# Patient Record
Sex: FEMALE | HISPANIC OR LATINO | Employment: FULL TIME | URBAN - METROPOLITAN AREA
[De-identification: names, ages, dates, MRNs, and addresses within clinical notes are randomized per-mention and may not be internally consistent; named-entity substitution may affect disease eponyms.]

---

## 2017-09-29 ENCOUNTER — OFFICE VISIT (OUTPATIENT)
Dept: FAMILY MEDICINE | Facility: CLINIC | Age: 47
End: 2017-09-29
Payer: MEDICAID

## 2017-09-29 VITALS
TEMPERATURE: 98.1 F | OXYGEN SATURATION: 98 % | BODY MASS INDEX: 31.57 KG/M2 | HEIGHT: 61 IN | WEIGHT: 167.2 LBS | SYSTOLIC BLOOD PRESSURE: 119 MMHG | HEART RATE: 52 BPM | DIASTOLIC BLOOD PRESSURE: 78 MMHG

## 2017-09-29 DIAGNOSIS — Z00.00 ROUTINE HISTORY AND PHYSICAL EXAMINATION OF ADULT: Primary | ICD-10-CM

## 2017-09-29 DIAGNOSIS — Z12.31 ENCOUNTER FOR SCREENING MAMMOGRAM FOR BREAST CANCER: ICD-10-CM

## 2017-09-29 DIAGNOSIS — Z23 NEED FOR PROPHYLACTIC VACCINATION AND INOCULATION AGAINST INFLUENZA: ICD-10-CM

## 2017-09-29 DIAGNOSIS — E03.9 HYPOTHYROIDISM, UNSPECIFIED TYPE: ICD-10-CM

## 2017-09-29 PROCEDURE — G0476 HPV COMBO ASSAY CA SCREEN: HCPCS | Performed by: FAMILY MEDICINE

## 2017-09-29 PROCEDURE — G0123 SCREEN CERV/VAG THIN LAYER: HCPCS | Performed by: FAMILY MEDICINE

## 2017-09-29 PROCEDURE — 90686 IIV4 VACC NO PRSV 0.5 ML IM: CPT | Performed by: FAMILY MEDICINE

## 2017-09-29 PROCEDURE — G0145 SCR C/V CYTO,THINLAYER,RESCR: HCPCS | Performed by: FAMILY MEDICINE

## 2017-09-29 PROCEDURE — 87624 HPV HI-RISK TYP POOLED RSLT: CPT | Performed by: FAMILY MEDICINE

## 2017-09-29 PROCEDURE — 90471 IMMUNIZATION ADMIN: CPT | Performed by: FAMILY MEDICINE

## 2017-09-29 PROCEDURE — 84443 ASSAY THYROID STIM HORMONE: CPT | Performed by: FAMILY MEDICINE

## 2017-09-29 PROCEDURE — 36415 COLL VENOUS BLD VENIPUNCTURE: CPT | Performed by: FAMILY MEDICINE

## 2017-09-29 PROCEDURE — 99386 PREV VISIT NEW AGE 40-64: CPT | Mod: 25 | Performed by: FAMILY MEDICINE

## 2017-09-29 RX ORDER — LEVOTHYROXINE SODIUM 25 MCG
25 TABLET ORAL DAILY
Qty: 90 TABLET | Refills: 3 | Status: SHIPPED | OUTPATIENT
Start: 2017-09-29 | End: 2018-01-30

## 2017-09-29 NOTE — MR AVS SNAPSHOT
After Visit Summary   9/29/2017    Saray Huntley    MRN: [id number]           Patient Information     Date Of Birth          1970        Visit Information        Provider Department      9/29/2017 2:00 PM Jeannie Ross [place] [facility] [place]        Today's Diagnoses     Routine history and physical examination of adult    -  1    Hypothyroidism, unspecified type        Encounter for screening mammogram for breast cancer          Care Instructions      Preventive Health Recommendations  Female Ages 40 to 49    Yearly exam:     See your health care provider every year in order to  1. Review health changes.   2. Discuss preventive care.    3. Review your medicines if your doctor prescribed any.      Get a Pap test every three years (unless you have an abnormal result and your provider advises testing more often).      If you get Pap tests with HPV test, you only need to test every 5 years, unless you have an abnormal result. You do not need a Pap test if your uterus was removed (hysterectomy) and you have not had cancer.      You should be tested each year for STDs (sexually transmitted diseases), if you're at risk.       Ask your doctor if you should have a mammogram.      Have a colonoscopy (test for colon cancer) if someone in your family has had colon cancer or polyps before age 50.       Have a cholesterol test every 5 years.       Have a diabetes test (fasting glucose) after age 45. If you are at risk for diabetes, you should have this test every 3 years.    Shots: Get a flu shot each year. Get a tetanus shot every 10 years.     Nutrition:     Eat at least 5 servings of fruits and vegetables each day.    Eat whole-grain bread, whole-wheat pasta and brown rice instead of white grains and rice.    Talk to your provider about Calcium and Vitamin D.     Lifestyle    Exercise at least 150 minutes a week (an average of 30 minutes a day, 5 days a week). This will help you control your weight  "and prevent disease.    Limit alcohol to one drink per day.    No smoking.     Wear sunscreen to prevent skin cancer.    See your dentist every six months for an exam and cleaning.          Follow-ups after your visit        Follow-up notes from your care team     Return in about 1 year (around 2018) for Annual Physical Exam.      Future tests that were ordered for you today     Open Future Orders        Priority Expected Expires Ordered    *MA Screening Digital Bilateral Routine  2018            Who to contact     If you have questions or need follow up information about today's clinic visit or your schedule please contact [facility] [place] directly at [phone].  Normal or non-critical lab and imaging results will be communicated to you by MyChart, letter or phone within 4 business days after the clinic has received the results. If you do not hear from us within 7 days, please contact the clinic through [employer]hart or phone. If you have a critical or abnormal lab result, we will notify you by phone as soon as possible.  Submit refill requests through [employer] or call your pharmacy and they will forward the refill request to us. Please allow 3 business days for your refill to be completed.          Additional Information About Your Visit        MyChart Information     [employer] lets you send messages to your doctor, view your test results, renew your prescriptions, schedule appointments and more. To sign up, go to [email or web address] . Click on \"Log in\" on the left side of the screen, which will take you to the Welcome page. Then click on \"Sign up Now\" on the right side of the page.     You will be asked to enter the access code listed below, as well as some personal information. Please follow the directions to create your username and password.     Your access code is: [email or web address]  Expires: 2017  2:43 PM     Your access code will  in 90 days. If you need help or a new " "code, please call your [place] clinic or [phone].        Care EveryWhere ID     This is your Care EveryWhere ID. This could be used by other organizations to access your [place] medical records  [id number]        Your Vitals Were     Pulse Temperature Height Last Period Pulse Oximetry BMI (Body Mass Index)    52 98.1  F (36.7  C) (Tympanic) 5' 0.75\" (1.543 m) 09/29/2012 (Approximate) 98% 31.85 kg/m2       Blood Pressure from Last 3 Encounters:   09/29/17 119/78    Weight from Last 3 Encounters:   09/29/17 167 lb 3.2 oz (75.8 kg)              We Performed the Following     HPV High Risk Types DNA Cervical     Pap imaged thin layer screen with HPV - recommended age 30 - 65 years (select HPV order below)     TSH          Today's Medication Changes          These changes are accurate as of: 9/29/17  2:43 PM.  If you have any questions, ask your nurse or doctor.               Start taking these medicines.        Dose/Directions    SYNTHROID 25 MCG tablet   Used for:  Hypothyroidism, unspecified type   Generic drug:  levothyroxine   Replaces:  SYNTHROID PO   Started by:  Jeannie Ross MD        Dose:  25 mcg   Take 1 tablet (25 mcg) by mouth daily BRAND NAME ONLY   Quantity:  90 tablet   Refills:  3         Stop taking these medicines if you haven't already. Please contact your care team if you have questions.     SYNTHROID PO   Replaced by:  SYNTHROID 25 MCG tablet   Stopped by:  Jeannie Ross MD                Where to get your medicines      These [facility] - [place], [place] - [address] AT [facility] OF  [address]  [address] [facility], [place] [facility] [address]     Phone:  [phone]     SYNTHROID 25 MCG tablet                Primary Care Provider    None Specified       No primary provider on file.        Equal Access to Services     SAMANTA GABRIEL: Rosendo Acevedo, wachenteda luemiladaha, qaybta kaalmada adeanetayaantonina, esha anand " alissa villelatonymedina daveyTomasaanick ah. So [facility] [phone].    ATENCIÓN: Si habla irving, tiene a marquez disposición servicios gratuitos de asistencia lingüística. Laney al [phone].    We comply with applicable federal civil rights laws and [place] laws. We do not discriminate on the basis of race, color, national origin, age, disability, sex, sexual orientation, or gender identity.            Thank you!     Thank you for [facility]  for your care. Our goal is always to provide you with excellent care. Hearing back from our patients is one way we can continue to improve our services. Please take a few minutes to complete the written survey that you may receive in the mail after your visit with us. Thank you!             Your Updated Medication List - Protect others around you: Learn how to safely use, store and throw away your medicines at www.disposemymeds.org.          This list is accurate as of: 9/29/17  2:43 PM.  Always use your most recent med list.                   Brand Name Dispense Instructions for use Diagnosis    SYNTHROID 25 MCG tablet   Generic drug:  levothyroxine     90 tablet    Take 1 tablet (25 mcg) by mouth daily BRAND NAME ONLY    Hypothyroidism, unspecified type

## 2017-09-29 NOTE — NURSING NOTE
"Chief Complaint   Patient presents with     Physical     Recheck Medication       Initial /78 (BP Location: Right arm, Patient Position: Sitting, Cuff Size: Adult Large)  Pulse 52  Temp 98.1  F (36.7  C) (Tympanic)  Ht 5' 0.75\" (1.543 m)  Wt 167 lb 3.2 oz (75.8 kg)  LMP 09/29/2012 (Approximate)  SpO2 98%  BMI 31.85 kg/m2 Estimated body mass index is 31.85 kg/(m^2) as calculated from the following:    Height as of this encounter: 5' 0.75\" (1.543 m).    Weight as of this encounter: 167 lb 3.2 oz (75.8 kg).  Medication Reconciliation: complete   Mackenzie Kwong CMA    "

## 2017-09-29 NOTE — LETTER
October 10, 2017    Saray Huntley  [address]  [place] [address]    Dear Saray,  We are happy to inform you that your PAP smear result from 9/29/17 is normal.  We are now able to do a follow up test on PAP smears. The DNA test is for HPV (Human Papilloma Virus). Cervical cancer is closely linked with certain types of HPV. Your result showed no evidence of high risk HPV.  Therefore we recommend you return in 3 years for your next pap smear.  You will still need to return to the clinic every year for an annual exam and other preventive tests.  Please contact the clinic at [phone] with any questions.  Sincerely,    Jeannie Ross MD/jim

## 2017-09-29 NOTE — LETTER
October 1, 2017      Saray Huntley  [address] [address]        Dear as,    I have reviewed your recent labs. Here are the results:    -TSH (thyroid stimulating hormone) level is normal which indicates appropriate thyroid replacement dosing.  ADVISE: continuing same replacement dose and recheck in 12 months (TSH w/ T4 reflex, DX: hypothyroidism.)        Resulted Orders   TSH   Result Value Ref Range    TSH 2.66 0.40 - 4.00 mU/L       If you have any questions or concerns, please call the clinic at the number listed above.       Sincerely,        Jeannie Ross MD

## 2017-09-29 NOTE — NURSING NOTE
Prior to injection verified patient identity using patient's name and date of birth.  Per orders of Dr. Ross, injection of Flu vaccine given by Mackenzie Culver. Patient instructed to remain in clinic for 15 minutes afterwards, and to report any adverse reaction to me immediately.  Mackenzie Kwong, CMA

## 2017-09-29 NOTE — Clinical Note
Please abstract the following data from this visit with this patient into the appropriate field in Epic:  Mammogram done on this date: 11/1/16 (approximately), by this group: [place] [facility], results were normal Mackenzie Kwong CMA.

## 2017-09-30 LAB — TSH SERPL DL<=0.005 MIU/L-ACNC: 2.66 MU/L (ref 0.4–4)

## 2017-10-02 ENCOUNTER — TELEPHONE (OUTPATIENT)
Dept: FAMILY MEDICINE | Facility: CLINIC | Age: 47
End: 2017-10-02

## 2017-10-02 NOTE — TELEPHONE ENCOUNTER
Reported that generic synthroid - has side effect, headache and nausea  Has not tried other medication and does not wish to try as she is very sensitive to medication     Matilde Ching RN

## 2017-10-02 NOTE — TELEPHONE ENCOUNTER
Left message for pt to call back.    Need to know why she needs brand name synthroid.  What else has she tried? Or has she had bad side effects from other medication or generic synthroid.    Once pt calls back will proceed with RONALD Gonzales CMA

## 2017-10-02 NOTE — TELEPHONE ENCOUNTER
Fax from pt's pharmacy stating that her synthroid isn't covered by her insurance.    Do you want to change med or proceed with PA?    Reta Gonzales [facility]      Insurance # [phone]  Pt ID# [id number]

## 2017-10-02 NOTE — TELEPHONE ENCOUNTER
Patient requested from brand name only.  Please do a PA.     Jeannie Ross MD  [facility] [place]

## 2017-10-03 ENCOUNTER — TELEPHONE (OUTPATIENT)
Dept: LAB | Facility: CLINIC | Age: 47
End: 2017-10-03

## 2017-10-03 DIAGNOSIS — Z13.220 LIPID SCREENING: Primary | ICD-10-CM

## 2017-10-03 DIAGNOSIS — Z13.1 SCREENING FOR DIABETES MELLITUS: ICD-10-CM

## 2017-10-03 DIAGNOSIS — Z13.0 SCREENING FOR DEFICIENCY ANEMIA: ICD-10-CM

## 2017-10-04 DIAGNOSIS — Z13.220 LIPID SCREENING: ICD-10-CM

## 2017-10-04 DIAGNOSIS — Z13.0 SCREENING FOR DEFICIENCY ANEMIA: ICD-10-CM

## 2017-10-04 DIAGNOSIS — Z13.1 SCREENING FOR DIABETES MELLITUS: ICD-10-CM

## 2017-10-04 LAB
ALBUMIN SERPL-MCNC: 4.1 G/DL (ref 3.4–5)
ALP SERPL-CCNC: 104 U/L (ref 40–150)
ALT SERPL W P-5'-P-CCNC: 74 U/L (ref 0–50)
ANION GAP SERPL CALCULATED.3IONS-SCNC: 8 MMOL/L (ref 3–14)
AST SERPL W P-5'-P-CCNC: 43 U/L (ref 0–45)
BILIRUB SERPL-MCNC: 0.5 MG/DL (ref 0.2–1.3)
BUN SERPL-MCNC: 15 MG/DL (ref 7–30)
CALCIUM SERPL-MCNC: 9.3 MG/DL (ref 8.5–10.1)
CHLORIDE SERPL-SCNC: 105 MMOL/L (ref 94–109)
CHOLEST SERPL-MCNC: 207 MG/DL
CO2 SERPL-SCNC: 26 MMOL/L (ref 20–32)
COPATH REPORT: NORMAL
CREAT SERPL-MCNC: 0.69 MG/DL (ref 0.52–1.04)
GFR SERPL CREATININE-BSD FRML MDRD: 90 ML/MIN/1.7M2
GLUCOSE SERPL-MCNC: 106 MG/DL (ref 70–99)
HDLC SERPL-MCNC: 57 MG/DL
HGB BLD-MCNC: 13.6 G/DL (ref 11.7–15.7)
LDLC SERPL CALC-MCNC: 131 MG/DL
NONHDLC SERPL-MCNC: 150 MG/DL
PAP: NORMAL
POTASSIUM SERPL-SCNC: 3.7 MMOL/L (ref 3.4–5.3)
PROT SERPL-MCNC: 7.8 G/DL (ref 6.8–8.8)
SODIUM SERPL-SCNC: 139 MMOL/L (ref 133–144)
TRIGL SERPL-MCNC: 94 MG/DL

## 2017-10-04 PROCEDURE — 36415 COLL VENOUS BLD VENIPUNCTURE: CPT | Performed by: FAMILY MEDICINE

## 2017-10-04 PROCEDURE — 85018 HEMOGLOBIN: CPT | Performed by: FAMILY MEDICINE

## 2017-10-04 PROCEDURE — 80061 LIPID PANEL: CPT | Performed by: FAMILY MEDICINE

## 2017-10-04 PROCEDURE — 80053 COMPREHEN METABOLIC PANEL: CPT | Performed by: FAMILY MEDICINE

## 2017-10-09 LAB
FINAL DIAGNOSIS: NORMAL
HPV HR 12 DNA CVX QL NAA+PROBE: NEGATIVE
HPV16 DNA SPEC QL NAA+PROBE: NEGATIVE
HPV18 DNA SPEC QL NAA+PROBE: NEGATIVE
SPECIMEN DESCRIPTION: NORMAL

## 2017-10-11 NOTE — TELEPHONE ENCOUNTER
Approved through 9/29/17 - 10/31/17.    Insurance company states looks like pt is starting a new insurance company 11/1/17.    Pt notified of approval.    PA [id number]    Reta Gonzales CMA

## 2017-10-18 ENCOUNTER — RADIANT APPOINTMENT (OUTPATIENT)
Dept: MAMMOGRAPHY | Facility: CLINIC | Age: 47
End: 2017-10-18
Attending: FAMILY MEDICINE
Payer: MEDICAID

## 2017-10-18 DIAGNOSIS — Z12.31 ENCOUNTER FOR SCREENING MAMMOGRAM FOR BREAST CANCER: ICD-10-CM

## 2017-10-18 PROCEDURE — G0202 SCR MAMMO BI INCL CAD: HCPCS | Mod: TC

## 2017-11-09 ENCOUNTER — TELEPHONE (OUTPATIENT)
Dept: FAMILY MEDICINE | Facility: CLINIC | Age: 47
End: 2017-11-09

## 2017-11-09 DIAGNOSIS — R92.8 ABNORMAL MAMMOGRAM: Primary | ICD-10-CM

## 2017-11-09 DIAGNOSIS — E03.9 HYPOTHYROIDISM, UNSPECIFIED TYPE: ICD-10-CM

## 2017-11-09 NOTE — TELEPHONE ENCOUNTER
Reason for Call:  Medication or medication refill:    Do you use a [facility]?  Name [facility] the [phone]    Name of the medication requested: SYNTHROID 25 MCG tablet    Other request: Patient needs an authorization form sent to her insurance company so that she can get a refill on her medication. She dont know the fax# to her insurance company    Can we leave a detailed message on this number? Not Applicable    Phone number patient can be reached at: [phone] - [employer]   Best Time: Anytime   Call taken on 11/9/2017 at 9:46 AM by Barb Carrillo

## 2017-11-09 NOTE — TELEPHONE ENCOUNTER
Dr Ross- if patient is having a DX U/s does she need a  3D mammo? I thought 3D was just for screening? Please advise. Thanks    Linda Valentine  Referral Coordinator

## 2017-11-10 NOTE — TELEPHONE ENCOUNTER
Lawanda from Breast center calling and states confusion with patient and completing imaging.  3D Mammo can be used in this patient's case for further imaging on Left breast concern.  Recommended this imaging be used to further evaluate concern with densities to left breast.  Provider ordered correct imaging with Tomography and breast center agrees with this first order.  Patient has orders for the Mammogram and the issue is with patient and her insurance coverage as she would not sign form to cover any out of pocket expenses if insurance would not cover the 3D.      Patient was confused about if her insurance covered this imaging or not and would not agree to the 3D due to possible not coverage and was told by her insurance she would need a prior authorization for 3D imaging.  So if patient wants to pay out of pocket to have 3D she can have imaging  Or she will need to continue with the 2D imaging if she wants insurance coverage.     I attempted to call the number to insurance that patient left to see if I could find out more about her coverage and it was for medication PA.  Could not get through to anyone to find out about medical benefit coverage.    Breast center call back number for Lawanda - [phone]  Lucy Raya RN - Triage  [facility]

## 2017-11-13 RX ORDER — LEVOTHYROXINE SODIUM 25 MCG
25 TABLET ORAL DAILY
Qty: 90 TABLET | Refills: 3 | OUTPATIENT
Start: 2017-11-13

## 2017-11-13 NOTE — TELEPHONE ENCOUNTER
Patient has refills on file.     Routing to team to address message below. Does patient need a PA? Please address today, encounter is from 11/9/17.     Ilana Henry RN   [facility] - Triage

## 2017-11-13 NOTE — TELEPHONE ENCOUNTER
PA Faxed to [facility] for 3D imaging of left breast  Lucy Raya RN - Triage  [facility]

## 2017-11-14 ENCOUNTER — TELEPHONE (OUTPATIENT)
Dept: FAMILY MEDICINE | Facility: CLINIC | Age: 47
End: 2017-11-14

## 2017-11-14 NOTE — TELEPHONE ENCOUNTER
PA from [facility] approved for name brand synthroid. DAYSI Salazar LPN    Patient was notified. DAYSI Salazar LPN

## 2017-11-14 NOTE — TELEPHONE ENCOUNTER
Updated Status: APPROVED; Coverage Start Date 11/12/2017 End Date: 11/13/2018.   Pharmacy is currently closed. Will call and inform both patient and pharmacy tech as soon as pharmacy opens. Thank you.   Amber Marina MA

## 2018-01-30 ENCOUNTER — OFFICE VISIT (OUTPATIENT)
Dept: FAMILY MEDICINE | Facility: CLINIC | Age: 48
End: 2018-01-30
Payer: COMMERCIAL

## 2018-01-30 VITALS
BODY MASS INDEX: 34.17 KG/M2 | HEIGHT: 61 IN | SYSTOLIC BLOOD PRESSURE: 118 MMHG | WEIGHT: 181 LBS | DIASTOLIC BLOOD PRESSURE: 64 MMHG | TEMPERATURE: 98.9 F | HEART RATE: 57 BPM | OXYGEN SATURATION: 97 %

## 2018-01-30 DIAGNOSIS — S29.012A UPPER BACK STRAIN, INITIAL ENCOUNTER: Primary | ICD-10-CM

## 2018-01-30 DIAGNOSIS — L91.8 SKIN TAG: ICD-10-CM

## 2018-01-30 DIAGNOSIS — E03.9 HYPOTHYROIDISM, UNSPECIFIED TYPE: ICD-10-CM

## 2018-01-30 PROCEDURE — 99214 OFFICE O/P EST MOD 30 MIN: CPT | Performed by: FAMILY MEDICINE

## 2018-01-30 NOTE — MR AVS SNAPSHOT
After Visit Summary   1/30/2018    Saray Huntley    MRN: [id number]           Patient Information     Date Of Birth          1970        Visit Information        Provider Department      1/30/2018 4:20 PM Jeannie Ross MD [facility] [place]        Today's Diagnoses     Hypothyroidism, unspecified type    -  1    Skin tag        Upper back strain, initial encounter           Follow-ups after your visit        Additional Services     DERMATOLOGY REFERRAL       Your provider has referred you to: [facility] [place] [phone]   [email or web address]    Please be aware that coverage of these services is subject to the terms and limitations of your health insurance plan.  Call member services at your health plan with any benefit or coverage questions.      Please bring the following with you to your appointment:    (1) Any X-Rays, CTs or MRIs which have been performed.  Contact the facility where they were done to arrange for  prior to your scheduled appointment.    (2) List of current medications  (3) This referral request   (4) Any documents/labs given to you for this referral                  Follow-up notes from your care team     Return in about 2 weeks (around 2/13/2018) for Lab only visit.      Your next 10 appointments already scheduled     Feb 15, 2018  8:20 AM CST   LAB with EC LAB   [facility] [place] ([facility]    [address] [place] [id number] [phone]           OUTSIDE LABS: Please include name of facility and Physician that is requesting outside labs be drawn.  Please indicate if labs are fasting or non-fasting on appt notes.  Be as specific as you can on which labs are being drawn.              Future tests that were ordered for you today     Open Future Orders        Priority Expected Expires Ordered    TSH with free T4 reflex Routine  1/30/2019 1/30/2018            Who to  "contact     If you have questions or need follow up information about today's clinic visit or your schedule please contact [facility] [place] directly at [phone].  Normal or non-critical lab and imaging results will be communicated to you by MyChart, letter or phone within 4 business days after the clinic has received the results. If you do not hear from us within 7 days, please contact the clinic through MyChart or phone. If you have a critical or abnormal lab result, we will notify you by phone as soon as possible.  Submit refill requests through [employer] or call your pharmacy and they will forward the refill request to us. Please allow 3 business days for your refill to be completed.          Additional Information About Your Visit        [employer] Information     [employer] lets you send messages to your doctor, view your test results, renew your prescriptions, schedule appointments and more. To sign up, go to [email or web address] . Click on \"Log in\" on the left side of the screen, which will take you to the Welcome page. Then click on \"Sign up Now\" on the right side of the page.     You will be asked to enter the access code listed below, as well as some personal information. Please follow the directions to create your username and password.     Your access code is: [email or web address]  Expires: 2018  4:55 PM     Your access code will  in 90 days. If you need help or a new code, please call your [place] clinic or [phone].        Care EveryWhere ID     This is your Care EveryWhere ID. This could be used by other organizations to access your [place] medical records  [id number]        Your Vitals Were     Pulse Temperature Height Last Period Pulse Oximetry BMI (Body Mass Index)    57 98.9  F (37.2  C) (Tympanic) 5' 0.75\" (1.543 m) 2012 (Approximate) 97% 34.48 kg/m2       Blood Pressure from Last 3 Encounters:   18 118/64   17 119/78    Weight from Last 3 Encounters: "   01/30/18 181 lb (82.1 kg)   09/29/17 167 lb 3.2 oz (75.8 kg)              We Performed the Following     DERMATOLOGY REFERRAL          Today's Medication Changes          These changes are accurate as of 1/30/18  5:17 PM.  If you have any questions, ask your nurse or doctor.               Stop taking these medicines if you haven't already. Please contact your care team if you have questions.     SYNTHROID 25 MCG tablet   Generic drug:  levothyroxine   Stopped by:  Jeannie Ross MD                    Primary Care Provider Office Phone # Fax #    Jeannie Ross [phone]       [address]  [place] [address]        Equal Access to Services     [facility]: Hadyeyo Acevedo, wacindy smith, qaybta kaalmada xenia, esha bennett . So [facility] [phone].    ATENCIÓN: Si habla español, tiene a marquez disposición servicios gratuitos de asistencia lingüística. Llame al [phone].    We comply with applicable federal civil rights laws and [place] laws. We do not discriminate on the basis of race, color, national origin, age, disability, sex, sexual orientation, or gender identity.            Thank you!     Thank you for choosing [facility] [place]  for your care. Our goal is always to provide you with excellent care. Hearing back from our patients is one way we can continue to improve our services. Please take a few minutes to complete the written survey that you may receive in the mail after your visit with us. Thank you!             Your Updated Medication List - Protect others around you: Learn how to safely use, store and throw away your medicines at www.disposemymeds.org.      Notice  As of 1/30/2018  5:17 PM    You have not been prescribed any medications.

## 2018-01-30 NOTE — PROGRESS NOTES
SUBJECTIVE:   Saray Huntley is a 48 year old female who presents to clinic today for the following health issues:      Back Pain       Duration: 1 week        Specific cause: shoveling     Description:   Location of pain: upper back right  Character of pain: dull ache and pinch  Pain radiation:none  New numbness or weakness in legs, not attributed to pain:  no     Intensity: moderate    History:   Pain interferes with job: No  History of back problems: no prior back problems  Any previous MRI or X-rays: None  Sees a specialist for back pain:  No  Therapies tried without relief: NSAIDs    Alleviating factors:   Improved by: NSAIDs      Precipitating factors:  Worsened by: lift arm above head    Functional and Psychosocial Screen (Marjorie STarT Back):      Not performed today        Hypothyroidism: Patient ran out of Synthroid 25 mcg on 1/15/18.  Reports of feeling no different than when she was on the medication.  Wonders if she needs to be on the medication.  Have recommended to follow-up in the next 2-4 weeks for thyroid function recheck.  That would help us decide if she needs to be started on thyroid medication again or not.  Patient agrees to the plan.      Patient is concerned about some skin tags.  Would like a referral to dermatology for management.        Problem list and histories reviewed & adjusted, as indicated.  Additional history: as documented    Patient Active Problem List   Diagnosis     Hypothyroidism, unspecified type     No past surgical history on file.    Social History   Substance Use Topics     Smoking status: Never Smoker     Smokeless tobacco: Never Used     Alcohol use No     Family History   Problem Relation Age of Onset     Family History Negative Other          No current outpatient prescriptions on file.     No Known Allergies    Reviewed and updated as needed this visit by clinical staff       Reviewed and updated as needed this visit by Provider         ROS:  C: NEGATIVE for fever,  "chills, change in weight  E/M: NEGATIVE for ear, mouth and throat problems  R: NEGATIVE for significant cough or SOB  CV: NEGATIVE for chest pain, palpitations or peripheral edema    OBJECTIVE:                                                    /64  Pulse 57  Temp 98.9  F (37.2  C) (Tympanic)  Ht 5' 0.75\" (1.543 m)  Wt 181 lb (82.1 kg)  LMP 09/29/2012 (Approximate)  SpO2 97%  BMI 34.48 kg/m2  Body mass index is 34.48 kg/(m^2).   GENERAL: healthy, alert, well nourished, well hydrated, no distress  NECK: no tenderness, no adenopathy, no asymmetry, no masses, no stiffness; thyroid- normal to palpation  RESP: lungs clear to auscultation - no rales, no rhonchi, no wheezes  CV: regular rates and rhythm, normal S1 S2, no S3 or S4 and no murmur, no click or rub -  ABDOMEN: soft, no tenderness, no  hepatosplenomegaly, no masses, normal bowel sounds  SKIN: no suspicious lesions, no rashes  NEURO: strength and tone- normal, sensory exam- grossly normal, mentation- intact, speech- normal, reflexes- symmetric  BACK: No localized tenderness over the scapula, cervical or thoracic spine.  MS: Range of motion of both shoulders are normal.  No tenderness over the shoulders noted.         ASSESSMENT/PLAN:                                                      1. Upper back strain, initial encounter  Recommended to use ibuprofen for pain as needed.  Symptoms are likely due to muscular strain.  No imaging indicated.  Avoid lifting any heavy weights or pulling or pushing.  If symptoms are worsening or no improvement noted in the next few weeks, instructed to notify me back.    2. Hypothyroidism, unspecified type  Plan as above.   - TSH with free T4 reflex; Future    3. Skin tag    - DERMATOLOGY REFERRAL      Jeannie Ross MD  [facility]  "

## 2018-01-30 NOTE — NURSING NOTE
"Chief Complaint   Patient presents with     Back Pain     Skin Tags     Results     review        Initial /64  Pulse 57  Temp 98.9  F (37.2  C) (Tympanic)  Ht 5' 0.75\" (1.543 m)  Wt 181 lb (82.1 kg)  LMP 09/29/2012 (Approximate)  SpO2 97%  BMI 34.48 kg/m2 Estimated body mass index is 34.48 kg/(m^2) as calculated from the following:    Height as of this encounter: 5' 0.75\" (1.543 m).    Weight as of this encounter: 181 lb (82.1 kg).  Medication Reconciliation: complete  "

## 2018-02-09 ENCOUNTER — TELEPHONE (OUTPATIENT)
Dept: FAMILY MEDICINE | Facility: CLINIC | Age: 48
End: 2018-02-09

## 2018-02-09 DIAGNOSIS — E03.9 HYPOTHYROIDISM, UNSPECIFIED TYPE: ICD-10-CM

## 2018-02-09 RX ORDER — LEVOTHYROXINE SODIUM 25 UG/1
25 TABLET ORAL DAILY
Qty: 90 TABLET | Refills: 2 | Status: SHIPPED | OUTPATIENT
Start: 2018-02-09 | End: 2018-11-06

## 2018-02-09 NOTE — TELEPHONE ENCOUNTER
Fax received from [facility] [address]    Synthroid 0.025MG (25MCG) Tablets    Drug not covered by patient plan. The preferred alternative is LevothyroxineSodium.  Please call/fax the pharmacy to change medication along with strength, directions, quantity and refills    Liliya CRISTOBAL

## 2018-10-23 ENCOUNTER — HOSPITAL ENCOUNTER (OUTPATIENT)
Dept: MAMMOGRAPHY | Facility: CLINIC | Age: 48
Discharge: HOME | End: 2018-10-23
Attending: FAMILY MEDICINE | Admitting: FAMILY MEDICINE
Payer: COMMERCIAL

## 2018-10-23 DIAGNOSIS — Z12.31 SCREENING MAMMOGRAM, ENCOUNTER FOR: ICD-10-CM

## 2018-10-23 PROCEDURE — 77063 BREAST TOMOSYNTHESIS BI: CPT

## 2018-10-29 ENCOUNTER — TELEPHONE (OUTPATIENT)
Dept: FAMILY MEDICINE | Facility: CLINIC | Age: 48
End: 2018-10-29

## 2018-10-29 NOTE — TELEPHONE ENCOUNTER
Attempt # 2 today at [phone]    Non detailed message left for pt to return call    Lyn TAVAREZ RN  EP Triage

## 2018-10-29 NOTE — TELEPHONE ENCOUNTER
Please contact the patient to follow-up with the breast center's recommendation.  Breast imaging is concerning for cancer.  Please reach out to the emergency contacts that the patient is not attending her phone calls.    Jeannie Ross MD  [facility], [place]

## 2018-10-29 NOTE — TELEPHONE ENCOUNTER
Attempted to call pt at the home number listed and left a message to call clinic or the breast center for further breast imaging per Dr. Ross and radiologist's recommendation.    Attempted to call emergency contact Jenni pa who has the same home number as pt but tried the cell # [phone] but received message that the mailbox is full and could not leave a message.    Will attempt later today and tomorrow.    Lyn TAVAREZ RN  EP Triage

## 2018-10-29 NOTE — TELEPHONE ENCOUNTER
Verito from the  Breast Center calling states pt had a 3D mammogram screening done on 10/23.  Had a mammogram last year that was abnormal and was to f/u on it but pt never did.  Now the mammogram came back abnormal again and the radiologist states the area is larger as compared to last year and is more suspicious of malignancy.  The breast center did contact the pt this year and last year but she told them she had to check with insurance to see about follow up.  Now will not answer the phone when they attempt to call her.  They have tried to call 2x and have sent out a letter x1 also.  Will send another letter but does not think pt will follow up.   Letting Dr. Ross know sooner than usual.    Lyn TAVAREZ RN  EP Triage

## 2018-10-30 NOTE — TELEPHONE ENCOUNTER
Patient notified of mammogram results and need for ultrasound.  She said she was checking with her insurance to see if ultrasound was covered.  She said she was not going to do it if insurance did not cover it.  I advised her the mammogram results are concerning for cancer and that is why they want further imaging.  She said she will call to schedule ultrasound now that she knows her insurance will cover it.      FYI to Dr. Ross.      Rajwinder Roman, GABBIE, RN, N  [facility]) [phone]

## 2018-10-30 NOTE — TELEPHONE ENCOUNTER
Left non detailed message for patient to return call.     Spoke with sister - emergency contact. She wanted to know if this was regarding her mammogram. Unfortunately, there is no consent to give her this information. She states they are going to make an appointment at the breast center and are trying to find a time to schedule so they can go together. She asks that we stop calling her. I state that I need to speak to the patient and if she calls us to confirm that she will be following up then we will stop calling.     Ilana Henry RN   [facility] - Triage

## 2018-11-06 ENCOUNTER — HOSPITAL ENCOUNTER (OUTPATIENT)
Dept: MAMMOGRAPHY | Facility: CLINIC | Age: 48
End: 2018-11-06
Attending: FAMILY MEDICINE
Payer: COMMERCIAL

## 2018-11-06 ENCOUNTER — HOSPITAL ENCOUNTER (OUTPATIENT)
Dept: MAMMOGRAPHY | Facility: CLINIC | Age: 48
Discharge: HOME | End: 2018-11-06
Attending: FAMILY MEDICINE | Admitting: FAMILY MEDICINE
Payer: COMMERCIAL

## 2018-11-06 DIAGNOSIS — R92.8 ABNORMAL MAMMOGRAM: ICD-10-CM

## 2018-11-06 PROCEDURE — 88305 TISSUE EXAM BY PATHOLOGIST: CPT | Mod: 26 | Performed by: FAMILY MEDICINE

## 2018-11-06 PROCEDURE — 88360 TUMOR IMMUNOHISTOCHEM/MANUAL: CPT | Mod: 26 | Performed by: FAMILY MEDICINE

## 2018-11-06 PROCEDURE — 76642 ULTRASOUND BREAST LIMITED: CPT | Mod: LT

## 2018-11-06 PROCEDURE — 25000125 ZZHC RX 250: Performed by: FAMILY MEDICINE

## 2018-11-06 PROCEDURE — 88305 TISSUE EXAM BY PATHOLOGIST: CPT | Performed by: FAMILY MEDICINE

## 2018-11-06 PROCEDURE — 88377 M/PHMTRC ALYS ISHQUANT/SEMIQ: CPT | Performed by: FAMILY MEDICINE

## 2018-11-06 PROCEDURE — 19083 BX BREAST 1ST LESION US IMAG: CPT | Mod: LT

## 2018-11-06 PROCEDURE — 88360 TUMOR IMMUNOHISTOCHEM/MANUAL: CPT | Performed by: FAMILY MEDICINE

## 2018-11-06 PROCEDURE — 40000986 MA POST PROCEDURE LEFT

## 2018-11-06 PROCEDURE — 00000158 ZZHCL STATISTIC H-FISH PROCESS B/S: Performed by: FAMILY MEDICINE

## 2018-11-06 PROCEDURE — 00000159 ZZHCL STATISTIC H-SEND OUTS PREP: Performed by: FAMILY MEDICINE

## 2018-11-06 RX ADMIN — LIDOCAINE HYDROCHLORIDE 5 ML: 10 INJECTION, SOLUTION INFILTRATION; PERINEURAL at 09:02

## 2018-11-06 NOTE — DISCHARGE INSTRUCTIONS
After Your Breast Biopsy    Bleeding or bruising: Slight bruising is normal.  If you bleed through the bandage, put direct pressure on the breast.  If you are still bleeding after 20 minutes, call the doctor who ordered the exam.    Bandages: Keep your bandage in place until tomorrow morning.  Do not get it wet.     Activity: You may shower the morning after the exam.  No heavy activity (lifting, vacuuming) for 24 hours.    Discomfort: Wear your bra overnight to support the breast.  You may take Tylenol (acetaminophen) for pain.  If you had a stereotactic of MR-directed biopsy, you may take aspirin or ibuprofen (Advil, Motrin) the morning after your biopsy, unless your doctor tells you not to.    Infection: Infection is rare.  Symptoms include fever, redness, increasing pain and fluid draining from the biopsy site.  If you have any of these symptoms, please call the doctor who ordered your exam.    Results: Results may take up to three business days.  If you have not heard your results in three days, call the Breast Center Nurse at [phone] or [phone].  In rare cases, we may need to do another biopsy.    Call the doctor who ordered your exam if:    You have bleeding that lasts more than 20 minutes.    You have pain that cannot be controlled.    You have signs of infection (fever, redness, drainage or other signs).    You have not had your results within three days.    Nurse navigator: Our breast nurse coordinator is here to answer your questions and help you set up future clinic visits.  Please call [phone].    Thank you for choosing [facility].  Please call us if you have questions or concerns about your biopsy.

## 2018-11-07 LAB — COPATH REPORT: NORMAL

## 2018-11-08 ENCOUNTER — TELEPHONE (OUTPATIENT)
Dept: MAMMOGRAPHY | Facility: CLINIC | Age: 48
End: 2018-11-08

## 2018-11-08 ENCOUNTER — OFFICE VISIT (OUTPATIENT)
Dept: SURGERY | Facility: CLINIC | Age: 48
End: 2018-11-08
Payer: COMMERCIAL

## 2018-11-08 VITALS
HEART RATE: 59 BPM | WEIGHT: 170 LBS | OXYGEN SATURATION: 100 % | BODY MASS INDEX: 32.1 KG/M2 | DIASTOLIC BLOOD PRESSURE: 74 MMHG | SYSTOLIC BLOOD PRESSURE: 124 MMHG | HEIGHT: 61 IN

## 2018-11-08 DIAGNOSIS — C50.912 MALIGNANT NEOPLASM OF LEFT FEMALE BREAST, UNSPECIFIED ESTROGEN RECEPTOR STATUS, UNSPECIFIED SITE OF BREAST (H): Primary | ICD-10-CM

## 2018-11-08 LAB — COPATH REPORT: NORMAL

## 2018-11-08 PROCEDURE — 99205 OFFICE O/P NEW HI 60 MIN: CPT | Performed by: SURGERY

## 2018-11-08 NOTE — LETTER
"2018    RE: Saray Huntley, : 1970      [facility]  Surgery Consultation     HPI: Patient is a 48 year old female who is here for consultation requested by Jeannie Ross [phone] for left breast cancer The lesion was discovered on the patients recent mammogram. The patients last mammogram was in 2017.  She was found to have a possible asymmetry in this location but did not undergo follow-up due to insurance reasons.  Her recent mammogram  and ultrasound showed a 1.5 x 1.6 x 1.5 cm mass.  A biopsy was performed that showed grade 3 invasive ductal carcinoma.  ER/[place] negative and HER-2 pending. She has never had an abnormality or biopsy in the past. Her menarche was at age 12. Patients last menstrual cycle was in years ago. She denies taking hormone replacement in the past. She has had 1 pregnancies and has 1 living children. Her first pregnancy was at age 32. Her family history is significant for no breast or ovarian cancer. She denies all other complaints today. Patient denies fevers, chills, nausea, vomiting, SOB, chest pain, abdominal pain.     PMH:  Saray Huntley  has a past medical history of Hypothyroidism, unspecified type (2017).  PSH:  Saray Huntley  has a past surgical history that includes  section (2003).  Social History:  Saray Huntley  reports that she has never smoked. She has never used smokeless tobacco. She reports that she does not drink alcohol or use illicit drugs.  Family History:  Saray Huntley family history includes Diabetes in her father; Hyperlipidemia in her mother; KIDNEY DISEASE in her mother.  Medications/Allergies: Home medications and allergies reviewed.     ROS:  The 10 point Review of Systems is negative other than noted in the HPI.     Physical Exam:  /74 (BP Location: Right arm, Patient Position: Chair, Cuff Size: Adult Regular)  Pulse 59  Ht 5' 1\" (1.549 m)  Wt 170 lb (77.1 kg)  LMP 2012 (Approximate)  SpO2 100%  BMI " 32.12 kg/m2  GENERAL: Generally appears well.  Psych: Alert and Oriented.  Normal affect  Eyes: Sclera clear  Respiratory:  Lungs clear to ausculation bilaterally with good air excursion  Cardiovascular:  Regular Rate and Rhythm with no murmurs gallops or rubs, normal peripheral pulses  Breast: A bilateral breast exam was performed in the sitting and supine position. The hands were placed at this side and above the head. Bilateral breasts were palpated in a circumferential clockwise fashion including the supraclavicular and axillary areas. Right breast-no masses palpated.. Left breast-mass palpated at 12 o clock position.  Measures approximately 2 cm and is mobile and not fixed to underlying skin.  GI: Abdomen Soft Non-Tender   Lymphatic/Hematologic/Immune:  No femoral or cervical lymphadenopathy.  Integumentary:  No rashes  Neurological: grossly intact     All new lab and imaging data was reviewed.      Impression and Plan:  Patient is a 48 year old female with Left breast cancer     PLAN:   I spent over 60 minutes discussing the pathophysiology of breast cancer and surgical options.  She has a 1.6 cm invasive ductal ER/DE negative and HER-2 pending.  We discussed the treatment of breast cancer which includes systemic therapy and surgical therapy.  We discussed that if HER- 2 is positive that she should meet with oncology for  consideration of neoadjuvant therapy.  If the HER-2 is negative she would be a candidate for upfront surgery.  I discussed breast conservative therapy with a localized lumpectomy and sentinel lymph node biopsy versus simple mastectomy and sentinel lymph node biopsy. I explained all the procedures in great depth and described the postoperative course which could include radiation if she chose breast conserving therapy and possible chemotherapy or hormonal therapy depending on the final pathology report. We also discussed that the survivability of the above procedures are equal but there would  be a higher recurrence with breast conservation at 10% comparative to approximately 1-2% with mastectomy.  I will refer her to medical oncology now and hopefully the HER-2 will be available at that appointment.  I will be in touch with the patient in the near future.        Sae Montaño M.D.  [facility]  [phone]

## 2018-11-08 NOTE — TELEPHONE ENCOUNTER
MALIGNANT path  Pathology report reviewed with our breast radiologist Dr. Azeem Murphy.  I phoned and informed patient of results showing Left Breast Invasive Ductal Carcinoma, grade 3.  Estrogen is a weak +(1-3%), and Progesterone is negative.  Her2 result is still pending.  Patient states no problems with biopsy site.  Recommended follow up is Surgical Consultation.  Surgical Consult has been arranged with Dr Sae Montaño @ [facility] on 11/8/2018  at 1:45p.m., with a check in time of 1:30 p.m.   Patient has directions and phone numbers.  Questions were answered and I explained my role as Breast Nurse Coordinator in assisting her with appointments, resources and social support. New diagnosis information packet will be available at consult. I will follow up with the patient. She has my phone number if she has further questions. Ordering provider notified.  Tatyana Pimentel RN, BSN

## 2018-11-08 NOTE — NURSING NOTE
Breast Patients    BREAST PATIENTS (ALL)    1-Do you have any of the following symptoms? Lump(s) or Mass(es)  2-In which breast are you having the symptoms? left  3-Do you use hormones?  No  4-Have you had a Mammogram? Yes    5-Have you ever had a breast cyst drained? No  6-Have you ever had a breast biopsy? Yes    7-Have you ever had a Breast Cancer? No   8-Is there a history of Breast Cancer in your family? Unknown  9-Have you ever had Ovarian Cancer? No  10-Is there a history of Ovarian Cancer in your family? Unknown  11-Summarize your caffeine intake (i.e. coffee, tea, chocolate, soda etc.): All occasionally    BREAST PATIENTS (FEMALE)    12-What age did your periods begin? 12  13-Date your last menstrual period began? Years ago  14-Number of full-term pregnancies: 1  15-Your age when your first child was born? 32  16-Did you nurse your children? Yes  17-Are you pregnant now? No  18-Have you begun menopause? Yes  Age Menopause began:  Unknown  19-Have you had either ovary removed?No  20-Do you have breast implants? No

## 2018-11-08 NOTE — TELEPHONE ENCOUNTER
MALIGNANT PATH:  Pathology report reviewed with our breast radiologist Dr.Steven Murphy.  I called patient on her cell phone([phone]), and left her a message to call me back.  Awaiting call from patient to inform of breast biopsy results below.  Tatyana Pimentel RN, BSN      Patient Name: AARON COSBY   MR#: [id number]   Specimen #: [id number]   Collected: 11/6/2018   Received: 11/6/2018   Reported: 11/7/2018 17:07   Ordering Phy(s): JOSE F JENKINS   Additional Phy(s): YIN MURPHY     For improved result formatting, select 'View Enhanced Report Format' under    Linked Documents section.     SPECIMEN(S):   Left ultrasound guided breast needle biopsy, 12:00, 8.0 cm from nipple     FINAL DIAGNOSIS:   Left breast, 12:00, 8.0 cm from nipple, ultrasound-guided needle biopsy   - Invasive ductal carcinoma, Irlanda grade 3 (of 3)   - Weakly positive for estrogen receptor (1-3%)   - Negative for progesterone receptor

## 2018-11-08 NOTE — MR AVS SNAPSHOT
After Visit Summary   11/8/2018    Saray Huntley    MRN: [id number]           Patient Information     Date Of Birth          1970        Visit Information        Provider Department      11/8/2018 1:45 PM Sae Montaño MD [facility] Surgery      Today's Diagnoses     Malignant neoplasm of left female breast, unspecified estrogen receptor status, unspecified site of breast (H)    -  1       Follow-ups after your visit        Additional Services     GENETICS REFERRAL       Your provider has referred you to: [facility]: Adult Genetics Clinic [facility] [phone]   [email or web address]    Please be aware that coverage of these services is subject to the terms and limitations of your health insurance plan.  Call member services at your health plan with any benefit or coverage questions.      Please bring the following with you to your appointment:    (1) Any X-Rays, CTs or MRIs which have been performed.  Contact the facility where they were done to arrange for  prior to your scheduled appointment.   (2) List of current medications   (3) This referral request   (4) Any documents/labs given to you for this referral            ONC/HEME ADULT REFERRAL       Your provider has referred you to: [facility]: Cancer Care/Hematology (All Cancer Related Services) - [place] [phone]   [email or web address]    Please be aware that coverage of these services is subject to the terms and limitations of your health insurance plan.  Call member services at your health plan with any benefit or coverage questions.      Please bring the following with you to your appointment:    (1) Any X-Rays, CTs or MRIs which have been performed.  Contact the facility where they were done to arrange for  prior to your scheduled appointment.   (2) List of current  "medications  (3) This referral request   (4) Any documents/labs given to you for this referral                  Who to contact     If you have questions or need follow up information about today's clinic visit or your schedule please contact [facility] directly at [phone].  Normal or non-critical lab and imaging results will be communicated to you by MyChart, letter or phone within 4 business days after the clinic has received the results. If you do not hear from us within 7 days, please contact the clinic through MyChart or phone. If you have a critical or abnormal lab result, we will notify you by phone as soon as possible.  Submit refill requests through [employer] or call your pharmacy and they will forward the refill request to us. Please allow 3 business days for your refill to be completed.          Additional Information About Your Visit        Care EveryWhere ID     This is your Care EveryWhere ID. This could be used by other organizations to access your [place] medical records  [id number]        Your Vitals Were     Pulse Height Last Period Pulse Oximetry BMI (Body Mass Index)       59 5' 1\" (1.549 m) 09/29/2012 (Approximate) 100% 32.12 kg/m2        Blood Pressure from Last 3 Encounters:   11/08/18 124/74   01/30/18 118/64   09/29/17 119/78    Weight from Last 3 Encounters:   11/08/18 170 lb (77.1 kg)   01/30/18 181 lb (82.1 kg)   09/29/17 167 lb 3.2 oz (75.8 kg)              We Performed the Following     GENETICS REFERRAL     ONC/HEME ADULT REFERRAL        Primary Care Provider Office Phone # Fax #    Jeannie Ross [phone]       [address]  [place] [address]        Equal Access to Services     [facility]: Hadii skyler Acevedo, sonia smith, qaybta cesaralmaesha ramos. So [facility] [phone].    ATENCIÓN: Si habla español, tiene a marquez disposición servicios gratuitos de asistencia " lingüísticaBeth Davison al [phone].    We comply with applicable federal civil rights laws and [place] laws. We do not discriminate on the basis of race, color, national origin, age, disability, sex, sexual orientation, or gender identity.            Thank you!     Thank you for choosing [facility]  for your care. Our goal is always to provide you with excellent care. Hearing back from our patients is one way we can continue to improve our services. Please take a few minutes to complete the written survey that you may receive in the mail after your visit with us. Thank you!             Your Updated Medication List - Protect others around you: Learn how to safely use, store and throw away your medicines at www.disposemymeds.org.      Notice  As of 11/8/2018  2:59 PM    You have not been prescribed any medications.

## 2018-11-08 NOTE — PROGRESS NOTES
MALIGNANT path  Pathology report reviewed with our breast radiologist Dr. Azeem Murphy.  I phoned and informed patient of results showing Left Breast Invasive Ductal Carcinoma, grade 3.  Estrogen is a weak +(1-3%), and Progesterone is negative.  Her2 result is still pending.  Patient states no problems with biopsy site.  Recommended follow up is Surgical Consultation.  Surgical Consult has been arranged with Dr Sae Montaño @ [facility] on 11/8/2018  at 1:45p.m., with a check in time of 1:30 p.m.   Patient has directions and phone numbers.  Questions were answered and I explained my role as Breast Nurse Coordinator in assisting her with appointments, resources and social support. New diagnosis information packet will be available at consult. I will follow up with the patient. She has my phone number if she has further questions. Ordering provider notified.  Tatyana Pimentel RN, BSN

## 2018-11-09 ENCOUNTER — TELEPHONE (OUTPATIENT)
Dept: SURGERY | Facility: CLINIC | Age: 48
End: 2018-11-09

## 2018-11-09 NOTE — PROGRESS NOTES
"[facility]  Surgery Consultation    HPI: Patient is a 48 year old female who is here for consultation requested by Jeannie Ross [phone] for left breast cancer The lesion was discovered on the patients recent mammogram. The patients last mammogram was in .  She was found to have a possible asymmetry in this location but did not undergo follow-up due to insurance reasons.  Her recent mammogram  and ultrasound showed a 1.5 x 1.6 x 1.5 cm mass.  A biopsy was performed that showed grade 3 invasive ductal carcinoma.  ER/NC negative and HER-2 pending. She has never had an abnormality or biopsy in the past. Her menarche was at age 12. Patients last menstrual cycle was in years ago. She denies taking hormone replacement in the past. She has had 1 pregnancies and has 1 living children. Her first pregnancy was at age 32. Her family history is significant for no breast or ovarian cancer. She denies all other complaints today. Patient denies fevers, chills, nausea, vomiting, SOB, chest pain, abdominal pain.    PMH:  Saray Huntley  has a past medical history of Hypothyroidism, unspecified type (2017).  PSH:  Saray Huntley  has a past surgical history that includes  section (2003).  Social History:  Saray Huntley  reports that she has never smoked. She has never used smokeless tobacco. She reports that she does not drink alcohol or use illicit drugs.  Family History:  Saray Huntley family history includes Diabetes in her father; Hyperlipidemia in her mother; KIDNEY DISEASE in her mother.  Medications/Allergies: Home medications and allergies reviewed.    ROS:  The 10 point Review of Systems is negative other than noted in the HPI.    Physical Exam:  /74 (BP Location: Right arm, Patient Position: Chair, Cuff Size: Adult Regular)  Pulse 59  Ht 5' 1\" (1.549 m)  Wt 170 lb (77.1 kg)  LMP 2012 (Approximate)  SpO2 100%  BMI 32.12 kg/m2  GENERAL: Generally appears well.  Psych: Alert " and Oriented.  Normal affect  Eyes: Sclera clear  Respiratory:  Lungs clear to ausculation bilaterally with good air excursion  Cardiovascular:  Regular Rate and Rhythm with no murmurs gallops or rubs, normal peripheral pulses  Breast: A bilateral breast exam was performed in the sitting and supine position. The hands were placed at this side and above the head. Bilateral breasts were palpated in a circumferential clockwise fashion including the supraclavicular and axillary areas. Right breast-no masses palpated.. Left breast-mass palpated at 12 o clock position.  Measures approximately 2 cm and is mobile and not fixed to underlying skin.  GI: Abdomen Soft Non-Tender   Lymphatic/Hematologic/Immune:  No femoral or cervical lymphadenopathy.  Integumentary:  No rashes  Neurological: grossly intact    All new lab and imaging data was reviewed.     Impression and Plan:  Patient is a 48 year old female with Left breast cancer    PLAN:   I spent over 60 minutes discussing the pathophysiology of breast cancer and surgical options.  She has a 1.6 cm invasive ductal ER/[place] negative and HER-2 pending.  We discussed the treatment of breast cancer which includes systemic therapy and surgical therapy.  We discussed that if HER- 2 is positive that she should meet with oncology for  consideration of neoadjuvant therapy.  If the HER-2 is negative she would be a candidate for upfront surgery.  I discussed breast conservative therapy with a localized lumpectomy and sentinel lymph node biopsy versus simple mastectomy and sentinel lymph node biopsy. I explained all the procedures in great depth and described the postoperative course which could include radiation if she chose breast conserving therapy and possible chemotherapy or hormonal therapy depending on the final pathology report. We also discussed that the survivability of the above procedures are equal but there would be a higher recurrence with breast conservation at 10%  comparative to approximately 1-2% with mastectomy.  I will refer her to medical oncology now and hopefully the HER-2 will be available at that appointment.  I will be in touch with the patient in the near future.    Sae Montaño M.D.  [facility]  [phone]    Please CC to referring provider and primary care physician.

## 2018-11-09 NOTE — TELEPHONE ENCOUNTER
2nd message left to discuss results of HER2. Requested call back.    Ena Trinidad BSN, RN, OCN  Oncology Care Coordinator  [facility]  [phone]

## 2018-11-09 NOTE — TELEPHONE ENCOUNTER
Call placed to patient to discuss HER2 results. Message left requesting call back.    Ena CARTWRIGHTN, RN, OCN  Oncology Care Coordinator  [facility]  [phone]

## 2018-11-12 NOTE — TELEPHONE ENCOUNTER
I called patient and left her a message to call me back.  Awaiting a return call to inform patient of Her2 results below.  Tatyana Pimentel, RN, BSN      Patient Name: SARAY COSBY   MR#: [id number]   Specimen #: [id number]   Collected: 11/6/2018 09:01   Received: 11/7/2018 16:28   Reported: 11/8/2018 20:25   Ordering Phy(s): KOLBY VERNON   Additional Phy(s): JOSE F ALONSO     TEST(S) REQUESTED:   C[employer] HER2 FISH     CLINICAL COMMENTS:   [id number]     RESULTS:     Ratio of HER2/BREANNA-17 signals   Saray Cosby: >12.4 (DINESH Positive)                                   Avg.   number HER2 signals/nucleus:  >21.0                                                                        Avg.   number BREANNA-17 signals/nucleus:  1.7

## 2018-11-13 NOTE — TELEPHONE ENCOUNTER
I called patient again today and left her a message to call me back.  Awaiting a return call from patient to inform of positive Her2 results.  Tatyana Pimentel RN, BSN

## 2018-11-13 NOTE — TELEPHONE ENCOUNTER
Date of appointment:11/16/18    Diagnosis/reason for appointment:Breast Cancer  Referring provider/facility:  Who called:    Recent Studies  Imaging:  Pathology:  Labs:  Previous chemo/radiation (if known):    Records requested from:  Records received from:    Additional information:Records in Epic

## 2018-11-14 ENCOUNTER — DOCUMENTATION ONLY (OUTPATIENT)
Dept: MAMMOGRAPHY | Facility: CLINIC | Age: 48
End: 2018-11-14

## 2018-11-14 NOTE — TELEPHONE ENCOUNTER
Patient returned my call 11/13/2018, and I informed her of Her2+ results.  We discussed patient's upcoming appointment with Dr. Stock on 11/16/2018.  I gave her directions and contact information for Dr. Stock.  Informed patient to call me back with any other questions or concerns.  Patient verbalized understanding.  Tatyana Pimentel RN, BSN

## 2018-11-14 NOTE — TELEPHONE ENCOUNTER
Breast Nurse Care Coordination:  I introduced self to patient, and sister-Eusebia, and explained my role of breast nurse coordinator. I accompanied Saray to her surgical consultation on 11/8/2018 @ 0271 with Dr. Sae Montaño at the [facility].      Saray was given the new breast cancer patient packet which includes educational material and support resources such as [employer], American Cancer Society, Caring Bridge, Fighting Cancer through Diet and Lifestyle, [employer], [employer], [facility] Cancer Support Group, and the [facility] Breast Cancer Support Group.  I also enclosed a copy of her Left breast biopsy pathology report(11/08/2018).    Patient Name: SARAY COSBY   MR#: [id number]   Specimen #: [id number]   Collected: 11/6/2018   Received: 11/6/2018   Reported: 11/7/2018 17:07   Ordering Phy(s): JOSE F JENKINS   Additional Phy(s): YIN ALONSO     SPECIMEN(S):   Left ultrasound guided breast needle biopsy, 12:00, 8.0 cm from nipple     FINAL DIAGNOSIS:   Left breast, 12:00, 8.0 cm from nipple, ultrasound-guided needle biopsy   - Invasive ductal carcinoma, [place] grade 3 (of 3)   - Weakly positive for estrogen receptor (1-3%)   - Negative for progesterone receptor        At the end of the consultation, we reviewed Saray's plan of care and education.  We are waiting on patient's Her2 results and once available, I will call patient to inform.  The plan is for patient to meet with Dr. Stock, Medical Oncologist on 11/16/2018 @ 8917 to discuss whether patient should have neoadjuvent chemotherapy or proceed with surgery first.       I answered her questions and encouraged patient to call me back with any future questions or concerns.  Saray has my contact information, and knows to contact me in the future with any questions or concerns.     Tatyana Pimentel, RN, BSN  Breast Nurse Coordinator  [facility]  [phone]

## 2018-11-15 ENCOUNTER — TELEPHONE (OUTPATIENT)
Dept: ONCOLOGY | Facility: CLINIC | Age: 48
End: 2018-11-15

## 2018-11-15 NOTE — TELEPHONE ENCOUNTER
Patient called regarding her appointment tomorrow with Dr. Stock at 0315 PM. Left message on patient's voice mail with directions and parking policy of clinic. Patient instructed to call clinic with any questions. Nicole José RN,BSN,OCN

## 2018-11-16 ENCOUNTER — HOSPITAL ENCOUNTER (OUTPATIENT)
Facility: CLINIC | Age: 48
Setting detail: SPECIMEN
Discharge: HOME | End: 2018-11-16
Attending: INTERNAL MEDICINE | Admitting: INTERNAL MEDICINE
Payer: COMMERCIAL

## 2018-11-16 ENCOUNTER — ONCOLOGY VISIT (OUTPATIENT)
Dept: ONCOLOGY | Facility: CLINIC | Age: 48
End: 2018-11-16
Attending: INTERNAL MEDICINE
Payer: COMMERCIAL

## 2018-11-16 ENCOUNTER — PRE VISIT (OUTPATIENT)
Dept: ONCOLOGY | Facility: CLINIC | Age: 48
End: 2018-11-16

## 2018-11-16 VITALS
OXYGEN SATURATION: 98 % | RESPIRATION RATE: 16 BRPM | HEART RATE: 64 BPM | BODY MASS INDEX: 32.31 KG/M2 | DIASTOLIC BLOOD PRESSURE: 76 MMHG | WEIGHT: 171 LBS | TEMPERATURE: 98.1 F | SYSTOLIC BLOOD PRESSURE: 126 MMHG

## 2018-11-16 DIAGNOSIS — C50.412 MALIGNANT NEOPLASM OF UPPER-OUTER QUADRANT OF LEFT BREAST IN FEMALE, ESTROGEN RECEPTOR POSITIVE (H): ICD-10-CM

## 2018-11-16 DIAGNOSIS — Z17.0 MALIGNANT NEOPLASM OF UPPER-OUTER QUADRANT OF LEFT BREAST IN FEMALE, ESTROGEN RECEPTOR POSITIVE (H): ICD-10-CM

## 2018-11-16 LAB
ALBUMIN SERPL-MCNC: 4.2 G/DL (ref 3.4–5)
ALP SERPL-CCNC: 112 U/L (ref 40–150)
ALT SERPL W P-5'-P-CCNC: 52 U/L (ref 0–50)
ANION GAP SERPL CALCULATED.3IONS-SCNC: 5 MMOL/L (ref 3–14)
AST SERPL W P-5'-P-CCNC: 25 U/L (ref 0–45)
BASOPHILS # BLD AUTO: 0 10E9/L (ref 0–0.2)
BASOPHILS NFR BLD AUTO: 0.4 %
BILIRUB SERPL-MCNC: 0.5 MG/DL (ref 0.2–1.3)
BUN SERPL-MCNC: 20 MG/DL (ref 7–30)
CALCIUM SERPL-MCNC: 9.2 MG/DL (ref 8.5–10.1)
CHLORIDE SERPL-SCNC: 103 MMOL/L (ref 94–109)
CO2 SERPL-SCNC: 29 MMOL/L (ref 20–32)
CREAT SERPL-MCNC: 0.56 MG/DL (ref 0.52–1.04)
DIFFERENTIAL METHOD BLD: NORMAL
EOSINOPHIL # BLD AUTO: 0.2 10E9/L (ref 0–0.7)
EOSINOPHIL NFR BLD AUTO: 2.6 %
ERYTHROCYTE [DISTWIDTH] IN BLOOD BY AUTOMATED COUNT: 12.1 % (ref 10–15)
GFR SERPL CREATININE-BSD FRML MDRD: >90 ML/MIN/1.7M2
GLUCOSE SERPL-MCNC: 93 MG/DL (ref 70–99)
HCT VFR BLD AUTO: 41.5 % (ref 35–47)
HGB BLD-MCNC: 14.1 G/DL (ref 11.7–15.7)
IMM GRANULOCYTES # BLD: 0 10E9/L (ref 0–0.4)
IMM GRANULOCYTES NFR BLD: 0 %
LYMPHOCYTES # BLD AUTO: 2.4 10E9/L (ref 0.8–5.3)
LYMPHOCYTES NFR BLD AUTO: 34 %
MCH RBC QN AUTO: 30.2 PG (ref 26.5–33)
MCHC RBC AUTO-ENTMCNC: 34 G/DL (ref 31.5–36.5)
MCV RBC AUTO: 89 FL (ref 78–100)
MONOCYTES # BLD AUTO: 0.4 10E9/L (ref 0–1.3)
MONOCYTES NFR BLD AUTO: 5.6 %
NEUTROPHILS # BLD AUTO: 4.1 10E9/L (ref 1.6–8.3)
NEUTROPHILS NFR BLD AUTO: 57.4 %
PLATELET # BLD AUTO: 261 10E9/L (ref 150–450)
POTASSIUM SERPL-SCNC: 3.8 MMOL/L (ref 3.4–5.3)
PROT SERPL-MCNC: 8.3 G/DL (ref 6.8–8.8)
RBC # BLD AUTO: 4.67 10E12/L (ref 3.8–5.2)
SODIUM SERPL-SCNC: 137 MMOL/L (ref 133–144)
WBC # BLD AUTO: 7.2 10E9/L (ref 4–11)

## 2018-11-16 PROCEDURE — 99205 OFFICE O/P NEW HI 60 MIN: CPT | Performed by: INTERNAL MEDICINE

## 2018-11-16 PROCEDURE — G0463 HOSPITAL OUTPT CLINIC VISIT: HCPCS

## 2018-11-16 PROCEDURE — 80053 COMPREHEN METABOLIC PANEL: CPT | Performed by: INTERNAL MEDICINE

## 2018-11-16 PROCEDURE — 85025 COMPLETE CBC W/AUTO DIFF WBC: CPT | Performed by: INTERNAL MEDICINE

## 2018-11-16 ASSESSMENT — PAIN SCALES - GENERAL: PAINLEVEL: NO PAIN (0)

## 2018-11-16 NOTE — MR AVS SNAPSHOT
After Visit Summary   11/16/2018    Saray Huntley    MRN: [id number]           Patient Information     Date Of Birth          1970        Visit Information        Provider Department      11/16/2018 3:15 PM Cayla Stock MD [facility]        Today's Diagnoses     Malignant neoplasm of upper-outer quadrant of left breast in female, estrogen receptor positive (H)          Care Instructions    -chemotherapy teaching today on paclitaxel, Herceptin, Perjeta  -port teaching  -labs today DONE-AG  -port placement next week with surgery, Dr. Montaño  -schedule echo next week  -genetic counseling referral  -nutrition referral  -schedule chemotherapy and clinic appointment on Friday, 11/30/18            Follow-ups after your visit        Additional Services     CANCER RISK MGMT/CANCER GENETIC COUNSELING REFERRAL       Your provider has referred you to the Cancer Risk Management Program - Cancer Genetic Counseling.    Reason for Referral: genetic counseling  Family History of: unknown  Personal History of: breast cancer  Other:     We have a sent a notice to a staff member of the Cancer Risk Management Program to give you a call to assist with scheduling your appointment.  You may also call  [phone] ([phone]) to initiate scheduling.    Please be aware that coverage of these services is subject to the terms and limitations of your health insurance plan.  Call member services at your health plan with any benefit or coverage questions.      Please bring the completed family history sheet to your appointment in addition to any available outside medical records documenting your cancer diagnosis.            NUTRITION REFERRAL       Your provider has referred you to: [facility]: [facility] (on call location) - [place] [phone]   [email or web address]    Please be aware that coverage of these services is subject to the terms and  limitations of your health insurance plan.  Call member services at your health plan with any benefit or coverage questions.      Please bring the following with you to your appointment:    (1) This referral request  (2) Any documents given to you regarding this referral  (3) Any specific questions you have about diet and/or food choices                  Your next 10 appointments already scheduled     Nov 20, 2018  8:45 AM CST   Ech Limited with [id number]   [facility] CV Echocardiography (Cardiovascular Imaging at [facility])    [address]  [facility] [address]   [phone]           1.  Please bring or wear a comfortable two-piece outfit. 2.  You may eat, drink and take your normal medicines. 3.  For any questions that cannot be answered, please contact the ordering physician 4.  Please do not wear perfumes or scented lotions on the day of your exam.            Nov 30, 2018  8:00 AM CST   Level 7 with  INFUSION CHAIR 3   [facility] ([facility])    [facility]  [address]  [facility] [address]   [phone]            Nov 30, 2018  9:00 AM CST   Return Visit with IRINA Scruggs CNP   [facility] ([facility])    [facility] [place]  [address]  [facility] [address]   [phone]            Dec 07, 2018  8:00 AM CST   Level 4 with  INFUSION CHAIR 4   [facility] ([facility])    [facility] [place]  [address]  [facility] [address]   [phone]            Dec 07, 2018  9:00 AM CST   Return Visit with IRINA Scruggs CNP   [facility] ([facility])    [facility]  [address]  [facility] [address]   [phone]            Dec 14, 2018  8:30 AM CST   Level 6 with  INFUSION CHAIR 11   [facility] ([facility])    [facility]  [address]  [place] [address] [phone]            Dec 14, 2018  9:30 AM CST   Return Visit with Cayla Stock MD   [facility] ([facility])    [facility] [place]  [address]  [place] [address] [phone]              Future tests that were ordered for you today     Open Future Orders        Priority Expected Expires Ordered    Echocardiogram Limited Routine  11/16/2019 11/16/2018            Who to contact     If you have questions or need follow up information about today's clinic visit or your schedule please contact [facility] directly at [phone].  Normal or non-critical lab and imaging results will be communicated to you by MyChart, letter or phone within 4 business days after the clinic has received the results. If you do not hear from us within 7 days, please contact the clinic through MyChart or phone. If you have a critical or abnormal lab result, we will notify you by phone as soon as possible.  Submit refill requests through [employer] or call your pharmacy and they will forward the refill request to us. Please allow 3 business days for your refill to be completed.          Additional Information About Your Visit        Care EveryWhere ID     This is your Care EveryWhere ID. This could be used by other organizations to access your [place] medical records  [id number]        Your Vitals Were     Pulse Temperature Respirations Last Period Pulse Oximetry BMI (Body Mass Index)    64 98.1  F (36.7  C) (Oral) 16 09/29/2012 (Approximate) 98% 32.31 kg/m2       Blood Pressure from Last 3 Encounters:   11/16/18 126/76   11/08/18 124/74   01/30/18 118/64    Weight from Last 3 Encounters:   11/16/18 77.6 kg (171 lb)   11/08/18 77.1 kg (170 lb)   01/30/18 82.1 kg (181 lb)              We Performed the Following      CANCER RISK MGMT/CANCER GENETIC COUNSELING REFERRAL     CBC with platelets differential     Comprehensive metabolic panel     NUTRITION REFERRAL        Primary Care Provider Office Phone # Fax #    Jeannie Ross [phone]       [address] [place] [facility] [place] [address]        Equal Access to Services     [facility]: Hadii aad ku hadasho Soomaali, waaxda luqadaha, qaybta kaalmada adeegyada, waxay frantzin hayaan adeaneta villelatonymedina lasierran ah. So [facility] [phone].    ATENCIÓN: Si habla español, tiene a marquez disposición servicios gratuitos de asistencia lingüística. Llame al [phone].    We comply with applicable federal civil rights laws and [place] laws. We do not discriminate on the basis of race, color, national origin, age, disability, sex, sexual orientation, or gender identity.            Thank you!     Thank you for choosing [facility]  for your care. Our goal is always to provide you with excellent care. Hearing back from our patients is one way we can continue to improve our services. Please take a few minutes to complete the written survey that you may receive in the mail after your visit with us. Thank you!             Your Updated Medication List - Protect others around you: Learn how to safely use, store and throw away your medicines at www.disposemymeds.org.      Notice  As of 11/16/2018  5:03 PM    You have not been prescribed any medications.

## 2018-11-16 NOTE — LETTER
"    11/16/2018         RE: Saray Huntley  [address]  [place] [address]        Dear Colleague,    Thank you for referring your patient, Saray Huntley, to the [facility]. Please see a copy of my visit note below.    Oncology Rooming Note    November 16, 2018 3:07 PM   Saray Huntley is a 48 year old female who presents for:    Chief Complaint   Patient presents with     Oncology Clinic Visit     breast cancer     Initial Vitals: /76 (BP Location: Right arm, Patient Position: Sitting, Cuff Size: Adult Large)  Pulse 64  Temp 98.1  F (36.7  C) (Oral)  Resp 16  Wt 77.6 kg (171 lb)  LMP 09/29/2012 (Approximate)  SpO2 98%  BMI 32.31 kg/m2 Estimated body mass index is 32.31 kg/(m^2) as calculated from the following:    Height as of 11/8/18: 1.549 m (5' 1\").    Weight as of this encounter: 77.6 kg (171 lb). Body surface area is 1.83 meters squared.  No Pain (0) Comment: Data Unavailable   Patient's last menstrual period was 09/29/2012 (approximate).  Allergies reviewed: Yes  Medications reviewed: Yes    Medications: Medication refills not needed today.  Pharmacy name entered into [employer]: [id number] [place] [facility] [place] [address] AT [facility] OF [facility] [address]    Clinical concerns: None               4 minutes for nursing intake (face to face time)     Lesvia Roldan MA          Patient here for exam and labs  Labs drawn:  CBC/diff/PLT,CMP,  CA 27.29  Peripheral L AC gauge, 23 needle type BF  Concerns no additional concerns at this time patient tolerated without incident  Denies concerns or issues that need to be addressed prior to appt with MD Donna Duke        [facility]    Hematology/Oncology New Patient Note      Today's Date: 11/16/18    Reason for Consult: left-sided breast cancer      HISTORY OF PRESENT ILLNESS: Saray Huntley is a 48 year old female who presents with left-sided breast cancer.  It was found on a screening " mammogram.  Left breast ultrasound found a hypoechoic mass at the 12:00 position, 8 cm from the nipple, measuring 1.5 x 1.6 x 1.5 cm.  Axilla survey showed only normal-appearing lymph nodes.   Biopsy showed invasive ductal carcinoma, grade 3, weakly positive for ER (1-3%), FL negative, HER-2 positive.      Patient does not know much about her family history and is unaware if there is breast cancer or ovarian cancer in her family.  She has never smoked.  She does not drink or use illicit drugs.  She is going to school at [employer] studying IT.  She has 1 daughter who is 15 years old.  She says that she has menopause a few years old.  She tried oral contraceptive, but she did not tolerate it well, so did not take it long term.  She has never taken hormone replacement therapy      Currently, she otherwise feels well.  She denies fever/chills, nausea/vomiting, pain, discharge, rash.        REVIEW OF SYSTEMS:   14 point ROS was reviewed and is negative other than as noted above in HPI.       HOME MEDICATIONS:  No current outpatient prescriptions on file.         ALLERGIES:  Allergies   Allergen Reactions     Sulfa Drugs Rash         PAST MEDICAL HISTORY:  Past Medical History:   Diagnosis Date     Hypothyroidism, unspecified type 2017         PAST SURGICAL HISTORY:  Past Surgical History:   Procedure Laterality Date      SECTION  2003         SOCIAL HISTORY:  Social History     Social History     Marital status:      Spouse name: N/A     Number of children: N/A     Years of education: N/A     Occupational History     Not on file.     Social History Main Topics     Smoking status: Never Smoker     Smokeless tobacco: Never Used     Alcohol use No     Drug use: No     Sexual activity: Not Currently     Other Topics Concern     Not on file     Social History Narrative         FAMILY HISTORY:  Family History   Problem Relation Age of Onset     Hyperlipidemia Mother      KIDNEY DISEASE Mother       Diabetes Father          PHYSICAL EXAM:  Vital signs:  /76 (BP Location: Right arm, Patient Position: Sitting, Cuff Size: Adult Large)  Pulse 64  Temp 98.1  F (36.7  C) (Oral)  Resp 16  Wt 77.6 kg (171 lb)  LMP 2012 (Approximate)  SpO2 98%  BMI 32.31 kg/m2   ECO  GENERAL/CONSTITUTIONAL: No acute distress. Accompanied by sister and brother-in-law.  EYES: No scleral icterus.  LYMPH: No anterior cervical, posterior cervical, supraclavicular, or axillary adenopathy.   RESPIRATORY: Clear to auscultation bilaterally. No crackles or wheezing.   CARDIOVASCULAR: Regular rate and rhythm without murmurs, gallops, or rubs.  GASTROINTESTINAL: No hepatosplenomegaly, masses, or tenderness. The patient has normal bowel sounds. No guarding.  No distention.  BREAST: Right-no palpable mass, discharge, rash, or axillary lymphadenopathy.  Left-palpable mass at the 12:00 position, measuring ~2 cm, mobile.   MUSCULOSKELETAL: Warm and well-perfused, no cyanosis, clubbing, or edema.  NEUROLOGIC: Alert, oriented, answers questions appropriately.  INTEGUMENTARY: No jaundice.  GAIT: Steady, does not use assistive device      LABS:  Pending.      PATHOLOGY:  18:  FINAL DIAGNOSIS:   Left breast, 12:00, 8.0 cm from nipple, ultrasound-guided needle biopsy   - Invasive ductal carcinoma, [place] grade 3 (of 3)   - Weakly positive for estrogen receptor (1-3%)   - Negative for progesterone receptor     INTERPRETATION:   Per the American Society of Clinical Oncology/College of American   Pathologists Clinical Practice Guideline   Focused Update (Claire LONGORIA et al, 2018, Arch Pathol Lab Med     doi:10.5858/arpa.1004-4781-TK), the HER2/BREANNA 17   ratio of >12.4 and average number of HER2 signals/cell of >21.0 places   this specimen in Group 1 (DINESH   Positive).       IMAGING:  Left breast ultrasound 18:  FINDINGS:  Targeted ultrasound shows a hypoechoic mass at the 12:00  position, 8 cm from the nipple, measuring 1.5 x 1.6 x  1.5 cm. Survey  of the axilla shows only normal-appearing lymph nodes.          ASSESSMENT/PLAN:  Saray Huntley is a 48 year old post-menopausal female with:    1) Left-sided breast cancer:  1.5 x 1.6 x 1.5 cm on ultrasound.  Axilla survey showed only normal-appearing lymph nodes.   Biopsy showed invasive ductal carcinoma, grade 3, weakly positive for ER (1-3%), OR negative, HER-2 positive.  Clinical stage IA (cT1cN0).    We reviewed the imaging and pathology results in detail.  She has HER-2 positive tumor measuring 1.6 cm.  I recommended neoadjuvant chemotherapy with HER-2 directed therapy.  Because she has a relatively small tumor that is stage I, we can start with paclitaxel+Herceptin+Perjeta x 12 weeks and assess response, as there is data that this is adequate in small lymph node negative tumors.  If she has a good clinical response, she may be able to go on to surgery, and if she has a complete pathologic response, we may be able to avoid anthracycline.  If she does not have a complete response, we could give further chemotherapy with dose-dense Adriamycin+cyclophosphamide x 4 cycles.  We reviewed the potential side effects, which may include, but not limited to: fever/chills, infection, neuropathy, fatigue, myelosuppression, anemia, bleeding, rash, shortness of breath, chest pain, organ failure, hair loss, diarrhea/constipation, nausea/vomiting, mucositis.  I discussed the schedule, regimen, dose, possible side effects, the benefits and risks, and patient agrees to treatment plan.  She will undergo Herceptin to complete 1 year treatment after surgery.  Although her ER positive was weak, she may benefit from anti-hormonal therapy after surgery as well.      She has already met with Dr. Montaño and reviewed surgical options, but she has not decided if she wants mastectomy or lumpectomy with radiation.      Patient was overwhelmed, but did agree to treatment plan.  Her family did request to have her case  presented at our next breast tumor conference before starting chemotherapy, and I will be happy to do that at the next conference on 11/28/18.  We will plan to start chemotherapy after that.    -chemotherapy and port teaching today  -labs today  -echo next week  -port placement by Dr. Montaño before starting chemotherapy  -plan to start chemotherapy on 11/30/18 and clinic appointment same day    2) Nutrition:  -will place nutrition referral    3) Genetics: She is only 48 years old with new diagnosis of breast cancer.   -will refer to genetic counseling      I spent a total of 60 minutes with the patient, with over >50% of the time in counseling and/or coordination of care.       Cayla Stock MD  Hematology/Oncology  [facility]    Again, thank you for allowing me to participate in the care of your patient.        Sincerely,        Cayla Stock MD

## 2018-11-16 NOTE — PROGRESS NOTES
"Oncology Rooming Note    November 16, 2018 3:07 PM   Saray Huntley is a 48 year old female who presents for:    Chief Complaint   Patient presents with     Oncology Clinic Visit     breast cancer     Initial Vitals: /76 (BP Location: Right arm, Patient Position: Sitting, Cuff Size: Adult Large)  Pulse 64  Temp 98.1  F (36.7  C) (Oral)  Resp 16  Wt 77.6 kg (171 lb)  LMP 09/29/2012 (Approximate)  SpO2 98%  BMI 32.31 kg/m2 Estimated body mass index is 32.31 kg/(m^2) as calculated from the following:    Height as of 11/8/18: 1.549 m (5' 1\").    Weight as of this encounter: 77.6 kg (171 lb). Body surface area is 1.83 meters squared.  No Pain (0) Comment: Data Unavailable   Patient's last menstrual period was 09/29/2012 (approximate).  Allergies reviewed: Yes  Medications reviewed: Yes    Medications: Medication refills not needed today.  Pharmacy name entered into [employer]: [id number] - [facility] [address] AT [facility] OF [facility] [address]    Clinical concerns: None               4 minutes for nursing intake (face to face time)     Lesvia Roldan MA          Patient here for exam and labs  Labs drawn:  CBC/diff/PLT,CMP,  CA 27.29  Peripheral L AC gauge, 23 needle type BF  Concerns no additional concerns at this time patient tolerated without incident  Denies concerns or issues that need to be addressed prior to appt with MD Donna Duke      "

## 2018-11-16 NOTE — PROGRESS NOTES
[facility]    Hematology/Oncology New Patient Note      Today's Date: 18    Reason for Consult: left-sided breast cancer      HISTORY OF PRESENT ILLNESS: Saray Huntley is a 48 year old female who presents with left-sided breast cancer.  It was found on a screening mammogram.  Left breast ultrasound found a hypoechoic mass at the 12:00 position, 8 cm from the nipple, measuring 1.5 x 1.6 x 1.5 cm.  Axilla survey showed only normal-appearing lymph nodes.   Biopsy showed invasive ductal carcinoma, grade 3, weakly positive for ER (1-3%), MA negative, HER-2 positive.      Patient does not know much about her family history and is unaware if there is breast cancer or ovarian cancer in her family.  She has never smoked.  She does not drink or use illicit drugs.  She is going to school at [employer] studying IT.  She has 1 daughter who is 15 years old.  She says that she has menopause a few years old.  She tried oral contraceptive, but she did not tolerate it well, so did not take it long term.  She has never taken hormone replacement therapy      Currently, she otherwise feels well.  She denies fever/chills, nausea/vomiting, pain, discharge, rash.        REVIEW OF SYSTEMS:   14 point ROS was reviewed and is negative other than as noted above in HPI.       HOME MEDICATIONS:  No current outpatient prescriptions on file.         ALLERGIES:  Allergies   Allergen Reactions     Sulfa Drugs Rash         PAST MEDICAL HISTORY:  Past Medical History:   Diagnosis Date     Hypothyroidism, unspecified type 2017         PAST SURGICAL HISTORY:  Past Surgical History:   Procedure Laterality Date      SECTION  2003         SOCIAL HISTORY:  Social History     Social History     Marital status:      Spouse name: N/A     Number of children: N/A     Years of education: N/A     Occupational History     Not on file.     Social History Main Topics     Smoking status: Never Smoker     Smokeless  tobacco: Never Used     Alcohol use No     Drug use: No     Sexual activity: Not Currently     Other Topics Concern     Not on file     Social History Narrative         FAMILY HISTORY:  Family History   Problem Relation Age of Onset     Hyperlipidemia Mother      KIDNEY DISEASE Mother      Diabetes Father          PHYSICAL EXAM:  Vital signs:  /76 (BP Location: Right arm, Patient Position: Sitting, Cuff Size: Adult Large)  Pulse 64  Temp 98.1  F (36.7  C) (Oral)  Resp 16  Wt 77.6 kg (171 lb)  LMP 2012 (Approximate)  SpO2 98%  BMI 32.31 kg/m2   ECO  GENERAL/CONSTITUTIONAL: No acute distress. Accompanied by sister and brother-in-law.  EYES: No scleral icterus.  LYMPH: No anterior cervical, posterior cervical, supraclavicular, or axillary adenopathy.   RESPIRATORY: Clear to auscultation bilaterally. No crackles or wheezing.   CARDIOVASCULAR: Regular rate and rhythm without murmurs, gallops, or rubs.  GASTROINTESTINAL: No hepatosplenomegaly, masses, or tenderness. The patient has normal bowel sounds. No guarding.  No distention.  BREAST: Right-no palpable mass, discharge, rash, or axillary lymphadenopathy.  Left-palpable mass at the 12:00 position, measuring ~2 cm, mobile.   MUSCULOSKELETAL: Warm and well-perfused, no cyanosis, clubbing, or edema.  NEUROLOGIC: Alert, oriented, answers questions appropriately.  INTEGUMENTARY: No jaundice.  GAIT: Steady, does not use assistive device      LABS:  Pending.      PATHOLOGY:  18:  FINAL DIAGNOSIS:   Left breast, 12:00, 8.0 cm from nipple, ultrasound-guided needle biopsy   - Invasive ductal carcinoma, Irlanda grade 3 (of 3)   - Weakly positive for estrogen receptor (1-3%)   - Negative for progesterone receptor     INTERPRETATION:   Per the American Society of Clinical Oncology/College of American   Pathologists Clinical Practice Guideline   Focused Update (Claire LONGORIA et al, 2018, Arch Pathol Lab Med     doi:[email or web address]), the HER2/BREANNA  17   ratio of >12.4 and average number of HER2 signals/cell of >21.0 places   this specimen in Group 1 (DINESH   Positive).       IMAGING:  Left breast ultrasound 11/6/18:  FINDINGS:  Targeted ultrasound shows a hypoechoic mass at the 12:00  position, 8 cm from the nipple, measuring 1.5 x 1.6 x 1.5 cm. Survey  of the axilla shows only normal-appearing lymph nodes.          ASSESSMENT/PLAN:  Saray Huntley is a 48 year old post-menopausal female with:    1) Left-sided breast cancer:  1.5 x 1.6 x 1.5 cm on ultrasound.  Axilla survey showed only normal-appearing lymph nodes.   Biopsy showed invasive ductal carcinoma, grade 3, weakly positive for ER (1-3%), AL negative, HER-2 positive.  Clinical stage IA (cT1cN0).    We reviewed the imaging and pathology results in detail.  She has HER-2 positive tumor measuring 1.6 cm.  I recommended neoadjuvant chemotherapy with HER-2 directed therapy.  Because she has a relatively small tumor that is stage I, we can start with paclitaxel+Herceptin+Perjeta x 12 weeks and assess response, as there is data that this is adequate in small lymph node negative tumors.  If she has a good clinical response, she may be able to go on to surgery, and if she has a complete pathologic response, we may be able to avoid anthracycline.  If she does not have a complete response, we could give further chemotherapy with dose-dense Adriamycin+cyclophosphamide x 4 cycles.  We reviewed the potential side effects, which may include, but not limited to: fever/chills, infection, neuropathy, fatigue, myelosuppression, anemia, bleeding, rash, shortness of breath, chest pain, organ failure, hair loss, diarrhea/constipation, nausea/vomiting, mucositis.  I discussed the schedule, regimen, dose, possible side effects, the benefits and risks, and patient agrees to treatment plan.  She will undergo Herceptin to complete 1 year treatment after surgery.  Although her ER positive was weak, she may benefit from anti-hormonal  therapy after surgery as well.      She has already met with Dr. Montaño and reviewed surgical options, but she has not decided if she wants mastectomy or lumpectomy with radiation.      Patient was overwhelmed, but did agree to treatment plan.  Her family did request to have her case presented at our next breast tumor conference before starting chemotherapy, and I will be happy to do that at the next conference on 11/28/18.  We will plan to start chemotherapy after that.    -chemotherapy and port teaching today  -labs today  -echo next week  -port placement by Dr. Montaño before starting chemotherapy  -plan to start chemotherapy on 11/30/18 and clinic appointment same day    2) Nutrition:  -will place nutrition referral    3) Genetics: She is only 48 years old with new diagnosis of breast cancer.   -will refer to genetic counseling      I spent a total of 60 minutes with the patient, with over >50% of the time in counseling and/or coordination of care.       Cayla Stock MD  Hematology/Oncology  [facility]

## 2018-11-19 ENCOUNTER — CARE COORDINATION (OUTPATIENT)
Dept: ONCOLOGY | Facility: CLINIC | Age: 48
End: 2018-11-19

## 2018-11-19 ENCOUNTER — TELEPHONE (OUTPATIENT)
Dept: SURGERY | Facility: CLINIC | Age: 48
End: 2018-11-19

## 2018-11-19 ENCOUNTER — TELEPHONE (OUTPATIENT)
Dept: ONCOLOGY | Facility: CLINIC | Age: 48
End: 2018-11-19

## 2018-11-19 NOTE — PROGRESS NOTES
Chemo Teach  Re:Adriamycin/Cytoxan,Neulasta(on-pro),Taxol treatment plan for diagnosis: Breast Cancer   -See Pt Education documentation - Chemo Effects (Adult)  -Reviewed treatment schedule including cycle length, lab monitoring and take home medications.    Verbal and written instruction provided using:     [facility] Taxol, Herceptin/Perjeta Drug Information   -Reviewed What Chemotherapy Is Used For, How Chemotherapy is Given, Side Effects, When to Contact Your Doctor of Health Care Provider and Self Care Tips sections.      [place] literature:   -_Patient given My Cancer Guidebook binder and reviewed with patient, sister and brother-in-law  -Reviewed Getting Ready for Chemotherapy: What to Expect Before, During and After Your Treatment   -Reviewed Tips for Increasing Protein and Calories  -Reviewed Vascular Access Port Implantation with Power Port teaching book/model

## 2018-11-19 NOTE — TELEPHONE ENCOUNTER
Type of surgery: Port placement  Location of surgery: [facility]  Date and time of surgery: 11/28/18 at 10:50am  Surgeon: Dr. Sae Montaño  Pre-Op Appt Date: Patient to schedule  Post-Op Appt Date: Patient to schedule   Packet sent out: Yes  Pre-cert/Authorization completed:  Not Applicable  Date: 11/19/18

## 2018-11-20 ENCOUNTER — HOSPITAL ENCOUNTER (OUTPATIENT)
Dept: CARDIOLOGY | Facility: CLINIC | Age: 48
Discharge: HOME | End: 2018-11-20
Attending: INTERNAL MEDICINE | Admitting: INTERNAL MEDICINE
Payer: COMMERCIAL

## 2018-11-20 DIAGNOSIS — Z17.0 MALIGNANT NEOPLASM OF UPPER-OUTER QUADRANT OF LEFT BREAST IN FEMALE, ESTROGEN RECEPTOR POSITIVE (H): ICD-10-CM

## 2018-11-20 DIAGNOSIS — C50.412 MALIGNANT NEOPLASM OF UPPER-OUTER QUADRANT OF LEFT BREAST IN FEMALE, ESTROGEN RECEPTOR POSITIVE (H): ICD-10-CM

## 2018-11-20 PROCEDURE — 93306 TTE W/DOPPLER COMPLETE: CPT

## 2018-11-20 PROCEDURE — 93306 TTE W/DOPPLER COMPLETE: CPT | Mod: 26 | Performed by: INTERNAL MEDICINE

## 2018-11-25 NOTE — INTERVAL H&P NOTE
This note is for the purpose of making the H & P performed in clinic within the last   30 days available in the hospital surgical encounter.

## 2018-11-25 NOTE — H&P (VIEW-ONLY)
[facility]    Hematology/Oncology New Patient Note      Today's Date: 18    Reason for Consult: left-sided breast cancer      HISTORY OF PRESENT ILLNESS: Saray Huntley is a 48 year old female who presents with left-sided breast cancer.  It was found on a screening mammogram.  Left breast ultrasound found a hypoechoic mass at the 12:00 position, 8 cm from the nipple, measuring 1.5 x 1.6 x 1.5 cm.  Axilla survey showed only normal-appearing lymph nodes.   Biopsy showed invasive ductal carcinoma, grade 3, weakly positive for ER (1-3%), NM negative, HER-2 positive.      Patient does not know much about her family history and is unaware if there is breast cancer or ovarian cancer in her family.  She has never smoked.  She does not drink or use illicit drugs.  She is going to school at [employer] studying IT.  She has 1 daughter who is 15 years old.  She says that she has menopause a few years old.  She tried oral contraceptive, but she did not tolerate it well, so did not take it long term.  She has never taken hormone replacement therapy      Currently, she otherwise feels well.  She denies fever/chills, nausea/vomiting, pain, discharge, rash.        REVIEW OF SYSTEMS:   14 point ROS was reviewed and is negative other than as noted above in HPI.       HOME MEDICATIONS:  No current outpatient prescriptions on file.         ALLERGIES:  Allergies   Allergen Reactions     Sulfa Drugs Rash         PAST MEDICAL HISTORY:  Past Medical History:   Diagnosis Date     Hypothyroidism, unspecified type 2017         PAST SURGICAL HISTORY:  Past Surgical History:   Procedure Laterality Date      SECTION  2003         SOCIAL HISTORY:  Social History     Social History     Marital status:      Spouse name: N/A     Number of children: N/A     Years of education: N/A     Occupational History     Not on file.     Social History Main Topics     Smoking status: Never Smoker     Smokeless  tobacco: Never Used     Alcohol use No     Drug use: No     Sexual activity: Not Currently     Other Topics Concern     Not on file     Social History Narrative         FAMILY HISTORY:  Family History   Problem Relation Age of Onset     Hyperlipidemia Mother      KIDNEY DISEASE Mother      Diabetes Father          PHYSICAL EXAM:  Vital signs:  /76 (BP Location: Right arm, Patient Position: Sitting, Cuff Size: Adult Large)  Pulse 64  Temp 98.1  F (36.7  C) (Oral)  Resp 16  Wt 77.6 kg (171 lb)  LMP 2012 (Approximate)  SpO2 98%  BMI 32.31 kg/m2   ECO  GENERAL/CONSTITUTIONAL: No acute distress. Accompanied by sister and brother-in-law.  EYES: No scleral icterus.  LYMPH: No anterior cervical, posterior cervical, supraclavicular, or axillary adenopathy.   RESPIRATORY: Clear to auscultation bilaterally. No crackles or wheezing.   CARDIOVASCULAR: Regular rate and rhythm without murmurs, gallops, or rubs.  GASTROINTESTINAL: No hepatosplenomegaly, masses, or tenderness. The patient has normal bowel sounds. No guarding.  No distention.  BREAST: Right-no palpable mass, discharge, rash, or axillary lymphadenopathy.  Left-palpable mass at the 12:00 position, measuring ~2 cm, mobile.   MUSCULOSKELETAL: Warm and well-perfused, no cyanosis, clubbing, or edema.  NEUROLOGIC: Alert, oriented, answers questions appropriately.  INTEGUMENTARY: No jaundice.  GAIT: Steady, does not use assistive device      LABS:  Pending.      PATHOLOGY:  18:  FINAL DIAGNOSIS:   Left breast, 12:00, 8.0 cm from nipple, ultrasound-guided needle biopsy   - Invasive ductal carcinoma, Irlanda grade 3 (of 3)   - Weakly positive for estrogen receptor (1-3%)   - Negative for progesterone receptor     INTERPRETATION:   Per the American Society of Clinical Oncology/College of American   Pathologists Clinical Practice Guideline   Focused Update (Claire LONGORIA et al, 2018, Arch Pathol Lab Med     doi:[email or web address]), the HER2/BREANNA  17   ratio of >12.4 and average number of HER2 signals/cell of >21.0 places   this specimen in Group 1 (DINESH   Positive).       IMAGING:  Left breast ultrasound 11/6/18:  FINDINGS:  Targeted ultrasound shows a hypoechoic mass at the 12:00  position, 8 cm from the nipple, measuring 1.5 x 1.6 x 1.5 cm. Survey  of the axilla shows only normal-appearing lymph nodes.          ASSESSMENT/PLAN:  Saray Huntley is a 48 year old post-menopausal female with:    1) Left-sided breast cancer:  1.5 x 1.6 x 1.5 cm on ultrasound.  Axilla survey showed only normal-appearing lymph nodes.   Biopsy showed invasive ductal carcinoma, grade 3, weakly positive for ER (1-3%), NV negative, HER-2 positive.  Clinical stage IA (cT1cN0).    We reviewed the imaging and pathology results in detail.  She has HER-2 positive tumor measuring 1.6 cm.  I recommended neoadjuvant chemotherapy with HER-2 directed therapy.  Because she has a relatively small tumor that is stage I, we can start with paclitaxel+Herceptin+Perjeta x 12 weeks and assess response, as there is data that this is adequate in small lymph node negative tumors.  If she has a good clinical response, she may be able to go on to surgery, and if she has a complete pathologic response, we may be able to avoid anthracycline.  If she does not have a complete response, we could give further chemotherapy with dose-dense Adriamycin+cyclophosphamide x 4 cycles.  We reviewed the potential side effects, which may include, but not limited to: fever/chills, infection, neuropathy, fatigue, myelosuppression, anemia, bleeding, rash, shortness of breath, chest pain, organ failure, hair loss, diarrhea/constipation, nausea/vomiting, mucositis.  I discussed the schedule, regimen, dose, possible side effects, the benefits and risks, and patient agrees to treatment plan.  She will undergo Herceptin to complete 1 year treatment after surgery.  Although her ER positive was weak, she may benefit from anti-hormonal  therapy after surgery as well.      She has already met with Dr. Montaño and reviewed surgical options, but she has not decided if she wants mastectomy or lumpectomy with radiation.      Patient was overwhelmed, but did agree to treatment plan.  Her family did request to have her case presented at our next breast tumor conference before starting chemotherapy, and I will be happy to do that at the next conference on 11/28/18.  We will plan to start chemotherapy after that.    -chemotherapy and port teaching today  -labs today  -echo next week  -port placement by Dr. Montaño before starting chemotherapy  -plan to start chemotherapy on 11/30/18 and clinic appointment same day    2) Nutrition:  -will place nutrition referral    3) Genetics: She is only 48 years old with new diagnosis of breast cancer.   -will refer to genetic counseling      I spent a total of 60 minutes with the patient, with over >50% of the time in counseling and/or coordination of care.       Cayla Stock MD  Hematology/Oncology  [facility]

## 2018-11-28 ENCOUNTER — HOSPITAL ENCOUNTER (OUTPATIENT)
Facility: CLINIC | Age: 48
Discharge: HOME | End: 2018-11-28
Attending: SURGERY | Admitting: SURGERY
Payer: COMMERCIAL

## 2018-11-28 ENCOUNTER — ANESTHESIA (OUTPATIENT)
Dept: SURGERY | Facility: CLINIC | Age: 48
End: 2018-11-28
Payer: COMMERCIAL

## 2018-11-28 ENCOUNTER — ANESTHESIA EVENT (OUTPATIENT)
Dept: SURGERY | Facility: CLINIC | Age: 48
End: 2018-11-28
Payer: COMMERCIAL

## 2018-11-28 ENCOUNTER — SURGERY (OUTPATIENT)
Age: 48
End: 2018-11-28

## 2018-11-28 ENCOUNTER — TELEPHONE (OUTPATIENT)
Dept: ONCOLOGY | Facility: CLINIC | Age: 48
End: 2018-11-28

## 2018-11-28 ENCOUNTER — OFFICE VISIT (OUTPATIENT)
Dept: SURGERY | Facility: PHYSICIAN GROUP | Age: 48
End: 2018-11-28
Payer: COMMERCIAL

## 2018-11-28 ENCOUNTER — APPOINTMENT (OUTPATIENT)
Dept: GENERAL RADIOLOGY | Facility: CLINIC | Age: 48
End: 2018-11-28
Attending: SURGERY
Payer: COMMERCIAL

## 2018-11-28 VITALS
TEMPERATURE: 97.4 F | BODY MASS INDEX: 32.36 KG/M2 | WEIGHT: 171.38 LBS | OXYGEN SATURATION: 98 % | SYSTOLIC BLOOD PRESSURE: 113 MMHG | DIASTOLIC BLOOD PRESSURE: 72 MMHG | HEIGHT: 61 IN | RESPIRATION RATE: 16 BRPM

## 2018-11-28 DIAGNOSIS — C50.412 MALIGNANT NEOPLASM OF UPPER-OUTER QUADRANT OF LEFT BREAST IN FEMALE, ESTROGEN RECEPTOR POSITIVE (H): Primary | ICD-10-CM

## 2018-11-28 DIAGNOSIS — Z17.0 MALIGNANT NEOPLASM OF UPPER-OUTER QUADRANT OF LEFT BREAST IN FEMALE, ESTROGEN RECEPTOR POSITIVE (H): Primary | ICD-10-CM

## 2018-11-28 PROCEDURE — 27210794 ZZH OR GENERAL SUPPLY STERILE: Performed by: SURGERY

## 2018-11-28 PROCEDURE — 37000009 ZZH ANESTHESIA TECHNICAL FEE, EACH ADDTL 15 MIN: Performed by: SURGERY

## 2018-11-28 PROCEDURE — 40000170 ZZH STATISTIC PRE-PROCEDURE ASSESSMENT II: Performed by: SURGERY

## 2018-11-28 PROCEDURE — 25000125 ZZHC RX 250: Performed by: NURSE ANESTHETIST, CERTIFIED REGISTERED

## 2018-11-28 PROCEDURE — 25000128 H RX IP 250 OP 636: Performed by: SURGERY

## 2018-11-28 PROCEDURE — 40000277 XR SURGERY CARM FLUORO LESS THAN 5 MIN W STILLS

## 2018-11-28 PROCEDURE — 25000128 H RX IP 250 OP 636: Performed by: NURSE ANESTHETIST, CERTIFIED REGISTERED

## 2018-11-28 PROCEDURE — 25000125 ZZHC RX 250: Performed by: SURGERY

## 2018-11-28 PROCEDURE — 36000054 ZZH SURGERY LEVEL 2 W FLUORO 1ST 30 MIN: Performed by: SURGERY

## 2018-11-28 PROCEDURE — C1788 PORT, INDWELLING, IMP: HCPCS | Performed by: SURGERY

## 2018-11-28 PROCEDURE — 36561 INSERT TUNNELED CV CATH: CPT | Performed by: SURGERY

## 2018-11-28 PROCEDURE — 71000012 ZZH RECOVERY PHASE 1 LEVEL 1 FIRST HR: Performed by: SURGERY

## 2018-11-28 PROCEDURE — 36000052 ZZH SURGERY LEVEL 2 EA 15 ADDTL MIN: Performed by: SURGERY

## 2018-11-28 PROCEDURE — 25000132 ZZH RX MED GY IP 250 OP 250 PS 637: Performed by: SURGERY

## 2018-11-28 PROCEDURE — 37000008 ZZH ANESTHESIA TECHNICAL FEE, 1ST 30 MIN: Performed by: SURGERY

## 2018-11-28 PROCEDURE — 25000128 H RX IP 250 OP 636: Performed by: ANESTHESIOLOGY

## 2018-11-28 PROCEDURE — 71000027 ZZH RECOVERY PHASE 2 EACH 15 MINS: Performed by: SURGERY

## 2018-11-28 DEVICE — CATH PORT POWERPORT CLEARVUE ISP 8FR 5608062
Type: IMPLANTABLE DEVICE | Site: CHEST | Status: NON-FUNCTIONAL
Removed: 2019-03-19

## 2018-11-28 RX ORDER — FENTANYL CITRATE 50 UG/ML
25-50 INJECTION, SOLUTION INTRAMUSCULAR; INTRAVENOUS EVERY 5 MIN PRN
Status: DISCONTINUED | OUTPATIENT
Start: 2018-11-28 | End: 2018-11-28 | Stop reason: HOSPADM

## 2018-11-28 RX ORDER — MEPERIDINE HYDROCHLORIDE 25 MG/ML
25 INJECTION INTRAMUSCULAR; INTRAVENOUS; SUBCUTANEOUS EVERY 30 MIN PRN
Status: CANCELLED | OUTPATIENT
Start: 2018-11-30

## 2018-11-28 RX ORDER — EPINEPHRINE 1 MG/ML
0.3 INJECTION, SOLUTION INTRAMUSCULAR; SUBCUTANEOUS EVERY 5 MIN PRN
Status: CANCELLED | OUTPATIENT
Start: 2018-12-14

## 2018-11-28 RX ORDER — ALBUTEROL SULFATE 0.83 MG/ML
2.5 SOLUTION RESPIRATORY (INHALATION)
Status: CANCELLED | OUTPATIENT
Start: 2018-11-30

## 2018-11-28 RX ORDER — ONDANSETRON 4 MG/1
4 TABLET, ORALLY DISINTEGRATING ORAL EVERY 30 MIN PRN
Status: DISCONTINUED | OUTPATIENT
Start: 2018-11-28 | End: 2018-11-28 | Stop reason: HOSPADM

## 2018-11-28 RX ORDER — PROPOFOL 10 MG/ML
INJECTION, EMULSION INTRAVENOUS CONTINUOUS PRN
Status: DISCONTINUED | OUTPATIENT
Start: 2018-11-28 | End: 2018-11-28

## 2018-11-28 RX ORDER — EPINEPHRINE 0.3 MG/.3ML
0.3 INJECTION SUBCUTANEOUS EVERY 5 MIN PRN
Status: CANCELLED | OUTPATIENT
Start: 2018-11-30

## 2018-11-28 RX ORDER — DIPHENHYDRAMINE HCL 25 MG
50 CAPSULE ORAL ONCE
Status: CANCELLED
Start: 2018-12-14

## 2018-11-28 RX ORDER — CEFAZOLIN SODIUM 2 G/100ML
2 INJECTION, SOLUTION INTRAVENOUS
Status: COMPLETED | OUTPATIENT
Start: 2018-11-28 | End: 2018-11-28

## 2018-11-28 RX ORDER — EPINEPHRINE 0.3 MG/.3ML
0.3 INJECTION SUBCUTANEOUS EVERY 5 MIN PRN
Status: CANCELLED | OUTPATIENT
Start: 2018-12-14

## 2018-11-28 RX ORDER — CEFAZOLIN SODIUM 1 G/3ML
1 INJECTION, POWDER, FOR SOLUTION INTRAMUSCULAR; INTRAVENOUS SEE ADMIN INSTRUCTIONS
Status: DISCONTINUED | OUTPATIENT
Start: 2018-11-28 | End: 2018-11-28 | Stop reason: HOSPADM

## 2018-11-28 RX ORDER — HYDROCODONE BITARTRATE AND ACETAMINOPHEN 5; 325 MG/1; MG/1
1 TABLET ORAL
Status: DISCONTINUED | OUTPATIENT
Start: 2018-11-28 | End: 2018-11-28 | Stop reason: HOSPADM

## 2018-11-28 RX ORDER — HEPARIN SODIUM (PORCINE) LOCK FLUSH IV SOLN 100 UNIT/ML 100 UNIT/ML
SOLUTION INTRAVENOUS PRN
Status: DISCONTINUED | OUTPATIENT
Start: 2018-11-28 | End: 2018-11-28 | Stop reason: HOSPADM

## 2018-11-28 RX ORDER — METHYLPREDNISOLONE SODIUM SUCCINATE 125 MG/2ML
125 INJECTION, POWDER, LYOPHILIZED, FOR SOLUTION INTRAMUSCULAR; INTRAVENOUS
Status: CANCELLED
Start: 2018-12-07

## 2018-11-28 RX ORDER — HYDROCODONE BITARTRATE AND ACETAMINOPHEN 5; 325 MG/1; MG/1
1-2 TABLET ORAL EVERY 4 HOURS PRN
Qty: 15 TABLET | Refills: 0 | Status: SHIPPED | OUTPATIENT
Start: 2018-11-28 | End: 2019-03-08

## 2018-11-28 RX ORDER — EPINEPHRINE 1 MG/ML
0.3 INJECTION, SOLUTION INTRAMUSCULAR; SUBCUTANEOUS EVERY 5 MIN PRN
Status: CANCELLED | OUTPATIENT
Start: 2018-12-07

## 2018-11-28 RX ORDER — DIPHENHYDRAMINE HCL 25 MG
50 CAPSULE ORAL ONCE
Status: CANCELLED
Start: 2018-11-30

## 2018-11-28 RX ORDER — LORAZEPAM 2 MG/ML
0.5 INJECTION INTRAMUSCULAR EVERY 4 HOURS PRN
Status: CANCELLED
Start: 2018-11-30

## 2018-11-28 RX ORDER — NALOXONE HYDROCHLORIDE 0.4 MG/ML
.1-.4 INJECTION, SOLUTION INTRAMUSCULAR; INTRAVENOUS; SUBCUTANEOUS
Status: DISCONTINUED | OUTPATIENT
Start: 2018-11-28 | End: 2018-11-28 | Stop reason: HOSPADM

## 2018-11-28 RX ORDER — MEPERIDINE HYDROCHLORIDE 25 MG/ML
12.5 INJECTION INTRAMUSCULAR; INTRAVENOUS; SUBCUTANEOUS
Status: DISCONTINUED | OUTPATIENT
Start: 2018-11-28 | End: 2018-11-28 | Stop reason: HOSPADM

## 2018-11-28 RX ORDER — FENTANYL CITRATE 50 UG/ML
INJECTION, SOLUTION INTRAMUSCULAR; INTRAVENOUS PRN
Status: DISCONTINUED | OUTPATIENT
Start: 2018-11-28 | End: 2018-11-28

## 2018-11-28 RX ORDER — METHYLPREDNISOLONE SODIUM SUCCINATE 125 MG/2ML
125 INJECTION, POWDER, LYOPHILIZED, FOR SOLUTION INTRAMUSCULAR; INTRAVENOUS
Status: CANCELLED
Start: 2018-12-14

## 2018-11-28 RX ORDER — EPINEPHRINE 1 MG/ML
0.3 INJECTION, SOLUTION INTRAMUSCULAR; SUBCUTANEOUS EVERY 5 MIN PRN
Status: CANCELLED | OUTPATIENT
Start: 2018-11-30

## 2018-11-28 RX ORDER — ALBUTEROL SULFATE 0.83 MG/ML
2.5 SOLUTION RESPIRATORY (INHALATION)
Status: CANCELLED | OUTPATIENT
Start: 2018-12-07

## 2018-11-28 RX ORDER — LORAZEPAM 2 MG/ML
0.5 INJECTION INTRAMUSCULAR EVERY 4 HOURS PRN
Status: CANCELLED
Start: 2018-12-14

## 2018-11-28 RX ORDER — METHYLPREDNISOLONE SODIUM SUCCINATE 125 MG/2ML
125 INJECTION, POWDER, LYOPHILIZED, FOR SOLUTION INTRAMUSCULAR; INTRAVENOUS
Status: CANCELLED
Start: 2018-11-30

## 2018-11-28 RX ORDER — EPINEPHRINE 0.3 MG/.3ML
0.3 INJECTION SUBCUTANEOUS EVERY 5 MIN PRN
Status: CANCELLED | OUTPATIENT
Start: 2018-12-07

## 2018-11-28 RX ORDER — SODIUM CHLORIDE 9 MG/ML
1000 INJECTION, SOLUTION INTRAVENOUS CONTINUOUS PRN
Status: CANCELLED
Start: 2018-12-14

## 2018-11-28 RX ORDER — DIPHENHYDRAMINE HYDROCHLORIDE 50 MG/ML
50 INJECTION INTRAMUSCULAR; INTRAVENOUS
Status: CANCELLED
Start: 2018-12-07

## 2018-11-28 RX ORDER — DIPHENHYDRAMINE HCL 25 MG
50 CAPSULE ORAL ONCE
Status: CANCELLED
Start: 2018-12-07

## 2018-11-28 RX ORDER — ALBUTEROL SULFATE 0.83 MG/ML
2.5 SOLUTION RESPIRATORY (INHALATION)
Status: CANCELLED | OUTPATIENT
Start: 2018-12-14

## 2018-11-28 RX ORDER — ALBUTEROL SULFATE 90 UG/1
1-2 AEROSOL, METERED RESPIRATORY (INHALATION)
Status: CANCELLED
Start: 2018-12-14

## 2018-11-28 RX ORDER — MEPERIDINE HYDROCHLORIDE 25 MG/ML
25 INJECTION INTRAMUSCULAR; INTRAVENOUS; SUBCUTANEOUS EVERY 30 MIN PRN
Status: CANCELLED | OUTPATIENT
Start: 2018-12-14

## 2018-11-28 RX ORDER — HYDRALAZINE HYDROCHLORIDE 20 MG/ML
2.5-5 INJECTION INTRAMUSCULAR; INTRAVENOUS EVERY 10 MIN PRN
Status: DISCONTINUED | OUTPATIENT
Start: 2018-11-28 | End: 2018-11-28 | Stop reason: HOSPADM

## 2018-11-28 RX ORDER — FENTANYL CITRATE 50 UG/ML
25-50 INJECTION, SOLUTION INTRAMUSCULAR; INTRAVENOUS
Status: DISCONTINUED | OUTPATIENT
Start: 2018-11-28 | End: 2018-11-28 | Stop reason: HOSPADM

## 2018-11-28 RX ORDER — KETOROLAC TROMETHAMINE 30 MG/ML
30 INJECTION, SOLUTION INTRAMUSCULAR; INTRAVENOUS EVERY 6 HOURS PRN
Status: DISCONTINUED | OUTPATIENT
Start: 2018-11-28 | End: 2018-11-28 | Stop reason: HOSPADM

## 2018-11-28 RX ORDER — SODIUM CHLORIDE 9 MG/ML
1000 INJECTION, SOLUTION INTRAVENOUS CONTINUOUS PRN
Status: CANCELLED
Start: 2018-12-07

## 2018-11-28 RX ORDER — DIPHENHYDRAMINE HYDROCHLORIDE 50 MG/ML
50 INJECTION INTRAMUSCULAR; INTRAVENOUS
Status: CANCELLED
Start: 2018-11-30

## 2018-11-28 RX ORDER — SODIUM CHLORIDE, SODIUM LACTATE, POTASSIUM CHLORIDE, CALCIUM CHLORIDE 600; 310; 30; 20 MG/100ML; MG/100ML; MG/100ML; MG/100ML
INJECTION, SOLUTION INTRAVENOUS CONTINUOUS
Status: DISCONTINUED | OUTPATIENT
Start: 2018-11-28 | End: 2018-11-28 | Stop reason: HOSPADM

## 2018-11-28 RX ORDER — LABETALOL HYDROCHLORIDE 5 MG/ML
10 INJECTION, SOLUTION INTRAVENOUS
Status: DISCONTINUED | OUTPATIENT
Start: 2018-11-28 | End: 2018-11-28 | Stop reason: HOSPADM

## 2018-11-28 RX ORDER — SODIUM CHLORIDE, SODIUM LACTATE, POTASSIUM CHLORIDE, CALCIUM CHLORIDE 600; 310; 30; 20 MG/100ML; MG/100ML; MG/100ML; MG/100ML
INJECTION, SOLUTION INTRAVENOUS CONTINUOUS PRN
Status: DISCONTINUED | OUTPATIENT
Start: 2018-11-28 | End: 2018-11-28

## 2018-11-28 RX ORDER — SODIUM CHLORIDE 9 MG/ML
1000 INJECTION, SOLUTION INTRAVENOUS CONTINUOUS PRN
Status: CANCELLED
Start: 2018-11-30

## 2018-11-28 RX ORDER — ACETAMINOPHEN 325 MG/1
650 TABLET ORAL ONCE
Status: CANCELLED | OUTPATIENT
Start: 2018-11-30

## 2018-11-28 RX ORDER — MEPERIDINE HYDROCHLORIDE 25 MG/ML
25 INJECTION INTRAMUSCULAR; INTRAVENOUS; SUBCUTANEOUS EVERY 30 MIN PRN
Status: CANCELLED | OUTPATIENT
Start: 2018-12-07

## 2018-11-28 RX ORDER — ONDANSETRON 2 MG/ML
4 INJECTION INTRAMUSCULAR; INTRAVENOUS EVERY 30 MIN PRN
Status: DISCONTINUED | OUTPATIENT
Start: 2018-11-28 | End: 2018-11-28 | Stop reason: HOSPADM

## 2018-11-28 RX ORDER — DEXAMETHASONE SODIUM PHOSPHATE 4 MG/ML
4 INJECTION, SOLUTION INTRA-ARTICULAR; INTRALESIONAL; INTRAMUSCULAR; INTRAVENOUS; SOFT TISSUE EVERY 10 MIN PRN
Status: DISCONTINUED | OUTPATIENT
Start: 2018-11-28 | End: 2018-11-28 | Stop reason: HOSPADM

## 2018-11-28 RX ORDER — HYDROCODONE BITARTRATE AND ACETAMINOPHEN 5; 325 MG/1; MG/1
1 TABLET ORAL ONCE
Status: COMPLETED | OUTPATIENT
Start: 2018-11-28 | End: 2018-11-28

## 2018-11-28 RX ORDER — LORAZEPAM 2 MG/ML
0.5 INJECTION INTRAMUSCULAR EVERY 4 HOURS PRN
Status: CANCELLED
Start: 2018-12-07

## 2018-11-28 RX ORDER — ALBUTEROL SULFATE 90 UG/1
1-2 AEROSOL, METERED RESPIRATORY (INHALATION)
Status: CANCELLED
Start: 2018-12-07

## 2018-11-28 RX ORDER — DIPHENHYDRAMINE HYDROCHLORIDE 50 MG/ML
50 INJECTION INTRAMUSCULAR; INTRAVENOUS
Status: CANCELLED
Start: 2018-12-14

## 2018-11-28 RX ORDER — ALBUTEROL SULFATE 90 UG/1
1-2 AEROSOL, METERED RESPIRATORY (INHALATION)
Status: CANCELLED
Start: 2018-11-30

## 2018-11-28 RX ADMIN — MIDAZOLAM 2 MG: 1 INJECTION INTRAMUSCULAR; INTRAVENOUS at 11:00

## 2018-11-28 RX ADMIN — HYDROCODONE BITARTRATE AND ACETAMINOPHEN 1 TABLET: 5; 325 TABLET ORAL at 12:32

## 2018-11-28 RX ADMIN — CEFAZOLIN SODIUM 2 G: 2 INJECTION, SOLUTION INTRAVENOUS at 11:10

## 2018-11-28 RX ADMIN — DEXMEDETOMIDINE HYDROCHLORIDE 20 MCG: 100 INJECTION, SOLUTION INTRAVENOUS at 11:18

## 2018-11-28 RX ADMIN — FENTANYL CITRATE 50 MCG: 50 INJECTION, SOLUTION INTRAMUSCULAR; INTRAVENOUS at 11:06

## 2018-11-28 RX ADMIN — FENTANYL CITRATE 50 MCG: 50 INJECTION, SOLUTION INTRAMUSCULAR; INTRAVENOUS at 11:20

## 2018-11-28 RX ADMIN — SODIUM CHLORIDE, PRESERVATIVE FREE 100 UNITS: 5 INJECTION INTRAVENOUS at 11:35

## 2018-11-28 RX ADMIN — SODIUM CHLORIDE, POTASSIUM CHLORIDE, SODIUM LACTATE AND CALCIUM CHLORIDE: 600; 310; 30; 20 INJECTION, SOLUTION INTRAVENOUS at 11:00

## 2018-11-28 RX ADMIN — PROPOFOL 150 MCG/KG/MIN: 10 INJECTION, EMULSION INTRAVENOUS at 11:03

## 2018-11-28 RX ADMIN — LIDOCAINE HYDROCHLORIDE 10 ML: 10 INJECTION, SOLUTION EPIDURAL; INFILTRATION; INTRACAUDAL; PERINEURAL at 11:48

## 2018-11-28 RX ADMIN — FENTANYL CITRATE 50 MCG: 50 INJECTION, SOLUTION INTRAMUSCULAR; INTRAVENOUS at 12:06

## 2018-11-28 RX ADMIN — HEPARIN SODIUM 250 ML: 1000 INJECTION, SOLUTION INTRAVENOUS; SUBCUTANEOUS at 11:30

## 2018-11-28 ASSESSMENT — LIFESTYLE VARIABLES: TOBACCO_USE: 0

## 2018-11-28 NOTE — ANESTHESIA POSTPROCEDURE EVALUATION
Patient: Saray Huntley    Procedure(s):  PORT PLACEMENT     Diagnosis:LEFT BREAST CANCER   Diagnosis Additional Information: No value filed.    Anesthesia Type:  MAC    Note:  Anesthesia Post Evaluation    Patient location during evaluation: PACU  Patient participation: Able to fully participate in evaluation  Level of consciousness: awake and alert  Pain management: adequate  Airway patency: patent  Cardiovascular status: acceptable  Respiratory status: acceptable and unassisted  Hydration status: acceptable  PONV: none             Last vitals:  Vitals:    11/28/18 1201 11/28/18 1215 11/28/18 1230   BP: 109/61 113/66 129/67   Resp: 14 14 14   Temp: 36.3  C (97.4  F)     SpO2: 98% 94% 97%         Electronically Signed By: Holland Orellana MD  November 28, 2018  12:38 PM

## 2018-11-28 NOTE — OP NOTE
PREOPERATIVE DIAGNOSIS:  Breast cancer      POSTOPERATIVE DIAGNOSIS:  Same.        PROCEDURE:  Ultrasound-guided right internal jugular Power Port-A-Cath placement with fluoroscopy and interpretation of imaging      SURGEON:  Sae Montaño MD.        ASSISTANT:   Kristi Marsh PA-C       ANESTHESIA:  Local with MAC      COMPLICATIONS:  None.        BLOOD LOSS:  Minimal.              INDICATIONS: Mrs. Huntley Has a history of breast cancer and is presenting today for Port-A-Cath placement. I explained the risks, benefits, complications, including but not limited to bleeding, infection, postop hematoma, seroma, port malposition, pneumothorax and need for additional procedures.  The patient agreed and did sign consent.        DETAILS OF PROCEDURE:  The patient was brought to the operating per Anesthesia, placed in supine position and MAC was administered without difficulty.  A surgical timeout was then performed, verifying the correct surgeon, site, procedure, patient, and all in the room were in agreement.  The patient was prepped in the usual fashion using ChloraPrep.  1% lidocaine and 0.25% Marcaine were injected to the right neck area.  The ultrasound was used to locate the internal jugular vein. Under direct visualization the needle was placed into the internal jugular vein without difficulty. An immediate flash of venous blood was apsirated. The guidewire was then placed through the needle using the Seldinger technique. Fluoroscopy confirmed that the guidewire was in through the SVC junction.  A port pocket was then created with cautery. The dilator was then placed over the guidewire without difficulty. An 8 K[place] catheter was then brought from the port pocket up through the neck incision. It was then placed through the dilator with ease.  Fluoroscopy was brought into the room, and confirmed the catheter was located in the SVC junction. I interpreted the fluoroscopy imaging confirming the placement and  confirming no obvious pneumothorax.  I aspirated and flushed the catheter multiple times with ease.  I then placed the Power Port on the catheter and anchored it on the chest wall with Prolene suture. The final fluoroscopy revealed that it was at the SVC junction. 7 cc of concentrated heparin was then injected into the catheter. Once this was done, the incision was closed with 3-0 Vicryl and a 4-0 Monocryl in subcuticular fashion.  Dermabond was applied to the wounds.  Pateint was awoken from anesthesia, transferred to PACU in stable condition.  All sponge, instrument and needle counts were correct. The Physician Assistant was medically necessary for their expertise in retraction/exposure, suctioning, and suturing.           TOMY WEST MD     Please cc note to Referring provider and PCP

## 2018-11-28 NOTE — IP AVS SNAPSHOT
MRN:[id number]                      After Visit Summary   11/28/2018    Saray Huntley    MRN: [id number]           Thank you!     Thank you for choosing [facility] for your care. Our goal is always to provide you with excellent care. Hearing back from our patients is one way we can continue to improve our services. Please take a few minutes to complete the written survey that you may receive in the mail after you visit with us. Thank you!        Patient Information     Date Of Birth          1970        About your hospital stay     You were admitted on:  November 28, 2018 You last received care in the:  [facility] PreOP/Phase II    You were discharged on:  November 28, 2018       Who to Call     For medical emergencies, please call 911.  For non-urgent questions about your medical care, please call your primary care provider or clinic, [phone]  For questions related to your surgery, please call your surgery clinic        Attending Provider     Provider Specialty    Sae Montaño MD Surgery       Primary Care Provider Office Phone # Fax #    Jeannie Ross [phone]      After Care Instructions     Discharge Instructions       No follow up needed                  Your next 10 appointments already scheduled     Nov 30, 2018  8:00 AM CST   Level 7 with  INFUSION CHAIR 3   [facility] ([facility])    [facility]  [address]  [facility] [address]   [phone]            Nov 30, 2018  9:00 AM CST   Return Visit with IRINA Scruggs CNP   [facility] ([facility])    [facility]  [address]  [facility] [address]   [phone]            Dec 07, 2018  8:00 AM CST   Level 4 with SH INFUSION CHAIR 4   [facility] ([facility])    [facility]  [address]  [place] [address]   [phone]            Dec 07, 2018  9:00 AM CST   Return Visit with IRINA Scruggs CNP   [facility] ([facility])    [place] [id number]  [place] [address]   [phone]            Dec 14, 2018  8:30 AM CST   Level 6 with SH INFUSION CHAIR 11   [facility] ([facility])    [place] [address]  [place] [address]   [phone]            Dec 14, 2018  9:30 AM CST   Return Visit with Cayla Stock MD   [facility] ([facility])    [place] [address]  [place] [address]   [phone]            Feb 04, 2019 11:15 AM CST   New Visit with Brenna Callahan GC   [facility] ([facility])    [place] [address]  [place] [address]   [phone]              Further instructions from your care team       [facility] - [facility]  DISCHARGE INSTRUCTIONS  WOUND CARE  Keep the wound clean and dry. You may shower or bathe tomorrow as usual, but do not use soaps, lotions, or ointments on the wound area. Do not scrub the wound. After bathing, pat the wound dry with a soft towel.  Do not scratch, rub, or pick at the strips or film. Do not place tape directly over the strips or film.  Do not apply liquids (such as peroxide), ointments, or creams to the wound while the strips or film are in place.  Most  wounds heal without problems. However, an infection sometimes occurs despite proper treatment. Therefore, watch for the signs of infection listed below.  The skin glue strips or film will fall off naturally in 5 to 10 days.    ACTIVITY  You may climb stairs.  You may exercise if you are comfortable.  You may drive without restrictions when you are not using prescription  pain medication and are comfortable in the car.  You may return to work / school when you are comfortable without prescription pain medication.  BATHING  You may shower.  Do not soak the wound or swim until the incision has been dry for 2-3 days.  .  DIET  Return to the diet you were on before surgery.  FOLLOW UP  No follow up needed    HELPFUL HINTS  Prescription pain medications make most people constipated.  It is never a bad idea to take a mild laxative (Miralax / Milk of magnesia) while you are using your prescription medication.  You may take ibuprofen instead of or in addition to your prescription.  If you are taking the maximum amount of your prescription medication, you should not take any additional Tylenol.  CALL YOUR PHYSICIAN IF YOU HAVE  Chills or fever above 101  F.  Significant or malodorous drainage from the incision(s)   Significant bleeding  Pain not relieved by your pain medication or rest.   Increasing pain after the first 48 hours  Same Day Surgery Discharge Instructions for  Sedation and General Anesthesia       It's not unusual to feel dizzy, light-headed or faint for up to 24 hours after surgery or while taking pain medication.  If you have these symptoms: sit for a few minutes before standing and have someone assist you when you get up to walk or use the bathroom.      You should rest and relax for the next 24 hours. We recommend you make arrangements to have an adult stay with you for at least 24 hours after your discharge.  Avoid hazardous and strenuous activity.      DO NOT DRIVE any vehicle or operate mechanical equipment for 24 hours following the end of your surgery.  Even though you may feel normal, your reactions may be affected by the medication you have received.      Do not drink alcoholic beverages for 24 hours following surgery.       Slowly progress to your regular diet as you feel able. It's not unusual to feel nauseated and/or vomit after receiving anesthesia.  If you  "develop these symptoms, drink clear liquids (apple juice, ginger ale, broth, 7-up, etc. ) until you feel better.  If your nausea and vomiting persists for 24 hours, please notify your surgeon.        All narcotic pain medications, along with inactivity and anesthesia, can cause constipation. Drinking plenty of liquids and increasing fiber intake will help.      For any questions of a medical nature, call your surgeon.      Do not make important decisions for 24 hours.      If you had general anesthesia, you may have a sore throat for a couple of days related to the breathing tube used during surgery.  You may use Cepacol lozenges to help with this discomfort.  If it worsens or if you develop a fever, contact your surgeon.       If you feel your pain is not well managed with the pain medications prescribed by your surgeon, please contact your surgeon's office to let them know so they can address your concerns.           Pending Results     Date and Time Order Name Status Description    11/28/2018 1147 XR Surgery PETERSON Fluoro L/T 5 Min w Stills In process             Admission Information     Date & Time Provider Department Dept. Phone    11/28/2018 Sae Montaño MD [facility] PreOP/Phase [phone]      Your Vitals Were     Blood Pressure Temperature Respirations Height Weight Last Period    129/67 97.4  F (36.3  C) (Temporal) 14 1.549 m (5' 1\") 77.7 kg (171 lb 6 oz) 09/29/2012 (Approximate)    Pulse Oximetry BMI (Body Mass Index)                97% 32.38 kg/m2          Care EveryWhere ID     This is your Care EveryWhere ID. This could be used by other organizations to access your [facility] medical records  [id number]        Equal Access to Services     [facility] : Hadii skyler Acevedo, wachenteda luqadaha, qaybta kaalesha vee. So [facility] [phone].    ATENCIÓN: Si habla español, tiene a marquez disposición servicios gratuitos de asistencia " lingüísticaBeth Davison al [phone].    We comply with applicable federal civil rights laws and [place] laws. We do not discriminate on the basis of race, color, national origin, age, disability, sex, sexual orientation, or gender identity.               Review of your medicines      START taking        Dose / Directions    HYDROcodone-acetaminophen 5-325 MG tablet   Commonly known as:  NORCO   Used for:  Malignant neoplasm of upper-outer quadrant of left breast in female, estrogen receptor positive (H)   Notes to Patient:  You received 1 tablet at 12:30.        Dose:  1-2 tablet   Take 1-2 tablets by mouth every 4 hours as needed for moderate to severe pain   Quantity:  15 tablet   Refills:  0            Where to get your medicines      Some of these will need a paper prescription and others can be bought over the counter. Ask your nurse if you have questions.     Bring a paper prescription for each of these medications     HYDROcodone-acetaminophen 5-325 MG tablet                Protect others around you: Learn how to safely use, store and throw away your medicines at www.disposemymeds.org.        Information about OPIOIDS     PRESCRIPTION OPIOIDS: WHAT YOU NEED TO KNOW   We gave you an opioid (narcotic) pain medicine. It is important to manage your pain, but opioids are not always the best choice. You should first try all the other options your care team gave you. Take this medicine for as short a time (and as few doses) as possible.    Some activities can increase your pain, such as bandage changes or therapy sessions. It may help to take your pain medicine 30 to 60 minutes before these activities. Reduce your stress by getting enough sleep, working on hobbies you enjoy and practicing relaxation or meditation. Talk to your care team about ways to manage your pain beyond prescription opioids.    These medicines have risks:    DO NOT drive when on new or higher doses of pain medicine. These medicines can affect  your alertness and reaction times, and you could be arrested for driving under the influence (DUI). If you need to use opioids long-term, talk to your care team about driving.    DO NOT operate heavy machinery    DO NOT do any other dangerous activities while taking these medicines.    DO NOT drink any alcohol while taking these medicines.     If the opioid prescribed includes acetaminophen, DO NOT take with any other medicines that contain acetaminophen. Read all labels carefully. Look for the word  acetaminophen  or  Tylenol.  Ask your pharmacist if you have questions or are unsure.    You can get addicted to pain medicines, especially if you have a history of addiction (chemical, alcohol or substance dependence). Talk to your care team about ways to reduce this risk.    All opioids tend to cause constipation. Drink plenty of water and eat foods that have a lot of fiber, such as fruits, vegetables, prune juice, apple juice and high-fiber cereal. Take a laxative (Miralax, milk of magnesia, Colace, Senna) if you don t move your bowels at least every other day. Other side effects include upset stomach, sleepiness, dizziness, throwing up, tolerance (needing more of the medicine to have the same effect), physical dependence and slowed breathing.    Store your pills in a secure place, locked if possible. We will not replace any lost or stolen medicine. If you don t finish your medicine, please throw away (dispose) as directed by your pharmacist. The [place] Pollution [employer] has more information about safe disposal: https://www.pca.[facility].[place].us/living-green/managing-unwanted-medications             Medication List: This is a list of all your medications and when to take them. Check marks below indicate your daily home schedule. Keep this list as a reference.      Medications           Morning Afternoon Evening Bedtime As Needed    HYDROcodone-acetaminophen 5-325 MG tablet   Commonly known as:  NORCO   Take 1-2  tablets by mouth every 4 hours as needed for moderate to severe pain   Last time this was given:  1 tablet on 11/28/2018 12:32 PM   Notes to Patient:  You received 1 tablet at 12:30.

## 2018-11-28 NOTE — ANESTHESIA PREPROCEDURE EVALUATION
Anesthesia Evaluation     . Pt has had prior anesthetic.     No history of anesthetic complications          ROS/MED HX    ENT/Pulmonary:      (-) tobacco use and sleep apnea   Neurologic:  - neg neurologic ROS     Cardiovascular:  - neg cardiovascular ROS       METS/Exercise Tolerance:     Hematologic:         Musculoskeletal:         GI/Hepatic:        (-) GERD   Renal/Genitourinary:  - ROS Renal section negative       Endo:     (+) thyroid problem hypothyroidism, .      Psychiatric:         Infectious Disease:         Malignancy:   (+) Malignancy History of Breast          Other:                     Physical Exam  Normal systems: cardiovascular, pulmonary and dental    Airway   Mallampati: II  TM distance: >3 FB  Neck ROM: full    Dental     Cardiovascular       Pulmonary                     Anesthesia Plan      History & Physical Review  History and physical reviewed and following examination; no interval change.    ASA Status:  2 .    NPO Status:  > 8 hours    Plan for MAC Reason for MAC:  Deep or markedly invasive procedure (G8)  PONV prophylaxis:  Ondansetron (or other 5HT-3)       Postoperative Care  Postoperative pain management:  IV analgesics.      Consents  Anesthetic plan, risks, benefits and alternatives discussed with:  Patient..                          .

## 2018-11-28 NOTE — ANESTHESIA CARE TRANSFER NOTE
Patient: Saray Huntley    Procedure(s):  PORT PLACEMENT     Diagnosis: LEFT BREAST CANCER   Diagnosis Additional Information: No value filed.    Anesthesia Type:   MAC     Note:  Airway :Room Air  Patient transferred to:PACU  Comments: To recovery, Antonio Report: Identifed the Patient, Identified the Reponsible Provider, Reviewed the pertinent medical history, Discussed the surgical course, Reviewed Intra-OP anesthesia mangement and issues during anesthesia, Set expectations for post-procedure period and Allowed opportunity for questions and acknowledgement of understanding      Vitals: (Last set prior to Anesthesia Care Transfer)    CRNA VITALS  11/28/2018 1129 - 11/28/2018 1204      11/28/2018             Resp Rate (set): 10                Electronically Signed By: IRINA Perez CRNA  November 28, 2018  12:04 PM

## 2018-11-28 NOTE — IP AVS SNAPSHOT
[facility] PreOP/Phase II    [address]    [place] [address]    Phone:  [phone]                                       After Visit Summary   11/28/2018    Saray Huntley    MRN: [id number]           After Visit Summary Signature Page     I have received my discharge instructions, and my questions have been answered. I have discussed any challenges I see with this plan with the nurse or doctor.    ..........................................................................................................................................  Patient/Patient Representative Signature      ..........................................................................................................................................  Patient Representative Print Name and Relationship to Patient    ..................................................               ................................................  Date                                   Time    ..........................................................................................................................................  Reviewed by Signature/Title    ...................................................              ..............................................  Date                                               Time          22EPIC Rev 08/18

## 2018-11-28 NOTE — TELEPHONE ENCOUNTER
I called patient and let her know that we reviewed her case at tumor conference this morning, and all were in agreement to proceed with neoadjuvant chemotherapy as planned.  Patient has port placement today, and scheduled to start chemotherapy on Friday, 11/30/18.

## 2018-11-28 NOTE — BRIEF OP NOTE
[facility] Brief Operative Note    Pre-operative diagnosis: LEFT BREAST CANCER    Post-operative diagnosis Breast cancer      Procedure: Procedure(s):  PORT PLACEMENT    Surgeon(s): Surgeon(s) and Role:     * Sae Montaño MD - Primary     * Kristi Marsh PA-C - Assisting   Estimated blood loss: 10 mL    Specimens: * No specimens in log *   Findings: Same as above    Kristi Marsh PA-C

## 2018-11-28 NOTE — DISCHARGE INSTRUCTIONS
[facility] - [facility]  DISCHARGE INSTRUCTIONS  WOUND CARE  Keep the wound clean and dry. You may shower or bathe tomorrow as usual, but do not use soaps, lotions, or ointments on the wound area. Do not scrub the wound. After bathing, pat the wound dry with a soft towel.  Do not scratch, rub, or pick at the strips or film. Do not place tape directly over the strips or film.  Do not apply liquids (such as peroxide), ointments, or creams to the wound while the strips or film are in place.  Most  wounds heal without problems. However, an infection sometimes occurs despite proper treatment. Therefore, watch for the signs of infection listed below.  The skin glue strips or film will fall off naturally in 5 to 10 days.    ACTIVITY  You may climb stairs.  You may exercise if you are comfortable.  You may drive without restrictions when you are not using prescription pain medication and are comfortable in the car.  You may return to work / school when you are comfortable without prescription pain medication.  BATHING  You may shower.  Do not soak the wound or swim until the incision has been dry for 2-3 days.  .  DIET  Return to the diet you were on before surgery.  FOLLOW UP  No follow up needed    HELPFUL HINTS  Prescription pain medications make most people constipated.  It is never a bad idea to take a mild laxative (Miralax / Milk of magnesia) while you are using your prescription medication.  You may take ibuprofen instead of or in addition to your prescription.  If you are taking the maximum amount of your prescription medication, you should not take any additional Tylenol.  CALL YOUR PHYSICIAN IF YOU HAVE  Chills or fever above 101  F.  Significant or malodorous drainage from the incision(s)   Significant bleeding  Pain not relieved by your pain medication or rest.   Increasing pain after the first 48 hours  Same Day Surgery Discharge Instructions for  Sedation and General Anesthesia        It's not unusual to feel dizzy, light-headed or faint for up to 24 hours after surgery or while taking pain medication.  If you have these symptoms: sit for a few minutes before standing and have someone assist you when you get up to walk or use the bathroom.      You should rest and relax for the next 24 hours. We recommend you make arrangements to have an adult stay with you for at least 24 hours after your discharge.  Avoid hazardous and strenuous activity.      DO NOT DRIVE any vehicle or operate mechanical equipment for 24 hours following the end of your surgery.  Even though you may feel normal, your reactions may be affected by the medication you have received.      Do not drink alcoholic beverages for 24 hours following surgery.       Slowly progress to your regular diet as you feel able. It's not unusual to feel nauseated and/or vomit after receiving anesthesia.  If you develop these symptoms, drink clear liquids (apple juice, ginger ale, broth, 7-up, etc. ) until you feel better.  If your nausea and vomiting persists for 24 hours, please notify your surgeon.        All narcotic pain medications, along with inactivity and anesthesia, can cause constipation. Drinking plenty of liquids and increasing fiber intake will help.      For any questions of a medical nature, call your surgeon.      Do not make important decisions for 24 hours.      If you had general anesthesia, you may have a sore throat for a couple of days related to the breathing tube used during surgery.  You may use Cepacol lozenges to help with this discomfort.  If it worsens or if you develop a fever, contact your surgeon.       If you feel your pain is not well managed with the pain medications prescribed by your surgeon, please contact your surgeon's office to let them know so they can address your concerns.

## 2018-11-30 ENCOUNTER — INFUSION THERAPY VISIT (OUTPATIENT)
Dept: INFUSION THERAPY | Facility: CLINIC | Age: 48
End: 2018-11-30
Attending: INTERNAL MEDICINE
Payer: COMMERCIAL

## 2018-11-30 ENCOUNTER — ONCOLOGY VISIT (OUTPATIENT)
Dept: ONCOLOGY | Facility: CLINIC | Age: 48
End: 2018-11-30
Attending: INTERNAL MEDICINE
Payer: COMMERCIAL

## 2018-11-30 ENCOUNTER — HOSPITAL ENCOUNTER (OUTPATIENT)
Facility: CLINIC | Age: 48
Setting detail: SPECIMEN
End: 2018-11-30
Attending: INTERNAL MEDICINE
Payer: COMMERCIAL

## 2018-11-30 ENCOUNTER — HOSPITAL ENCOUNTER (OUTPATIENT)
Facility: CLINIC | Age: 48
Discharge: HOME | End: 2018-11-30
Admitting: NURSE PRACTITIONER
Payer: COMMERCIAL

## 2018-11-30 ENCOUNTER — HOSPITAL ENCOUNTER (OUTPATIENT)
Dept: CT IMAGING | Facility: CLINIC | Age: 48
End: 2018-11-30
Attending: NURSE PRACTITIONER
Payer: COMMERCIAL

## 2018-11-30 ENCOUNTER — ALLIED HEALTH/NURSE VISIT (OUTPATIENT)
Dept: ONCOLOGY | Facility: CLINIC | Age: 48
End: 2018-11-30

## 2018-11-30 ENCOUNTER — TELEPHONE (OUTPATIENT)
Dept: PHARMACY | Facility: CLINIC | Age: 48
End: 2018-11-30

## 2018-11-30 VITALS
BODY MASS INDEX: 31.72 KG/M2 | SYSTOLIC BLOOD PRESSURE: 123 MMHG | HEART RATE: 67 BPM | WEIGHT: 168 LBS | OXYGEN SATURATION: 96 % | DIASTOLIC BLOOD PRESSURE: 77 MMHG | TEMPERATURE: 98.2 F | HEIGHT: 61 IN | RESPIRATION RATE: 16 BRPM

## 2018-11-30 VITALS
HEART RATE: 59 BPM | TEMPERATURE: 98.4 F | WEIGHT: 168 LBS | BODY MASS INDEX: 31.72 KG/M2 | DIASTOLIC BLOOD PRESSURE: 84 MMHG | OXYGEN SATURATION: 96 % | HEIGHT: 61 IN | RESPIRATION RATE: 18 BRPM | SYSTOLIC BLOOD PRESSURE: 129 MMHG

## 2018-11-30 DIAGNOSIS — Z17.0 MALIGNANT NEOPLASM OF UPPER-OUTER QUADRANT OF LEFT BREAST IN FEMALE, ESTROGEN RECEPTOR POSITIVE (H): ICD-10-CM

## 2018-11-30 DIAGNOSIS — C50.412 MALIGNANT NEOPLASM OF UPPER-OUTER QUADRANT OF LEFT BREAST IN FEMALE, ESTROGEN RECEPTOR POSITIVE (H): Primary | ICD-10-CM

## 2018-11-30 DIAGNOSIS — C50.412 MALIGNANT NEOPLASM OF UPPER-OUTER QUADRANT OF LEFT BREAST IN FEMALE, ESTROGEN RECEPTOR POSITIVE (H): ICD-10-CM

## 2018-11-30 DIAGNOSIS — Z17.0 MALIGNANT NEOPLASM OF UPPER-OUTER QUADRANT OF LEFT BREAST IN FEMALE, ESTROGEN RECEPTOR POSITIVE (H): Primary | ICD-10-CM

## 2018-11-30 DIAGNOSIS — Z95.828 PORT-A-CATH IN PLACE: ICD-10-CM

## 2018-11-30 DIAGNOSIS — Z71.9 COUNSELING NOS(V65.40): Primary | ICD-10-CM

## 2018-11-30 LAB
ALBUMIN SERPL-MCNC: 3.8 G/DL (ref 3.4–5)
ALP SERPL-CCNC: 100 U/L (ref 40–150)
ALT SERPL W P-5'-P-CCNC: 66 U/L (ref 0–50)
ANION GAP SERPL CALCULATED.3IONS-SCNC: 5 MMOL/L (ref 3–14)
AST SERPL W P-5'-P-CCNC: 30 U/L (ref 0–45)
BASOPHILS # BLD AUTO: 0 10E9/L (ref 0–0.2)
BASOPHILS NFR BLD AUTO: 0.6 %
BILIRUB SERPL-MCNC: 0.7 MG/DL (ref 0.2–1.3)
BUN SERPL-MCNC: 16 MG/DL (ref 7–30)
CALCIUM SERPL-MCNC: 8.9 MG/DL (ref 8.5–10.1)
CHLORIDE SERPL-SCNC: 106 MMOL/L (ref 94–109)
CO2 SERPL-SCNC: 27 MMOL/L (ref 20–32)
CREAT SERPL-MCNC: 0.6 MG/DL (ref 0.52–1.04)
DIFFERENTIAL METHOD BLD: NORMAL
EOSINOPHIL # BLD AUTO: 0.1 10E9/L (ref 0–0.7)
EOSINOPHIL NFR BLD AUTO: 2 %
ERYTHROCYTE [DISTWIDTH] IN BLOOD BY AUTOMATED COUNT: 12.2 % (ref 10–15)
GFR SERPL CREATININE-BSD FRML MDRD: >90 ML/MIN/1.7M2
GLUCOSE SERPL-MCNC: 82 MG/DL (ref 70–99)
HCT VFR BLD AUTO: 40.1 % (ref 35–47)
HGB BLD-MCNC: 13.7 G/DL (ref 11.7–15.7)
IMM GRANULOCYTES # BLD: 0 10E9/L (ref 0–0.4)
IMM GRANULOCYTES NFR BLD: 0.2 %
LYMPHOCYTES # BLD AUTO: 2.3 10E9/L (ref 0.8–5.3)
LYMPHOCYTES NFR BLD AUTO: 34.7 %
MCH RBC QN AUTO: 30.4 PG (ref 26.5–33)
MCHC RBC AUTO-ENTMCNC: 34.2 G/DL (ref 31.5–36.5)
MCV RBC AUTO: 89 FL (ref 78–100)
MONOCYTES # BLD AUTO: 0.3 10E9/L (ref 0–1.3)
MONOCYTES NFR BLD AUTO: 4.7 %
NEUTROPHILS # BLD AUTO: 3.8 10E9/L (ref 1.6–8.3)
NEUTROPHILS NFR BLD AUTO: 57.8 %
NRBC # BLD AUTO: 0 10*3/UL
NRBC BLD AUTO-RTO: 0 /100
PLATELET # BLD AUTO: 266 10E9/L (ref 150–450)
POTASSIUM SERPL-SCNC: 3.9 MMOL/L (ref 3.4–5.3)
PROT SERPL-MCNC: 7.9 G/DL (ref 6.8–8.8)
RBC # BLD AUTO: 4.5 10E12/L (ref 3.8–5.2)
SODIUM SERPL-SCNC: 138 MMOL/L (ref 133–144)
WBC # BLD AUTO: 6.6 10E9/L (ref 4–11)

## 2018-11-30 PROCEDURE — 70491 CT SOFT TISSUE NECK W/DYE: CPT

## 2018-11-30 PROCEDURE — 96367 TX/PROPH/DG ADDL SEQ IV INF: CPT

## 2018-11-30 PROCEDURE — 99214 OFFICE O/P EST MOD 30 MIN: CPT | Performed by: NURSE PRACTITIONER

## 2018-11-30 PROCEDURE — 25000128 H RX IP 250 OP 636: Performed by: RADIOLOGY

## 2018-11-30 PROCEDURE — 25000125 ZZHC RX 250: Performed by: NURSE PRACTITIONER

## 2018-11-30 PROCEDURE — 96415 CHEMO IV INFUSION ADDL HR: CPT

## 2018-11-30 PROCEDURE — 25000128 H RX IP 250 OP 636: Performed by: NURSE PRACTITIONER

## 2018-11-30 PROCEDURE — 25000132 ZZH RX MED GY IP 250 OP 250 PS 637: Performed by: INTERNAL MEDICINE

## 2018-11-30 PROCEDURE — 96375 TX/PRO/DX INJ NEW DRUG ADDON: CPT

## 2018-11-30 PROCEDURE — 96417 CHEMO IV INFUS EACH ADDL SEQ: CPT

## 2018-11-30 PROCEDURE — 96374 THER/PROPH/DIAG INJ IV PUSH: CPT

## 2018-11-30 PROCEDURE — 80053 COMPREHEN METABOLIC PANEL: CPT | Performed by: INTERNAL MEDICINE

## 2018-11-30 PROCEDURE — 85025 COMPLETE CBC W/AUTO DIFF WBC: CPT | Performed by: INTERNAL MEDICINE

## 2018-11-30 PROCEDURE — 96413 CHEMO IV INFUSION 1 HR: CPT

## 2018-11-30 PROCEDURE — 25000128 H RX IP 250 OP 636: Performed by: INTERNAL MEDICINE

## 2018-11-30 PROCEDURE — 40000863 ZZH STATISTIC RADIOLOGY XRAY, US, CT, MAR, NM

## 2018-11-30 RX ORDER — PROCHLORPERAZINE MALEATE 10 MG
10 TABLET ORAL EVERY 6 HOURS PRN
Qty: 30 TABLET | Refills: 2 | Status: SHIPPED | OUTPATIENT
Start: 2018-11-30 | End: 2019-02-20

## 2018-11-30 RX ORDER — LORAZEPAM 0.5 MG/1
0.5 TABLET ORAL EVERY 4 HOURS PRN
Qty: 30 TABLET | Refills: 2 | Status: SHIPPED | OUTPATIENT
Start: 2018-11-30 | End: 2019-02-20

## 2018-11-30 RX ORDER — ACETAMINOPHEN 325 MG/1
650 TABLET ORAL ONCE
Status: COMPLETED | OUTPATIENT
Start: 2018-11-30 | End: 2018-11-30

## 2018-11-30 RX ORDER — IOPAMIDOL 755 MG/ML
80 INJECTION, SOLUTION INTRAVASCULAR ONCE
Status: COMPLETED | OUTPATIENT
Start: 2018-11-30 | End: 2018-11-30

## 2018-11-30 RX ORDER — HEPARIN SODIUM (PORCINE) LOCK FLUSH IV SOLN 100 UNIT/ML 100 UNIT/ML
500 SOLUTION INTRAVENOUS ONCE
Status: CANCELLED
Start: 2018-11-30 | End: 2018-11-30

## 2018-11-30 RX ORDER — HEPARIN SODIUM (PORCINE) LOCK FLUSH IV SOLN 100 UNIT/ML 100 UNIT/ML
500 SOLUTION INTRAVENOUS ONCE
Status: COMPLETED | OUTPATIENT
Start: 2018-11-30 | End: 2018-11-30

## 2018-11-30 RX ORDER — ONDANSETRON 4 MG/1
4 TABLET, ORALLY DISINTEGRATING ORAL
Qty: 60 TABLET | Refills: 1 | Status: SHIPPED | OUTPATIENT
Start: 2018-11-30 | End: 2019-03-08

## 2018-11-30 RX ORDER — HEPARIN SODIUM (PORCINE) LOCK FLUSH IV SOLN 100 UNIT/ML 100 UNIT/ML
5 SOLUTION INTRAVENOUS
Status: DISCONTINUED | OUTPATIENT
Start: 2018-11-30 | End: 2018-11-30 | Stop reason: HOSPADM

## 2018-11-30 RX ADMIN — PERTUZUMAB 840 MG: 30 INJECTION, SOLUTION, CONCENTRATE INTRAVENOUS at 11:18

## 2018-11-30 RX ADMIN — PACLITAXEL 146 MG: 6 INJECTION, SOLUTION INTRAVENOUS at 14:12

## 2018-11-30 RX ADMIN — SODIUM CHLORIDE 250 ML: 9 INJECTION, SOLUTION INTRAVENOUS at 10:02

## 2018-11-30 RX ADMIN — SODIUM CHLORIDE, PRESERVATIVE FREE 60 ML: 5 INJECTION INTRAVENOUS at 16:36

## 2018-11-30 RX ADMIN — SODIUM CHLORIDE, PRESERVATIVE FREE 5 ML: 5 INJECTION INTRAVENOUS at 16:50

## 2018-11-30 RX ADMIN — SODIUM CHLORIDE, PRESERVATIVE FREE 500 UNITS: 5 INJECTION INTRAVENOUS at 15:36

## 2018-11-30 RX ADMIN — ACETAMINOPHEN 650 MG: 325 TABLET ORAL at 10:02

## 2018-11-30 RX ADMIN — DIPHENHYDRAMINE HYDROCHLORIDE 50 MG: 50 INJECTION, SOLUTION INTRAMUSCULAR; INTRAVENOUS at 10:41

## 2018-11-30 RX ADMIN — FAMOTIDINE 20 MG: 20 INJECTION, SOLUTION INTRAVENOUS at 11:01

## 2018-11-30 RX ADMIN — DEXAMETHASONE SODIUM PHOSPHATE 20 MG: 10 INJECTION, SOLUTION INTRAMUSCULAR; INTRAVENOUS at 10:14

## 2018-11-30 RX ADMIN — TRASTUZUMAB 600 MG: 150 INJECTION, POWDER, LYOPHILIZED, FOR SOLUTION INTRAVENOUS at 12:25

## 2018-11-30 RX ADMIN — IOPAMIDOL 80 ML: 755 INJECTION, SOLUTION INTRAVENOUS at 16:36

## 2018-11-30 ASSESSMENT — PAIN SCALES - GENERAL: PAINLEVEL: SEVERE PAIN (6)

## 2018-11-30 NOTE — TELEPHONE ENCOUNTER
Insurance only covers #12 per fill, requesting for a quantity override.    [facility] Pharmacy Liaison  [phone]

## 2018-11-30 NOTE — LETTER
"    11/30/2018         RE: Saray Huntley  [address]  [place] [address]        Dear Colleague,    Thank you for referring your patient, Saray Huntley, to the [facility]. Please see a copy of my visit note below.    Oncology Rooming Note    November 30, 2018 8:40 AM   Saray Huntley is a 48 year old female who presents for:    Chief Complaint   Patient presents with     Oncology Clinic Visit     Malignant neoplasm of upper-outer quadrant of left breast in female, estrogen receptor positive      Initial Vitals: /84  Pulse 59  Temp 98.4  F (36.9  C) (Oral)  Resp 18  Ht 1.549 m (5' 0.98\")  Wt 76.2 kg (168 lb)  LMP 09/29/2012 (Approximate)  SpO2 96%  BMI 31.76 kg/m2 Estimated body mass index is 31.76 kg/(m^2) as calculated from the following:    Height as of this encounter: 1.549 m (5' 0.98\").    Weight as of this encounter: 76.2 kg (168 lb). Body surface area is 1.81 meters squared.  Severe Pain (6) Comment: Data Unavailable   Patient's last menstrual period was 09/29/2012 (approximate).  Allergies reviewed: Yes  Medications reviewed: Yes    Medications: Medication refills not needed today.  Pharmacy name entered into [facility]:    [id number] - [place], [place] - [address] AT [facility] OF [place] [address] & [facility] [place] - [address]    Clinical concerns: None                   4 minutes for nursing intake (face to face time)     Lesvia Roldan MA              Oncology/Hematology Visit Note  Nov 30, 2018    Reason for Visit: follow up of breast cancer    History of Present Illness: Saray Huntley is a 48 year old female with  Left sided breast cancer biopsy showed invasive ductal carcinoma grade 3 n weakly positive for ER(1-3%) IA negative HER-2 positive    Patient is here for cycle 1 day 1 paclitaxel Herceptin and Perjeta       Interval History:  She had right side  port placed on Wednesday.  She has pain in the area- mostly in the " "cervical area.  She denies swelling redness.  She patient denies fever chills sweats, denies cough no shortness of breath.  She denies nausea vomiting diarrhea denies abdominal pain she denies upper extremity and lower extremity edema.      Review of Systems:  14 point ROS of systems including Constitutional, Eyes, Respiratory, Cardiovascular, Gastroenterology, Genitourinary, Integumentary, Muscularskeletal, Psychiatric were all negative except for pertinent positives noted in my HPI.      Current Outpatient Prescriptions   Medication Sig Dispense Refill     HYDROcodone-acetaminophen (NORCO) 5-325 MG tablet Take 1-2 tablets by mouth every 4 hours as needed for moderate to severe pain 15 tablet 0     LORazepam (ATIVAN) 0.5 MG tablet Take 1 tablet (0.5 mg) by mouth every 4 hours as needed (Anxiety, Nausea/Vomiting or Sleep) 30 tablet 2     ondansetron (ZOFRAN ODT) 4 MG ODT tab Take 1 tablet (4 mg) by mouth 3 times daily (before meals) 60 tablet 1     prochlorperazine (COMPAZINE) 10 MG tablet Take 1 tablet (10 mg) by mouth every 6 hours as needed (Nausea/Vomiting) 30 tablet 2       Physical Examination:  General: The patient is a pleasant female in no acute distress.  /84  Pulse 59  Temp 98.4  F (36.9  C) (Oral)  Resp 18  Ht 1.549 m (5' 0.98\")  Wt 76.2 kg (168 lb)  LMP 09/29/2012 (Approximate)  SpO2 96%  BMI 31.76 kg/m2  HEENT: EOMI, PERRL. Sclerae are anicteric. Oral mucosa is pink and moist with no lesions or thrush.   Lymph: Neck is supple with no lymphadenopathy in the cervical or supraclavicular areas.  Right cervical area tender to touch.  Heart: Regular rate and rhythm.   Lungs: Clear to auscultation bilaterally.   GI: Bowel sounds present, soft, nontender with no palpable hepatosplenomegaly or masses.   Extremities: No lower extremity edema noted bilaterally.   Skin: No rashes, petechiae, or bruising noted on exposed skin.    Laboratory Data:  Results for orders placed or performed in visit on " 11/30/18 (from the past 24 hour(s))   CBC with platelets differential   Result Value Ref Range    WBC 6.6 4.0 - 11.0 10e9/L    RBC Count 4.50 3.8 - 5.2 10e12/L    Hemoglobin 13.7 11.7 - 15.7 g/dL    Hematocrit 40.1 35.0 - 47.0 %    MCV 89 78 - 100 fl    MCH 30.4 26.5 - 33.0 pg    MCHC 34.2 31.5 - 36.5 g/dL    RDW 12.2 10.0 - 15.0 %    Platelet Count 266 150 - 450 10e9/L    Diff Method Automated Method     % Neutrophils 57.8 %    % Lymphocytes 34.7 %    % Monocytes 4.7 %    % Eosinophils 2.0 %    % Basophils 0.6 %    % Immature Granulocytes 0.2 %    Nucleated RBCs 0 0 /100    Absolute Neutrophil 3.8 1.6 - 8.3 10e9/L    Absolute Lymphocytes 2.3 0.8 - 5.3 10e9/L    Absolute Monocytes 0.3 0.0 - 1.3 10e9/L    Absolute Eosinophils 0.1 0.0 - 0.7 10e9/L    Absolute Basophils 0.0 0.0 - 0.2 10e9/L    Abs Immature Granulocytes 0.0 0 - 0.4 10e9/L    Absolute Nucleated RBC 0.0    Comprehensive metabolic panel   Result Value Ref Range    Sodium 138 133 - 144 mmol/L    Potassium 3.9 3.4 - 5.3 mmol/L    Chloride 106 94 - 109 mmol/L    Carbon Dioxide 27 20 - 32 mmol/L    Anion Gap 5 3 - 14 mmol/L    Glucose 82 70 - 99 mg/dL    Urea Nitrogen 16 7 - 30 mg/dL    Creatinine 0.60 0.52 - 1.04 mg/dL    GFR Estimate >90 >60 mL/min/1.7m2    GFR Estimate If Black >90 >60 mL/min/1.7m2    Calcium 8.9 8.5 - 10.1 mg/dL    Bilirubin Total 0.7 0.2 - 1.3 mg/dL    Albumin 3.8 3.4 - 5.0 g/dL    Protein Total 7.9 6.8 - 8.8 g/dL    Alkaline Phosphatase 100 40 - 150 U/L    ALT 66 (H) 0 - 50 U/L    AST 30 0 - 45 U/L         Assessment and Plan:    This is a 48-year-old female with    Left sided breast cancer biopsy showed invasive ductal carcinoma grade 3 n weakly positive for ER(1-3%) GA negative HER-2 positive  Patient is here for cycle 1 day 1 paclitaxel Herceptin and Perjeta   -Labs and echocardiogram reviewed patient.  Side effects of the chemotherapy discussed in detail.  Patient family verbalized understanding.  Patient is asked to call with the  side effects or any changes in health condition  Proceed with cycle 1 day 1 - Perjeta /Herceptin weekly taxol   -pt  is scheduled for follow-up with me next week  -Patient is scheduled to see Dr. Stock in 2 weeks      Right cervical pain   -started after  Port-A-Cath was placed on Wednesday 11/27  -I will order stat CT of the neck to rule out blood clot.      Genetics  She will see genetic counseling in February    Coping-we talked about coping mechanisms.  She denies depression or suicidal ideation .she is anxious about the diagnosis .She is not ready to see palliative care. I will schedule her to see Dr. Tamir Mohamud  Patient will see our  today      IRINA Scruggs CNP  Hem/Onc   [facility]               Again, thank you for allowing me to participate in the care of your patient.        Sincerely,        IRINA Scruggs CNP

## 2018-11-30 NOTE — PROGRESS NOTES
ONCOLOGY SOCIAL WORK  INITIAL PSYCHOSOCIAL ASSESSMENT    Assessment completed of living situation, support system, financial status, functional status, coping, stressors, need for resources and social work intervention provided as needed.    Date of Assessment: 11/30/2018    Present at assessment: Saray comes to clinic today accompanied by her mother Gris, father Srinivas, sister Jenni, and brother-in-law Ascencion.     Diagnosis: IA left-sided breast cancer    Date of Diagnosis: 11/6/2018    Physician: Cayla Stock MD    Presenting Information: Saray presents today for C1D1 Taxol, Herceptin, Perjeta. This clinician met with family with goal of introducing psychosocial services and support.     Decision Making: Self    Health Care Directive: Not on file. Did not discuss with pt today.     Family/Support System: Parents, Siblings and Other. 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     Transportation: Private Car    Insurance: No Insurance issues identified    Sources of Income: Currently in work/study program at school where she makes $385 every 2 weeks. Planning to graduate from school May 2019    Employment: Works in [employer]. Hoping to continue to be able to work throughout treatment.     Financial Concerns: Financial concerns at present and interested in foundation assistance programs    Education/School: Currently enrolled in [employer]    Mental Health: Per medical record, no previous history of mental illness    Legal Concerns/Involvement: None    Recreation/Leisure Interests: Enjoys [employer] classes and going on walks with sister, likes spending time with daughter    Current Coping Strategies: approachable, responsive, interactive and seeks support    Assessment and Recommendations for Team:   Saray is a parvin woman who is the proud mother of 15-year-old daughter. Saray moved to [place] from [place] 1.5 years ago to be  "closer to family and to start school, her parents recently moved into her home. Saray reports that she has never known anyone personally to cope with cancer, and is understandably anxious during this period of transition. Despite understandable anxiety when coping with the unknown, she appears to have a very robust network of family support, with her father acknowledging that the family is \"rull of survivors\" from having coped with obstacles in life previously. Saray acknowledges that her greatest concerns today are financial, as well as emotional support for her daughter. Oriented Saray and family to SW resources and support available in clinic, and provided community resources as well.     Interventions:   1) Financial: Discussed [employer], [employer], and [employer]. This clinician initiating [employer] application per her request today, and provided information regarding [employer] as well. Provided written information about local resources and supports.  2) Emotional: Provided information about support programming available through [employer] for her daughter, as well as programming available through [employer], and [employer]. Sent in Saray's contact information for [employer] per her request.     Follow-Up Planned:   1) This clinician will plan to follow-up with pt by phone next week for psychosocial check-in and support.   2) This clinician will continue to follow for ongoing psychosocial support as pt adjusts to treatment and realizes its impacts. Family aware of how to reach out to this clinician in the case of psychosocial distress or concern.   3) This clinician will continue to collaborate with pt's ongoing care team.     Please page in the case of psychosocial distress or need.     ALEA Tapia, [facility]  Phone: [phone]  Pager: [phone]    [facility]: M, T  *every other Thursday, 8am-4:30pm  [facility]: W, F, *every other Thursday, " 8am-4:30pm

## 2018-11-30 NOTE — MR AVS SNAPSHOT
After Visit Summary   11/30/2018    Saray Huntley    MRN: [id number]           Patient Information     Date Of Birth          1970        Visit Information        Provider Department      11/30/2018 8:00 AM  INFUSION CHAIR 3 [facility]        Today's Diagnoses     Malignant neoplasm of upper-outer quadrant of left breast in female, estrogen receptor positive (H)    -  1    Port-A-Cath in place           Follow-ups after your visit        Your next 10 appointments already scheduled     Dec 07, 2018  8:00 AM CST   Level 4 with  INFUSION CHAIR 4   [facility] ([facility])    [place] [id number]  [place] [address]   [phone]            Dec 07, 2018  9:00 AM CST   Return Visit with IRINA Scruggs CNP   [facility] ([facility])    [place] [id number]  [place] [address]   [phone]            Dec 10, 2018  9:00 AM CST   New Visit with Ryan Mohamud PsyD   [facility] ([facility])    [facility] [place] [id number]  [place] [address] [phone]            Dec 14, 2018  8:30 AM CST   Level 6 with  INFUSION CHAIR 11   [facility] ([facility])    [place] [id number]  [place] [address] [phone]            Dec 14, 2018  9:30 AM CST   Return Visit with Cayla Stock MD   [facility] ([facility])    [facility]  [address]  [place] [address] [phone]            Feb 04, 2019 11:15 AM CST   New Visit with Brenna Callahan GC   Cancer Risk Management Program ([facility])    [facility]  [address]  [place] [address]  "  [phone]              Future tests that were ordered for you today     Open Future Orders        Priority Expected Expires Ordered    CT Soft Tissue Neck w Contrast STAT  11/30/2019 11/30/2018            Who to contact     If you have questions or need follow up information about today's clinic visit or your schedule please contact [facility] AND [facility] directly at [phone].  Normal or non-critical lab and imaging results will be communicated to you by MyChart, letter or phone within 4 business days after the clinic has received the results. If you do not hear from us within 7 days, please contact the clinic through MyChart or phone. If you have a critical or abnormal lab result, we will notify you by phone as soon as possible.  Submit refill requests through [employer] or call your pharmacy and they will forward the refill request to us. Please allow 3 business days for your refill to be completed.          Additional Information About Your Visit        Care EveryWhere ID     This is your Care EveryWhere ID. This could be used by other organizations to access your [facility] medical records  [id number]        Your Vitals Were     Pulse Temperature Respirations Height Last Period Pulse Oximetry    59 98.4  F (36.9  C) (Oral) 18 1.549 m (5' 1\") 09/29/2012 (Approximate) 96%    BMI (Body Mass Index)                   31.74 kg/m2            Blood Pressure from Last 3 Encounters:   11/30/18 129/84   11/30/18 129/84   11/28/18 113/72    Weight from Last 3 Encounters:   11/30/18 76.2 kg (168 lb)   11/30/18 76.2 kg (168 lb)   11/28/18 77.7 kg (171 lb 6 oz)              We Performed the Following     CBC with platelets differential     Comprehensive metabolic panel          Today's Medication Changes          These changes are accurate as of 11/30/18  3:37 PM.  If you have any questions, ask your nurse or doctor.               Start taking these medicines.        Dose/Directions    LORazepam 0.5 " MG tablet   Commonly known as:  ATIVAN   Used for:  Malignant neoplasm of upper-outer quadrant of left breast in female, estrogen receptor positive (H)        Dose:  0.5 mg   Take 1 tablet (0.5 mg) by mouth every 4 hours as needed (Anxiety, Nausea/Vomiting or Sleep)   Quantity:  30 tablet   Refills:  2       ondansetron 4 MG ODT tab   Commonly known as:  ZOFRAN ODT   Used for:  Malignant neoplasm of upper-outer quadrant of left breast in female, estrogen receptor positive (H)   Started by:  Osmin Rose APRN CNP        Dose:  4 mg   Take 1 tablet (4 mg) by mouth 3 times daily (before meals)   Quantity:  60 tablet   Refills:  1       prochlorperazine 10 MG tablet   Commonly known as:  COMPAZINE   Used for:  Malignant neoplasm of upper-outer quadrant of left breast in female, estrogen receptor positive (H)        Dose:  10 mg   Take 1 tablet (10 mg) by mouth every 6 hours as needed (Nausea/Vomiting)   Quantity:  30 tablet   Refills:  2            Where to get your medicines      These medications were sent to [facility] [place] - [address]  [address] [facility] [address], [facility] [address]     Phone:  [phone]     ondansetron 4 MG ODT tab    prochlorperazine 10 MG tablet         Some of these will need a paper prescription and others can be bought over the counter.  Ask your nurse if you have questions.     Bring a paper prescription for each of these medications     LORazepam 0.5 MG tablet                Primary Care Provider Office Phone # Fax #    Jeannie Ross [phone]       [address]  [place] [address]        Equal Access to Services     [facility] AH: Hadii skyler thomas hadasho Sogavinali, waaxda luqadaha, qaybta kaalmada adeegjodie, esha hearn. So [facility] [phone].    ATENCIÓN: Si habla español, tiene a marquez disposición servicios gratuitos de asistencia lingüística. Llame al [phone].    We comply with applicable [facility] civil  rights laws and [place] laws. We do not discriminate on the basis of race, color, national origin, age, disability, sex, sexual orientation, or gender identity.            Thank you!     Thank you for choosing [facility]  for your care. Our goal is always to provide you with excellent care. Hearing back from our patients is one way we can continue to improve our services. Please take a few minutes to complete the written survey that you may receive in the mail after your visit with us. Thank you!             Your Updated Medication List - Protect others around you: Learn how to safely use, store and throw away your medicines at www.disposemymeds.org.          This list is accurate as of 11/30/18  3:37 PM.  Always use your most recent med list.                   Brand Name Dispense Instructions for use Diagnosis    HYDROcodone-acetaminophen 5-325 MG tablet    NORCO    15 tablet    Take 1-2 tablets by mouth every 4 hours as needed for moderate to severe pain    Malignant neoplasm of upper-outer quadrant of left breast in female, estrogen receptor positive (H)       LORazepam 0.5 MG tablet    ATIVAN    30 tablet    Take 1 tablet (0.5 mg) by mouth every 4 hours as needed (Anxiety, Nausea/Vomiting or Sleep)    Malignant neoplasm of upper-outer quadrant of left breast in female, estrogen receptor positive (H)       ondansetron 4 MG ODT tab    ZOFRAN ODT    60 tablet    Take 1 tablet (4 mg) by mouth 3 times daily (before meals)    Malignant neoplasm of upper-outer quadrant of left breast in female, estrogen receptor positive (H)       prochlorperazine 10 MG tablet    COMPAZINE    30 tablet    Take 1 tablet (10 mg) by mouth every 6 hours as needed (Nausea/Vomiting)    Malignant neoplasm of upper-outer quadrant of left breast in female, estrogen receptor positive (H)

## 2018-11-30 NOTE — MR AVS SNAPSHOT
After Visit Summary   11/30/2018    Saray Huntley    MRN: [id number]           Patient Information     Date Of Birth          1970        Visit Information        Provider Department      11/30/2018 1:35 PM Fabiola Monique LICSW [facility]        Today's Diagnoses     Counseling NOS(V65.40)    -  1       Follow-ups after your visit        Your next 10 appointments already scheduled     Nov 30, 2018  3:30 PM CST   CT SOFT TISSUE NECK W CONTRAST with SHCT1   [facility] CT ([facility])    [address] [place] [address]   [phone]           How do I prepare for my exam? (Food and drink instructions) **You will have contrast for this exam.** Do not eat or drink for 2 hours before your exam. If you need to take medicine, you may take it with small sips of water. (We may ask you to take liquid medicine as well.)  The day before your exam, drink extra fluids at least six 8-ounce glasses (unless your doctor tells you to restrict your fluids).  How do I prepare for my exam? (Other instructions) Patients over 70 or patients with diabetes or kidney problems: If you haven t had a blood test (creatinine test) within the last 30 days, the Cardiologist/Radiologist may require you to get this test prior to your exam.  What should I wear: Please wear loose clothing, such as a sweat suit or jogging clothes.  Avoid snaps, zippers and other metal. We may ask you to undress and put on a hospital gown.  How long does the exam take: Most scans take less than 20 minutes.  What should I bring: Please bring any scans or X-rays taken at other hospitals, if similar tests were done. Also bring a list of your medicines, including vitamins, minerals and over-the-counter drugs. It is safest to leave personal items at home.  Do I need a :  No  is needed.  What do I need to tell my doctor? Be sure to tell your doctor: * If you have any allergies. * If there s any  chance you are pregnant. * If you are breastfeeding. * If you have diabetes as your medication may need to be adjusted for this exam.  What should I do after the exam: No restrictions, You may resume normal activities.  What is this test: A CT (computed tomography) scan is a series of pictures that allows us to look inside your body. The scanner creates images of the body in cross sections, much like slices of bread. This helps us see any problems more clearly. You may receive contrast (X-ray dye) before or during your scan. Contrast is given through an IV (small needle in your arm).  Who should I call with questions: If you have any questions, please call the Imaging Department where you will have your exam. Directions, parking instructions, and other information is available on our website, [email or web address].            Dec 07, 2018  8:00 AM CST   Level 4 with  INFUSION CHAIR 4   [facility] ([facility])    [place] [id number]  [place] [address]   [phone]            Dec 07, 2018  9:00 AM CST   Return Visit with IRINA Scruggs CNP   [facility] ([facility])    [place] [id number]  [place] [address]   [phone]            Dec 10, 2018  9:00 AM CST   New Visit with Ryan Mohamud PsyD   [facility] ([facility])    [facility] [place] [id number]  [place] [address]   [phone]            Dec 14, 2018  8:30 AM CST   Level 6 with SH INFUSION CHAIR 11   [facility] ([facility])    [facility]  [address]  [place] [address]   [phone]            Dec 14, 2018  9:30 AM CST   Return Visit with Cayla Stock MD   [facility] ([facility])    [facility]  [place]  [address]  [place] [address]   [phone]            Feb 04, 2019 11:15 AM CST   New Visit with Brenna Callahan GC   [facility] ([facility])    [facility] [place]  [address]  [place] [address]   [phone]              Future tests that were ordered for you today     Open Future Orders        Priority Expected Expires Ordered    CT Soft Tissue Neck w Contrast STAT  11/30/2019 11/30/2018            Who to contact     If you have questions or need follow up information about today's clinic visit or your schedule please contact [facility] directly at [phone].  Normal or non-critical lab and imaging results will be communicated to you by MyChart, letter or phone within 4 business days after the clinic has received the results. If you do not hear from us within 7 days, please contact the clinic through MyChart or phone. If you have a critical or abnormal lab result, we will notify you by phone as soon as possible.  Submit refill requests through [employer] or call your pharmacy and they will forward the refill request to us. Please allow 3 business days for your refill to be completed.          Additional Information About Your Visit        Care EveryWhere ID     This is your Care EveryWhere ID. This could be used by other organizations to access your [facility] medical records  [id number]        Your Vitals Were     Last Period                   09/29/2012 (Approximate)            Blood Pressure from Last 3 Encounters:   11/30/18 129/84   11/30/18 129/84   11/28/18 113/72    Weight from Last 3 Encounters:   11/30/18 76.2 kg (168 lb)   11/30/18 76.2 kg (168 lb)   11/28/18 77.7 kg (171 lb 6 oz)              Today, you had the following     No orders found for display         Today's Medication Changes          These changes are accurate as of 11/30/18  1:57 PM.  If you have any questions, ask your nurse or doctor.                Start taking these medicines.        Dose/Directions    LORazepam 0.5 MG tablet   Commonly known as:  ATIVAN   Used for:  Malignant neoplasm of upper-outer quadrant of left breast in female, estrogen receptor positive (H)        Dose:  0.5 mg   Take 1 tablet (0.5 mg) by mouth every 4 hours as needed (Anxiety, Nausea/Vomiting or Sleep)   Quantity:  30 tablet   Refills:  2       ondansetron 4 MG ODT tab   Commonly known as:  ZOFRAN ODT   Used for:  Malignant neoplasm of upper-outer quadrant of left breast in female, estrogen receptor positive (H)   Started by:  Osmin Rose APRN CNP        Dose:  4 mg   Take 1 tablet (4 mg) by mouth 3 times daily (before meals)   Quantity:  60 tablet   Refills:  1       prochlorperazine 10 MG tablet   Commonly known as:  COMPAZINE   Used for:  Malignant neoplasm of upper-outer quadrant of left breast in female, estrogen receptor positive (H)        Dose:  10 mg   Take 1 tablet (10 mg) by mouth every 6 hours as needed (Nausea/Vomiting)   Quantity:  30 tablet   Refills:  2            Where to get your medicines      These medications were sent to [facility] [place], [place] - [address]  [address] [facility] [address], [place] [address]     Phone:  [phone]     ondansetron 4 MG ODT tab    prochlorperazine 10 MG tablet         Some of these will need a paper prescription and others can be bought over the counter.  Ask your nurse if you have questions.     Bring a paper prescription for each of these medications     LORazepam 0.5 MG tablet                Primary Care Provider Office Phone # Fax #    Jeannie Ross [phone]       [address]  [place] [address]        Equal Access to Services     STEVEN BLAKELY AH: Hadyeyo Acevedo, wacindy smith, qaybta kaalleonel michaels, esha hearn. So [facility] [phone].    ATENCIÓN: Si habla español, tiene a marquez disposición servicios gratuitos de  asistencia lingüística. [place] al [phone].    We comply with applicable federal civil rights laws and [place] laws. We do not discriminate on the basis of race, color, national origin, age, disability, sex, sexual orientation, or gender identity.            Thank you!     Thank you for choosing [facility]  for your care. Our goal is always to provide you with excellent care. Hearing back from our patients is one way we can continue to improve our services. Please take a few minutes to complete the written survey that you may receive in the mail after your visit with us. Thank you!             Your Updated Medication List - Protect others around you: Learn how to safely use, store and throw away your medicines at www.disposemymeds.org.          This list is accurate as of 11/30/18  1:57 PM.  Always use your most recent med list.                   Brand Name Dispense Instructions for use Diagnosis    HYDROcodone-acetaminophen 5-325 MG tablet    NORCO    15 tablet    Take 1-2 tablets by mouth every 4 hours as needed for moderate to severe pain    Malignant neoplasm of upper-outer quadrant of left breast in female, estrogen receptor positive (H)       LORazepam 0.5 MG tablet    ATIVAN    30 tablet    Take 1 tablet (0.5 mg) by mouth every 4 hours as needed (Anxiety, Nausea/Vomiting or Sleep)    Malignant neoplasm of upper-outer quadrant of left breast in female, estrogen receptor positive (H)       ondansetron 4 MG ODT tab    ZOFRAN ODT    60 tablet    Take 1 tablet (4 mg) by mouth 3 times daily (before meals)    Malignant neoplasm of upper-outer quadrant of left breast in female, estrogen receptor positive (H)       prochlorperazine 10 MG tablet    COMPAZINE    30 tablet    Take 1 tablet (10 mg) by mouth every 6 hours as needed (Nausea/Vomiting)    Malignant neoplasm of upper-outer quadrant of left breast in female, estrogen receptor positive (H)

## 2018-11-30 NOTE — MR AVS SNAPSHOT
After Visit Summary   11/30/2018    Saray Huntley    MRN: [id number]           Patient Information     Date Of Birth          1970        Visit Information        Provider Department      11/30/2018 9:00 AM Osmin Rose APRN [facility]        Today's Diagnoses     Malignant neoplasm of upper-outer quadrant of left breast in female, estrogen receptor positive (H)    -  1      Care Instructions      Schedule with Tamir Walton to give chemo today        Start Ct scan of the neck ( after chemo today(           Follow-ups after your visit        Your next 10 appointments already scheduled     Nov 30, 2018  3:30 PM CST   CT SOFT TISSUE NECK W CONTRAST with SHCT1   [facility] CT ([facility])    [address] [place] [address] [phone]           How do I prepare for my exam? (Food and drink instructions) **You will have contrast for this exam.** Do not eat or drink for 2 hours before your exam. If you need to take medicine, you may take it with small sips of water. (We may ask you to take liquid medicine as well.)  The day before your exam, drink extra fluids at least six 8-ounce glasses (unless your doctor tells you to restrict your fluids).  How do I prepare for my exam? (Other instructions) Patients over 70 or patients with diabetes or kidney problems: If you haven t had a blood test (creatinine test) within the last 30 days, the Cardiologist/Radiologist may require you to get this test prior to your exam.  What should I wear: Please wear loose clothing, such as a sweat suit or jogging clothes.  Avoid snaps, zippers and other metal. We may ask you to undress and put on a hospital gown.  How long does the exam take: Most scans take less than 20 minutes.  What should I bring: Please bring any scans or X-rays taken at other hospitals, if similar tests were done. Also bring a list of your medicines, including vitamins, minerals and  over-the-counter drugs. It is safest to leave personal items at home.  Do I need a :  No  is needed.  What do I need to tell my doctor? Be sure to tell your doctor: * If you have any allergies. * If there s any chance you are pregnant. * If you are breastfeeding. * If you have diabetes as your medication may need to be adjusted for this exam.  What should I do after the exam: No restrictions, You may resume normal activities.  What is this test: A CT (computed tomography) scan is a series of pictures that allows us to look inside your body. The scanner creates images of the body in cross sections, much like slices of bread. This helps us see any problems more clearly. You may receive contrast (X-ray dye) before or during your scan. Contrast is given through an IV (small needle in your arm).  Who should I call with questions: If you have any questions, please call the Imaging Department where you will have your exam. Directions, parking instructions, and other information is available on our website, [email or web address].            Dec 07, 2018  8:00 AM CST   Level 4 with  INFUSION CHAIR 4   [facility] ([facility])    [place] [id number]  [place] [address]   [phone]            Dec 07, 2018  9:00 AM CST   Return Visit with IRINA Scruggs CNP   [facility] ([facility])    [facility]  [address]  [place] [address]   [phone]            Dec 10, 2018  9:00 AM CST   New Visit with Ryan Mohamud PsyD   [facility] ([facility])    [facility]  [address]  [place] [address]   [phone]            Dec 14, 2018  8:30 AM CST   Level 6 with  INFUSION CHAIR 11   [facility] ([facility])    [facility]  [address]  [place] [address]   [phone]            Dec 14, 2018  9:30 AM CST   Return Visit with Cayla Stock MD   [facility] ([facility])    [facility] [place]  [address]  [place] [address]   [phone]            Feb 04, 2019 11:15 AM CST   New Visit with Brenna Callahan GC   [facility] Risk [facility] ([facility])    [facility]  [address]  [place] [address]   [phone]              Future tests that were ordered for you today     Open Future Orders        Priority Expected Expires Ordered    CT Soft Tissue Neck w Contrast STAT  11/30/2019 11/30/2018            Who to contact     If you have questions or need follow up information about today's clinic visit or your schedule please contact [facility] directly at [phone].  Normal or non-critical lab and imaging results will be communicated to you by MyChart, letter or phone within 4 business days after the clinic has received the results. If you do not hear from us within 7 days, please contact the clinic through MyChart or phone. If you have a critical or abnormal lab result, we will notify you by phone as soon as possible.  Submit refill requests through [employer] or call your pharmacy and they will forward the refill request to us. Please allow 3 business days for your refill to be completed.          Additional Information About Your Visit        Care EveryWhere ID     This is your Care EveryWhere ID. This could be used by other organizations to access your [facility] medical records  [id number]        Your Vitals Were     Pulse Temperature Respirations Height Last Period Pulse Oximetry    59 98.4  F (36.9  C) (Oral) 18 1.549 m (5' 0.98\") 09/29/2012 (Approximate) 96%    BMI (Body Mass Index)                   31.76 kg/m2            Blood Pressure from Last 3 Encounters:   11/30/18 129/84   11/30/18 " 129/84   11/28/18 113/72    Weight from Last 3 Encounters:   11/30/18 76.2 kg (168 lb)   11/30/18 76.2 kg (168 lb)   11/28/18 77.7 kg (171 lb 6 oz)                 Today's Medication Changes          These changes are accurate as of 11/30/18 10:03 AM.  If you have any questions, ask your nurse or doctor.               Start taking these medicines.        Dose/Directions    LORazepam 0.5 MG tablet   Commonly known as:  ATIVAN   Used for:  Malignant neoplasm of upper-outer quadrant of left breast in female, estrogen receptor positive (H)        Dose:  0.5 mg   Take 1 tablet (0.5 mg) by mouth every 4 hours as needed (Anxiety, Nausea/Vomiting or Sleep)   Quantity:  30 tablet   Refills:  2       ondansetron 4 MG ODT tab   Commonly known as:  ZOFRAN ODT   Used for:  Malignant neoplasm of upper-outer quadrant of left breast in female, estrogen receptor positive (H)   Started by:  Osmin Rose APRN CNP        Dose:  4 mg   Take 1 tablet (4 mg) by mouth 3 times daily (before meals)   Quantity:  60 tablet   Refills:  1       prochlorperazine 10 MG tablet   Commonly known as:  COMPAZINE   Used for:  Malignant neoplasm of upper-outer quadrant of left breast in female, estrogen receptor positive (H)        Dose:  10 mg   Take 1 tablet (10 mg) by mouth every 6 hours as needed (Nausea/Vomiting)   Quantity:  30 tablet   Refills:  2            Where to get your medicines      These medications were sent to [facility] [place] - [address]  [address] [place] [address], [place] [address]     Phone:  [phone]     ondansetron 4 MG ODT tab    prochlorperazine 10 MG tablet         Some of these will need a paper prescription and others can be bought over the counter.  Ask your nurse if you have questions.     Bring a paper prescription for each of these medications     LORazepam 0.5 MG tablet                Primary Care Provider Office Phone # Fax #    Jeannie Ross [phone]       [address] [facility] [place] [address]        Equal Access to Services     SAMANTA BLAKELY : Hadii skyler thomas maryweston Sogavinali, waaxda luqadaha, qaybta cesarhalleantonina michaels, esha villelatonymedina hearn. So [facility] [phone].    ATENCIÓN: Si habla español, tiene a marquez disposición servicios gratuitos de asistencia lingüística. Llame al [phone].    We comply with applicable federal civil rights laws and [place] laws. We do not discriminate on the basis of race, color, national origin, age, disability, sex, sexual orientation, or gender identity.            Thank you!     Thank you for choosing [facility]  for your care. Our goal is always to provide you with excellent care. Hearing back from our patients is one way we can continue to improve our services. Please take a few minutes to complete the written survey that you may receive in the mail after your visit with us. Thank you!             Your Updated Medication List - Protect others around you: Learn how to safely use, store and throw away your medicines at www.disposemymeds.org.          This list is accurate as of 11/30/18 10:03 AM.  Always use your most recent med list.                   Brand Name Dispense Instructions for use Diagnosis    HYDROcodone-acetaminophen 5-325 MG tablet    NORCO    15 tablet    Take 1-2 tablets by mouth every 4 hours as needed for moderate to severe pain    Malignant neoplasm of upper-outer quadrant of left breast in female, estrogen receptor positive (H)       LORazepam 0.5 MG tablet    ATIVAN    30 tablet    Take 1 tablet (0.5 mg) by mouth every 4 hours as needed (Anxiety, Nausea/Vomiting or Sleep)    Malignant neoplasm of upper-outer quadrant of left breast in female, estrogen receptor positive (H)       ondansetron 4 MG ODT tab    ZOFRAN ODT    60 tablet    Take 1 tablet (4 mg) by mouth 3 times daily (before meals)    Malignant neoplasm of upper-outer quadrant of left breast in female, estrogen receptor positive  (H)       prochlorperazine 10 MG tablet    COMPAZINE    30 tablet    Take 1 tablet (10 mg) by mouth every 6 hours as needed (Nausea/Vomiting)    Malignant neoplasm of upper-outer quadrant of left breast in female, estrogen receptor positive (H)

## 2018-11-30 NOTE — PATIENT INSTRUCTIONS
Schedule with Tamir Mohamud   Scheduled/abraham     Ok to give chemo today        Stat Ct scan of the neck ( after chemo today)  Scheduled/abraham       AVS printed & given to patient/abraham

## 2018-11-30 NOTE — PROGRESS NOTES
"Oncology/Hematology Visit Note  Nov 30, 2018    Reason for Visit: follow up of breast cancer    History of Present Illness: Saray Huntley is a 48 year old female with  Left sided breast cancer biopsy showed invasive ductal carcinoma grade 3 n weakly positive for ER(1-3%) MS negative HER-2 positive    Patient is here for cycle 1 day 1 paclitaxel Herceptin and Perjeta       Interval History:  She had right side  port placed on Wednesday.  She has pain in the area- mostly in the cervical area.  She denies swelling redness.  She patient denies fever chills sweats, denies cough no shortness of breath.  She denies nausea vomiting diarrhea denies abdominal pain she denies upper extremity and lower extremity edema.      Review of Systems:  14 point ROS of systems including Constitutional, Eyes, Respiratory, Cardiovascular, Gastroenterology, Genitourinary, Integumentary, Muscularskeletal, Psychiatric were all negative except for pertinent positives noted in my HPI.      Current Outpatient Prescriptions   Medication Sig Dispense Refill     HYDROcodone-acetaminophen (NORCO) 5-325 MG tablet Take 1-2 tablets by mouth every 4 hours as needed for moderate to severe pain 15 tablet 0     LORazepam (ATIVAN) 0.5 MG tablet Take 1 tablet (0.5 mg) by mouth every 4 hours as needed (Anxiety, Nausea/Vomiting or Sleep) 30 tablet 2     ondansetron (ZOFRAN ODT) 4 MG ODT tab Take 1 tablet (4 mg) by mouth 3 times daily (before meals) 60 tablet 1     prochlorperazine (COMPAZINE) 10 MG tablet Take 1 tablet (10 mg) by mouth every 6 hours as needed (Nausea/Vomiting) 30 tablet 2       Physical Examination:  General: The patient is a pleasant female in no acute distress.  /84  Pulse 59  Temp 98.4  F (36.9  C) (Oral)  Resp 18  Ht 1.549 m (5' 0.98\")  Wt 76.2 kg (168 lb)  LMP 09/29/2012 (Approximate)  SpO2 96%  BMI 31.76 kg/m2  HEENT: EOMI, PERRL. Sclerae are anicteric. Oral mucosa is pink and moist with no lesions or thrush.   Lymph: Neck is " supple with no lymphadenopathy in the cervical or supraclavicular areas.  Right cervical area tender to touch.  Heart: Regular rate and rhythm.   Lungs: Clear to auscultation bilaterally.   GI: Bowel sounds present, soft, nontender with no palpable hepatosplenomegaly or masses.   Extremities: No lower extremity edema noted bilaterally.   Skin: No rashes, petechiae, or bruising noted on exposed skin.    Laboratory Data:  Results for orders placed or performed in visit on 11/30/18 (from the past 24 hour(s))   CBC with platelets differential   Result Value Ref Range    WBC 6.6 4.0 - 11.0 10e9/L    RBC Count 4.50 3.8 - 5.2 10e12/L    Hemoglobin 13.7 11.7 - 15.7 g/dL    Hematocrit 40.1 35.0 - 47.0 %    MCV 89 78 - 100 fl    MCH 30.4 26.5 - 33.0 pg    MCHC 34.2 31.5 - 36.5 g/dL    RDW 12.2 10.0 - 15.0 %    Platelet Count 266 150 - 450 10e9/L    Diff Method Automated Method     % Neutrophils 57.8 %    % Lymphocytes 34.7 %    % Monocytes 4.7 %    % Eosinophils 2.0 %    % Basophils 0.6 %    % Immature Granulocytes 0.2 %    Nucleated RBCs 0 0 /100    Absolute Neutrophil 3.8 1.6 - 8.3 10e9/L    Absolute Lymphocytes 2.3 0.8 - 5.3 10e9/L    Absolute Monocytes 0.3 0.0 - 1.3 10e9/L    Absolute Eosinophils 0.1 0.0 - 0.7 10e9/L    Absolute Basophils 0.0 0.0 - 0.2 10e9/L    Abs Immature Granulocytes 0.0 0 - 0.4 10e9/L    Absolute Nucleated RBC 0.0    Comprehensive metabolic panel   Result Value Ref Range    Sodium 138 133 - 144 mmol/L    Potassium 3.9 3.4 - 5.3 mmol/L    Chloride 106 94 - 109 mmol/L    Carbon Dioxide 27 20 - 32 mmol/L    Anion Gap 5 3 - 14 mmol/L    Glucose 82 70 - 99 mg/dL    Urea Nitrogen 16 7 - 30 mg/dL    Creatinine 0.60 0.52 - 1.04 mg/dL    GFR Estimate >90 >60 mL/min/1.7m2    GFR Estimate If Black >90 >60 mL/min/1.7m2    Calcium 8.9 8.5 - 10.1 mg/dL    Bilirubin Total 0.7 0.2 - 1.3 mg/dL    Albumin 3.8 3.4 - 5.0 g/dL    Protein Total 7.9 6.8 - 8.8 g/dL    Alkaline Phosphatase 100 40 - 150 U/L    ALT 66 (H) 0 -  50 U/L    AST 30 0 - 45 U/L         Assessment and Plan:    This is a 48-year-old female with    Left sided breast cancer biopsy showed invasive ductal carcinoma grade 3 n weakly positive for ER(1-3%) NC negative HER-2 positive  Patient is here for cycle 1 day 1 paclitaxel Herceptin and Perjeta   -Labs and echocardiogram reviewed patient.  Side effects of the chemotherapy discussed in detail.  Patient family verbalized understanding.  Patient is asked to call with the side effects or any changes in health condition  Proceed with cycle 1 day 1 - Perjeta /Herceptin weekly taxol   -pt  is scheduled for follow-up with me next week  -Patient is scheduled to see Dr. Stock in 2 weeks      Right cervical pain   -started after  Port-A-Cath was placed on Wednesday 11/27  -I will order stat CT of the neck to rule out blood clot.      Genetics  She will see genetic counseling in February    Coping-we talked about coping mechanisms.  She denies depression or suicidal ideation .she is anxious about the diagnosis .She is not ready to see palliative care. I will schedule her to see Dr. Tamir Mohamud  Patient will see our  today      IRINA Scruggs CNP  Hem/Onc   [facility]

## 2018-11-30 NOTE — PROGRESS NOTES
"Oncology Rooming Note    November 30, 2018 8:40 AM   Saray Huntley is a 48 year old female who presents for:    Chief Complaint   Patient presents with     Oncology Clinic Visit     Malignant neoplasm of upper-outer quadrant of left breast in female, estrogen receptor positive      Initial Vitals: /84  Pulse 59  Temp 98.4  F (36.9  C) (Oral)  Resp 18  Ht 1.549 m (5' 0.98\")  Wt 76.2 kg (168 lb)  LMP 09/29/2012 (Approximate)  SpO2 96%  BMI 31.76 kg/m2 Estimated body mass index is 31.76 kg/(m^2) as calculated from the following:    Height as of this encounter: 1.549 m (5' 0.98\").    Weight as of this encounter: 76.2 kg (168 lb). Body surface area is 1.81 meters squared.  Severe Pain (6) Comment: Data Unavailable   Patient's last menstrual period was 09/29/2012 (approximate).  Allergies reviewed: Yes  Medications reviewed: Yes    Medications: Medication refills not needed today.  Pharmacy name entered into [facility]:    [id number] - [place] - [address] AT [facility] OF [facility] [address] & [facility] - [place] - [address]    Clinical concerns: None                   4 minutes for nursing intake (face to face time)     Lesvia Roldan MA            "

## 2018-12-01 RX ORDER — LIDOCAINE AND PRILOCAINE 25; 25 MG/G; MG/G
CREAM TOPICAL DAILY PRN
Qty: 30 G | Refills: 3 | Status: SHIPPED | OUTPATIENT
Start: 2018-12-01 | End: 2019-08-20

## 2018-12-01 RX ORDER — LIDOCAINE/PRILOCAINE 2.5 %-2.5%
CREAM (GRAM) TOPICAL
Status: ACTIVE | OUTPATIENT
Start: 2018-12-01

## 2018-12-03 ENCOUNTER — CARE COORDINATION (OUTPATIENT)
Dept: ONCOLOGY | Facility: CLINIC | Age: 48
End: 2018-12-03

## 2018-12-03 NOTE — PROGRESS NOTES
Patient called post chemotherapy. She stated that she is eating and drinking plenty but she feels very tired. Saray stated that she does not have very much energy. Stated to patient that is not unusual to feel tired and that she should rest when her body feels tired. Patient aware to use her Emla cream this Friday morning prior to her chemotherapy. She is aware to call clinic with any issues or concerns. Nicole José RN,BSN,OCN  '

## 2018-12-07 ENCOUNTER — INFUSION THERAPY VISIT (OUTPATIENT)
Dept: INFUSION THERAPY | Facility: CLINIC | Age: 48
End: 2018-12-07
Attending: INTERNAL MEDICINE
Payer: COMMERCIAL

## 2018-12-07 ENCOUNTER — ALLIED HEALTH/NURSE VISIT (OUTPATIENT)
Dept: ONCOLOGY | Facility: CLINIC | Age: 48
End: 2018-12-07

## 2018-12-07 ENCOUNTER — HOSPITAL ENCOUNTER (OUTPATIENT)
Facility: CLINIC | Age: 48
Setting detail: SPECIMEN
Discharge: HOME | End: 2018-12-07
Attending: INTERNAL MEDICINE | Admitting: INTERNAL MEDICINE
Payer: COMMERCIAL

## 2018-12-07 ENCOUNTER — ONCOLOGY VISIT (OUTPATIENT)
Dept: ONCOLOGY | Facility: CLINIC | Age: 48
End: 2018-12-07
Attending: INTERNAL MEDICINE
Payer: COMMERCIAL

## 2018-12-07 VITALS
DIASTOLIC BLOOD PRESSURE: 80 MMHG | BODY MASS INDEX: 32.79 KG/M2 | SYSTOLIC BLOOD PRESSURE: 127 MMHG | HEART RATE: 70 BPM | WEIGHT: 167 LBS | TEMPERATURE: 98.3 F | HEIGHT: 60 IN | OXYGEN SATURATION: 97 %

## 2018-12-07 VITALS
DIASTOLIC BLOOD PRESSURE: 76 MMHG | OXYGEN SATURATION: 97 % | TEMPERATURE: 98.3 F | WEIGHT: 167.8 LBS | BODY MASS INDEX: 31.68 KG/M2 | SYSTOLIC BLOOD PRESSURE: 112 MMHG | HEIGHT: 61 IN | HEART RATE: 66 BPM | RESPIRATION RATE: 16 BRPM

## 2018-12-07 DIAGNOSIS — Z17.0 MALIGNANT NEOPLASM OF UPPER-OUTER QUADRANT OF LEFT BREAST IN FEMALE, ESTROGEN RECEPTOR POSITIVE (H): Primary | ICD-10-CM

## 2018-12-07 DIAGNOSIS — Z78.9 UNDER CARE OF SOCIAL WORKER: Primary | ICD-10-CM

## 2018-12-07 DIAGNOSIS — C50.412 MALIGNANT NEOPLASM OF UPPER-OUTER QUADRANT OF LEFT BREAST IN FEMALE, ESTROGEN RECEPTOR POSITIVE (H): Primary | ICD-10-CM

## 2018-12-07 LAB
BASOPHILS # BLD AUTO: 0 10E9/L (ref 0–0.2)
BASOPHILS NFR BLD AUTO: 0.5 %
DIFFERENTIAL METHOD BLD: NORMAL
EOSINOPHIL # BLD AUTO: 0.2 10E9/L (ref 0–0.7)
EOSINOPHIL NFR BLD AUTO: 3.4 %
ERYTHROCYTE [DISTWIDTH] IN BLOOD BY AUTOMATED COUNT: 11.9 % (ref 10–15)
HCT VFR BLD AUTO: 38.5 % (ref 35–47)
HGB BLD-MCNC: 13.3 G/DL (ref 11.7–15.7)
IMM GRANULOCYTES # BLD: 0 10E9/L (ref 0–0.4)
IMM GRANULOCYTES NFR BLD: 0.2 %
LYMPHOCYTES # BLD AUTO: 2.3 10E9/L (ref 0.8–5.3)
LYMPHOCYTES NFR BLD AUTO: 38.7 %
MCH RBC QN AUTO: 30.4 PG (ref 26.5–33)
MCHC RBC AUTO-ENTMCNC: 34.5 G/DL (ref 31.5–36.5)
MCV RBC AUTO: 88 FL (ref 78–100)
MONOCYTES # BLD AUTO: 0.2 10E9/L (ref 0–1.3)
MONOCYTES NFR BLD AUTO: 2.7 %
NEUTROPHILS # BLD AUTO: 3.2 10E9/L (ref 1.6–8.3)
NEUTROPHILS NFR BLD AUTO: 54.5 %
NRBC # BLD AUTO: 0 10*3/UL
NRBC BLD AUTO-RTO: 0 /100
PLATELET # BLD AUTO: 288 10E9/L (ref 150–450)
RBC # BLD AUTO: 4.38 10E12/L (ref 3.8–5.2)
WBC # BLD AUTO: 5.9 10E9/L (ref 4–11)

## 2018-12-07 PROCEDURE — 85025 COMPLETE CBC W/AUTO DIFF WBC: CPT | Performed by: INTERNAL MEDICINE

## 2018-12-07 PROCEDURE — 96367 TX/PROPH/DG ADDL SEQ IV INF: CPT

## 2018-12-07 PROCEDURE — 96413 CHEMO IV INFUSION 1 HR: CPT

## 2018-12-07 PROCEDURE — 25000132 ZZH RX MED GY IP 250 OP 250 PS 637: Performed by: NURSE PRACTITIONER

## 2018-12-07 PROCEDURE — 99214 OFFICE O/P EST MOD 30 MIN: CPT | Performed by: NURSE PRACTITIONER

## 2018-12-07 PROCEDURE — 25000128 H RX IP 250 OP 636: Performed by: NURSE PRACTITIONER

## 2018-12-07 PROCEDURE — 25000128 H RX IP 250 OP 636: Performed by: INTERNAL MEDICINE

## 2018-12-07 RX ORDER — HEPARIN SODIUM (PORCINE) LOCK FLUSH IV SOLN 100 UNIT/ML 100 UNIT/ML
500 SOLUTION INTRAVENOUS ONCE
Status: COMPLETED | OUTPATIENT
Start: 2018-12-07 | End: 2018-12-07

## 2018-12-07 RX ORDER — DIPHENHYDRAMINE HCL 25 MG
25 CAPSULE ORAL ONCE
Status: COMPLETED | OUTPATIENT
Start: 2018-12-07 | End: 2018-12-07

## 2018-12-07 RX ORDER — DIPHENHYDRAMINE HCL 25 MG
25 CAPSULE ORAL ONCE
Status: CANCELLED
Start: 2018-12-07

## 2018-12-07 RX ADMIN — DIPHENHYDRAMINE HYDROCHLORIDE 25 MG: 25 CAPSULE ORAL at 09:48

## 2018-12-07 RX ADMIN — FAMOTIDINE 20 MG: 20 INJECTION, SOLUTION INTRAVENOUS at 10:24

## 2018-12-07 RX ADMIN — SODIUM CHLORIDE, PRESERVATIVE FREE 500 UNITS: 5 INJECTION INTRAVENOUS at 12:18

## 2018-12-07 RX ADMIN — PACLITAXEL 146 MG: 6 INJECTION, SOLUTION INTRAVENOUS at 10:42

## 2018-12-07 RX ADMIN — DEXAMETHASONE SODIUM PHOSPHATE 20 MG: 10 INJECTION, SOLUTION INTRAMUSCULAR; INTRAVENOUS at 10:07

## 2018-12-07 RX ADMIN — SODIUM CHLORIDE 1000 ML: 9 INJECTION, SOLUTION INTRAVENOUS at 09:48

## 2018-12-07 ASSESSMENT — PAIN SCALES - GENERAL: PAINLEVEL: NO PAIN (0)

## 2018-12-07 NOTE — PROGRESS NOTES
"Social Work Progress Note      Data/Intervention:  Patient Name:  Saray Huntley  /Age:  1970 (48 year old)    Reason for Follow-Up:  Saray is a 48-year-old woman with a new diagnosis of IA left sided-breast cancer who comes to clinic for C1D8 Taxol accompanied by parents, sister, and brother-in-law. This clinician met with pt for planned psychosocial check-in and emotional support.     Intervention:   Saray reports that she had \"some highs and lows\" over this past week of treatment. Saray acknowledges that overall she feels much more fatigued, and acknowledges that there was only one day this past week when she went to pick daughter up from the bus. Provided psychoeducation about the benefit of continuing to engage, when possible, in physical activities that she enjoyed, even in more abbreviated fashion, as this will help physical and emotional coping. Provided safe place for Saray to voice concerns about 15-year-old daughter's coping, normalizing issues that have arisen in home, and providing emotional support. Saray acknowledges that she continues to benefit from discussion with providers, which is further confirmation that what she is experiencing is similar to that of other women and parents coping with treatment.     Resources Provided:  [employer]- Teen  Parent Guide- [employer]  [employer]- faxed 18  [employer]- movement groups  [employer]- pt to complete on own    Assessment:  Saray continues to adjust to cancer treatment and cope with its impacts. Saray appears to gain great strength from engagement with family, and their continued presence and support. Saray open to recommendations from this clinician surrounding joined activities with daughter, and providing space for her to express concerns or needs, and was also receptive to recommendation to enhance physical activity this week with sister. Saray was appreciative of referral for wig support, as hair loss appears to be of great " concern. Saray appreciative of ongoing SW support, and knows to reach out in case of psychosocial distress or need.     Plan:  1) This clinician will continue to follow for ongoing psychosocial support as pt continues to adjust to treatment and realize its impacts. Pt knows to contact this clinician in the case of psychosocial distress or need.   2) This clinician will continue to collaborate with pt's ongoing care team.     Please call or page if needs or concerns arise.     ALEA Tapia, [facility] HospitalSW  Direct Phone: [phone]  Pager: [phone]

## 2018-12-07 NOTE — MR AVS SNAPSHOT
After Visit Summary   12/7/2018    Saray Huntley    MRN: [id number]           Patient Information     Date Of Birth          1970        Visit Information        Provider Department      12/7/2018 3:50 PM Fabiola Monique LICSW [facility]        Today's Diagnoses     Under care of     -  1       Follow-ups after your visit        Your next 10 appointments already scheduled     Dec 12, 2018  2:00 PM CST   New Visit with Ryan Mohamud PsyD   [facility] ([facility])    [facility]  [address]  [place] [address]   [phone]            Dec 14, 2018  8:30 AM CST   Level 4 with SH INFUSION CHAIR 8   [facility] ([facility])    formerly [facility] [place] [id number]  [place] [address]   [phone]            Dec 14, 2018  9:30 AM CST   Return Visit with Cayla Stock MD   [facility] ([facility])    [facility] [place] [id number]  [place] [address]   [phone]            Dec 21, 2018  8:00 AM CST   Level 4 with SH INFUSION CHAIR 4   [facility] ([facility])    [facility]  [address]  [place] [address]   [phone]            Dec 28, 2018 10:00 AM CST   Level 4 with SH INFUSION CHAIR 7   [facility] ([facility])    [facility]  [address]  [place] [address]   [phone]            Jan 04, 2019  8:00 AM CST   Level 4 with SH INFUSION CHAIR 7   [facility] ([facility])    [facility]  [address]  [place] [address]   [phone]            Feb 04, 2019 11:15 AM CST   New Visit with Brenna Callahan GC    [facility] ([facility])    [facility] [place]  [address]  [place] [address] [phone]              Who to contact     If you have questions or need follow up information about today's clinic visit or your schedule please contact [facility] directly at [phone].  Normal or non-critical lab and imaging results will be communicated to you by MyChart, letter or phone within 4 business days after the clinic has received the results. If you do not hear from us within 7 days, please contact the clinic through MyChart or phone. If you have a critical or abnormal lab result, we will notify you by phone as soon as possible.  Submit refill requests through [employer] or call your pharmacy and they will forward the refill request to us. Please allow 3 business days for your refill to be completed.          Additional Information About Your Visit        Care EveryWhere ID     This is your Care EveryWhere ID. This could be used by other organizations to access your [facility] medical records  [id number]        Your Vitals Were     Last Period                   09/29/2012 (Approximate)            Blood Pressure from Last 3 Encounters:   12/07/18 127/80   12/07/18 112/76   11/30/18 129/84    Weight from Last 3 Encounters:   12/07/18 75.8 kg (167 lb)   12/07/18 76.1 kg (167 lb 12.8 oz)   11/30/18 76.2 kg (168 lb)              Today, you had the following     No orders found for display         Today's Medication Changes          These changes are accurate as of 12/7/18  4:01 PM.  If you have any questions, ask your nurse or doctor.               Start taking these medicines.        Dose/Directions    omeprazole 20 MG DR capsule   Commonly known as:  priLOSEC   Used for:  Malignant neoplasm of upper-outer quadrant of left breast in female, estrogen receptor positive (H)   Started by:  Osmin Rose APRN CNP        Dose:  20 mg   Take 1 capsule (20  mg) by mouth daily   Quantity:  30 capsule   Refills:  0            Where to get your medicines      These medications were sent to [id number] - [place], [place] - [address] AT [facility] OF  & [address]  [address], [place] [address]     Phone:  [phone]     omeprazole 20 MG DR capsule                Primary Care Provider Office Phone # Fax #    Jeannie Ross [phone]       [address]  [place] [address]        Equal Access to Services     [facility]: Hadii aad ku hadasho Soomaali, waaxda luqadaha, qaybta kaalmada adeegyada, waxida landryin hayaan adeaneta bennett . So [facility] [phone].    ATENCIÓN: Si habla español, tiene a marquez disposición servicios gratuitos de asistencia lingüística. [facility] [phone].    We comply with applicable federal civil rights laws and [place] laws. We do not discriminate on the basis of race, color, national origin, age, disability, sex, sexual orientation, or gender identity.            Thank you!     Thank you for choosing [facility]  for your care. Our goal is always to provide you with excellent care. Hearing back from our patients is one way we can continue to improve our services. Please take a few minutes to complete the written survey that you may receive in the mail after your visit with us. Thank you!             Your Updated Medication List - Protect others around you: Learn how to safely use, store and throw away your medicines at www.disposemymeds.org.          This list is accurate as of 12/7/18  4:01 PM.  Always use your most recent med list.                   Brand Name Dispense Instructions for use Diagnosis    HYDROcodone-acetaminophen 5-325 MG tablet    NORCO    15 tablet    Take 1-2 tablets by mouth every 4 hours as needed for moderate to severe pain    Malignant neoplasm of upper-outer quadrant of left breast in female, estrogen receptor positive (H)        LORazepam 0.5 MG tablet    ATIVAN    30 tablet    Take 1 tablet (0.5 mg) by mouth every 4 hours as needed (Anxiety, Nausea/Vomiting or Sleep)    Malignant neoplasm of upper-outer quadrant of left breast in female, estrogen receptor positive (H)       omeprazole 20 MG DR capsule    priLOSEC    30 capsule    Take 1 capsule (20 mg) by mouth daily    Malignant neoplasm of upper-outer quadrant of left breast in female, estrogen receptor positive (H)       ondansetron 4 MG ODT tab    ZOFRAN ODT    60 tablet    Take 1 tablet (4 mg) by mouth 3 times daily (before meals)    Malignant neoplasm of upper-outer quadrant of left breast in female, estrogen receptor positive (H)       prochlorperazine 10 MG tablet    COMPAZINE    30 tablet    Take 1 tablet (10 mg) by mouth every 6 hours as needed (Nausea/Vomiting)    Malignant neoplasm of upper-outer quadrant of left breast in female, estrogen receptor positive (H)       STUDY - LIDOCAINE 2.5%/PRILOCAINE 2.5% CREAM (IDS# [id number])     30 g    Externally apply topically daily as needed for moderate pain    Malignant neoplasm of upper-outer quadrant of left breast in female, estrogen receptor positive (H), Port-A-Cath in place

## 2018-12-07 NOTE — MR AVS SNAPSHOT
After Visit Summary   12/7/2018    Saray Huntley    MRN: [id number]           Patient Information     Date Of Birth          1970        Visit Information        Provider Department      12/7/2018 9:00 AM Osmin Rose APRN CNP [facility]        Today's Diagnoses     Malignant neoplasm of upper-outer quadrant of left breast in female, estrogen receptor positive (H)    -  1      Care Instructions    Reschedule appointment with hai mohamud  Scheduled 12/12/18 2:00pm  at [facility]/ Francisca Okeefe to give chemo today    Start omeprazole 20 mg p.o. daily    AVS printed and given to patient/ Francisca             Follow-ups after your visit        Your next 10 appointments already scheduled     Dec 12, 2018  2:00 PM CST   New Visit with Ryan Mohamud PsyD   [facility] ([facility])    [facility]  [id number] [place] [address] [address]  [place] [address]   [phone]            Dec 14, 2018  8:30 AM CST   Level 6 with  INFUSION CHAIR 11   [facility] ([facility])    [facility] [place] [id number]  [place] [address] [phone]            Dec 14, 2018  9:30 AM CST   Return Visit with Cayla Stock MD   [facility] ([facility])    [facility]  [address]  [place] [address] [phone]            Feb 04, 2019 11:15 AM CST   New Visit with Brenna Callahan GC   [facility] ([facility])    [place] [id number]  [place] [address] [phone]              Who to contact     If you have questions or need follow up information about today's clinic visit or your schedule please contact [facility] directly at [phone].  Normal or non-critical lab and imaging results will be communicated to you  by MyChart, letter or phone within 4 business days after the clinic has received the results. If you do not hear from us within 7 days, please contact the clinic through MyChart or phone. If you have a critical or abnormal lab result, we will notify you by phone as soon as possible.  Submit refill requests through [employer] or call your pharmacy and they will forward the refill request to us. Please allow 3 business days for your refill to be completed.          Additional Information About Your Visit        Care EveryWhere ID     This is your Care EveryWhere ID. This could be used by other organizations to access your [facility] medical records  [id number]        Your Vitals Were     Pulse Temperature Height Last Period Pulse Oximetry BMI (Body Mass Index)    70 98.3  F (36.8  C) (Oral) 1.524 m (5') 09/29/2012 (Approximate) 97% 32.61 kg/m2       Blood Pressure from Last 3 Encounters:   12/07/18 127/80   12/07/18 127/80   11/30/18 129/84    Weight from Last 3 Encounters:   12/07/18 75.8 kg (167 lb)   12/07/18 76.1 kg (167 lb 12.8 oz)   11/30/18 76.2 kg (168 lb)              Today, you had the following     No orders found for display         Today's Medication Changes          These changes are accurate as of 12/7/18 10:37 AM.  If you have any questions, ask your nurse or doctor.               Start taking these medicines.        Dose/Directions    omeprazole 20 MG DR capsule   Commonly known as:  priLOSEC   Used for:  Malignant neoplasm of upper-outer quadrant of left breast in female, estrogen receptor positive (H)   Started by:  Osmin Rose APRN CNP        Dose:  20 mg   Take 1 capsule (20 mg) by mouth daily   Quantity:  30 capsule   Refills:  0            Where to get your medicines      These medications were sent to [id number] - [place], [place] - [address] AT [facility] OF  & [address]  [address], [place] [address]     Phone:  [phone]     omeprazole  20 MG DR capsule                Primary Care Provider Office Phone # Fax #    Jeannie Ross [phone]       [address]  [place] [address]        Equal Access to Services     [facility] ZORA : Hadii skyler ku maryo Sogavinali, waaxda luqadaha, qaybta kaalmada adeegyada, waxida idiin frankyn alissa devine laTomasnii nadiya. So [facility] [phone].    ATENCIÓN: Si habla español, tiene a marquez disposición servicios gratuitos de asistencia lingüística. Llame al [phone].    We comply with applicable federal civil rights laws and [place] laws. We do not discriminate on the basis of race, color, national origin, age, disability, sex, sexual orientation, or gender identity.            Thank you!     Thank you for choosing [facility]  for your care. Our goal is always to provide you with excellent care. Hearing back from our patients is one way we can continue to improve our services. Please take a few minutes to complete the written survey that you may receive in the mail after your visit with us. Thank you!             Your Updated Medication List - Protect others around you: Learn how to safely use, store and throw away your medicines at www.disposemymeds.org.          This list is accurate as of 12/7/18 10:37 AM.  Always use your most recent med list.                   Brand Name Dispense Instructions for use Diagnosis    HYDROcodone-acetaminophen 5-325 MG tablet    NORCO    15 tablet    Take 1-2 tablets by mouth every 4 hours as needed for moderate to severe pain    Malignant neoplasm of upper-outer quadrant of left breast in female, estrogen receptor positive (H)       LORazepam 0.5 MG tablet    ATIVAN    30 tablet    Take 1 tablet (0.5 mg) by mouth every 4 hours as needed (Anxiety, Nausea/Vomiting or Sleep)    Malignant neoplasm of upper-outer quadrant of left breast in female, estrogen receptor positive (H)       omeprazole 20 MG DR capsule    priLOSEC    30 capsule    Take 1 capsule (20  mg) by mouth daily    Malignant neoplasm of upper-outer quadrant of left breast in female, estrogen receptor positive (H)       ondansetron 4 MG ODT tab    ZOFRAN ODT    60 tablet    Take 1 tablet (4 mg) by mouth 3 times daily (before meals)    Malignant neoplasm of upper-outer quadrant of left breast in female, estrogen receptor positive (H)       prochlorperazine 10 MG tablet    COMPAZINE    30 tablet    Take 1 tablet (10 mg) by mouth every 6 hours as needed (Nausea/Vomiting)    Malignant neoplasm of upper-outer quadrant of left breast in female, estrogen receptor positive (H)       STUDY - LIDOCAINE 2.5%/PRILOCAINE 2.5% CREAM (IDS# 4243)     30 g    Externally apply topically daily as needed for moderate pain    Malignant neoplasm of upper-outer quadrant of left breast in female, estrogen receptor positive (H), Port-A-Cath in place

## 2018-12-07 NOTE — PROGRESS NOTES
Oncology/Hematology Visit Note  Dec 7, 2018    Reason for Visit: follow up of breast cancer    History of Present Illness: Saray Huntley is a 48 year old female with  Left sided breast cancer biopsy showed invasive ductal carcinoma grade 3 n weakly positive for ER(1-3%) [place] negative HER-2 positive  paclitaxel Herceptin and Perjeta -started 11/30      Interval History:  She is tearful she denies anxiety depression denies suicidal ideation.  She is having a hard time with coping with new diagnosis.  She denies fever chills sweats denies cough no shortness of breath.  She has acid reflux that she tells me affects her appetite and she took Tums which helped.  She denies nausea vomiting diarrhea denies abdominal pain.  Denies skin rash denies bleeding.  She is weak where she takes short walks throughout the day.  She has low appetite and was told that her insurance does not cover nutritionist        Review of Systems:  14 point ROS of systems including Constitutional, Eyes, Respiratory, Cardiovascular, Gastroenterology, Genitourinary, Integumentary, Muscularskeletal, Psychiatric were all negative except for pertinent positives noted in my HPI.      Current Outpatient Prescriptions   Medication Sig Dispense Refill     HYDROcodone-acetaminophen (NORCO) 5-325 MG tablet Take 1-2 tablets by mouth every 4 hours as needed for moderate to severe pain 15 tablet 0     Lidocaine-Prilocaine (STUDY - LIDOCAINE 2.5%/PRILOCAINE 2.5% CREAM, IDS# [id number],) Externally apply topically daily as needed for moderate pain 30 g 3     LORazepam (ATIVAN) 0.5 MG tablet Take 1 tablet (0.5 mg) by mouth every 4 hours as needed (Anxiety, Nausea/Vomiting or Sleep) 30 tablet 2     omeprazole (PRILOSEC) 20 MG DR capsule Take 1 capsule (20 mg) by mouth daily 30 capsule 0     ondansetron (ZOFRAN ODT) 4 MG ODT tab Take 1 tablet (4 mg) by mouth 3 times daily (before meals) 60 tablet 1     prochlorperazine (COMPAZINE) 10 MG tablet Take 1 tablet (10 mg) by mouth  every 6 hours as needed (Nausea/Vomiting) 30 tablet 2       Physical Examination:  General: The patient is a pleasant female in no acute distress.  /80  Pulse 70  Temp 98.3  F (36.8  C) (Oral)  Ht 1.524 m (5')  Wt 75.8 kg (167 lb)  LMP 09/29/2012 (Approximate)  SpO2 97%  BMI 32.61 kg/m2  HEENT: EOMI, PERRL. Sclerae are anicteric. Oral mucosa is pink and moist with no lesions or thrush.   Lymph: Neck is supple with no lymphadenopathy in the cervical or supraclavicular areas.  Right cervical area tender to touch.  Heart: Regular rate and rhythm.   Lungs: Clear to auscultation bilaterally.   GI: Bowel sounds present, soft, nontender with no palpable hepatosplenomegaly or masses.   Extremities: No lower extremity edema noted bilaterally.   Skin: No rashes, petechiae, or bruising noted on exposed skin.    Laboratory Data:  Results for orders placed or performed in visit on 12/07/18 (from the past 24 hour(s))   CBC with platelets differential   Result Value Ref Range    WBC 5.9 4.0 - 11.0 10e9/L    RBC Count 4.38 3.8 - 5.2 10e12/L    Hemoglobin 13.3 11.7 - 15.7 g/dL    Hematocrit 38.5 35.0 - 47.0 %    MCV 88 78 - 100 fl    MCH 30.4 26.5 - 33.0 pg    MCHC 34.5 31.5 - 36.5 g/dL    RDW 11.9 10.0 - 15.0 %    Platelet Count 288 150 - 450 10e9/L    Diff Method Automated Method     % Neutrophils 54.5 %    % Lymphocytes 38.7 %    % Monocytes 2.7 %    % Eosinophils 3.4 %    % Basophils 0.5 %    % Immature Granulocytes 0.2 %    Nucleated RBCs 0 0 /100    Absolute Neutrophil 3.2 1.6 - 8.3 10e9/L    Absolute Lymphocytes 2.3 0.8 - 5.3 10e9/L    Absolute Monocytes 0.2 0.0 - 1.3 10e9/L    Absolute Eosinophils 0.2 0.0 - 0.7 10e9/L    Absolute Basophils 0.0 0.0 - 0.2 10e9/L    Abs Immature Granulocytes 0.0 0 - 0.4 10e9/L    Absolute Nucleated RBC 0.0          Assessment and Plan:    This is a 48-year-old female with    Left sided breast cancer biopsy showed invasive ductal carcinoma grade 3 n weakly positive for ER(1-3%) [place]  negative HER-2 positive  started 11/30  paclitaxel Herceptin and Perjeta -started 11/30  She comes for cycle 1 day 8 today  Besides mild fatigue she has been overall tolerating treatment well  Labs reviewed with patient.  Okay to proceed with chemo today  Patient has scheduled appointment prior day 15 with Dr. Stock    Right cervical pain -improved  -started after  Port-A-Cath was placed on Wednesday 11/27  -CT the neck was negative for blood clot      Genetics  She will see genetic counseling in February    Coping-we talked about coping mechanisms.  She denies depression or suicidal ideation .she is anxious about the diagnosis .She is not ready to see palliative care. I will schedule her to see Dr. Tamir Mohamud  Patient will see our  today    GERD  Start omeprazole 20 mg p.o. Daily    Low appetite secondary to chemo  She tells me that her insurance does not cover a visit with nutritionist/I told her that she will be able to see Catherine for consult.        IRINA Scruggs CNP  Hem/Onc   [facility]

## 2018-12-07 NOTE — PROGRESS NOTES
Infusion Nursing Note:  Saray Huntley presents today for C1D8 taxol.    Patient seen by provider today: Yes: BRITTON Solorio   present during visit today: Not Applicable.    Note: N/A.    Intravenous Access:  Labs drawn without difficulty.  Implanted Port.    Treatment Conditions:  Lab Results   Component Value Date    HGB 13.3 12/07/2018     Lab Results   Component Value Date    WBC 5.9 12/07/2018      Lab Results   Component Value Date    ANEU 3.2 12/07/2018     Lab Results   Component Value Date     12/07/2018      Lab Results   Component Value Date     11/30/2018                   Lab Results   Component Value Date    POTASSIUM 3.9 11/30/2018           No results found for: MAG         Lab Results   Component Value Date    CR 0.60 11/30/2018                   Lab Results   Component Value Date    ELIJAH 8.9 11/30/2018                Lab Results   Component Value Date    BILITOTAL 0.7 11/30/2018           Lab Results   Component Value Date    ALBUMIN 3.8 11/30/2018                    Lab Results   Component Value Date    ALT 66 11/30/2018           Lab Results   Component Value Date    AST 30 11/30/2018       Results reviewed, labs MET treatment parameters, ok to proceed with treatment.      Post Infusion Assessment:  Patient tolerated infusion without incident.  Blood return noted pre and post infusion.  Site patent and intact, free from redness, edema or discomfort.  No evidence of extravasations.  Access discontinued per protocol.    Discharge Plan:   AVS to patient via MYCHART.  Patient will return as [facility] appointment.   Patient discharged in stable condition accompanied by: brother, mother and father.  Departure Mode: Ambulatory.    Yadiel Campos RN

## 2018-12-07 NOTE — LETTER
12/7/2018         RE: Saray Huntley  [address]  [place] [address]        Dear Colleague,    Thank you for referring your patient, Saray Huntley, to the [facility]. Please see a copy of my visit note below.    Oncology/Hematology Visit Note  Dec 7, 2018    Reason for Visit: follow up of breast cancer    History of Present Illness: Saray Huntley is a 48 year old female with  Left sided breast cancer biopsy showed invasive ductal carcinoma grade 3 n weakly positive for ER(1-3%) [place] negative HER-2 positive  paclitaxel Herceptin and Perjeta -started 11/30      Interval History:  She is tearful she denies anxiety depression denies suicidal ideation.  She is having a hard time with coping with new diagnosis.  She denies fever chills sweats denies cough no shortness of breath.  She has acid reflux that she tells me affects her appetite and she took Tums which helped.  She denies nausea vomiting diarrhea denies abdominal pain.  Denies skin rash denies bleeding.  She is weak where she takes short walks throughout the day.  She has low appetite and was told that her insurance does not cover nutritionist        Review of Systems:  14 point ROS of systems including Constitutional, Eyes, Respiratory, Cardiovascular, Gastroenterology, Genitourinary, Integumentary, Muscularskeletal, Psychiatric were all negative except for pertinent positives noted in my HPI.      Current Outpatient Prescriptions   Medication Sig Dispense Refill     HYDROcodone-acetaminophen (NORCO) 5-325 MG tablet Take 1-2 tablets by mouth every 4 hours as needed for moderate to severe pain 15 tablet 0     Lidocaine-Prilocaine (STUDY - LIDOCAINE 2.5%/PRILOCAINE 2.5% CREAM, IDS# [id number],) Externally apply topically daily as needed for moderate pain 30 g 3     LORazepam (ATIVAN) 0.5 MG tablet Take 1 tablet (0.5 mg) by mouth every 4 hours as needed (Anxiety, Nausea/Vomiting or Sleep) 30 tablet 2     omeprazole (PRILOSEC) 20 MG DR capsule Take 1  capsule (20 mg) by mouth daily 30 capsule 0     ondansetron (ZOFRAN ODT) 4 MG ODT tab Take 1 tablet (4 mg) by mouth 3 times daily (before meals) 60 tablet 1     prochlorperazine (COMPAZINE) 10 MG tablet Take 1 tablet (10 mg) by mouth every 6 hours as needed (Nausea/Vomiting) 30 tablet 2       Physical Examination:  General: The patient is a pleasant female in no acute distress.  /80  Pulse 70  Temp 98.3  F (36.8  C) (Oral)  Ht 1.524 m (5')  Wt 75.8 kg (167 lb)  LMP 09/29/2012 (Approximate)  SpO2 97%  BMI 32.61 kg/m2  HEENT: EOMI, PERRL. Sclerae are anicteric. Oral mucosa is pink and moist with no lesions or thrush.   Lymph: Neck is supple with no lymphadenopathy in the cervical or supraclavicular areas.  Right cervical area tender to touch.  Heart: Regular rate and rhythm.   Lungs: Clear to auscultation bilaterally.   GI: Bowel sounds present, soft, nontender with no palpable hepatosplenomegaly or masses.   Extremities: No lower extremity edema noted bilaterally.   Skin: No rashes, petechiae, or bruising noted on exposed skin.    Laboratory Data:  Results for orders placed or performed in visit on 12/07/18 (from the past 24 hour(s))   CBC with platelets differential   Result Value Ref Range    WBC 5.9 4.0 - 11.0 10e9/L    RBC Count 4.38 3.8 - 5.2 10e12/L    Hemoglobin 13.3 11.7 - 15.7 g/dL    Hematocrit 38.5 35.0 - 47.0 %    MCV 88 78 - 100 fl    MCH 30.4 26.5 - 33.0 pg    MCHC 34.5 31.5 - 36.5 g/dL    RDW 11.9 10.0 - 15.0 %    Platelet Count 288 150 - 450 10e9/L    Diff Method Automated Method     % Neutrophils 54.5 %    % Lymphocytes 38.7 %    % Monocytes 2.7 %    % Eosinophils 3.4 %    % Basophils 0.5 %    % Immature Granulocytes 0.2 %    Nucleated RBCs 0 0 /100    Absolute Neutrophil 3.2 1.6 - 8.3 10e9/L    Absolute Lymphocytes 2.3 0.8 - 5.3 10e9/L    Absolute Monocytes 0.2 0.0 - 1.3 10e9/L    Absolute Eosinophils 0.2 0.0 - 0.7 10e9/L    Absolute Basophils 0.0 0.0 - 0.2 10e9/L    Abs Immature  Granulocytes 0.0 0 - 0.4 10e9/L    Absolute Nucleated RBC 0.0          Assessment and Plan:    This is a 48-year-old female with    Left sided breast cancer biopsy showed invasive ductal carcinoma grade 3 n weakly positive for ER(1-3%) [place] negative HER-2 positive  started 11/30  paclitaxel Herceptin and Perjeta -started 11/30  She comes for cycle 1 day 8 today  Besides mild fatigue she has been overall tolerating treatment well  Labs reviewed with patient.  Okay to proceed with chemo today  Patient has scheduled appointment prior day 15 with Dr. Stock    Right cervical pain -improved  -started after  Port-A-Cath was placed on Wednesday 11/27  -CT the neck was negative for blood clot      Genetics  She will see genetic counseling in February    Coping-we talked about coping mechanisms.  She denies depression or suicidal ideation .she is anxious about the diagnosis .She is not ready to see palliative care. I will schedule her to see Dr. Tamir Mohamud  Patient will see our  today    GERD  Start omeprazole 20 mg p.o. Daily    Low appetite secondary to chemo  She tells me that her insurance does not cover a visit with nutritionist/I told her that she will be able to see Catherine for consult.        IRINA Scruggs CNP  Hem/Onc   [facility]               Again, thank you for allowing me to participate in the care of your patient.        Sincerely,        IRINA Scruggs CNP

## 2018-12-07 NOTE — PATIENT INSTRUCTIONS
Reschedule appointment with hai cain  Scheduled 12/12/18 2:00pm  at [facility]/ Francisca Okeefe to give chemo today    Start omeprazole 20 mg p.o. daily    AVS printed and given to patient/ Francisca

## 2018-12-10 ENCOUNTER — HOSPITAL ENCOUNTER (OUTPATIENT)
Facility: CLINIC | Age: 48
Setting detail: SPECIMEN
End: 2018-12-10
Attending: INTERNAL MEDICINE
Payer: COMMERCIAL

## 2018-12-11 ENCOUNTER — TELEPHONE (OUTPATIENT)
Dept: ONCOLOGY | Facility: CLINIC | Age: 48
End: 2018-12-11

## 2018-12-11 NOTE — TELEPHONE ENCOUNTER
Received request to contact patient per Fabiola.  Called and spoke with patient.  Informed patient [facility] jessie could cover nutrition appointment.  Patient unsure when she can get a ride to appointment from sister.  Patient states will have sister contact RD to schedule appointment time.  RD verified RD has permission to speak with sister to coordinate appointment time.  Patient's sister to call to schedule appointment.  Patient verbalized understanding of plan.    Lei Sutton, RD, LD  Clinical Dietitian  [facility]  [phone] (direct)

## 2018-12-14 ENCOUNTER — HOSPITAL ENCOUNTER (OUTPATIENT)
Facility: CLINIC | Age: 48
Setting detail: SPECIMEN
Discharge: HOME | End: 2018-12-14
Attending: INTERNAL MEDICINE | Admitting: INTERNAL MEDICINE
Payer: COMMERCIAL

## 2018-12-14 ENCOUNTER — INFUSION THERAPY VISIT (OUTPATIENT)
Dept: INFUSION THERAPY | Facility: CLINIC | Age: 48
End: 2018-12-14
Attending: INTERNAL MEDICINE
Payer: COMMERCIAL

## 2018-12-14 ENCOUNTER — ONCOLOGY VISIT (OUTPATIENT)
Dept: ONCOLOGY | Facility: CLINIC | Age: 48
End: 2018-12-14
Attending: INTERNAL MEDICINE
Payer: COMMERCIAL

## 2018-12-14 VITALS
OXYGEN SATURATION: 97 % | HEIGHT: 60 IN | HEART RATE: 67 BPM | BODY MASS INDEX: 32.12 KG/M2 | TEMPERATURE: 98.1 F | WEIGHT: 163.6 LBS | RESPIRATION RATE: 16 BRPM | DIASTOLIC BLOOD PRESSURE: 77 MMHG | SYSTOLIC BLOOD PRESSURE: 119 MMHG

## 2018-12-14 VITALS
HEIGHT: 60 IN | BODY MASS INDEX: 32.12 KG/M2 | HEART RATE: 68 BPM | RESPIRATION RATE: 16 BRPM | OXYGEN SATURATION: 97 % | WEIGHT: 163.6 LBS | SYSTOLIC BLOOD PRESSURE: 112 MMHG | TEMPERATURE: 98 F | DIASTOLIC BLOOD PRESSURE: 75 MMHG

## 2018-12-14 DIAGNOSIS — Z95.828 PORT-A-CATH IN PLACE: ICD-10-CM

## 2018-12-14 DIAGNOSIS — C50.412 MALIGNANT NEOPLASM OF UPPER-OUTER QUADRANT OF LEFT BREAST IN FEMALE, ESTROGEN RECEPTOR POSITIVE (H): Primary | ICD-10-CM

## 2018-12-14 DIAGNOSIS — Z17.0 MALIGNANT NEOPLASM OF UPPER-OUTER QUADRANT OF LEFT BREAST IN FEMALE, ESTROGEN RECEPTOR POSITIVE (H): Primary | ICD-10-CM

## 2018-12-14 LAB
BASOPHILS # BLD AUTO: 0 10E9/L (ref 0–0.2)
BASOPHILS NFR BLD AUTO: 0.4 %
DIFFERENTIAL METHOD BLD: NORMAL
EOSINOPHIL # BLD AUTO: 0.1 10E9/L (ref 0–0.7)
EOSINOPHIL NFR BLD AUTO: 2.6 %
ERYTHROCYTE [DISTWIDTH] IN BLOOD BY AUTOMATED COUNT: 12.1 % (ref 10–15)
HCT VFR BLD AUTO: 36.9 % (ref 35–47)
HGB BLD-MCNC: 12.8 G/DL (ref 11.7–15.7)
IMM GRANULOCYTES # BLD: 0 10E9/L (ref 0–0.4)
IMM GRANULOCYTES NFR BLD: 0.2 %
LYMPHOCYTES # BLD AUTO: 2.2 10E9/L (ref 0.8–5.3)
LYMPHOCYTES NFR BLD AUTO: 43.1 %
MCH RBC QN AUTO: 30.5 PG (ref 26.5–33)
MCHC RBC AUTO-ENTMCNC: 34.7 G/DL (ref 31.5–36.5)
MCV RBC AUTO: 88 FL (ref 78–100)
MONOCYTES # BLD AUTO: 0.2 10E9/L (ref 0–1.3)
MONOCYTES NFR BLD AUTO: 3.8 %
NEUTROPHILS # BLD AUTO: 2.5 10E9/L (ref 1.6–8.3)
NEUTROPHILS NFR BLD AUTO: 49.9 %
NRBC # BLD AUTO: 0 10*3/UL
NRBC BLD AUTO-RTO: 0 /100
PLATELET # BLD AUTO: 295 10E9/L (ref 150–450)
RBC # BLD AUTO: 4.2 10E12/L (ref 3.8–5.2)
WBC # BLD AUTO: 5 10E9/L (ref 4–11)

## 2018-12-14 PROCEDURE — 99215 OFFICE O/P EST HI 40 MIN: CPT | Performed by: INTERNAL MEDICINE

## 2018-12-14 PROCEDURE — 96413 CHEMO IV INFUSION 1 HR: CPT

## 2018-12-14 PROCEDURE — 25000128 H RX IP 250 OP 636: Performed by: INTERNAL MEDICINE

## 2018-12-14 PROCEDURE — 85025 COMPLETE CBC W/AUTO DIFF WBC: CPT | Performed by: INTERNAL MEDICINE

## 2018-12-14 PROCEDURE — 25000132 ZZH RX MED GY IP 250 OP 250 PS 637: Performed by: INTERNAL MEDICINE

## 2018-12-14 PROCEDURE — 96367 TX/PROPH/DG ADDL SEQ IV INF: CPT

## 2018-12-14 RX ORDER — DIPHENHYDRAMINE HCL 25 MG
25 CAPSULE ORAL ONCE
Status: COMPLETED | OUTPATIENT
Start: 2018-12-14 | End: 2018-12-14

## 2018-12-14 RX ORDER — SODIUM CHLORIDE 9 MG/ML
1000 INJECTION, SOLUTION INTRAVENOUS CONTINUOUS PRN
Status: CANCELLED
Start: 2018-12-21

## 2018-12-14 RX ORDER — DIPHENHYDRAMINE HYDROCHLORIDE 50 MG/ML
50 INJECTION INTRAMUSCULAR; INTRAVENOUS
Status: CANCELLED
Start: 2018-12-21

## 2018-12-14 RX ORDER — EPINEPHRINE 0.3 MG/.3ML
0.3 INJECTION SUBCUTANEOUS EVERY 5 MIN PRN
Status: CANCELLED | OUTPATIENT
Start: 2018-12-28

## 2018-12-14 RX ORDER — EPINEPHRINE 1 MG/ML
0.3 INJECTION, SOLUTION INTRAMUSCULAR; SUBCUTANEOUS EVERY 5 MIN PRN
Status: CANCELLED | OUTPATIENT
Start: 2018-12-28

## 2018-12-14 RX ORDER — METHYLPREDNISOLONE SODIUM SUCCINATE 125 MG/2ML
125 INJECTION, POWDER, LYOPHILIZED, FOR SOLUTION INTRAMUSCULAR; INTRAVENOUS
Status: CANCELLED
Start: 2018-12-21

## 2018-12-14 RX ORDER — MEPERIDINE HYDROCHLORIDE 25 MG/ML
25 INJECTION INTRAMUSCULAR; INTRAVENOUS; SUBCUTANEOUS EVERY 30 MIN PRN
Status: CANCELLED | OUTPATIENT
Start: 2018-12-28

## 2018-12-14 RX ORDER — HEPARIN SODIUM (PORCINE) LOCK FLUSH IV SOLN 100 UNIT/ML 100 UNIT/ML
500 SOLUTION INTRAVENOUS ONCE
Status: CANCELLED
Start: 2018-12-14 | End: 2018-12-14

## 2018-12-14 RX ORDER — ALBUTEROL SULFATE 90 UG/1
1-2 AEROSOL, METERED RESPIRATORY (INHALATION)
Status: CANCELLED
Start: 2019-01-04

## 2018-12-14 RX ORDER — DIPHENHYDRAMINE HYDROCHLORIDE 50 MG/ML
50 INJECTION INTRAMUSCULAR; INTRAVENOUS
Status: CANCELLED
Start: 2018-12-28

## 2018-12-14 RX ORDER — ALBUTEROL SULFATE 0.83 MG/ML
2.5 SOLUTION RESPIRATORY (INHALATION)
Status: CANCELLED | OUTPATIENT
Start: 2019-01-04

## 2018-12-14 RX ORDER — METHYLPREDNISOLONE SODIUM SUCCINATE 125 MG/2ML
125 INJECTION, POWDER, LYOPHILIZED, FOR SOLUTION INTRAMUSCULAR; INTRAVENOUS
Status: CANCELLED
Start: 2019-01-04

## 2018-12-14 RX ORDER — LORAZEPAM 2 MG/ML
0.5 INJECTION INTRAMUSCULAR EVERY 4 HOURS PRN
Status: CANCELLED
Start: 2019-01-04

## 2018-12-14 RX ORDER — ALBUTEROL SULFATE 90 UG/1
1-2 AEROSOL, METERED RESPIRATORY (INHALATION)
Status: CANCELLED
Start: 2018-12-28

## 2018-12-14 RX ORDER — ACETAMINOPHEN 325 MG/1
650 TABLET ORAL
Status: CANCELLED | OUTPATIENT
Start: 2018-12-21

## 2018-12-14 RX ORDER — DIPHENHYDRAMINE HYDROCHLORIDE 50 MG/ML
50 INJECTION INTRAMUSCULAR; INTRAVENOUS
Status: CANCELLED
Start: 2019-01-04

## 2018-12-14 RX ORDER — HEPARIN SODIUM (PORCINE) LOCK FLUSH IV SOLN 100 UNIT/ML 100 UNIT/ML
500 SOLUTION INTRAVENOUS ONCE
Status: COMPLETED | OUTPATIENT
Start: 2018-12-14 | End: 2018-12-14

## 2018-12-14 RX ORDER — LORAZEPAM 2 MG/ML
0.5 INJECTION INTRAMUSCULAR EVERY 4 HOURS PRN
Status: CANCELLED
Start: 2018-12-28

## 2018-12-14 RX ORDER — EPINEPHRINE 0.3 MG/.3ML
0.3 INJECTION SUBCUTANEOUS EVERY 5 MIN PRN
Status: CANCELLED | OUTPATIENT
Start: 2018-12-21

## 2018-12-14 RX ORDER — MEPERIDINE HYDROCHLORIDE 25 MG/ML
25 INJECTION INTRAMUSCULAR; INTRAVENOUS; SUBCUTANEOUS EVERY 30 MIN PRN
Status: CANCELLED | OUTPATIENT
Start: 2019-01-04

## 2018-12-14 RX ORDER — ALBUTEROL SULFATE 90 UG/1
1-2 AEROSOL, METERED RESPIRATORY (INHALATION)
Status: CANCELLED
Start: 2018-12-21

## 2018-12-14 RX ORDER — EPINEPHRINE 1 MG/ML
0.3 INJECTION, SOLUTION INTRAMUSCULAR; SUBCUTANEOUS EVERY 5 MIN PRN
Status: CANCELLED | OUTPATIENT
Start: 2018-12-21

## 2018-12-14 RX ORDER — DIPHENHYDRAMINE HCL 25 MG
50 CAPSULE ORAL ONCE
Status: CANCELLED
Start: 2019-01-04

## 2018-12-14 RX ORDER — LORAZEPAM 2 MG/ML
0.5 INJECTION INTRAMUSCULAR EVERY 4 HOURS PRN
Status: CANCELLED
Start: 2018-12-21

## 2018-12-14 RX ORDER — SODIUM CHLORIDE 9 MG/ML
1000 INJECTION, SOLUTION INTRAVENOUS CONTINUOUS PRN
Status: CANCELLED
Start: 2019-01-04

## 2018-12-14 RX ORDER — SODIUM CHLORIDE 9 MG/ML
1000 INJECTION, SOLUTION INTRAVENOUS CONTINUOUS PRN
Status: CANCELLED
Start: 2018-12-28

## 2018-12-14 RX ORDER — EPINEPHRINE 0.3 MG/.3ML
0.3 INJECTION SUBCUTANEOUS EVERY 5 MIN PRN
Status: CANCELLED | OUTPATIENT
Start: 2019-01-04

## 2018-12-14 RX ORDER — ALBUTEROL SULFATE 0.83 MG/ML
2.5 SOLUTION RESPIRATORY (INHALATION)
Status: CANCELLED | OUTPATIENT
Start: 2018-12-21

## 2018-12-14 RX ORDER — MEPERIDINE HYDROCHLORIDE 25 MG/ML
25 INJECTION INTRAMUSCULAR; INTRAVENOUS; SUBCUTANEOUS EVERY 30 MIN PRN
Status: CANCELLED | OUTPATIENT
Start: 2018-12-21

## 2018-12-14 RX ORDER — METHYLPREDNISOLONE SODIUM SUCCINATE 125 MG/2ML
125 INJECTION, POWDER, LYOPHILIZED, FOR SOLUTION INTRAMUSCULAR; INTRAVENOUS
Status: CANCELLED
Start: 2018-12-28

## 2018-12-14 RX ORDER — EPINEPHRINE 1 MG/ML
0.3 INJECTION, SOLUTION INTRAMUSCULAR; SUBCUTANEOUS EVERY 5 MIN PRN
Status: CANCELLED | OUTPATIENT
Start: 2019-01-04

## 2018-12-14 RX ORDER — DIPHENHYDRAMINE HCL 25 MG
50 CAPSULE ORAL ONCE
Status: CANCELLED
Start: 2018-12-21

## 2018-12-14 RX ORDER — DIPHENHYDRAMINE HCL 25 MG
50 CAPSULE ORAL ONCE
Status: CANCELLED
Start: 2018-12-28

## 2018-12-14 RX ORDER — ALBUTEROL SULFATE 0.83 MG/ML
2.5 SOLUTION RESPIRATORY (INHALATION)
Status: CANCELLED | OUTPATIENT
Start: 2018-12-28

## 2018-12-14 RX ADMIN — DIPHENHYDRAMINE HYDROCHLORIDE 25 MG: 25 CAPSULE ORAL at 10:25

## 2018-12-14 RX ADMIN — FAMOTIDINE 20 MG: 20 INJECTION, SOLUTION INTRAVENOUS at 11:03

## 2018-12-14 RX ADMIN — HEPARIN 500 UNITS: 100 SYRINGE at 12:30

## 2018-12-14 RX ADMIN — PACLITAXEL 146 MG: 6 INJECTION, SOLUTION INTRAVENOUS at 11:23

## 2018-12-14 RX ADMIN — DEXAMETHASONE SODIUM PHOSPHATE 20 MG: 10 INJECTION, SOLUTION INTRAMUSCULAR; INTRAVENOUS at 10:43

## 2018-12-14 RX ADMIN — SODIUM CHLORIDE 250 ML: 9 INJECTION, SOLUTION INTRAVENOUS at 10:25

## 2018-12-14 ASSESSMENT — MIFFLIN-ST. JEOR
SCORE: 1288.58
SCORE: 1288.58

## 2018-12-14 ASSESSMENT — PAIN SCALES - GENERAL: PAINLEVEL: NO PAIN (0)

## 2018-12-14 NOTE — NURSING NOTE
Infusion Nursing Note:  Saray Huntley presents today for D15C1.    Patient seen by provider today: Yes:    present during visit today: Not Applicable.    Note: No concerns or changes to report.    Intravenous Access:  Labs drawn without difficulty.  Implanted Port.    Treatment Conditions:  Lab Results   Component Value Date    HGB 12.8 12/14/2018     Lab Results   Component Value Date    WBC 5.0 12/14/2018      Lab Results   Component Value Date    ANEU 2.5 12/14/2018     Lab Results   Component Value Date     12/14/2018      Lab Results   Component Value Date     11/30/2018                   Lab Results   Component Value Date    POTASSIUM 3.9 11/30/2018           No results found for: MAG         Lab Results   Component Value Date    CR 0.60 11/30/2018                   Lab Results   Component Value Date    ELIJAH 8.9 11/30/2018                Lab Results   Component Value Date    BILITOTAL 0.7 11/30/2018           Lab Results   Component Value Date    ALBUMIN 3.8 11/30/2018                    Lab Results   Component Value Date    ALT 66 11/30/2018           Lab Results   Component Value Date    AST 30 11/30/2018       Results reviewed, labs MET treatment parameters, ok to proceed with treatment.      Post Infusion Assessment:  Patient tolerated infusion without incident.  Blood return noted pre and post infusion.  Site patent and intact, free from redness, edema or discomfort.  No evidence of extravasations.  Access discontinued per protocol.    Discharge Plan:   Discharge instructions reviewed with: Patient.  Patient and/or family verbalized understanding of discharge instructions and all questions answered.  Copy of AVS reviewed with patient and/or family.  Patient will return 12/21 for next appointment.  Patient discharged in stable condition accompanied by: sister and father.  Departure Mode: Ambulatory.    Dori Laird RN

## 2018-12-14 NOTE — PROGRESS NOTES
Oncology Rooming Note    December 14, 2018 8:58 AM   Saray Huntley is a 48 year old female who presents for:    Chief Complaint   Patient presents with     Oncology Clinic Visit     Malignant neoplasm of upper-outer quadrant of left breast in female, estrogen receptor positive (H)     Initial Vitals: /77   Pulse 67   Temp 98.1  F (36.7  C) (Oral)   Resp 16   Ht 1.524 m (5')   Wt 74.2 kg (163 lb 9.6 oz)   LMP 09/29/2012 (Approximate)   SpO2 97%   BMI 31.95 kg/m   Estimated body mass index is 31.95 kg/m  as calculated from the following:    Height as of this encounter: 1.524 m (5').    Weight as of this encounter: 74.2 kg (163 lb 9.6 oz). Body surface area is 1.77 meters squared.  No Pain (0) Comment: Data Unavailable   Patient's last menstrual period was 09/29/2012 (approximate).  Allergies reviewed: Yes  Medications reviewed: Yes    Medications: Medication refills not needed today.  Pharmacy name entered into [facility]:    [id number] - [place], [place] - [address] AT [facility] OF [facility] [address] [facility] - [address]    Clinical concerns: no      5 minutes for nursing intake (face to face time)          Shameka Damon MA

## 2018-12-14 NOTE — PROGRESS NOTES
[facility]    Hematology/Oncology Established Patient Note      Today's Date: 12/14/18    Reason for Follow-up: left-sided breast cancer      HISTORY OF PRESENT ILLNESS: Saray Huntley is a 48 year old female who presents with left-sided breast cancer.  It was found on a screening mammogram.  Left breast ultrasound found a hypoechoic mass at the 12:00 position, 8 cm from the nipple, measuring 1.5 x 1.6 x 1.5 cm.  Axilla survey showed only normal-appearing lymph nodes.   Biopsy showed invasive ductal carcinoma, grade 3, weakly positive for ER (1-3%), FL negative, HER-2 positive.      Case was reviewed at tumor conference, and recommendation was for neoadjuvant chemotherapy.    Port was placed on 11/28/18.    She started neoadjuvant chemotherapy on 11/30/18 with weekly paclitaxel, Herceptin, Perjeta.  C1D1: 11/30/18; C1D8: 12/7/18; C1D15: 12/14/18  C2D1: anticipated for 12/21/18; C2D8: anticipated for 12/28/18; C2D15: anticipated for 1/4/19  C3D1: anticipated for 1/11/19        INTERIM HISTORY: Patient is here for follow-up today.  She has received 2 weeks of chemotherapy so far.  She overall is tolerating it well.  She noted mild nausea, but controlled with nausea medications.  She noted mild rawness of her mouth, but the oral rinses help.  She notes some fatigue.  She continues to attend school, but is doing coursework from home.  She notes that she can no longer feel her left breast lump.      REVIEW OF SYSTEMS:   14 point ROS was reviewed and is negative other than as noted above in HPI.       HOME MEDICATIONS:  Current Outpatient Medications   Medication Sig Dispense Refill     HYDROcodone-acetaminophen (NORCO) 5-325 MG tablet Take 1-2 tablets by mouth every 4 hours as needed for moderate to severe pain 15 tablet 0     Lidocaine-Prilocaine (STUDY - LIDOCAINE 2.5%/PRILOCAINE 2.5% CREAM, IDS# 4243,) Externally apply topically daily as needed for moderate pain 30 g 3     LORazepam (ATIVAN) 0.5  MG tablet Take 1 tablet (0.5 mg) by mouth every 4 hours as needed (Anxiety, Nausea/Vomiting or Sleep) 30 tablet 2     omeprazole (PRILOSEC) 20 MG DR capsule Take 1 capsule (20 mg) by mouth daily 30 capsule 0     ondansetron (ZOFRAN ODT) 4 MG ODT tab Take 1 tablet (4 mg) by mouth 3 times daily (before meals) 60 tablet 1     prochlorperazine (COMPAZINE) 10 MG tablet Take 1 tablet (10 mg) by mouth every 6 hours as needed (Nausea/Vomiting) 30 tablet 2         ALLERGIES:  Allergies   Allergen Reactions     Sulfa Drugs Rash         PAST MEDICAL HISTORY:  Past Medical History:   Diagnosis Date     Breast cancer (H)      Hypothyroidism, unspecified type 2017     PONV (postoperative nausea and vomiting)          PAST SURGICAL HISTORY:  Past Surgical History:   Procedure Laterality Date      SECTION  2003     INSERT PORT VASCULAR ACCESS N/A 2018    Procedure: PORT PLACEMENT ;  Surgeon: Sae Montaño MD;  Location:  OR         SOCIAL HISTORY:  Social History     Socioeconomic History     Marital status:      Spouse name: Not on file     Number of children: Not on file     Years of education: Not on file     Highest education level: Not on file   Social Needs     Financial resource strain: Not on file     Food insecurity - worry: Not on file     Food insecurity - inability: Not on file     Transportation needs - medical: Not on file     Transportation needs - non-medical: Not on file   Occupational History     Not on file   Tobacco Use     Smoking status: Never Smoker     Smokeless tobacco: Never Used   Substance and Sexual Activity     Alcohol use: No     Drug use: No     Sexual activity: Not Currently   Other Topics Concern     Parent/sibling w/ CABG, MI or angioplasty before 65F 55M? Not Asked   Social History Narrative     Not on file   She has never smoked.  She does not drink or use illicit drugs.  She is going to school at [employer] studying IT.  She has 1 daughter who is 15  years old.  She says that she has menopause a few years old.  She tried oral contraceptive, but she did not tolerate it well, so did not take it long term.  She has never taken hormone replacement therapy          FAMILY HISTORY:  Family History   Problem Relation Age of Onset     Hyperlipidemia Mother      Kidney Disease Mother      Diabetes Father    Patient does not know much about her family history and is unaware if there is breast cancer or ovarian cancer in her family.        PHYSICAL EXAM:  Vital signs:  /77   Pulse 67   Temp 98.1  F (36.7  C) (Oral)   Resp 16   Ht 1.524 m (5')   Wt 74.2 kg (163 lb 9.6 oz)   LMP 2012 (Approximate)   SpO2 97%   BMI 31.95 kg/m     ECO  GENERAL/CONSTITUTIONAL: No acute distress. Accompanied by sister and brother-in-law and father.  EYES: No scleral icterus.  LYMPH: No anterior cervical, posterior cervical, supraclavicular, or axillary adenopathy.   RESPIRATORY: Clear to auscultation bilaterally. No crackles or wheezing.   CARDIOVASCULAR: Regular rate and rhythm without murmurs, gallops, or rubs.  GASTROINTESTINAL: No tenderness. The patient has normal bowel sounds. No guarding.  No distention.  BREAST: Right-no palpable mass, discharge, rash, or axillary lymphadenopathy.  Left-palpable mass at the 12:00 position, now no longer palpable.   MUSCULOSKELETAL: Warm and well-perfused, no cyanosis, clubbing, or edema.  NEUROLOGIC: Alert, oriented, answers questions appropriately.  INTEGUMENTARY: No jaundice.  GAIT: Steady, does not use assistive device  Port in place at right upper chest.      LABS:  CBC RESULTS:   Recent Labs   Lab Test 18  0838   WBC 5.0   RBC 4.20   HGB 12.8   HCT 36.9   MCV 88   MCH 30.5   MCHC 34.7   RDW 12.1            PATHOLOGY:  18:  FINAL DIAGNOSIS:   Left breast, 12:00, 8.0 cm from nipple, ultrasound-guided needle biopsy   - Invasive ductal carcinoma, Irlanda grade 3 (of 3)   - Weakly positive for estrogen  receptor (1-3%)   - Negative for progesterone receptor     INTERPRETATION:   Per the American Society of Clinical Oncology/College of American   Pathologists Clinical Practice Guideline   Focused Update (Claire LONGORIA et al, 2018, Arch Pathol Lab Med     doi:[email or web address]/arpa.[id number]), the HER2/BREANNA 17   ratio of >12.4 and average number of HER2 signals/cell of >21.0 places   this specimen in Group 1 (DINESH   Positive).       IMAGING:  Left breast ultrasound 11/6/18:  FINDINGS:  Targeted ultrasound shows a hypoechoic mass at the 12:00  position, 8 cm from the nipple, measuring 1.5 x 1.6 x 1.5 cm. Survey  of the axilla shows only normal-appearing lymph nodes.      Echo 11/20/18:  Global peak LV longitudinal strain is averaged at -24.6%. This is within  reported normal limits (normal <-18%).  The visual ejection fraction is estimated at 64.7%.  Left ventricular systolic function is normal.      ASSESSMENT/PLAN:  Saray Huntley is a 48 year old post-menopausal female with:    1) Left-sided breast cancer:  1.5 x 1.6 x 1.5 cm on ultrasound.  Axilla survey showed only normal-appearing lymph nodes.   Biopsy showed invasive ductal carcinoma, grade 3, weakly positive for ER (1-3%), PA negative, HER-2 positive.  Clinical stage IA (cT1cN0).    She has HER-2 positive tumor measuring 1.6 cm.  I recommended neoadjuvant chemotherapy with HER-2 directed therapy.  Because she has a relatively small tumor that is stage I, we can start with paclitaxel+Herceptin+Perjeta x 12 weeks and assess response, as there is data that this is adequate in small lymph node negative tumors.  If she has a good clinical response, she may be able to go on to surgery, and if she has a complete pathologic response, we may be able to avoid anthracycline.  If she does not have a complete response, we could give further chemotherapy with dose-dense Adriamycin+cyclophosphamide x 4 cycles.  She will undergo Herceptin to complete 1 year treatment after surgery.  Although  her ER positivity was weak, she may benefit from anti-hormonal therapy after surgery as well.      She has already met with Dr. Montaño and reviewed surgical options, but she has not decided if she wants mastectomy or lumpectomy with radiation.      She is overall tolerating chemotherapy well.  Her left breast lump is no longer palpable.  Her family had many questions, which were answered.    -proceed with C1D15 of chemotherapy today  -C2D1 will be on 12/21/18  -C2D8 will be on 12/28/18, with clinic appointment with Osmin same day  -C2D15 will be on 1/4/19  -C3D1 will be on 1/11/19, with clinic appointment with me same day    2) Nutrition: She was contacted by nutrition.    3) Genetics: She is only 48 years old with new diagnosis of breast cancer.   -referred to genetic counseling - appointment scheduled on 2/4/19    4) Chemotherapy-induced nausea: mild, controlled with home anti-emetics.    5) Mouth rawness: mild, controlled with home salt/baking soda rinses.    6) Fatigue: Secondary to chemotherapy.    6) Coping: Patient seems to be doing better now.    - following    7) GERD: on omeprazole      I spent a total of 40 minutes with the patient, with over >50% of the time in counseling and/or coordination of care.       Cayla Stock MD  Hematology/Oncology  [facility]

## 2018-12-14 NOTE — LETTER
12/14/2018         RE: Saray Huntley  [address]  [place] [address]        Dear Colleague,    Thank you for referring your patient, Saray Huntley, to the [facility]. Please see a copy of my visit note below.    Oncology Rooming Note    December 14, 2018 8:58 AM   Saray Huntley is a 48 year old female who presents for:    Chief Complaint   Patient presents with     Oncology Clinic Visit     Malignant neoplasm of upper-outer quadrant of left breast in female, estrogen receptor positive (H)     Initial Vitals: /77   Pulse 67   Temp 98.1  F (36.7  C) (Oral)   Resp 16   Ht 1.524 m (5')   Wt 74.2 kg (163 lb 9.6 oz)   LMP 09/29/2012 (Approximate)   SpO2 97%   BMI 31.95 kg/m    Estimated body mass index is 31.95 kg/m  as calculated from the following:    Height as of this encounter: 1.524 m (5').    Weight as of this encounter: 74.2 kg (163 lb 9.6 oz). Body surface area is 1.77 meters squared.  No Pain (0) Comment: Data Unavailable   Patient's last menstrual period was 09/29/2012 (approximate).  Allergies reviewed: Yes  Medications reviewed: Yes    Medications: Medication refills not needed today.  Pharmacy name entered into [facility]:    [id number] - [place], [place] - [address] AT [facility] OF [address] [facility] [place], [place] - [address]    Clinical concerns: no      5 minutes for nursing intake (face to face time)          Shameka Damon MA              [facility]    Hematology/Oncology Established Patient Note      Today's Date: 12/14/18    Reason for Follow-up: left-sided breast cancer      HISTORY OF PRESENT ILLNESS: Saray Huntley is a 48 year old female who presents with left-sided breast cancer.  It was found on a screening mammogram.  Left breast ultrasound found a hypoechoic mass at the 12:00 position, 8 cm from the nipple, measuring 1.5 x 1.6 x 1.5 cm.  Axilla survey showed only normal-appearing  lymph nodes.   Biopsy showed invasive ductal carcinoma, grade 3, weakly positive for ER (1-3%), [place] negative, HER-2 positive.      Case was reviewed at tumor conference, and recommendation was for neoadjuvant chemotherapy.    Port was placed on 11/28/18.    She started neoadjuvant chemotherapy on 11/30/18 with weekly paclitaxel, Herceptin, Perjeta.  C1D1: 11/30/18; C1D8: 12/7/18; C1D15: 12/14/18  C2D1: anticipated for 12/21/18; C2D8: anticipated for 12/28/18; C2D15: anticipated for 1/4/19  C3D1: anticipated for 1/11/19        INTERIM HISTORY: Patient is here for follow-up today.  She has received 2 weeks of chemotherapy so far.  She overall is tolerating it well.  She noted mild nausea, but controlled with nausea medications.  She noted mild rawness of her mouth, but the oral rinses help.  She notes some fatigue.  She continues to attend school, but is doing coursework from home.  She notes that she can no longer feel her left breast lump.      REVIEW OF SYSTEMS:   14 point ROS was reviewed and is negative other than as noted above in HPI.       HOME MEDICATIONS:  Current Outpatient Medications   Medication Sig Dispense Refill     HYDROcodone-acetaminophen (NORCO) 5-325 MG tablet Take 1-2 tablets by mouth every 4 hours as needed for moderate to severe pain 15 tablet 0     Lidocaine-Prilocaine (STUDY - LIDOCAINE 2.5%/PRILOCAINE 2.5% CREAM, IDS# 4243,) Externally apply topically daily as needed for moderate pain 30 g 3     LORazepam (ATIVAN) 0.5 MG tablet Take 1 tablet (0.5 mg) by mouth every 4 hours as needed (Anxiety, Nausea/Vomiting or Sleep) 30 tablet 2     omeprazole (PRILOSEC) 20 MG DR capsule Take 1 capsule (20 mg) by mouth daily 30 capsule 0     ondansetron (ZOFRAN ODT) 4 MG ODT tab Take 1 tablet (4 mg) by mouth 3 times daily (before meals) 60 tablet 1     prochlorperazine (COMPAZINE) 10 MG tablet Take 1 tablet (10 mg) by mouth every 6 hours as needed (Nausea/Vomiting) 30 tablet 2         ALLERGIES:  Allergies    Allergen Reactions     Sulfa Drugs Rash         PAST MEDICAL HISTORY:  Past Medical History:   Diagnosis Date     Breast cancer (H)      Hypothyroidism, unspecified type 2017     PONV (postoperative nausea and vomiting)          PAST SURGICAL HISTORY:  Past Surgical History:   Procedure Laterality Date      SECTION  2003     INSERT PORT VASCULAR ACCESS N/A 2018    Procedure: PORT PLACEMENT ;  Surgeon: Sae Montaño MD;  Location:  OR         SOCIAL HISTORY:  Social History     Socioeconomic History     Marital status:      Spouse name: Not on file     Number of children: Not on file     Years of education: Not on file     Highest education level: Not on file   Social Needs     Financial resource strain: Not on file     Food insecurity - worry: Not on file     Food insecurity - inability: Not on file     Transportation needs - medical: Not on file     Transportation needs - non-medical: Not on file   Occupational History     Not on file   Tobacco Use     Smoking status: Never Smoker     Smokeless tobacco: Never Used   Substance and Sexual Activity     Alcohol use: No     Drug use: No     Sexual activity: Not Currently   Other Topics Concern     Parent/sibling w/ CABG, MI or angioplasty before 65F 55M? Not Asked   Social History Narrative     Not on file   She has never smoked.  She does not drink or use illicit drugs.  She is going to school at [employer] studying IT.  She has 1 daughter who is 15 years old.  She says that she has menopause a few years old.  She tried oral contraceptive, but she did not tolerate it well, so did not take it long term.  She has never taken hormone replacement therapy          FAMILY HISTORY:  Family History   Problem Relation Age of Onset     Hyperlipidemia Mother      Kidney Disease Mother      Diabetes Father    Patient does not know much about her family history and is unaware if there is breast cancer or ovarian cancer in her family.         PHYSICAL EXAM:  Vital signs:  /77   Pulse 67   Temp 98.1  F (36.7  C) (Oral)   Resp 16   Ht 1.524 m (5')   Wt 74.2 kg (163 lb 9.6 oz)   LMP 2012 (Approximate)   SpO2 97%   BMI 31.95 kg/m      ECO  GENERAL/CONSTITUTIONAL: No acute distress. Accompanied by sister and brother-in-law and father.  EYES: No scleral icterus.  LYMPH: No anterior cervical, posterior cervical, supraclavicular, or axillary adenopathy.   RESPIRATORY: Clear to auscultation bilaterally. No crackles or wheezing.   CARDIOVASCULAR: Regular rate and rhythm without murmurs, gallops, or rubs.  GASTROINTESTINAL: No tenderness. The patient has normal bowel sounds. No guarding.  No distention.  BREAST: Right-no palpable mass, discharge, rash, or axillary lymphadenopathy.  Left-palpable mass at the 12:00 position, now no longer palpable.   MUSCULOSKELETAL: Warm and well-perfused, no cyanosis, clubbing, or edema.  NEUROLOGIC: Alert, oriented, answers questions appropriately.  INTEGUMENTARY: No jaundice.  GAIT: Steady, does not use assistive device  Port in place at right upper chest.      LABS:  CBC RESULTS:   Recent Labs   Lab Test 18  0838   WBC 5.0   RBC 4.20   HGB 12.8   HCT 36.9   MCV 88   MCH 30.5   MCHC 34.7   RDW 12.1            PATHOLOGY:  18:  FINAL DIAGNOSIS:   Left breast, 12:00, 8.0 cm from nipple, ultrasound-guided needle biopsy   - Invasive ductal carcinoma, Irlanda grade 3 (of 3)   - Weakly positive for estrogen receptor (1-3%)   - Negative for progesterone receptor     INTERPRETATION:   Per the American Society of Clinical Oncology/College of American   Pathologists Clinical Practice Guideline   Focused Update (Claire LONGORIA et al, 2018, Arch Pathol Lab Med     doi:[id number]), the HER2/BREANNA 17   ratio of >12.4 and average number of HER2 signals/cell of >21.0 places   this specimen in Group 1 (DINESH   Positive).       IMAGING:  Left breast ultrasound 18:  FINDINGS:  Targeted  ultrasound shows a hypoechoic mass at the 12:00  position, 8 cm from the nipple, measuring 1.5 x 1.6 x 1.5 cm. Survey  of the axilla shows only normal-appearing lymph nodes.      Echo 11/20/18:  Global peak LV longitudinal strain is averaged at -24.6%. This is within  reported normal limits (normal <-18%).  The visual ejection fraction is estimated at 64.7%.  Left ventricular systolic function is normal.      ASSESSMENT/PLAN:  Saray Huntley is a 48 year old post-menopausal female with:    1) Left-sided breast cancer:  1.5 x 1.6 x 1.5 cm on ultrasound.  Axilla survey showed only normal-appearing lymph nodes.   Biopsy showed invasive ductal carcinoma, grade 3, weakly positive for ER (1-3%), [place] negative, HER-2 positive.  Clinical stage IA (cT1cN0).    She has HER-2 positive tumor measuring 1.6 cm.  I recommended neoadjuvant chemotherapy with HER-2 directed therapy.  Because she has a relatively small tumor that is stage I, we can start with paclitaxel+Herceptin+Perjeta x 12 weeks and assess response, as there is data that this is adequate in small lymph node negative tumors.  If she has a good clinical response, she may be able to go on to surgery, and if she has a complete pathologic response, we may be able to avoid anthracycline.  If she does not have a complete response, we could give further chemotherapy with dose-dense Adriamycin+cyclophosphamide x 4 cycles.  She will undergo Herceptin to complete 1 year treatment after surgery.  Although her ER positivity was weak, she may benefit from anti-hormonal therapy after surgery as well.      She has already met with Dr. Montaño and reviewed surgical options, but she has not decided if she wants mastectomy or lumpectomy with radiation.      She is overall tolerating chemotherapy well.  Her left breast lump is no longer palpable.  Her family had many questions, which were answered.    -proceed with C1D15 of chemotherapy today  -C2D1 will be on 12/21/18  -C2D8 will be on  12/28/18, with clinic appointment with Osmin same day  -C2D15 will be on 1/4/19  -C3D1 will be on 1/11/19, with clinic appointment with me same day    2) Nutrition: She was contacted by nutrition.    3) Genetics: She is only 48 years old with new diagnosis of breast cancer.   -referred to genetic counseling - appointment scheduled on 2/4/19    4) Chemotherapy-induced nausea: mild, controlled with home anti-emetics.    5) Mouth rawness: mild, controlled with home salt/baking soda rinses.    6) Fatigue: Secondary to chemotherapy.    6) Coping: Patient seems to be doing better now.    - following    7) GERD: on omeprazole      I spent a total of 40 minutes with the patient, with over >50% of the time in counseling and/or coordination of care.       Cayla Stock MD  Hematology/Oncology  [facility]    Again, thank you for allowing me to participate in the care of your patient.        Sincerely,        Cayla Stock MD

## 2018-12-14 NOTE — PATIENT INSTRUCTIONS
please print copy of lab results for patient DONE CY  -proceed with chemotherapy today (paclitaxel)  -schedule chemotherapy on 12/21/18 (Herceptin, Perjeta, paclitaxel)Scheduled/tia  -schedule chemotherapy (paclitaxel) on 12/28/18, and appointment with OsminScheduled/tia  -schedule chemotherapy (paclitaxel) on 1/4/19,Scheduled/tia  -schedule chemotherapy (Herceptin, Perjeta, paclitaxel) on 1/11/19, and appointment with Dr. Stock,Scheduled/tia    Patient is in [facility]    Scheduled/ Tia    Avs printed and given to patient

## 2018-12-16 ENCOUNTER — NURSE TRIAGE (OUTPATIENT)
Dept: NURSING | Facility: CLINIC | Age: 48
End: 2018-12-16

## 2018-12-17 NOTE — TELEPHONE ENCOUNTER
Sister Jenni ([phone], C2C on file dated 11/2018) calling to question if Taxol causes nosebleeds. Reports Saray had her 3rd chemo this past week and received Taxol; Saray had a nose bleed tonight which did stop, along with a headache; questioning if they need to be concerned. Caller is NOT with patient at time of call, NO TRIAGE completed. Denies recent facial injury, hx of HTN, blood thinners or known clotting conditions. This nurse educated caller to common causes of nosebleeds and Taxol side effects via [employer]ian. Also educated to care advice for nosebleeds per Epic guidelines. Advised caller to have Saray or someone with her, to call [facility] back for triage or to follow up with Oncology clinic tomorrow during the day. Caller had no further questions.    12/14/18 labs:    Platelet Count [phone]         Encouraged call back to [facility] 24/7 for new/worsening symptoms or further questions.    Raquel Jaime RN  [facility]    Educational Resources: http://chemocare.com/chemotherapy/drug-info/Taxol.aspx & http://chemocare.com/chemotherapy/side-effects/nosebleeds.aspx    Additional Information    [1] Caller is not with the adult (patient) AND [2] probable NON-URGENT symptoms     questioning if Taxol causes nosebleeds    Protocols used: INFORMATION ONLY CALL-ADULT-

## 2018-12-21 ENCOUNTER — INFUSION THERAPY VISIT (OUTPATIENT)
Dept: INFUSION THERAPY | Facility: CLINIC | Age: 48
End: 2018-12-21
Attending: INTERNAL MEDICINE
Payer: COMMERCIAL

## 2018-12-21 ENCOUNTER — HOSPITAL ENCOUNTER (OUTPATIENT)
Facility: CLINIC | Age: 48
Setting detail: SPECIMEN
Discharge: HOME | End: 2018-12-21
Attending: INTERNAL MEDICINE | Admitting: INTERNAL MEDICINE
Payer: COMMERCIAL

## 2018-12-21 ENCOUNTER — ALLIED HEALTH/NURSE VISIT (OUTPATIENT)
Dept: ONCOLOGY | Facility: CLINIC | Age: 48
End: 2018-12-21

## 2018-12-21 VITALS
WEIGHT: 166 LBS | BODY MASS INDEX: 32.59 KG/M2 | HEIGHT: 60 IN | HEART RATE: 66 BPM | SYSTOLIC BLOOD PRESSURE: 113 MMHG | TEMPERATURE: 98.4 F | RESPIRATION RATE: 16 BRPM | OXYGEN SATURATION: 98 % | DIASTOLIC BLOOD PRESSURE: 79 MMHG

## 2018-12-21 DIAGNOSIS — Z17.0 MALIGNANT NEOPLASM OF UPPER-OUTER QUADRANT OF LEFT BREAST IN FEMALE, ESTROGEN RECEPTOR POSITIVE (H): Primary | ICD-10-CM

## 2018-12-21 DIAGNOSIS — Z95.828 PORT-A-CATH IN PLACE: ICD-10-CM

## 2018-12-21 DIAGNOSIS — Z71.9 COUNSELING NOS(V65.40): Primary | ICD-10-CM

## 2018-12-21 DIAGNOSIS — C50.412 MALIGNANT NEOPLASM OF UPPER-OUTER QUADRANT OF LEFT BREAST IN FEMALE, ESTROGEN RECEPTOR POSITIVE (H): Primary | ICD-10-CM

## 2018-12-21 LAB
ALBUMIN SERPL-MCNC: 3.6 G/DL (ref 3.4–5)
ALP SERPL-CCNC: 82 U/L (ref 40–150)
ALT SERPL W P-5'-P-CCNC: 43 U/L (ref 0–50)
ANION GAP SERPL CALCULATED.3IONS-SCNC: 9 MMOL/L (ref 3–14)
AST SERPL W P-5'-P-CCNC: 19 U/L (ref 0–45)
BASOPHILS # BLD AUTO: 0.1 10E9/L (ref 0–0.2)
BASOPHILS NFR BLD AUTO: 1 %
BILIRUB SERPL-MCNC: 0.4 MG/DL (ref 0.2–1.3)
BUN SERPL-MCNC: 17 MG/DL (ref 7–30)
CALCIUM SERPL-MCNC: 8.8 MG/DL (ref 8.5–10.1)
CHLORIDE SERPL-SCNC: 107 MMOL/L (ref 94–109)
CO2 SERPL-SCNC: 26 MMOL/L (ref 20–32)
CREAT SERPL-MCNC: 0.62 MG/DL (ref 0.52–1.04)
DIFFERENTIAL METHOD BLD: NORMAL
EOSINOPHIL # BLD AUTO: 0.1 10E9/L (ref 0–0.7)
EOSINOPHIL NFR BLD AUTO: 2.5 %
ERYTHROCYTE [DISTWIDTH] IN BLOOD BY AUTOMATED COUNT: 12.5 % (ref 10–15)
GFR SERPL CREATININE-BSD FRML MDRD: >90 ML/MIN/{1.73_M2}
GLUCOSE SERPL-MCNC: 86 MG/DL (ref 70–99)
HCT VFR BLD AUTO: 35.5 % (ref 35–47)
HGB BLD-MCNC: 12.3 G/DL (ref 11.7–15.7)
IMM GRANULOCYTES # BLD: 0 10E9/L (ref 0–0.4)
IMM GRANULOCYTES NFR BLD: 0.4 %
LYMPHOCYTES # BLD AUTO: 2.1 10E9/L (ref 0.8–5.3)
LYMPHOCYTES NFR BLD AUTO: 39.5 %
MCH RBC QN AUTO: 30.7 PG (ref 26.5–33)
MCHC RBC AUTO-ENTMCNC: 34.6 G/DL (ref 31.5–36.5)
MCV RBC AUTO: 89 FL (ref 78–100)
MONOCYTES # BLD AUTO: 0.2 10E9/L (ref 0–1.3)
MONOCYTES NFR BLD AUTO: 3.6 %
NEUTROPHILS # BLD AUTO: 2.8 10E9/L (ref 1.6–8.3)
NEUTROPHILS NFR BLD AUTO: 53 %
NRBC # BLD AUTO: 0 10*3/UL
NRBC BLD AUTO-RTO: 0 /100
PLATELET # BLD AUTO: 307 10E9/L (ref 150–450)
POTASSIUM SERPL-SCNC: 3.8 MMOL/L (ref 3.4–5.3)
PROT SERPL-MCNC: 7.1 G/DL (ref 6.8–8.8)
RBC # BLD AUTO: 4.01 10E12/L (ref 3.8–5.2)
SODIUM SERPL-SCNC: 142 MMOL/L (ref 133–144)
WBC # BLD AUTO: 5.3 10E9/L (ref 4–11)

## 2018-12-21 PROCEDURE — 85025 COMPLETE CBC W/AUTO DIFF WBC: CPT | Performed by: INTERNAL MEDICINE

## 2018-12-21 PROCEDURE — 80053 COMPREHEN METABOLIC PANEL: CPT | Performed by: INTERNAL MEDICINE

## 2018-12-21 PROCEDURE — 25000128 H RX IP 250 OP 636: Performed by: INTERNAL MEDICINE

## 2018-12-21 PROCEDURE — 96367 TX/PROPH/DG ADDL SEQ IV INF: CPT

## 2018-12-21 PROCEDURE — 96417 CHEMO IV INFUS EACH ADDL SEQ: CPT

## 2018-12-21 PROCEDURE — 96413 CHEMO IV INFUSION 1 HR: CPT

## 2018-12-21 PROCEDURE — 25000132 ZZH RX MED GY IP 250 OP 250 PS 637: Performed by: INTERNAL MEDICINE

## 2018-12-21 RX ORDER — HEPARIN SODIUM (PORCINE) LOCK FLUSH IV SOLN 100 UNIT/ML 100 UNIT/ML
500 SOLUTION INTRAVENOUS ONCE
Status: CANCELLED
Start: 2018-12-21 | End: 2018-12-21

## 2018-12-21 RX ORDER — DIPHENHYDRAMINE HCL 25 MG
50 CAPSULE ORAL ONCE
Status: COMPLETED | OUTPATIENT
Start: 2018-12-21 | End: 2018-12-21

## 2018-12-21 RX ORDER — HEPARIN SODIUM (PORCINE) LOCK FLUSH IV SOLN 100 UNIT/ML 100 UNIT/ML
500 SOLUTION INTRAVENOUS ONCE
Status: COMPLETED | OUTPATIENT
Start: 2018-12-21 | End: 2018-12-21

## 2018-12-21 RX ADMIN — DEXAMETHASONE SODIUM PHOSPHATE 20 MG: 10 INJECTION, SOLUTION INTRAMUSCULAR; INTRAVENOUS at 10:53

## 2018-12-21 RX ADMIN — SODIUM CHLORIDE 250 ML: 9 INJECTION, SOLUTION INTRAVENOUS at 09:41

## 2018-12-21 RX ADMIN — PACLITAXEL 146 MG: 6 INJECTION, SOLUTION INTRAVENOUS at 11:36

## 2018-12-21 RX ADMIN — HEPARIN 500 UNITS: 100 SYRINGE at 12:55

## 2018-12-21 RX ADMIN — DIPHENHYDRAMINE HYDROCHLORIDE 25 MG: 25 CAPSULE ORAL at 10:37

## 2018-12-21 RX ADMIN — PERTUZUMAB 420 MG: 30 INJECTION, SOLUTION, CONCENTRATE INTRAVENOUS at 09:41

## 2018-12-21 RX ADMIN — TRASTUZUMAB 450 MG: 150 INJECTION, POWDER, LYOPHILIZED, FOR SOLUTION INTRAVENOUS at 10:16

## 2018-12-21 RX ADMIN — FAMOTIDINE 20 MG: 20 INJECTION, SOLUTION INTRAVENOUS at 11:14

## 2018-12-21 ASSESSMENT — MIFFLIN-ST. JEOR: SCORE: 1299.47

## 2018-12-21 ASSESSMENT — PAIN SCALES - GENERAL: PAINLEVEL: NO PAIN (0)

## 2018-12-21 NOTE — PROGRESS NOTES
"Social Work Progress Note      Data/Intervention:  Patient Name:  Saray Huntley  /Age:  1970 (48 year old)    Reason for Follow-Up:  Saray is a 48-year-old woman with a new diagnosis of IA left sided-breast cancer who comes to clinic for C2D1 accompanied sister. This clinician met with pt for planned psychosocial check-in and emotional support.     Intervention:   Saray reports that she worked hard to be more mobile this week, acknowledging that she went out to the mall two days and to the park, reporting that this level of activity was \"way too much.\" Continued to encourage activity, but monitoring number of hours to conserve energy somewhat. Saray acknowledges that from a mood perspective she feels that she has been coping better this week reporting that she has tried to \"stop worrying about what could happen and live in the moment, reacting to things as they come.\" Discussed with Saray that this is a wonderful way to support understandable anxiety when coping with cancer treatment. Saray reports that keeping mind distracted has been helpful, and acknowledged that she is planning to return to work in the coming weeks. Encouraged Saray to participate in activities, like work, that she did prior to treatment, and acknowledged that participating in these programs will be supportive for emotional coping and health.     Saray looking forward to acquiring wig tomorrow with sister and daughter. Provided safe place for Saray to reflect upon changes with daughter's coping; recommendations offered that support continued connection but acknowledge difference in family coping with treatment.     Resources Provided:  [employer]- pt received funding and has mailed in  [facility]- pt to complete on own    Assessment:  Saray continues to adjust to cancer treatment and cope with its impacts. Saray appears to gain great strength from engagement with family, and their continued presence and support. Saray open to " recommendations from this clinician surrounding activities that support emotional coping and help to reduce anxiety. Saray is appreciative of having space for her to express concerns or needs, and was also receptive to recommendation to enhance physical activity this week with sister. Saray appreciative of ongoing SW support, and knows to reach out in case of psychosocial distress or need.     Plan:  1) This clinician will continue to follow for ongoing psychosocial support as pt continues to adjust to treatment and realize its impacts. Pt knows to contact this clinician in the case of psychosocial distress or need.   2) This clinician will continue to collaborate with pt's ongoing care team.   3) Helped family to collaborate with scheduling surrounding appointments with nutrition and psychologist.     Please call or page if needs or concerns arise.     ALEA Tapia, [facility] Center[facility]  Direct Phone: [phone]  Pager: [phone]

## 2018-12-21 NOTE — PROGRESS NOTES
Infusion Nursing Note:  Saray Huntley presents today for C2D1 Taxol/Perjeta/Herceptin.    Patient seen by provider today: No   present during visit today: Not Applicable.    Note: pt states she takes 25mg Benadryl rather than 50mg as she felt too drowsy with the bigger dose. Tolerated Taxol fine with lower dose    Intravenous Access:  Implanted Port.    Treatment Conditions:  Lab Results   Component Value Date    HGB 12.3 12/21/2018     Lab Results   Component Value Date    WBC 5.3 12/21/2018      Lab Results   Component Value Date    ANEU 2.8 12/21/2018     Lab Results   Component Value Date     12/21/2018      Lab Results   Component Value Date     12/21/2018                   Lab Results   Component Value Date    POTASSIUM 3.8 12/21/2018           No results found for: MAG         Lab Results   Component Value Date    CR 0.62 12/21/2018                   Lab Results   Component Value Date    ELIJAH 8.8 12/21/2018                Lab Results   Component Value Date    BILITOTAL 0.4 12/21/2018           Lab Results   Component Value Date    ALBUMIN 3.6 12/21/2018                    Lab Results   Component Value Date    ALT 43 12/21/2018           Lab Results   Component Value Date    AST 19 12/21/2018       ECHO/MUGA completed 11/20/18  EF 65%.      Post Infusion Assessment:  Patient tolerated infusion without incident.  Blood return noted pre and post infusion.  Site patent and intact, free from redness, edema or discomfort.  No evidence of extravasations.  Access discontinued per protocol.    Discharge Plan:   Discharge instructions reviewed with: Patient.  Patient and/or family verbalized understanding of discharge instructions and all questions answered.  Copy of AVS reviewed with patient and/or family.  Patient will return 1 week for next appointment.  Patient discharged in stable condition accompanied by: self.  Departure Mode: Ambulatory.    Jud Barton RN

## 2018-12-28 ENCOUNTER — INFUSION THERAPY VISIT (OUTPATIENT)
Dept: INFUSION THERAPY | Facility: CLINIC | Age: 48
End: 2018-12-28
Attending: INTERNAL MEDICINE
Payer: COMMERCIAL

## 2018-12-28 ENCOUNTER — TELEPHONE (OUTPATIENT)
Dept: ONCOLOGY | Facility: CLINIC | Age: 48
End: 2018-12-28

## 2018-12-28 ENCOUNTER — ONCOLOGY VISIT (OUTPATIENT)
Dept: ONCOLOGY | Facility: CLINIC | Age: 48
End: 2018-12-28
Attending: NURSE PRACTITIONER
Payer: COMMERCIAL

## 2018-12-28 ENCOUNTER — HOSPITAL ENCOUNTER (OUTPATIENT)
Facility: CLINIC | Age: 48
Setting detail: SPECIMEN
Discharge: HOME | End: 2018-12-28
Attending: NURSE PRACTITIONER | Admitting: INTERNAL MEDICINE
Payer: COMMERCIAL

## 2018-12-28 VITALS
RESPIRATION RATE: 18 BRPM | HEART RATE: 88 BPM | TEMPERATURE: 98.2 F | WEIGHT: 166 LBS | SYSTOLIC BLOOD PRESSURE: 108 MMHG | DIASTOLIC BLOOD PRESSURE: 73 MMHG | BODY MASS INDEX: 32.42 KG/M2 | OXYGEN SATURATION: 98 %

## 2018-12-28 VITALS
WEIGHT: 166.8 LBS | SYSTOLIC BLOOD PRESSURE: 123 MMHG | HEIGHT: 60 IN | RESPIRATION RATE: 16 BRPM | DIASTOLIC BLOOD PRESSURE: 75 MMHG | BODY MASS INDEX: 32.75 KG/M2 | HEART RATE: 58 BPM

## 2018-12-28 DIAGNOSIS — C50.412 MALIGNANT NEOPLASM OF UPPER-OUTER QUADRANT OF LEFT BREAST IN FEMALE, ESTROGEN RECEPTOR POSITIVE (H): Primary | ICD-10-CM

## 2018-12-28 DIAGNOSIS — Z17.0 MALIGNANT NEOPLASM OF UPPER-OUTER QUADRANT OF LEFT BREAST IN FEMALE, ESTROGEN RECEPTOR POSITIVE (H): Primary | ICD-10-CM

## 2018-12-28 DIAGNOSIS — Z95.828 PORT-A-CATH IN PLACE: ICD-10-CM

## 2018-12-28 LAB
BASOPHILS # BLD AUTO: 0 10E9/L (ref 0–0.2)
BASOPHILS NFR BLD AUTO: 0.6 %
DIFFERENTIAL METHOD BLD: ABNORMAL
EOSINOPHIL # BLD AUTO: 0.2 10E9/L (ref 0–0.7)
EOSINOPHIL NFR BLD AUTO: 2.8 %
ERYTHROCYTE [DISTWIDTH] IN BLOOD BY AUTOMATED COUNT: 12.7 % (ref 10–15)
HCT VFR BLD AUTO: 34.4 % (ref 35–47)
HGB BLD-MCNC: 11.8 G/DL (ref 11.7–15.7)
IMM GRANULOCYTES # BLD: 0 10E9/L (ref 0–0.4)
IMM GRANULOCYTES NFR BLD: 0.2 %
LYMPHOCYTES # BLD AUTO: 2.3 10E9/L (ref 0.8–5.3)
LYMPHOCYTES NFR BLD AUTO: 41.9 %
MCH RBC QN AUTO: 30.6 PG (ref 26.5–33)
MCHC RBC AUTO-ENTMCNC: 34.3 G/DL (ref 31.5–36.5)
MCV RBC AUTO: 89 FL (ref 78–100)
MONOCYTES # BLD AUTO: 0.3 10E9/L (ref 0–1.3)
MONOCYTES NFR BLD AUTO: 4.7 %
NEUTROPHILS # BLD AUTO: 2.7 10E9/L (ref 1.6–8.3)
NEUTROPHILS NFR BLD AUTO: 49.8 %
NRBC # BLD AUTO: 0 10*3/UL
NRBC BLD AUTO-RTO: 0 /100
PLATELET # BLD AUTO: 287 10E9/L (ref 150–450)
RBC # BLD AUTO: 3.86 10E12/L (ref 3.8–5.2)
WBC # BLD AUTO: 5.4 10E9/L (ref 4–11)

## 2018-12-28 PROCEDURE — 96375 TX/PRO/DX INJ NEW DRUG ADDON: CPT

## 2018-12-28 PROCEDURE — 85025 COMPLETE CBC W/AUTO DIFF WBC: CPT | Performed by: INTERNAL MEDICINE

## 2018-12-28 PROCEDURE — 96413 CHEMO IV INFUSION 1 HR: CPT

## 2018-12-28 PROCEDURE — 99214 OFFICE O/P EST MOD 30 MIN: CPT | Performed by: NURSE PRACTITIONER

## 2018-12-28 PROCEDURE — 25000132 ZZH RX MED GY IP 250 OP 250 PS 637: Performed by: INTERNAL MEDICINE

## 2018-12-28 PROCEDURE — 25000128 H RX IP 250 OP 636: Performed by: INTERNAL MEDICINE

## 2018-12-28 RX ORDER — DIPHENHYDRAMINE HCL 25 MG
50 CAPSULE ORAL ONCE
Status: COMPLETED | OUTPATIENT
Start: 2018-12-28 | End: 2018-12-28

## 2018-12-28 RX ORDER — HEPARIN SODIUM (PORCINE) LOCK FLUSH IV SOLN 100 UNIT/ML 100 UNIT/ML
500 SOLUTION INTRAVENOUS ONCE
Status: COMPLETED | OUTPATIENT
Start: 2018-12-28 | End: 2018-12-28

## 2018-12-28 RX ORDER — HEPARIN SODIUM (PORCINE) LOCK FLUSH IV SOLN 100 UNIT/ML 100 UNIT/ML
500 SOLUTION INTRAVENOUS ONCE
Status: CANCELLED
Start: 2018-12-28 | End: 2018-12-28

## 2018-12-28 RX ADMIN — FAMOTIDINE 20 MG: 20 INJECTION, SOLUTION INTRAVENOUS at 11:57

## 2018-12-28 RX ADMIN — DIPHENHYDRAMINE HYDROCHLORIDE 25 MG: 25 CAPSULE ORAL at 11:27

## 2018-12-28 RX ADMIN — PACLITAXEL 146 MG: 6 INJECTION, SOLUTION INTRAVENOUS at 12:14

## 2018-12-28 RX ADMIN — SODIUM CHLORIDE 250 ML: 9 INJECTION, SOLUTION INTRAVENOUS at 11:27

## 2018-12-28 RX ADMIN — DEXAMETHASONE SODIUM PHOSPHATE 20 MG: 10 INJECTION, SOLUTION INTRAMUSCULAR; INTRAVENOUS at 11:31

## 2018-12-28 RX ADMIN — SODIUM CHLORIDE, PRESERVATIVE FREE 500 UNITS: 5 INJECTION INTRAVENOUS at 13:22

## 2018-12-28 ASSESSMENT — MIFFLIN-ST. JEOR: SCORE: 1303.1

## 2018-12-28 ASSESSMENT — PAIN SCALES - GENERAL: PAINLEVEL: NO PAIN (0)

## 2018-12-28 NOTE — PROGRESS NOTES
Oncology Rooming Note    December 28, 2018 10:41 AM   Saray Huntley is a 48 year old female who presents for:    Chief Complaint   Patient presents with     Oncology Clinic Visit     Malignant neoplasm of upper-outer quadrant of left breast in female, estrogen receptor positive (H)     Initial Vitals: /73 (BP Location: Right arm, Patient Position: Sitting, Cuff Size: Adult Regular)   Pulse 88   Temp 98.2  F (36.8  C) (Oral)   Resp 18   Wt 75.3 kg (166 lb)   LMP 09/29/2012 (Approximate)   SpO2 98%   BMI 32.42 kg/m   Estimated body mass index is 32.42 kg/m  as calculated from the following:    Height as of an earlier encounter on 12/28/18: 1.524 m (5').    Weight as of this encounter: 75.3 kg (166 lb). Body surface area is 1.79 meters squared.  No Pain (0) Comment: Data Unavailable   Patient's last menstrual period was 09/29/2012 (approximate).  Allergies reviewed: Yes  Medications reviewed: Yes    Medications: Medication refills not needed today.  Pharmacy name entered into [facility]:    [id number] [place], [place] - [address] AT [facility] [address] & [facility] [place] - [address]    Clinical concerns: no    5 minutes for nursing intake (face to face time)          Shameka Damon MA

## 2018-12-28 NOTE — TELEPHONE ENCOUNTER
L/M that Nutrition Consultation is scheduled for 1/11/19  At 1:45pm.  Call if you any questions/concerns. Your next appt with the infusion area if Jan 4 at 8:00am.

## 2018-12-28 NOTE — PROGRESS NOTES
Oncology/Hematology Visit Note  Dec 28, 2018    Reason for Visit: follow up of breast cancer    History of Present Illness: Saray Huntley is a 48 year old female with  Left sided breast cancer biopsy showed invasive ductal carcinoma grade 3 n weakly positive for ER(1-3%) [place] negative HER-2 positive  paclitaxel Herceptin and Perjeta -started 11/30    Patient comes here today for routine follow-up prior chemotherapy    Interval History:  She continues to be mildly fatigue.  She has been tolerating the chemotherapy well.  She denies fever chills sweats denies cough no shortness of breath denies nausea vomiting diarrhea denies abdominal pain.  Denies neuropathy.  No new concerns today  Port pain resolved    Review of Systems:  14 point ROS of systems including Constitutional, Eyes, Respiratory, Cardiovascular, Gastroenterology, Genitourinary, Integumentary, Muscularskeletal, Psychiatric were all negative except for pertinent positives noted in my HPI.      Current Outpatient Medications   Medication Sig Dispense Refill     HYDROcodone-acetaminophen (NORCO) 5-325 MG tablet Take 1-2 tablets by mouth every 4 hours as needed for moderate to severe pain 15 tablet 0     Lidocaine-Prilocaine (STUDY - LIDOCAINE 2.5%/PRILOCAINE 2.5% CREAM, IDS# 4243,) Externally apply topically daily as needed for moderate pain 30 g 3     LORazepam (ATIVAN) 0.5 MG tablet Take 1 tablet (0.5 mg) by mouth every 4 hours as needed (Anxiety, Nausea/Vomiting or Sleep) 30 tablet 2     omeprazole (PRILOSEC) 20 MG DR capsule Take 1 capsule (20 mg) by mouth daily 30 capsule 0     ondansetron (ZOFRAN ODT) 4 MG ODT tab Take 1 tablet (4 mg) by mouth 3 times daily (before meals) 60 tablet 1     prochlorperazine (COMPAZINE) 10 MG tablet Take 1 tablet (10 mg) by mouth every 6 hours as needed (Nausea/Vomiting) 30 tablet 2       Physical Examination:  General: The patient is a pleasant female in no acute distress.  /73 (BP Location: Right arm, Patient Position:  Sitting, Cuff Size: Adult Regular)   Pulse 88   Temp 98.2  F (36.8  C) (Oral)   Resp 18   Wt 75.3 kg (166 lb)   LMP 09/29/2012 (Approximate)   SpO2 98%   BMI 32.42 kg/m    HEENT: EOMI, PERRL. Sclerae are anicteric. Oral mucosa is pink and moist with no lesions or thrush.   Lymph: Neck is supple with no lymphadenopathy in the cervical or supraclavicular areas.  Right cervical area tender to touch.  Heart: Regular rate and rhythm.   Lungs: Clear to auscultation bilaterally.   GI: Bowel sounds present, soft, nontender with no palpable hepatosplenomegaly or masses.   Extremities: No lower extremity edema noted bilaterally.   Skin: No rashes, petechiae, or bruising noted on exposed skin.    Laboratory Data:  Results for orders placed or performed in visit on 12/28/18 (from the past 24 hour(s))   CBC with platelets differential   Result Value Ref Range    WBC 5.4 4.0 - 11.0 10e9/L    RBC Count 3.86 3.8 - 5.2 10e12/L    Hemoglobin 11.8 11.7 - 15.7 g/dL    Hematocrit 34.4 (L) 35.0 - 47.0 %    MCV 89 78 - 100 fl    MCH 30.6 26.5 - 33.0 pg    MCHC 34.3 31.5 - 36.5 g/dL    RDW 12.7 10.0 - 15.0 %    Platelet Count 287 150 - 450 10e9/L    Diff Method Automated Method     % Neutrophils 49.8 %    % Lymphocytes 41.9 %    % Monocytes 4.7 %    % Eosinophils 2.8 %    % Basophils 0.6 %    % Immature Granulocytes 0.2 %    Nucleated RBCs 0 0 /100    Absolute Neutrophil 2.7 1.6 - 8.3 10e9/L    Absolute Lymphocytes 2.3 0.8 - 5.3 10e9/L    Absolute Monocytes 0.3 0.0 - 1.3 10e9/L    Absolute Eosinophils 0.2 0.0 - 0.7 10e9/L    Absolute Basophils 0.0 0.0 - 0.2 10e9/L    Abs Immature Granulocytes 0.0 0 - 0.4 10e9/L    Absolute Nucleated RBC 0.0          Assessment and Plan:    This is a 48-year-old female with    Left sided breast cancer biopsy showed invasive ductal carcinoma grade 3 n weakly positive for ER(1-3%) [place] negative HER-2 positive  started 11/30  paclitaxel Herceptin and Perjeta -started 11/30  Overall she has been tolerating  treatment well today  Labs reviewed with patient.  Proceed with therapy today  Patient is scheduled for day 15 next week  01/11-patient has follow-up appointment with Dr. Stock    Right cervical pain -improved  -started after  Port-A-Cath was placed on Wednesday 11/27  -CT the neck was negative for blood clot      Genetics  She will see genetic counseling in February    Coping-we talked about coping mechanisms.  She denies depression or suicidal ideation  Patient is doing little better t    GERD  Continue omeprazole 20 mg p.o. Daily    Low appetite secondary to chemo  She tells me that her insurance does not cover a visit with nutritionist/I told her that she will be able to see Catherine for consult.        IRINA Scruggs CNP  Hem/Onc   [facility]

## 2018-12-28 NOTE — LETTER
12/28/2018         RE: Saray Huntley  [address]  [place] [address]        Dear Colleague,    Thank you for referring your patient, Saray Huntley, to the [facility]. Please see a copy of my visit note below.    Oncology Rooming Note    December 28, 2018 10:41 AM   Saray Huntley is a 48 year old female who presents for:    Chief Complaint   Patient presents with     Oncology Clinic Visit     Malignant neoplasm of upper-outer quadrant of left breast in female, estrogen receptor positive (H)     Initial Vitals: /73 (BP Location: Right arm, Patient Position: Sitting, Cuff Size: Adult Regular)   Pulse 88   Temp 98.2  F (36.8  C) (Oral)   Resp 18   Wt 75.3 kg (166 lb)   LMP 09/29/2012 (Approximate)   SpO2 98%   BMI 32.42 kg/m    Estimated body mass index is 32.42 kg/m  as calculated from the following:    Height as of an earlier encounter on 12/28/18: 1.524 m (5').    Weight as of this encounter: 75.3 kg (166 lb). Body surface area is 1.79 meters squared.  No Pain (0) Comment: Data Unavailable   Patient's last menstrual period was 09/29/2012 (approximate).  Allergies reviewed: Yes  Medications reviewed: Yes    Medications: Medication refills not needed today.  Pharmacy name entered into [facility]:    [id number] - [place], [place] - [address] AT [facility] [place] [address] & [facility] [place], [place] - [address]    Clinical concerns: no    5 minutes for nursing intake (face to face time)          Shameka Damon MA                Oncology/Hematology Visit Note  Dec 28, 2018    Reason for Visit: follow up of breast cancer    History of Present Illness: Saray Huntley is a 48 year old female with  Left sided breast cancer biopsy showed invasive ductal carcinoma grade 3 n weakly positive for ER(1-3%) [place] negative HER-2 positive  paclitaxel Herceptin and Perjeta -started 11/30    Patient comes here today for routine follow-up prior  chemotherapy    Interval History:  She continues to be mildly fatigue.  She has been tolerating the chemotherapy well.  She denies fever chills sweats denies cough no shortness of breath denies nausea vomiting diarrhea denies abdominal pain.  Denies neuropathy.  No new concerns today  Port pain resolved    Review of Systems:  14 point ROS of systems including Constitutional, Eyes, Respiratory, Cardiovascular, Gastroenterology, Genitourinary, Integumentary, Muscularskeletal, Psychiatric were all negative except for pertinent positives noted in my HPI.      Current Outpatient Medications   Medication Sig Dispense Refill     HYDROcodone-acetaminophen (NORCO) 5-325 MG tablet Take 1-2 tablets by mouth every 4 hours as needed for moderate to severe pain 15 tablet 0     Lidocaine-Prilocaine (STUDY - LIDOCAINE 2.5%/PRILOCAINE 2.5% CREAM, IDS# 4243,) Externally apply topically daily as needed for moderate pain 30 g 3     LORazepam (ATIVAN) 0.5 MG tablet Take 1 tablet (0.5 mg) by mouth every 4 hours as needed (Anxiety, Nausea/Vomiting or Sleep) 30 tablet 2     omeprazole (PRILOSEC) 20 MG DR capsule Take 1 capsule (20 mg) by mouth daily 30 capsule 0     ondansetron (ZOFRAN ODT) 4 MG ODT tab Take 1 tablet (4 mg) by mouth 3 times daily (before meals) 60 tablet 1     prochlorperazine (COMPAZINE) 10 MG tablet Take 1 tablet (10 mg) by mouth every 6 hours as needed (Nausea/Vomiting) 30 tablet 2       Physical Examination:  General: The patient is a pleasant female in no acute distress.  /73 (BP Location: Right arm, Patient Position: Sitting, Cuff Size: Adult Regular)   Pulse 88   Temp 98.2  F (36.8  C) (Oral)   Resp 18   Wt 75.3 kg (166 lb)   LMP 09/29/2012 (Approximate)   SpO2 98%   BMI 32.42 kg/m     HEENT: EOMI, PERRL. Sclerae are anicteric. Oral mucosa is pink and moist with no lesions or thrush.   Lymph: Neck is supple with no lymphadenopathy in the cervical or supraclavicular areas.  Right cervical area tender to  touch.  Heart: Regular rate and rhythm.   Lungs: Clear to auscultation bilaterally.   GI: Bowel sounds present, soft, nontender with no palpable hepatosplenomegaly or masses.   Extremities: No lower extremity edema noted bilaterally.   Skin: No rashes, petechiae, or bruising noted on exposed skin.    Laboratory Data:  Results for orders placed or performed in visit on 12/28/18 (from the past 24 hour(s))   CBC with platelets differential   Result Value Ref Range    WBC 5.4 4.0 - 11.0 10e9/L    RBC Count 3.86 3.8 - 5.2 10e12/L    Hemoglobin 11.8 11.7 - 15.7 g/dL    Hematocrit 34.4 (L) 35.0 - 47.0 %    MCV 89 78 - 100 fl    MCH 30.6 26.5 - 33.0 pg    MCHC 34.3 31.5 - 36.5 g/dL    RDW 12.7 10.0 - 15.0 %    Platelet Count 287 150 - 450 10e9/L    Diff Method Automated Method     % Neutrophils 49.8 %    % Lymphocytes 41.9 %    % Monocytes 4.7 %    % Eosinophils 2.8 %    % Basophils 0.6 %    % Immature Granulocytes 0.2 %    Nucleated RBCs 0 0 /100    Absolute Neutrophil 2.7 1.6 - 8.3 10e9/L    Absolute Lymphocytes 2.3 0.8 - 5.3 10e9/L    Absolute Monocytes 0.3 0.0 - 1.3 10e9/L    Absolute Eosinophils 0.2 0.0 - 0.7 10e9/L    Absolute Basophils 0.0 0.0 - 0.2 10e9/L    Abs Immature Granulocytes 0.0 0 - 0.4 10e9/L    Absolute Nucleated RBC 0.0          Assessment and Plan:    This is a 48-year-old female with    Left sided breast cancer biopsy showed invasive ductal carcinoma grade 3 n weakly positive for ER(1-3%) [place] negative HER-2 positive  started 11/30  paclitaxel Herceptin and Perjeta -started 11/30  Overall she has been tolerating treatment well today  Labs reviewed with patient.  Proceed with therapy today  Patient is scheduled for day 15 next week  01/11-patient has follow-up appointment with Dr. Stock    Right cervical pain -improved  -started after  Port-A-Cath was placed on Wednesday 11/27  -CT the neck was negative for blood clot      Genetics  She will see genetic counseling in February Coping-we talked about  coping mechanisms.  She denies depression or suicidal ideation  Patient is doing little better t    GERD  Continue omeprazole 20 mg p.o. Daily    Low appetite secondary to chemo  She tells me that her insurance does not cover a visit with nutritionist/I told her that she will be able to see Catherine for consult.        IRINA Scruggs CNP  Hem/Onc   [facility]               Again, thank you for allowing me to participate in the care of your patient.        Sincerely,        IRINA Scruggs CNP

## 2018-12-28 NOTE — PROGRESS NOTES
Infusion Nursing Note:  Saray Huntley presents today for taxol.    Patient seen by provider today: Yes: PAULINO Rose NP   present during visit today: Not Applicable.    Note: N/A.    Intravenous Access:  Implanted Port.    Treatment Conditions:  Lab Results   Component Value Date    HGB 11.8 12/28/2018     Lab Results   Component Value Date    WBC 5.4 12/28/2018      Lab Results   Component Value Date    ANEU 2.7 12/28/2018     Lab Results   Component Value Date     12/28/2018      Results reviewed, labs MET treatment parameters, ok to proceed with treatment per PAULINO Rose NP.      Post Infusion Assessment:  Patient tolerated infusion without incident.    Site patent and intact, free from redness, edema or discomfort.  No evidence of extravasations.  Access discontinued per protocol.    Discharge Plan:   Copy of AVS reviewed with patient and/or family.  Patient will return as prev rj for next appointment.  Patient discharged in stable condition accompanied by: family members.  Departure Mode: Ambulatory.    DAR Harkins RN (discharge)

## 2019-01-04 ENCOUNTER — HOSPITAL ENCOUNTER (OUTPATIENT)
Facility: CLINIC | Age: 49
Setting detail: SPECIMEN
End: 2019-01-04
Attending: INTERNAL MEDICINE
Payer: COMMERCIAL

## 2019-01-04 ENCOUNTER — HOSPITAL ENCOUNTER (OUTPATIENT)
Facility: CLINIC | Age: 49
Setting detail: SPECIMEN
Discharge: HOME | End: 2019-01-04
Attending: INTERNAL MEDICINE | Admitting: INTERNAL MEDICINE
Payer: COMMERCIAL

## 2019-01-04 ENCOUNTER — INFUSION THERAPY VISIT (OUTPATIENT)
Dept: INFUSION THERAPY | Facility: CLINIC | Age: 49
End: 2019-01-04
Attending: INTERNAL MEDICINE
Payer: COMMERCIAL

## 2019-01-04 VITALS
RESPIRATION RATE: 16 BRPM | DIASTOLIC BLOOD PRESSURE: 78 MMHG | SYSTOLIC BLOOD PRESSURE: 117 MMHG | OXYGEN SATURATION: 97 % | BODY MASS INDEX: 33.08 KG/M2 | HEART RATE: 72 BPM | WEIGHT: 169.4 LBS | TEMPERATURE: 98.3 F

## 2019-01-04 DIAGNOSIS — C50.412 MALIGNANT NEOPLASM OF UPPER-OUTER QUADRANT OF LEFT BREAST IN FEMALE, ESTROGEN RECEPTOR POSITIVE (H): Primary | ICD-10-CM

## 2019-01-04 DIAGNOSIS — Z17.0 MALIGNANT NEOPLASM OF UPPER-OUTER QUADRANT OF LEFT BREAST IN FEMALE, ESTROGEN RECEPTOR POSITIVE (H): Primary | ICD-10-CM

## 2019-01-04 DIAGNOSIS — Z95.828 PORT-A-CATH IN PLACE: ICD-10-CM

## 2019-01-04 LAB
BASOPHILS # BLD AUTO: 0 10E9/L (ref 0–0.2)
BASOPHILS NFR BLD AUTO: 0.6 %
DIFFERENTIAL METHOD BLD: ABNORMAL
EOSINOPHIL # BLD AUTO: 0.2 10E9/L (ref 0–0.7)
EOSINOPHIL NFR BLD AUTO: 2.2 %
ERYTHROCYTE [DISTWIDTH] IN BLOOD BY AUTOMATED COUNT: 13 % (ref 10–15)
HCT VFR BLD AUTO: 33.4 % (ref 35–47)
HGB BLD-MCNC: 11.5 G/DL (ref 11.7–15.7)
IMM GRANULOCYTES # BLD: 0 10E9/L (ref 0–0.4)
IMM GRANULOCYTES NFR BLD: 0.6 %
LYMPHOCYTES # BLD AUTO: 2.3 10E9/L (ref 0.8–5.3)
LYMPHOCYTES NFR BLD AUTO: 34.5 %
MCH RBC QN AUTO: 30.7 PG (ref 26.5–33)
MCHC RBC AUTO-ENTMCNC: 34.4 G/DL (ref 31.5–36.5)
MCV RBC AUTO: 89 FL (ref 78–100)
MONOCYTES # BLD AUTO: 0.4 10E9/L (ref 0–1.3)
MONOCYTES NFR BLD AUTO: 5.3 %
NEUTROPHILS # BLD AUTO: 3.9 10E9/L (ref 1.6–8.3)
NEUTROPHILS NFR BLD AUTO: 56.8 %
NRBC # BLD AUTO: 0 10*3/UL
NRBC BLD AUTO-RTO: 0 /100
PLATELET # BLD AUTO: 298 10E9/L (ref 150–450)
RBC # BLD AUTO: 3.74 10E12/L (ref 3.8–5.2)
WBC # BLD AUTO: 6.8 10E9/L (ref 4–11)

## 2019-01-04 PROCEDURE — 96413 CHEMO IV INFUSION 1 HR: CPT

## 2019-01-04 PROCEDURE — 85025 COMPLETE CBC W/AUTO DIFF WBC: CPT | Performed by: INTERNAL MEDICINE

## 2019-01-04 PROCEDURE — 25000132 ZZH RX MED GY IP 250 OP 250 PS 637: Performed by: INTERNAL MEDICINE

## 2019-01-04 PROCEDURE — 96367 TX/PROPH/DG ADDL SEQ IV INF: CPT

## 2019-01-04 PROCEDURE — 25000128 H RX IP 250 OP 636: Performed by: INTERNAL MEDICINE

## 2019-01-04 RX ORDER — HEPARIN SODIUM (PORCINE) LOCK FLUSH IV SOLN 100 UNIT/ML 100 UNIT/ML
500 SOLUTION INTRAVENOUS ONCE
Status: CANCELLED
Start: 2019-01-04 | End: 2019-01-04

## 2019-01-04 RX ORDER — HEPARIN SODIUM (PORCINE) LOCK FLUSH IV SOLN 100 UNIT/ML 100 UNIT/ML
500 SOLUTION INTRAVENOUS ONCE
Status: COMPLETED | OUTPATIENT
Start: 2019-01-04 | End: 2019-01-04

## 2019-01-04 RX ORDER — DIPHENHYDRAMINE HCL 25 MG
50 CAPSULE ORAL ONCE
Status: COMPLETED | OUTPATIENT
Start: 2019-01-04 | End: 2019-01-04

## 2019-01-04 RX ADMIN — DEXAMETHASONE SODIUM PHOSPHATE 20 MG: 10 INJECTION, SOLUTION INTRAMUSCULAR; INTRAVENOUS at 09:03

## 2019-01-04 RX ADMIN — DIPHENHYDRAMINE HYDROCHLORIDE 25 MG: 25 CAPSULE ORAL at 08:54

## 2019-01-04 RX ADMIN — SODIUM CHLORIDE 250 ML: 9 INJECTION, SOLUTION INTRAVENOUS at 08:55

## 2019-01-04 RX ADMIN — FAMOTIDINE 20 MG: 20 INJECTION, SOLUTION INTRAVENOUS at 09:21

## 2019-01-04 RX ADMIN — PACLITAXEL 146 MG: 6 INJECTION, SOLUTION INTRAVENOUS at 09:44

## 2019-01-04 RX ADMIN — SODIUM CHLORIDE, PRESERVATIVE FREE 500 UNITS: 5 INJECTION INTRAVENOUS at 10:53

## 2019-01-04 ASSESSMENT — PAIN SCALES - GENERAL: PAINLEVEL: NO PAIN (0)

## 2019-01-04 NOTE — PROGRESS NOTES
Infusion Nursing Note:  Saray Huntley presents today for Taxol.    Patient seen by provider today: No   present during visit today: Not Applicable.    Note: Pt requested only 25 mg benadryl. This is what she has previously done and tolerated.    Intravenous Access:  Labs drawn without difficulty.  Implanted Port.    Treatment Conditions:  Lab Results   Component Value Date    HGB 11.5 01/04/2019     Lab Results   Component Value Date    WBC 6.8 01/04/2019      Lab Results   Component Value Date    ANEU 3.9 01/04/2019     Lab Results   Component Value Date     01/04/2019      Results reviewed, labs MET treatment parameters, ok to proceed with treatment.      Post Infusion Assessment:  Patient tolerated infusion without incident.  Blood return noted pre and post infusion.  Site patent and intact, free from redness, edema or discomfort.  No evidence of extravasations.  Access discontinued per protocol.    Discharge Plan:   Discharge instructions reviewed with: Patient.  Patient and/or family verbalized understanding of discharge instructions and all questions answered.  Copy of AVS reviewed with patient and/or family.  Patient will return 1/11 for next appointment.  Patient discharged in stable condition accompanied by: self.  Departure Mode: Ambulatory.    Rajwinder Paniagua RN

## 2019-01-07 ENCOUNTER — ONCOLOGY VISIT (OUTPATIENT)
Dept: ONCOLOGY | Facility: CLINIC | Age: 49
End: 2019-01-07
Attending: GENETIC COUNSELOR, MS
Payer: COMMERCIAL

## 2019-01-07 DIAGNOSIS — Z17.0 MALIGNANT NEOPLASM OF UPPER-OUTER QUADRANT OF LEFT BREAST IN FEMALE, ESTROGEN RECEPTOR POSITIVE (H): Primary | ICD-10-CM

## 2019-01-07 DIAGNOSIS — C50.412 MALIGNANT NEOPLASM OF UPPER-OUTER QUADRANT OF LEFT BREAST IN FEMALE, ESTROGEN RECEPTOR POSITIVE (H): Primary | ICD-10-CM

## 2019-01-07 LAB — MISCELLANEOUS TEST: NORMAL

## 2019-01-07 PROCEDURE — 96040 ZZH GENETIC COUNSELING, EACH 30 MINUTES: CPT | Performed by: GENETIC COUNSELOR, MS

## 2019-01-07 NOTE — LETTER
Cancer Risk Management  Program Locations    [facility] Cancer [facility]  Mailing Address  Cancer Risk Management Program  [facility]  [address]  [place] [address]    New patient appointments  [phone]  January 7, 2019    Saray Huntley  [address]  [place] [address]      Dear Saray,    It was a pleasure meeting with you at the [facility] on 1/7/2019.  Here is a copy of the progress note from your recent genetic counseling visit to the Cancer Risk Management Program.  If you have any additional questions, please feel free to call.    Referring Provider: Cayla Stock MD    Presenting Information:   I met with Saray Audi today for genetic counseling at the Cancer Risk Management Program at the [facility] to discuss her personal history of breast cancer.  She is here today to review this history and available genetic testing options.  She was accompanied by her parents.    Personal History:  Saray is a 48 year old female.  She was diagnosed with ER positive invasive ductal carcinoma at age 48; treatment has included chemotherapy.  Surgery is planned following completion of chemotherapy, and Saray stated that the results from genetic testing will help determine surgical decision making for treatment.        She had her first menstrual period at age 13, her first child at age 32, and reports going through menopause at age 44.  Saray has her ovaries, fallopian tubes and uterus in place.  She reports no hormone replacement therapy.  She sees her primary care provider for annual visits, and does not regularly do any other cancer screening at this time.  Saray reported no tobacco use, and no concerns regarding alcohol use or environmental exposures.    Family History: (Please see  scanned pedigree for detailed family history information)    Saray reports no know family history of cancer, however shared that this information may be very limited.      Her maternal ethnicity is [place]. Her paternal ethnicity is [place].  There is no known Ashkenazi Religious ancestry on either side of her family.     Discussion:    Saray's personal history of breast cancer may be suggestive of a possible hereditary cancer syndrome.    We reviewed the features of sporadic, familial, and hereditary cancers.  In looking at Saray's history, it is possible that a cancer susceptibility gene is present due to her personal history of breast cancer diagnosed prior to age 49.  With that being said, her reported family history does not present with features suggestive of a hereditary cancer syndrome (such as additional relatives diagnosed with same/related cancers).  However, Saray and her parents shared that limited family history information is available regarding cancer history in her family.  In addition, Saray has requested genetic testing to help determine surgical decision making for her active cancer treatment, and is interested in this information for her daughter.          We discussed the natural history and genetics of Hereditary Breast and Ovarian Cancer syndrome caused by mutations in the BRCA1/2 genes. We discussed that there are additional genes that could cause increased risk for breast cancer.  As many of these genes present with overlapping features in a family and accurate cancer risk cannot always be established based upon the pedigree analysis alone, it would be reasonable for Saray to consider panel genetic testing to analyze multiple genes at once.    A detailed handout regarding hereditary breast and gynecologic cancer and the information we discussed was provided to Saray at the end of our appointment today and can be found in the after visit summary.  Topics included: inheritance pattern, cancer  risks, cancer screening recommendations, and also risks, benefits and limitations of testing.    We discussed that insurance coverage for genetic testing is dependent upon personal and family history being suggestive of a hereditary cancer syndrome; it is currently not clear whether this testing might be covered by her insurance.  We reviewed the option to first submit a prior authorization/keep testing on hold to determine insurance coverage/expected out of pocket cost through the laboratory.  Otherwise, we reviewed billing options through [employer], including patient pay.  Saray verbalized understanding and expressed interest in proceeding with genetic testing due to her active cancer diagnosis and possible implications for treatment and family.      We reviewed genetic testing options for hereditary breast and gynecologic cancers: actionable high/moderate risk breast and gynecologic cancer risk custom panel (CustomNext-Cancer, 19 genes, a combination of GynPlus and BRCAplus + NBN, STK11, and NF1) and expanded high and moderate risk panel testing (OvaNext, 25 genes).  Saray expressed an interest in only the actionable genes.  She opted for the CustomNext-Cancer 19-gene panel.   The CustomNext-Cancer high/moderate risk breast and gynecologic panel includes the following 19 genes: MIRELA, BRCA1, BRCA2, BRIP1, CDH1, CHEK2, EPCAM, MLH1, MSH2, MSH6, NBN, NF1, PALB2,  PMS2, PTEN, RAD51C, RAD51D, STK11, and TP53.    Some of the genes on this custom panel are associated with specific hereditary cancer syndromes and have published management guidelines:  Hereditary Breast and Ovarian Cancer syndrome (BRCA1, BRCA2), Day syndrome (EPCAM, MLH1, MSH2, MSH6, PMS2), Hereditary Diffuse Gastric Cancer (CDH1), Cowden Syndrome (PTEN), Peutz-Jeghers syndrome, neurofibromatosis type 1 (NF1), and Li Fraumeni syndrome (TP53).     Risk-reducing salpingo-oophorectomy can be considered in women with mutations in BRIP1, RAD51C, or  RAD51D.  MIRELA, CHEK2, NBN, and PALB2 are associated with breast cancer risk and have published screening guidelines.    Consent was obtained and genetic testing for the CustomNext-Cancer gene panel was sent to [facility].  Turn around time: 3-4 weeks.  Saray is encouraged to contact me should results be needed sooner for treatment.       Medical Management: For Saray, we reviewed that the information from genetic testing may determine:    surgery to treat Saray's active cancer diagnosis (i.e. lumpectomy versus bilateral mastectomy),    additional cancer screening for which Saray may qualify (i.e. mammogram and breast MRI if indicated, more frequent colonoscopies, more frequent dermatologic exams, etc.),    options for risk reducing surgeries Saray could consider (i.e. bilateral mastectomy, surgery to remove the ovaries and/or uterus, etc.),      and targeted chemotherapies for certain cancers in the future (i.e. immunotherapy for individuals with Day syndrome, PARP inhibitors, etc.).     These recommendations will be discussed in detail once genetic testing is completed.     Plan:  1) Today Saray elected to proceed with the CustomNext-Cancer gene panel (19 genes, a combination of GynPlus and BRCAplus + NBN, STK11, and NF1) offered through [facility].  2) This information should be available in 3-4 weeks.  3) Saray will return to clinic to discuss the results    Brenna Callahan MS, [facility]  Licensed Genetic Counselor  [phone]

## 2019-01-07 NOTE — PATIENT INSTRUCTIONS
Assessing Cancer Risk  Only about 5-10% of cancers are thought to be due to an inherited cancer susceptibility gene.    These families often have:    Several people with the same or related types of cancer    Cancers diagnosed at a young age (before age 50)    Individuals with more than one primary cancer    Multiple generations of the family affected with cancer    Some people may be candidates for genetic testing of more than one gene.  For these families, genetic testing using a cancer panel may be offered.  These panels will test different genes known to increase the risk for breast, ovarian, uterine, and/or other cancers. All of the genes discussed below have published clinical management guidelines for individuals who are found to carry a mutation. The purpose of this handout is to serve as a brief summary of the genes analyzed by the panels used to inquire about hereditary breast and gynecologic cancer:  MIRELA, BRCA1, BRCA2, BRIP1, CDH1, CHEK2, MLH1, MSH2, MSH6, PMS2, EPCAM, PTEN, PALB2, RAD51C, RAD51D, and TP53.  ______________________________________________________________________________  Hereditary Breast and Ovarian Cancer Syndrome   (BRCA1 and BRCA2)  A single mutation in one of the copies of BRCA1 or BRCA2 increases the risk for breast and ovarian cancer, among others.  The risk for pancreatic cancer and melanoma may also be slightly increased in some families.  The chart below shows the chance that someone with a BRCA mutation would develop cancer in his or her lifetime1,2,3,4.        A person s ethnic background is also important to consider, as individuals of Ashkenazi Religion ancestry have a higher chance of having a BRCA gene mutation.  There are three BRCA mutations that occur more frequently in this population.    Day Syndrome   (MLH1, MSH2, MSH6, PMS2, and EPCAM)  Currently five genes are known to cause Day Syndrome: MLH1, MSH2, MSH6, PMS2, and EPCAM.  A single mutation in one of the  Day Syndrome genes increases the risk for colon, endometrial, ovarian, and stomach cancers.  Other cancers that occur less commonly in Day Syndrome include urinary tract, skin, and brain cancers.  The chart below shows the chance that a person with Day syndrome would develop cancer in his or her lifetime5.      *Cancer risk varies depending on Day syndrome gene found    Cowden Syndrome   (PTEN)  Cowden syndrome is a hereditary condition that increases the risk for breast, thyroid, endometrial, colon, and kidney cancer.  Cowden syndrome is caused by a mutation in the PTEN gene.  A single mutation in one of the copies of PTEN causes Cowden syndrome and increases cancer risk.  The chart below shows the chance that someone with a PTEN mutation would develop cancer in their lifetime6,7.  Other benign features seen in some individuals with Cowden syndrome include benign skin lesions (facial papules, keratoses, lipomas), learning disability, autism, thyroid nodules, colon polyps, and larger head size.      *One recent study found breast cancer risk to be increased to 85%    Li-Fraumeni Syndrome   (TP53)  Li-Fraumeni Syndrome (LFS) is a cancer predisposition syndrome caused by a mutation in the TP53 gene. A single mutation in one of the copies of TP53 increases the risk for multiple cancers. Individuals with LFS are at an increased risk for developing cancer at a young age. The lifetime risk for development of a LFS-associated cancer is 50% by age 30 and 90% by age 60.   Core Cancers: Sarcomas, Breast, Brain, Lung, Leukemias/Lymphomas, Adrenocortical carcinomas  Other Cancers: Gastrointestinal, Thyroid, Skin, Genitourinary    Hereditary Diffuse Gastric Cancer   (CDH1)  Currently, one gene is known to cause hereditary diffuse gastric cancer (HDGC): CDH1.  Individuals with HDGC are at increased risk for diffuse gastric cancer and lobular breast cancer. Of people diagnosed with HDGC, 30-50% have a mutation in the CDH1  gene.  This suggests there are likely other genes that may cause HDGC that have not been identified yet.      Lifetime Cancer Risks    General Population HDGC    Diffuse Gastric  <1% ~80%   Breast 12% 39-52%         Additional Genes  MIRELA  MIRELA is a moderate-risk breast cancer gene. Women who have a mutation in MIRELA can have between a 2-4 fold increased risk for breast cancer compared to the general population8. MIRELA mutations have also been associated with increased risk for pancreatic cancer, however an estimate of this cancer risk is not well understood9. Individuals who inherit two MIRELA mutations have a condition called ataxia-telangiectasia (AT).  This rare autosomal recessive condition affects the nervous system and immune system, and is associated with progressive cerebellar ataxia beginning in childhood.  Individuals with ataxia-telangiectasia often have a weakened immune system and have an increased risk for childhood cancers.    PALB2  Mutations in PALB2 have been shown to increase the risk of breast cancer up to 33-58% in some families; where individuals fall within this risk range is dependent upon family [id number]. PALB2 mutations have also been associated with increased risk for pancreatic cancer, although this risk has not been quantified yet.  Individuals who inherit two PALB2 mutations--one from their mother and one from their father--have a condition called Fanconi Anemia.  This rare autosomal recessive condition is associated with short stature, developmental delay, bone marrow failure, and increased risk for childhood cancers.    CHEK2   CHEK2 is a moderate-risk breast cancer gene.  Women who have a mutation in CHEK2 have around a 2-fold increased risk for breast cancer compared to the general population, and this risk may be higher depending upon family history.11,12,13 Mutations in CHEK2 have also been shown to increase the risk of a number of other cancers, including colon and prostate, however  these cancer risks are currently not well understood.    BRIP1, RAD51C and RAD51D  Mutations in BRIP1, RAD51C, and RAD51D have been shown to increase the risk of ovarian cancer and possibly female breast cancer as well14,15 .       Lifetime Cancer Risk    General Population BRIP1 RAD51C RAD51D   Ovarian 1-2% ~5-8% ~5-9% ~7-15%           Inheritance  All of the cancer syndromes reviewed above are inherited in an autosomal dominant pattern.  This means that if a parent has a mutation, each of his or her children will have a 50% chance of inheriting that same mutation.  Therefore, each child--male or female--would have a 50% chance of being at increased risk for developing cancer.      Image obtained from Genetics Home Reference, 2013     Mutations in some genes can occur de jose, which means that a person s mutation occurred for the first time in them and was not inherited from a parent.  Now that they have the mutation, however, it can be passed on to future generations.    Genetic Testing  Genetic testing involves a blood test and will look at the genetic information in the MIRELA, BRCA1, BRCA2, BRIP1, CDH1, CHEK2, MLH1, MSH2, MSH6, PMS2, EPCAM, PTEN, PALB2, RAD51C, RAD51D, and TP53 genes for any harmful mutations that are associated with increased cancer risk.  If possible, it is recommended that the person(s) who has had cancer be tested before other family members.  That person will give us the most useful information about whether or not a specific gene is associated with the cancer in the family.    Results  There are three possible results of genetic testing:    Positive--a harmful mutation was identified in one or more of the genes    Negative--no mutation was identified in any of the genes on this panel    Variant of unknown significance--a variation in one of the genes was identified, but it is unclear how this impacts cancer risk in the family    Advantages and Disadvantages   There are advantages and  disadvantages to genetic testing.    Advantages    May clarify your cancer risk    Can help you make medical decisions    May explain the cancers in your family    May give useful information to your family members (if you share your results)    Disadvantages    Possible negative emotional impact of learning about inherited cancer risk    Uncertainty in interpreting a negative test result in some situations    Possible genetic discrimination concerns (see below)    Genetic Information Nondiscrimination Act (FARHAD)  FARHAD is a federal law that protects individuals from health insurance or employment discrimination based on a genetic test result alone.  Although rare, there are currently no legal discrimination protections in terms of life insurance, long term care, or disability insurances.  Visit the ADENTS HTI Research [place] website to learn more.    Reducing Cancer Risk  All of the genes described above have nationally recognized cancer screening guidelines that would be recommended for individuals who test positive.  In addition to increased cancer screening, surgeries may be offered or recommended to reduce cancer risk.  Recommendations are based upon an individual s genetic test result as well as their personal and family history of cancer.    Questions to Think About Regarding Genetic Testing:    What effect will the test result have on me and my relationship with my family members if I have an inherited gene mutation?  If I don t have a gene mutation?    Should I share my test results, and how will my family react to this news, which may also affect them?    Are my children ready to learn new information that may one day affect their own health?    Hereditary Cancer Resources    FORCE: Facing Our Risk of Cancer Empowered facingourrisk.org   Bright Pink bebrightpink.org   Li-Fraumeni Syndrome Association lfsassociation.org   PTEN World PTENworld.com   No stomach for cancer, Inc.  nostomachforcancer.org   Stomach cancer relief network Scrnet.org   Collaborative Group of the Americas on Inherited Colorectal Cancer (CGA) cgaicc.com    Cancer Care cancercare.org   American Cancer Society (ACS) cancer.org   National Cancer Marland (NCI) cancer.gov     Please call us if you have any questions or concerns.   Cancer Risk Management Program [phone] ([phone])  ? Reta Arriaza, MS, [facility]  [phone]  ? Akanksha Mujica, MS, [facility]  [phone]  ? Brenna Callahan, MS, [facility]  [phone]  ? Jorge Chahal, MS, [facility]  [phone]  ? Lashawn Tyson, MS, [facility] [phone]    References  1. Adam RAMIREZ, Wan PDP, Lia S, Vianney RIVERS, Swathi JE, Chavez JL, Alyssa N, Kev H, Zeferino O, Gato A, Adali B, Victor Hugo P, Susana S, Francisco Javier DM, James N, Radha E, Kelby H, Anival E, Lubinski J, Gronwald J, Cole B, Tulinius H, Thorlacius S, Eerola H, Asia H, Ayaz K, Ras OP. Average risks of breast and ovarian cancer associated with BRCA1 or BRCA2 mutations detected in case series unselected for family history: a combined analysis of 222 studies. Am J Hum Kelli. 2003;72:1117-30.  2. Yara N, Nathalie M, Elyse G.  BRCA1 and BRCA2 Hereditary Breast and Ovarian Cancer. Gene Reviews online. 2013.  3. Best YC, Yoselin S, Марина G, Long S. Breast cancer risk among male BRCA1 and BRCA2 mutation carriers. J Natl Cancer Inst. 2007;99:1811-4.  4. Seferino BARNEY, Manas I, Ramesh J, hSerry E, Fadi ER, Gloria F. Risk of breast cancer in male BRCA2 carriers. J Med Kelli. 2010;47:710-1.  5. National Comprehensive Cancer Network. Clinical practice guidelines in oncology, colorectal cancer screening. Available online (registration required). 2015.  6. Marcus TONEY, Xochitl J, Alex J, Nitza LA, Luc MS, Eng C. Lifetime cancer risks in individuals with germline PTEN mutations. Clin Cancer Res. 2012;18:400-7.  7. Marcos HAMILTON. Cowden Syndrome: A Critical Review of the Clinical Literature. J Kelli .  2009:18:13-27.  8. Pamela A, Jalen D, Remington S, Ani P, Lazaro T, Rian M, Lewis B, Gia H, Patel R, Valeri K, Deanne L, Seferino DG, Francisco Javier D, Toby DF, Agus MR, The Breast Cancer Susceptibility Collaboration () & Bernadine AHN. MIRELA mutations that cause ataxia-telangiectasia are breast cancer susceptibility alleles. Nature Genetics. 2006;38:873-875  9. Jerad N , Katelin Y, Heather J, Jenifer L, Milo GM , Louis ML, Gallinger S, Hui AG, Syngal S, Aracelis ML, Tato J , Regina R, Hang SZ, Frederick JR, Dannielle VE, Sunil M, Voeden B, Mendel N, Stephie RH, Promise KW, and Kayley AP. MIRELA mutations in patients with hereditary pancreatic cancer. Cancer Discover. 2012;2:41-46  10. Adam SHAKIH, et al. Breast-Cancer Risk in Families with Mutations in PALB2. NEJM. 2014; 371(6):497-506.  11. CHEK2 Breast Cancer Case-Control Consortium. CHEK2*1100delC and susceptibility to breast cancer: A collaborative analysis involving 10,860 breast cancer cases and 9,065 controls from 10 studies. Am J Hum Kelli, 74 (2004), pp. 5956-2399  12. Maggie T, Darci S, Davon K, et al. Spectrum of Mutations in BRCA1, BRCA2, CHEK2, and TP53 in Families at High Risk of Breast Cancer. SAMAN. 2006;295(12):1405-9479.   13. Prashant C, Wally D, Stacie A, et al. Risk of breast cancer in women with a CHEK2 mutation with and without a family history of breast cancer. J Clin Oncol. 2011;29:8871-0214.  14. Dany LUNDY, Peggy E, Dexter SJ, et al. Contribution of germline mutations in the RAD51B, RAD51C, and RAD51D genes to ovarian cancer in the population. J Clin Oncol. 2015;33(26):9250-9304. Doi:10.1200/JCO.2015.61.2408.  15. Kb T, Debra CALDERON, Gabi P, et al. Mutations in BRIP1 confer high risk of ovarian cancer. Mariajose Kelli. 2011;43(11):3635-6052. doi:10.1038/ng.955.

## 2019-01-07 NOTE — PROGRESS NOTES
1/7/2019    Referring Provider: Cayla Stock MD    Presenting Information:   I met with Saray Huntley today for genetic counseling at the Cancer Risk Management Program at the [facility] to discuss her personal history of breast cancer.  She is here today to review this history and available genetic testing options.  She was accompanied by her parents.    Personal History:  Saray is a 48 year old female.  She was diagnosed with ER positive invasive ductal carcinoma at age 48; treatment has included chemotherapy.  Surgery is planned following completion of chemotherapy, and Saray stated that the results from genetic testing will help determine surgical decision making for treatment.        She had her first menstrual period at age 13, her first child at age 32, and reports going through menopause at age 44.  Saray has her ovaries, fallopian tubes and uterus in place.  She reports no hormone replacement therapy.  She sees her primary care provider for annual visits, and does not regularly do any other cancer screening at this time.  Saray reported no tobacco use, and no concerns regarding alcohol use or environmental exposures.    Family History: (Please see scanned pedigree for detailed family history information)    Saray reports no know family history of cancer, however shared that this information may be very limited.      Her maternal ethnicity is [place]. Her paternal ethnicity is [place].  There is no known Ashkenazi Buddhist ancestry on either side of her family.     Discussion:    Saray's personal history of breast cancer may be suggestive of a possible hereditary cancer syndrome.    We reviewed the features of sporadic, familial, and hereditary cancers.  In looking at Saray's history, it is possible that a cancer susceptibility gene is present due to her personal history of breast cancer diagnosed prior to age 49.  With that being said, her reported family history does not present with  features suggestive of a hereditary cancer syndrome (such as additional relatives diagnosed with same/related cancers).  However, Saray and her parents shared that limited family history information is available regarding cancer history in her family.  In addition, Saray has requested genetic testing to help determine surgical decision making for her active cancer treatment, and is interested in this information for her daughter.          We discussed the natural history and genetics of Hereditary Breast and Ovarian Cancer syndrome caused by mutations in the BRCA1/2 genes. We discussed that there are additional genes that could cause increased risk for breast cancer.  As many of these genes present with overlapping features in a family and accurate cancer risk cannot always be established based upon the pedigree analysis alone, it would be reasonable for Saray to consider panel genetic testing to analyze multiple genes at once.    A detailed handout regarding hereditary breast and gynecologic cancer and the information we discussed was provided to Saray at the end of our appointment today and can be found in the after visit summary.  Topics included: inheritance pattern, cancer risks, cancer screening recommendations, and also risks, benefits and limitations of testing.    We discussed that insurance coverage for genetic testing is dependent upon personal and family history being suggestive of a hereditary cancer syndrome; it is currently not clear whether this testing might be covered by her insurance.  We reviewed the option to first submit a prior authorization/keep testing on hold to determine insurance coverage/expected out of pocket cost through the laboratory.  Otherwise, we reviewed billing options through [employer], including patient pay.  Saray verbalized understanding and expressed interest in proceeding with genetic testing due to her active cancer diagnosis and possible implications for treatment  and family.      We reviewed genetic testing options for hereditary breast and gynecologic cancers: actionable high/moderate risk breast and gynecologic cancer risk custom panel (CustomNext-Cancer, 19 genes, a combination of GynPlus and BRCAplus + NBN, STK11, and NF1) and expanded high and moderate risk panel testing (OvaNext, 25 genes).  Saray expressed an interest in only the actionable genes.  She opted for the CustomNext-Cancer 19-gene panel.   The CustomNext-Cancer high/moderate risk breast and gynecologic panel includes the following 19 genes: MIRELA, BRCA1, BRCA2, BRIP1, CDH1, CHEK2, EPCAM, MLH1, MSH2, MSH6, NBN, NF1, PALB2,  PMS2, PTEN, RAD51C, RAD51D, STK11, and TP53.    Some of the genes on this custom panel are associated with specific hereditary cancer syndromes and have published management guidelines:  Hereditary Breast and Ovarian Cancer syndrome (BRCA1, BRCA2), Day syndrome (EPCAM, MLH1, MSH2, MSH6, PMS2), Hereditary Diffuse Gastric Cancer (CDH1), Cowden Syndrome (PTEN), Peutz-Jeghers syndrome, neurofibromatosis type 1 (NF1), and Li Fraumeni syndrome (TP53).     Risk-reducing salpingo-oophorectomy can be considered in women with mutations in BRIP1, RAD51C, or RAD51D.  MIRELA, CHEK2, NBN, and PALB2 are associated with breast cancer risk and have published screening guidelines.    Consent was obtained and genetic testing for the CustomNext-Cancer gene panel was sent to [email or web address].  Turn around time: 3-4 weeks.  Saray is encouraged to contact me should results be needed sooner for treatment.       Medical Management: For Saray, we reviewed that the information from genetic testing may determine:    surgery to treat Saray's active cancer diagnosis (i.e. lumpectomy versus bilateral mastectomy),    additional cancer screening for which Saray may qualify (i.e. mammogram and breast MRI if indicated, more frequent colonoscopies, more frequent dermatologic exams, etc.),    options for risk reducing surgeries  Saray could consider (i.e. bilateral mastectomy, surgery to remove the ovaries and/or uterus, etc.),      and targeted chemotherapies for certain cancers in the future (i.e. immunotherapy for individuals with Day syndrome, PARP inhibitors, etc.).     These recommendations will be discussed in detail once genetic testing is completed.     Plan:  1) Today Saray elected to proceed with the CustomNext-Cancer gene panel (19 genes, a combination of GynPlus and BRCAplus + NBN, STK11, and NF1) offered through [facility]  2) This information should be available in 3-4 weeks.  3) Saray will return to clinic to discuss the results    Face to face time: 45 minutes    Brenna Callahan MS, [facility]  Licensed Genetic Counselor  [phone]

## 2019-01-07 NOTE — LETTER
1/7/2019         RE: Saray Huntley  [address]  [place] [address]        Dear Colleague,    Thank you for referring your patient, Saray Huntley, to the CANCER RISK MANAGEMENT PROGRAM. Please see a copy of my visit note below.    1/7/2019    Referring Provider: Cayla Stock MD    Presenting Information:   I met with Saray Huntley today for genetic counseling at the Cancer Risk Management Program at the [facility] to discuss her personal history of breast cancer.  She is here today to review this history and available genetic testing options.  She was accompanied by her parents.    Personal History:  Saray is a 48 year old female.  She was diagnosed with ER positive invasive ductal carcinoma at age 48; treatment has included chemotherapy.  Surgery is planned following completion of chemotherapy, and Saray stated that the results from genetic testing will help determine surgical decision making for treatment.        She had her first menstrual period at age 13, her first child at age 32, and reports going through menopause at age 44.  Saray has her ovaries, fallopian tubes and uterus in place.  She reports no hormone replacement therapy.  She sees her primary care provider for annual visits, and does not regularly do any other cancer screening at this time.  Saray reported no tobacco use, and no concerns regarding alcohol use or environmental exposures.    Family History: (Please see scanned pedigree for detailed family history information)    Saray reports no know family history of cancer, however shared that this information may be very limited.      Her maternal ethnicity is [place]. Her paternal ethnicity is [place].  There is no known Ashkenazi Adventist ancestry on either side of her family.     Discussion:    Saray's personal history of breast cancer may be suggestive of a possible hereditary cancer syndrome.    We reviewed the features of sporadic, familial, and hereditary cancers.   In looking at Saray's history, it is possible that a cancer susceptibility gene is present due to her personal history of breast cancer diagnosed prior to age 49.  With that being said, her reported family history does not present with features suggestive of a hereditary cancer syndrome (such as additional relatives diagnosed with same/related cancers).  However, Saray and her parents shared that limited family history information is available regarding cancer history in her family.  In addition, Saray has requested genetic testing to help determine surgical decision making for her active cancer treatment, and is interested in this information for her daughter.          We discussed the natural history and genetics of Hereditary Breast and Ovarian Cancer syndrome caused by mutations in the BRCA1/2 genes. We discussed that there are additional genes that could cause increased risk for breast cancer.  As many of these genes present with overlapping features in a family and accurate cancer risk cannot always be established based upon the pedigree analysis alone, it would be reasonable for Saray to consider panel genetic testing to analyze multiple genes at once.    A detailed handout regarding hereditary breast and gynecologic cancer and the information we discussed was provided to Saray at the end of our appointment today and can be found in the after visit summary.  Topics included: inheritance pattern, cancer risks, cancer screening recommendations, and also risks, benefits and limitations of testing.    We discussed that insurance coverage for genetic testing is dependent upon personal and family history being suggestive of a hereditary cancer syndrome; it is currently not clear whether this testing might be covered by her insurance.  We reviewed the option to first submit a prior authorization/keep testing on hold to determine insurance coverage/expected out of pocket cost through the laboratory.  Otherwise, we  reviewed billing options through [employer], including patient pay.  Saray verbalized understanding and expressed interest in proceeding with genetic testing due to her active cancer diagnosis and possible implications for treatment and family.      We reviewed genetic testing options for hereditary breast and gynecologic cancers: actionable high/moderate risk breast and gynecologic cancer risk custom panel (CustomNext-Cancer, 19 genes, a combination of GynPlus and BRCAplus + NBN, STK11, and NF1) and expanded high and moderate risk panel testing (OvaNext, 25 genes).  Saray expressed an interest in only the actionable genes.  She opted for the CustomNext-Cancer 19-gene panel.   The CustomNext-Cancer high/moderate risk breast and gynecologic panel includes the following 19 genes: MIRELA, BRCA1, BRCA2, BRIP1, CDH1, CHEK2, EPCAM, MLH1, MSH2, MSH6, NBN, NF1, PALB2,  PMS2, PTEN, RAD51C, RAD51D, STK11, and TP53.    Some of the genes on this custom panel are associated with specific hereditary cancer syndromes and have published management guidelines:  Hereditary Breast and Ovarian Cancer syndrome (BRCA1, BRCA2), Day syndrome (EPCAM, MLH1, MSH2, MSH6, PMS2), Hereditary Diffuse Gastric Cancer (CDH1), Cowden Syndrome (PTEN), Peutz-Jeghers syndrome, neurofibromatosis type 1 (NF1), and Li Fraumeni syndrome (TP53).     Risk-reducing salpingo-oophorectomy can be considered in women with mutations in BRIP1, RAD51C, or RAD51D.  MIRELA, CHEK2, NBN, and PALB2 are associated with breast cancer risk and have published screening guidelines.    Consent was obtained and genetic testing for the CustomNext-Cancer gene panel was sent to [facility].  Turn around time: 3-4 weeks.  Saray is encouraged to contact me should results be needed sooner for treatment.       Medical Management: For Saray, we reviewed that the information from genetic testing may determine:    surgery to treat Saray's active cancer diagnosis (i.e. lumpectomy versus  bilateral mastectomy),    additional cancer screening for which Saray may qualify (i.e. mammogram and breast MRI if indicated, more frequent colonoscopies, more frequent dermatologic exams, etc.),    options for risk reducing surgeries Saray could consider (i.e. bilateral mastectomy, surgery to remove the ovaries and/or uterus, etc.),      and targeted chemotherapies for certain cancers in the future (i.e. immunotherapy for individuals with Day syndrome, PARP inhibitors, etc.).     These recommendations will be discussed in detail once genetic testing is completed.     Plan:  1) Today Saray elected to proceed with the CustomNext-Cancer gene panel (19 genes, a combination of GynPlus and BRCAplus + NBN, STK11, and NF1) offered through [facility].  2) This information should be available in 3-4 weeks.  3) Saray will return to clinic to discuss the results    Face to face time: 45 minutes    Brenna Callahan MS, [facility]  Licensed Genetic Counselor  [phone]              Again, thank you for allowing me to participate in the care of your patient.        Sincerely,        Brenna Callahan GC

## 2019-01-11 ENCOUNTER — INFUSION THERAPY VISIT (OUTPATIENT)
Dept: INFUSION THERAPY | Facility: CLINIC | Age: 49
End: 2019-01-11
Attending: INTERNAL MEDICINE
Payer: COMMERCIAL

## 2019-01-11 ENCOUNTER — ONCOLOGY VISIT (OUTPATIENT)
Dept: ONCOLOGY | Facility: CLINIC | Age: 49
End: 2019-01-11
Attending: INTERNAL MEDICINE
Payer: COMMERCIAL

## 2019-01-11 ENCOUNTER — HOSPITAL ENCOUNTER (OUTPATIENT)
Facility: CLINIC | Age: 49
Setting detail: SPECIMEN
Discharge: HOME | End: 2019-01-11
Attending: INTERNAL MEDICINE | Admitting: INTERNAL MEDICINE
Payer: COMMERCIAL

## 2019-01-11 VITALS
TEMPERATURE: 98.9 F | HEART RATE: 67 BPM | RESPIRATION RATE: 16 BRPM | DIASTOLIC BLOOD PRESSURE: 77 MMHG | OXYGEN SATURATION: 97 % | HEIGHT: 60 IN | WEIGHT: 170 LBS | BODY MASS INDEX: 33.38 KG/M2 | SYSTOLIC BLOOD PRESSURE: 122 MMHG

## 2019-01-11 VITALS
TEMPERATURE: 98.9 F | HEIGHT: 60 IN | HEART RATE: 70 BPM | OXYGEN SATURATION: 97 % | RESPIRATION RATE: 16 BRPM | DIASTOLIC BLOOD PRESSURE: 74 MMHG | SYSTOLIC BLOOD PRESSURE: 112 MMHG | WEIGHT: 170 LBS | BODY MASS INDEX: 33.38 KG/M2

## 2019-01-11 DIAGNOSIS — Z17.0 MALIGNANT NEOPLASM OF UPPER-OUTER QUADRANT OF LEFT BREAST IN FEMALE, ESTROGEN RECEPTOR POSITIVE (H): Primary | ICD-10-CM

## 2019-01-11 DIAGNOSIS — C50.412 MALIGNANT NEOPLASM OF UPPER-OUTER QUADRANT OF LEFT BREAST IN FEMALE, ESTROGEN RECEPTOR POSITIVE (H): Primary | ICD-10-CM

## 2019-01-11 DIAGNOSIS — Z17.0 MALIGNANT NEOPLASM OF UPPER-OUTER QUADRANT OF LEFT BREAST IN FEMALE, ESTROGEN RECEPTOR POSITIVE (H): ICD-10-CM

## 2019-01-11 DIAGNOSIS — C50.412 MALIGNANT NEOPLASM OF UPPER-OUTER QUADRANT OF LEFT BREAST IN FEMALE, ESTROGEN RECEPTOR POSITIVE (H): ICD-10-CM

## 2019-01-11 DIAGNOSIS — Z95.828 PORT-A-CATH IN PLACE: ICD-10-CM

## 2019-01-11 LAB
ALBUMIN SERPL-MCNC: 3.4 G/DL (ref 3.4–5)
ALP SERPL-CCNC: 88 U/L (ref 40–150)
ALT SERPL W P-5'-P-CCNC: 81 U/L (ref 0–50)
ANION GAP SERPL CALCULATED.3IONS-SCNC: 8 MMOL/L (ref 3–14)
AST SERPL W P-5'-P-CCNC: 26 U/L (ref 0–45)
BASOPHILS # BLD AUTO: 0.1 10E9/L (ref 0–0.2)
BASOPHILS NFR BLD AUTO: 0.9 %
BILIRUB SERPL-MCNC: 0.6 MG/DL (ref 0.2–1.3)
BUN SERPL-MCNC: 19 MG/DL (ref 7–30)
CALCIUM SERPL-MCNC: 8.5 MG/DL (ref 8.5–10.1)
CHLORIDE SERPL-SCNC: 107 MMOL/L (ref 94–109)
CO2 SERPL-SCNC: 24 MMOL/L (ref 20–32)
CREAT SERPL-MCNC: 0.54 MG/DL (ref 0.52–1.04)
DIFFERENTIAL METHOD BLD: ABNORMAL
EOSINOPHIL # BLD AUTO: 0.2 10E9/L (ref 0–0.7)
EOSINOPHIL NFR BLD AUTO: 2.8 %
ERYTHROCYTE [DISTWIDTH] IN BLOOD BY AUTOMATED COUNT: 13.6 % (ref 10–15)
GFR SERPL CREATININE-BSD FRML MDRD: >90 ML/MIN/{1.73_M2}
GLUCOSE SERPL-MCNC: 99 MG/DL (ref 70–99)
HCT VFR BLD AUTO: 33.6 % (ref 35–47)
HGB BLD-MCNC: 11.6 G/DL (ref 11.7–15.7)
IMM GRANULOCYTES # BLD: 0 10E9/L (ref 0–0.4)
IMM GRANULOCYTES NFR BLD: 0.3 %
LYMPHOCYTES # BLD AUTO: 2.1 10E9/L (ref 0.8–5.3)
LYMPHOCYTES NFR BLD AUTO: 35.5 %
MCH RBC QN AUTO: 31.4 PG (ref 26.5–33)
MCHC RBC AUTO-ENTMCNC: 34.5 G/DL (ref 31.5–36.5)
MCV RBC AUTO: 91 FL (ref 78–100)
MONOCYTES # BLD AUTO: 0.3 10E9/L (ref 0–1.3)
MONOCYTES NFR BLD AUTO: 4.3 %
NEUTROPHILS # BLD AUTO: 3.3 10E9/L (ref 1.6–8.3)
NEUTROPHILS NFR BLD AUTO: 56.2 %
NRBC # BLD AUTO: 0 10*3/UL
NRBC BLD AUTO-RTO: 0 /100
PLATELET # BLD AUTO: 328 10E9/L (ref 150–450)
POTASSIUM SERPL-SCNC: 3.7 MMOL/L (ref 3.4–5.3)
PROT SERPL-MCNC: 7.1 G/DL (ref 6.8–8.8)
RBC # BLD AUTO: 3.69 10E12/L (ref 3.8–5.2)
SODIUM SERPL-SCNC: 139 MMOL/L (ref 133–144)
WBC # BLD AUTO: 5.8 10E9/L (ref 4–11)

## 2019-01-11 PROCEDURE — 96367 TX/PROPH/DG ADDL SEQ IV INF: CPT

## 2019-01-11 PROCEDURE — 97802 MEDICAL NUTRITION INDIV IN: CPT | Performed by: DIETITIAN, REGISTERED

## 2019-01-11 PROCEDURE — 96417 CHEMO IV INFUS EACH ADDL SEQ: CPT

## 2019-01-11 PROCEDURE — 99215 OFFICE O/P EST HI 40 MIN: CPT | Performed by: INTERNAL MEDICINE

## 2019-01-11 PROCEDURE — 85025 COMPLETE CBC W/AUTO DIFF WBC: CPT | Performed by: INTERNAL MEDICINE

## 2019-01-11 PROCEDURE — 80053 COMPREHEN METABOLIC PANEL: CPT | Performed by: INTERNAL MEDICINE

## 2019-01-11 PROCEDURE — 96413 CHEMO IV INFUSION 1 HR: CPT

## 2019-01-11 PROCEDURE — 25000128 H RX IP 250 OP 636: Performed by: INTERNAL MEDICINE

## 2019-01-11 PROCEDURE — 25000132 ZZH RX MED GY IP 250 OP 250 PS 637: Performed by: INTERNAL MEDICINE

## 2019-01-11 RX ORDER — METHYLPREDNISOLONE SODIUM SUCCINATE 125 MG/2ML
125 INJECTION, POWDER, LYOPHILIZED, FOR SOLUTION INTRAMUSCULAR; INTRAVENOUS
Status: CANCELLED
Start: 2019-01-18

## 2019-01-11 RX ORDER — EPINEPHRINE 1 MG/ML
0.3 INJECTION, SOLUTION INTRAMUSCULAR; SUBCUTANEOUS EVERY 5 MIN PRN
Status: CANCELLED | OUTPATIENT
Start: 2019-01-18

## 2019-01-11 RX ORDER — SODIUM CHLORIDE 9 MG/ML
1000 INJECTION, SOLUTION INTRAVENOUS CONTINUOUS PRN
Status: CANCELLED
Start: 2019-01-18

## 2019-01-11 RX ORDER — ALBUTEROL SULFATE 0.83 MG/ML
2.5 SOLUTION RESPIRATORY (INHALATION)
Status: CANCELLED | OUTPATIENT
Start: 2019-01-18

## 2019-01-11 RX ORDER — LORAZEPAM 2 MG/ML
0.5 INJECTION INTRAMUSCULAR EVERY 4 HOURS PRN
Status: CANCELLED
Start: 2019-01-18

## 2019-01-11 RX ORDER — DIPHENHYDRAMINE HCL 25 MG
50 CAPSULE ORAL ONCE
Status: CANCELLED
Start: 2019-01-11

## 2019-01-11 RX ORDER — LORAZEPAM 2 MG/ML
0.5 INJECTION INTRAMUSCULAR EVERY 4 HOURS PRN
Status: CANCELLED
Start: 2019-01-11

## 2019-01-11 RX ORDER — DIPHENHYDRAMINE HYDROCHLORIDE 50 MG/ML
50 INJECTION INTRAMUSCULAR; INTRAVENOUS
Status: CANCELLED
Start: 2019-01-11

## 2019-01-11 RX ORDER — ALBUTEROL SULFATE 0.83 MG/ML
2.5 SOLUTION RESPIRATORY (INHALATION)
Status: CANCELLED | OUTPATIENT
Start: 2019-01-25

## 2019-01-11 RX ORDER — LORAZEPAM 2 MG/ML
0.5 INJECTION INTRAMUSCULAR EVERY 4 HOURS PRN
Status: CANCELLED
Start: 2019-01-25

## 2019-01-11 RX ORDER — EPINEPHRINE 1 MG/ML
0.3 INJECTION, SOLUTION INTRAMUSCULAR; SUBCUTANEOUS EVERY 5 MIN PRN
Status: CANCELLED | OUTPATIENT
Start: 2019-01-25

## 2019-01-11 RX ORDER — METHYLPREDNISOLONE SODIUM SUCCINATE 125 MG/2ML
125 INJECTION, POWDER, LYOPHILIZED, FOR SOLUTION INTRAMUSCULAR; INTRAVENOUS
Status: CANCELLED
Start: 2019-01-25

## 2019-01-11 RX ORDER — ALBUTEROL SULFATE 90 UG/1
1-2 AEROSOL, METERED RESPIRATORY (INHALATION)
Status: CANCELLED
Start: 2019-01-25

## 2019-01-11 RX ORDER — DIPHENHYDRAMINE HCL 25 MG
50 CAPSULE ORAL ONCE
Status: CANCELLED
Start: 2019-01-25

## 2019-01-11 RX ORDER — METHYLPREDNISOLONE SODIUM SUCCINATE 125 MG/2ML
125 INJECTION, POWDER, LYOPHILIZED, FOR SOLUTION INTRAMUSCULAR; INTRAVENOUS
Status: CANCELLED
Start: 2019-01-11

## 2019-01-11 RX ORDER — EPINEPHRINE 0.3 MG/.3ML
0.3 INJECTION SUBCUTANEOUS EVERY 5 MIN PRN
Status: CANCELLED | OUTPATIENT
Start: 2019-01-18

## 2019-01-11 RX ORDER — ALBUTEROL SULFATE 90 UG/1
1-2 AEROSOL, METERED RESPIRATORY (INHALATION)
Status: CANCELLED
Start: 2019-01-18

## 2019-01-11 RX ORDER — ACETAMINOPHEN 325 MG/1
650 TABLET ORAL
Status: CANCELLED | OUTPATIENT
Start: 2019-01-11

## 2019-01-11 RX ORDER — MEPERIDINE HYDROCHLORIDE 25 MG/ML
25 INJECTION INTRAMUSCULAR; INTRAVENOUS; SUBCUTANEOUS EVERY 30 MIN PRN
Status: CANCELLED | OUTPATIENT
Start: 2019-01-18

## 2019-01-11 RX ORDER — SODIUM CHLORIDE 9 MG/ML
1000 INJECTION, SOLUTION INTRAVENOUS CONTINUOUS PRN
Status: CANCELLED
Start: 2019-01-25

## 2019-01-11 RX ORDER — MEPERIDINE HYDROCHLORIDE 25 MG/ML
25 INJECTION INTRAMUSCULAR; INTRAVENOUS; SUBCUTANEOUS EVERY 30 MIN PRN
Status: CANCELLED | OUTPATIENT
Start: 2019-01-25

## 2019-01-11 RX ORDER — EPINEPHRINE 0.3 MG/.3ML
0.3 INJECTION SUBCUTANEOUS EVERY 5 MIN PRN
Status: CANCELLED | OUTPATIENT
Start: 2019-01-11

## 2019-01-11 RX ORDER — SODIUM CHLORIDE 9 MG/ML
1000 INJECTION, SOLUTION INTRAVENOUS CONTINUOUS PRN
Status: CANCELLED
Start: 2019-01-11

## 2019-01-11 RX ORDER — EPINEPHRINE 1 MG/ML
0.3 INJECTION, SOLUTION INTRAMUSCULAR; SUBCUTANEOUS EVERY 5 MIN PRN
Status: CANCELLED | OUTPATIENT
Start: 2019-01-11

## 2019-01-11 RX ORDER — ALBUTEROL SULFATE 90 UG/1
1-2 AEROSOL, METERED RESPIRATORY (INHALATION)
Status: CANCELLED
Start: 2019-01-11

## 2019-01-11 RX ORDER — MEPERIDINE HYDROCHLORIDE 25 MG/ML
25 INJECTION INTRAMUSCULAR; INTRAVENOUS; SUBCUTANEOUS EVERY 30 MIN PRN
Status: CANCELLED | OUTPATIENT
Start: 2019-01-11

## 2019-01-11 RX ORDER — HEPARIN SODIUM (PORCINE) LOCK FLUSH IV SOLN 100 UNIT/ML 100 UNIT/ML
500 SOLUTION INTRAVENOUS ONCE
Status: CANCELLED
Start: 2019-01-11 | End: 2019-01-11

## 2019-01-11 RX ORDER — DIPHENHYDRAMINE HYDROCHLORIDE 50 MG/ML
50 INJECTION INTRAMUSCULAR; INTRAVENOUS
Status: CANCELLED
Start: 2019-01-25

## 2019-01-11 RX ORDER — DIPHENHYDRAMINE HCL 25 MG
50 CAPSULE ORAL ONCE
Status: COMPLETED | OUTPATIENT
Start: 2019-01-11 | End: 2019-01-11

## 2019-01-11 RX ORDER — HEPARIN SODIUM (PORCINE) LOCK FLUSH IV SOLN 100 UNIT/ML 100 UNIT/ML
500 SOLUTION INTRAVENOUS ONCE
Status: COMPLETED | OUTPATIENT
Start: 2019-01-11 | End: 2019-01-11

## 2019-01-11 RX ORDER — DIPHENHYDRAMINE HCL 25 MG
50 CAPSULE ORAL ONCE
Status: CANCELLED
Start: 2019-01-18

## 2019-01-11 RX ORDER — DIPHENHYDRAMINE HYDROCHLORIDE 50 MG/ML
50 INJECTION INTRAMUSCULAR; INTRAVENOUS
Status: CANCELLED
Start: 2019-01-18

## 2019-01-11 RX ORDER — ALBUTEROL SULFATE 0.83 MG/ML
2.5 SOLUTION RESPIRATORY (INHALATION)
Status: CANCELLED | OUTPATIENT
Start: 2019-01-11

## 2019-01-11 RX ORDER — EPINEPHRINE 0.3 MG/.3ML
0.3 INJECTION SUBCUTANEOUS EVERY 5 MIN PRN
Status: CANCELLED | OUTPATIENT
Start: 2019-01-25

## 2019-01-11 RX ADMIN — DIPHENHYDRAMINE HYDROCHLORIDE 25 MG: 25 CAPSULE ORAL at 11:44

## 2019-01-11 RX ADMIN — SODIUM CHLORIDE, PRESERVATIVE FREE 500 UNITS: 5 INJECTION INTRAVENOUS at 13:43

## 2019-01-11 RX ADMIN — DEXAMETHASONE SODIUM PHOSPHATE 20 MG: 10 INJECTION, SOLUTION INTRAMUSCULAR; INTRAVENOUS at 11:44

## 2019-01-11 RX ADMIN — TRASTUZUMAB 450 MG: 150 INJECTION, POWDER, LYOPHILIZED, FOR SOLUTION INTRAVENOUS at 11:09

## 2019-01-11 RX ADMIN — PACLITAXEL 146 MG: 6 INJECTION, SOLUTION INTRAVENOUS at 12:27

## 2019-01-11 RX ADMIN — FAMOTIDINE 20 MG: 20 INJECTION, SOLUTION INTRAVENOUS at 12:01

## 2019-01-11 RX ADMIN — SODIUM CHLORIDE 250 ML: 9 INJECTION, SOLUTION INTRAVENOUS at 10:37

## 2019-01-11 RX ADMIN — PERTUZUMAB 420 MG: 30 INJECTION, SOLUTION, CONCENTRATE INTRAVENOUS at 10:37

## 2019-01-11 ASSESSMENT — PAIN SCALES - GENERAL: PAINLEVEL: NO PAIN (0)

## 2019-01-11 ASSESSMENT — MIFFLIN-ST. JEOR
SCORE: 1317.61
SCORE: 1317.61

## 2019-01-11 NOTE — PROGRESS NOTES
Infusion Nursing Note:  Saray Huntley presents today for C3D1 perjeta, herceptin, taxol    Patient seen by provider today: Yes:    present during visit today: Not Applicable.    Note: N/A.    Intravenous Access:  Labs drawn without difficulty.  Implanted Port.    Treatment Conditions:  Lab Results   Component Value Date    HGB 11.6 01/11/2019     Lab Results   Component Value Date    WBC 5.8 01/11/2019      Lab Results   Component Value Date    ANEU 3.3 01/11/2019     Lab Results   Component Value Date     01/11/2019      Lab Results   Component Value Date     01/11/2019                   Lab Results   Component Value Date    POTASSIUM 3.7 01/11/2019           No results found for: MAG         Lab Results   Component Value Date    CR 0.54 01/11/2019                   Lab Results   Component Value Date    ELIJAH 8.5 01/11/2019                Lab Results   Component Value Date    BILITOTAL 0.6 01/11/2019           Lab Results   Component Value Date    ALBUMIN 3.4 01/11/2019                    Lab Results   Component Value Date    ALT 81 01/11/2019           Lab Results   Component Value Date    AST 26 01/11/2019       Results reviewed, labs MET treatment parameters, ok to proceed with treatment.  ECHO/MUGA completed 11/20/18  EF 64.7%.      Post Infusion Assessment:  Patient tolerated infusion without incident.  Blood return noted pre and post infusion.  Site patent and intact, free from redness, edema or discomfort.  No evidence of extravasations.  Access discontinued per protocol.    Discharge Plan:   AVS to patient via MYCHART.  Patient will return 1/18 for next appointment.   Patient discharged in stable condition accompanied by: mother and father.  Departure Mode: Ambulatory.    Yadiel Campos RN

## 2019-01-11 NOTE — PATIENT INSTRUCTIONS
-proceed with paclitaxel, Herceptin, Perjeta today  Scheduled/ Francisca   -schedule paclitaxel on 1/18/19 and 1/25/19  Scheduled/ Francisca  -schedule cycle 4 of paclitaxel, Herceptin, Perjeta on 2/1/19, and appointment with Osmin Scheduled/ Francisca   -schedule paclitaxel on 2/8/19 and 2/15/19 Scheduled/ Francisca   -schedule appointment with Dr. Stock on 2/15/19 - okay to overbook  Scheduled/ Francisca   -schedule follow-up appointment with Dr. Montaño  Scheduled/ Francisca      AVS printed and given to patient/ Francisca

## 2019-01-11 NOTE — PROGRESS NOTES
"CLINICAL NUTRITION SERVICES - ASSESSMENT NOTE    REASON FOR ASSESSMENT  Saray Huntley is a 48 year old female seen by the dietitian for Medical Nutrition Therapy related to breast cancer referred by Dr. Stock.  Patient accompanied by mother, father, sister and brother-in-law.    NUTRITION HISTORY  Factors affecting nutrition intake include: nausea  Patient wants to ensure she is eating healthy foods to increase her energy levels.  Patient wants to know iron sources of food.  Patient does not eat canned or processed foods.  Patient's mother preparing meals for her.  The one prepared item patient likes is frozen pizza (organic).  Patient states foods taste too sweet and salty.    Diet recall:   B: shake (berries, banana, quinoa)  L: homemade soup (meat and vegetables) similar to stew  D: fish, quinoa, vegetables   Snack: vegetables or soup  Beverages: water, smoothie  Patient is consuming the following supplements at home: none     PHYSICAL FINDINGS  Observed  No nutrition-related physical findings observed  Obtained from Chart/Interdisciplinary Team  None noted    ANTHROPOMETRICS  Height: 5'0\"  Weight: 169 lbs  BMI: 33.2 kg/m2  IBW: 100 lbs +/10%  % IBW: 169%  Adjusted Weight: 59 kg  Weight History:   Wt Readings from Last 5 Encounters:   01/11/19 77.1 kg (169 lb 16 oz)   01/11/19 77.1 kg (170 lb)   01/04/19 76.8 kg (169 lb 6.4 oz)   12/28/18 75.3 kg (166 lb)   12/28/18 75.7 kg (166 lb 12.8 oz)     Dosing Weight: 59 Kg    LABS  Labs reviewed    MEDICATIONS/VITAMINS/MINERALS  Medications reviewed    PROCEDURES WITH NUTRITIONAL IMPLICATIONS  Chemotherapy-Taxol, Herceptin, Perjeta    ASSESSED NUTRITION NEEDS  Estimated Energy Needs: 5806-9777 kcals (25-30 Kcal/Kg)  Justification: maintenance  Estimated Protein Needs: 70-90 protein (1.2-1.5 g pro/Kg)  Justification: Repletion  Estimated Fluid Needs: 9057-6222 mL (30-35 mL/kg)    ASSESSED MALNUTRITION STATUS  % Intake: No decreased intake noted  % Weight Loss: None " noted  Subcutaneous Fat Loss: None observed  Muscle Loss: None observed  Fluid/Edema: None noted  Weight Status: Underweight BMI <18.5  Patient does not meet two of the above criteria necessary for diagnosing malnutrition     NUTRITION DIAGNOSIS  Food and nutrition-related knowledge deficit related to lack of prior exposure as evidenced by no previous need for food and nutrition related recommendations     INTERVENTIONS  Recommendations/Nutrition Prescription  1. Eat 5-6 small meals per day  2. Include vitamin C source when eating iron containing food    Implementation:  -Assessed learning needs and learning preferences  -Nutrition education provided:  --Discussed ways to maximize kcal and protein intake. Discussed calorie and protein needs for maintenance of weight and nutrition status. Advised pt to aim for 8696-2076 kcal and 70-90 g protein via 5-6 small frequent meals.   -Discussed high iron foods as patient concerned about her lab values.  Provided Academy of [employer] and Dietetics high iron food list.  Suggest patient choose vitamin C source when eating iron containing food for better absorption.  Also provided Academy of Nutrition and Dietetics protein list for protein choices throughout the day.  Encouraged patient to exercise in short bouts (5 minutes) multiple times per day to improve cancer fatigue.  Reviewed fluid needs to also help with improving fatigue.   Pt verbalizes understanding of materials provided during consult by stating will increase protein intake.  Patient Understanding: good  Expected Compliance: good    Goals  1.  Aim for 5-6 small frequent meals  2.  Aim for 0223-4822 kcal and 70-90 g protein/day  3.  Weight maintenance throughout treatment    Follow-Up Plans:  Patient to call if has additional questions or if desires to follow up   RD name and number provided    Time spent with patient: 50 minutes    Lei Sutton, RD, LD  Clinical Dietitian  [facility]  [facility]  [phone] (direct)

## 2019-01-11 NOTE — LETTER
"    1/11/2019         RE: Saray Huntley  [address]  [place] [address]        Dear Colleague,    Thank you for referring your patient, Saray Huntley, to the [facility] CANC[facility]. Please see a copy of my visit note below.    CLINICAL NUTRITION SERVICES - ASSESSMENT NOTE    REASON FOR ASSESSMENT  Saray Huntley is a 48 year old female seen by the dietitian for Medical Nutrition Therapy related to breast cancer referred by Dr. Stock.  Patient accompanied by mother, father, sister and brother-in-law.    NUTRITION HISTORY  Factors affecting nutrition intake include: nausea  Patient wants to ensure she is eating healthy foods to increase her energy levels.  Patient wants to know iron sources of food.  Patient does not eat canned or processed foods.  Patient's mother preparing meals for her.  The one prepared item patient likes is frozen pizza (organic).  Patient states foods taste too sweet and salty.    Diet recall:   B: shake (berries, banana, quinoa)  L: homemade soup (meat and vegetables) similar to stew  D: fish, quinoa, vegetables   Snack: vegetables or soup  Beverages: water, smoothie  Patient is consuming the following supplements at home: none     PHYSICAL FINDINGS  Observed  No nutrition-related physical findings observed  Obtained from Chart/Interdisciplinary Team  None noted    ANTHROPOMETRICS  Height: 5'0\"  Weight: 169 lbs  BMI: 33.2 kg/m2  IBW: 100 lbs +/10%  % IBW: 169%  Adjusted Weight: 59 kg  Weight History:   Wt Readings from Last 5 Encounters:   01/11/19 77.1 kg (169 lb 16 oz)   01/11/19 77.1 kg (170 lb)   01/04/19 76.8 kg (169 lb 6.4 oz)   12/28/18 75.3 kg (166 lb)   12/28/18 75.7 kg (166 lb 12.8 oz)     Dosing Weight: 59 Kg    LABS  Labs reviewed    MEDICATIONS/VITAMINS/MINERALS  Medications reviewed    PROCEDURES WITH NUTRITIONAL IMPLICATIONS  Chemotherapy-Taxol, Herceptin, Perjeta    ASSESSED NUTRITION NEEDS  Estimated Energy Needs: 3271-4131 kcals (25-30 Kcal/Kg)  Justification: " maintenance  Estimated Protein Needs: 70-90 protein (1.2-1.5 g pro/Kg)  Justification: Repletion  Estimated Fluid Needs: 8721-0412 mL (30-35 mL/kg)    ASSESSED MALNUTRITION STATUS  % Intake: No decreased intake noted  % Weight Loss: None noted  Subcutaneous Fat Loss: None observed  Muscle Loss: None observed  Fluid/Edema: None noted  Weight Status: Underweight BMI <18.5  Patient does not meet two of the above criteria necessary for diagnosing malnutrition     NUTRITION DIAGNOSIS  Food and nutrition-related knowledge deficit related to lack of prior exposure as evidenced by no previous need for food and nutrition related recommendations     INTERVENTIONS  Recommendations/Nutrition Prescription  1. Eat 5-6 small meals per day  2. Include vitamin C source when eating iron containing food    Implementation:  -Assessed learning needs and learning preferences  -Nutrition education provided:  --Discussed ways to maximize kcal and protein intake. Discussed calorie and protein needs for maintenance of weight and nutrition status. Advised pt to aim for 3773-0766 kcal and 70-90 g protein via 5-6 small frequent meals.   -Discussed high iron foods as patient concerned about her lab values.  Provided Academy of Nutrition and Dietetics high iron food list.  Suggest patient choose vitamin C source when eating iron containing food for better absorption.  Also provided Academy of Nutrition and Dietetics protein list for protein choices throughout the day.  Encouraged patient to exercise in short bouts (5 minutes) multiple times per day to improve cancer fatigue.  Reviewed fluid needs to also help with improving fatigue.   Pt verbalizes understanding of materials provided during consult by stating will increase protein intake.  Patient Understanding: good  Expected Compliance: good    Goals  1.  Aim for 5-6 small frequent meals  2.  Aim for 0866-5378 kcal and 70-90 g protein/day  3.  Weight maintenance throughout  treatment    Follow-Up Plans:  Patient to call if has additional questions or if desires to follow up   RD name and number provided    Time spent with patient: 50 minutes    Lei Sutton, RD, LD  Clinical Dietitian  [facility]  [phone] (direct)    Again, thank you for allowing me to participate in the care of your patient.        Sincerely,        Chaparro Koehler RD, LD

## 2019-01-11 NOTE — PROGRESS NOTES
Oncology Rooming Note    January 11, 2019 8:57 AM   Saray Huntley is a 48 year old female who presents for:    Chief Complaint   Patient presents with     Oncology Clinic Visit     Malignant neoplasm of upper-outer quadrant of left breast in female, estrogen receptor positive (H)     Initial Vitals: /74   Pulse 70   Temp 98.9  F (37.2  C) (Oral)   Resp 16   Wt 77.1 kg (169 lb 16 oz)   LMP 09/29/2012 (Approximate)   SpO2 97%   BMI 33.20 kg/m   Estimated body mass index is 33.2 kg/m  as calculated from the following:    Height as of an earlier encounter on 1/11/19: 1.524 m (5').    Weight as of this encounter: 77.1 kg (169 lb 16 oz). Body surface area is 1.81 meters squared.  Data Unavailable Comment: Data Unavailable   Patient's last menstrual period was 09/29/2012 (approximate).  Allergies reviewed: Yes  Medications reviewed: Yes    Medications: Medication refills not needed today.  Pharmacy name entered into [facility]:    [id number] [place] [facility] [address] AT [facility] OF [facility] [address] [facility] [place]  [place] - [phone]    Clinical concerns: NO    5 minutes for nursing intake (face to face time)        Shameka Damon MA

## 2019-01-11 NOTE — LETTER
1/11/2019         RE: Saray Huntley  [address]  [place] [address]        Dear Colleague,    Thank you for referring your patient, Saray Huntley, to the [facility]. Please see a copy of my visit note below.    Oncology Rooming Note    January 11, 2019 8:57 AM   Saray Huntley is a 48 year old female who presents for:    Chief Complaint   Patient presents with     Oncology Clinic Visit     Malignant neoplasm of upper-outer quadrant of left breast in female, estrogen receptor positive (H)     Initial Vitals: /74   Pulse 70   Temp 98.9  F (37.2  C) (Oral)   Resp 16   Wt 77.1 kg (169 lb 16 oz)   LMP 09/29/2012 (Approximate)   SpO2 97%   BMI 33.20 kg/m    Estimated body mass index is 33.2 kg/m  as calculated from the following:    Height as of an earlier encounter on 1/11/19: 1.524 m (5').    Weight as of this encounter: 77.1 kg (169 lb 16 oz). Body surface area is 1.81 meters squared.  Data Unavailable Comment: Data Unavailable   Patient's last menstrual period was 09/29/2012 (approximate).  Allergies reviewed: Yes  Medications reviewed: Yes    Medications: Medication refills not needed today.  Pharmacy name entered into [facility]:    [id number] - [place] - [address] AT [facility] OF [place] [address] & [facility] [place] - [address]    Clinical concerns: NO    5 minutes for nursing intake (face to face time)        Shameka Damon MA                [facility]    Hematology/Oncology Established Patient Note      Today's Date: 1/11/19    Reason for Follow-up: left-sided breast cancer      HISTORY OF PRESENT ILLNESS: Saray Huntley is a 48 year old female who presents with left-sided breast cancer.  It was found on a screening mammogram.  Left breast ultrasound found a hypoechoic mass at the 12:00 position, 8 cm from the nipple, measuring 1.5 x 1.6 x 1.5 cm.  Axilla survey showed only normal-appearing lymph  nodes.   Biopsy showed invasive ductal carcinoma, grade 3, weakly positive for ER (1-3%), VA negative, HER-2 positive.      Case was reviewed at tumor conference, and recommendation was for neoadjuvant chemotherapy.    Port was placed on 11/28/18.    She started neoadjuvant chemotherapy on 11/30/18 with weekly paclitaxel, Herceptin, Perjeta.  C1D1: 11/30/18; C1D8: 12/7/18; C1D15: 12/14/18  C2D1: 12/21/18; C2D8: 12/28/18; C2D15: 1/4/19  C3D1: 1/11/19; C3D8: anticipated for 1/18/19; C3D15: anticipated for 1/25/19  C4D1: anticipated for 2/1/19; C4D8: anticipated for 2/8/19; C4D15: anticipated for 2/15/19        INTERIM HISTORY: Patient is here for follow-up today.  She has been tolerating chemotherapy well.  She feels fatigued, which she knows is expected.  She notes feeling some tingling in her fingers.  She expresses anxiety about upcoming surgery and has a lot of questions for the surgeon.      REVIEW OF SYSTEMS:   14 point ROS was reviewed and is negative other than as noted above in HPI.       HOME MEDICATIONS:  Current Outpatient Medications   Medication Sig Dispense Refill     HYDROcodone-acetaminophen (NORCO) 5-325 MG tablet Take 1-2 tablets by mouth every 4 hours as needed for moderate to severe pain 15 tablet 0     Lidocaine-Prilocaine (STUDY - LIDOCAINE 2.5%/PRILOCAINE 2.5% CREAM, IDS# [id number],) Externally apply topically daily as needed for moderate pain 30 g 3     LORazepam (ATIVAN) 0.5 MG tablet Take 1 tablet (0.5 mg) by mouth every 4 hours as needed (Anxiety, Nausea/Vomiting or Sleep) 30 tablet 2     ondansetron (ZOFRAN ODT) 4 MG ODT tab Take 1 tablet (4 mg) by mouth 3 times daily (before meals) 60 tablet 1     prochlorperazine (COMPAZINE) 10 MG tablet Take 1 tablet (10 mg) by mouth every 6 hours as needed (Nausea/Vomiting) 30 tablet 2         ALLERGIES:  Allergies   Allergen Reactions     Sulfa Drugs Rash         PAST MEDICAL HISTORY:  Past Medical History:   Diagnosis Date     Breast cancer (H)       Hypothyroidism, unspecified type 2017     PONV (postoperative nausea and vomiting)          PAST SURGICAL HISTORY:  Past Surgical History:   Procedure Laterality Date      SECTION  2003     INSERT PORT VASCULAR ACCESS N/A 2018    Procedure: PORT PLACEMENT ;  Surgeon: Sae Montaño MD;  Location:  OR         SOCIAL HISTORY:  Social History     Socioeconomic History     Marital status:      Spouse name: Not on file     Number of children: Not on file     Years of education: Not on file     Highest education level: Not on file   Social Needs     Financial resource strain: Not on file     Food insecurity - worry: Not on file     Food insecurity - inability: Not on file     Transportation needs - medical: Not on file     Transportation needs - non-medical: Not on file   Occupational History     Not on file   Tobacco Use     Smoking status: Never Smoker     Smokeless tobacco: Never Used   Substance and Sexual Activity     Alcohol use: No     Drug use: No     Sexual activity: Not Currently   Other Topics Concern     Parent/sibling w/ CABG, MI or angioplasty before 65F 55M? Not Asked   Social History Narrative     Not on file   She has never smoked.  She does not drink or use illicit drugs.  She is going to school at [employer] studying IT.  She has 1 daughter who is 15 years old.  She says that she has menopause a few years old.  She tried oral contraceptive, but she did not tolerate it well, so did not take it long term.  She has never taken hormone replacement therapy          FAMILY HISTORY:  Family History   Problem Relation Age of Onset     Hyperlipidemia Mother      Kidney Disease Mother      Diabetes Father    Patient does not know much about her family history and is unaware if there is breast cancer or ovarian cancer in her family.        PHYSICAL EXAM:  Vital signs:  /74 (BP Location: Left arm, Patient Position: Sitting, Cuff Size: Adult Regular)   Pulse 70   Temp  98.9  F (37.2  C) (Oral)   Resp 16   Ht 1.524 m (5')   Wt 77.1 kg (169 lb 16 oz)   LMP 2012 (Approximate)   SpO2 97%   BMI 33.20 kg/m      ECO  GENERAL/CONSTITUTIONAL: No acute distress. Accompanied by mother and father.  EYES: No scleral icterus.  LYMPH: No anterior cervical, posterior cervical, supraclavicular, or axillary adenopathy.   RESPIRATORY: Clear to auscultation bilaterally. No crackles or wheezing.   CARDIOVASCULAR: Regular rate and rhythm without murmurs, gallops, or rubs.  GASTROINTESTINAL: No tenderness. The patient has normal bowel sounds. No guarding.  No distention.  BREAST: Right-no palpable mass, discharge, rash, or axillary lymphadenopathy.  Left-palpable mass at the 12:00 position, now no longer palpable.   MUSCULOSKELETAL: Warm and well-perfused, no cyanosis, clubbing, or edema.  NEUROLOGIC: Alert, oriented, answers questions appropriately.  INTEGUMENTARY: No jaundice.  GAIT: Steady, does not use assistive device  Port in place at right upper chest.      LABS:  CBC RESULTS:   Recent Labs   Lab Test 19  0840   WBC 5.8   RBC 3.69*   HGB 11.6*   HCT 33.6*   MCV 91   MCH 31.4   MCHC 34.5   RDW 13.6        Recent Labs   Lab Test 19  0840 18  0825    142   POTASSIUM 3.7 3.8   CHLORIDE 107 107   CO2 24 26   ANIONGAP 8 9   GLC 99 86   BUN 19 17   CR 0.54 0.62   ELIJAH 8.5 8.8     Lab Results   Component Value Date    AST 26 2019     Lab Results   Component Value Date    ALT 81 2019     No results found for: BILICONJ   Lab Results   Component Value Date    BILITOTAL 0.6 2019     Lab Results   Component Value Date    ALBUMIN 3.4 2019     Lab Results   Component Value Date    PROTTOTAL 7.1 2019      Lab Results   Component Value Date    ALKPHOS 88 2019           PATHOLOGY:  18:  FINAL DIAGNOSIS:   Left breast, 12:00, 8.0 cm from nipple, ultrasound-guided needle biopsy   - Invasive ductal carcinoma, [place] grade 3 (of  3)   - Weakly positive for estrogen receptor (1-3%)   - Negative for progesterone receptor     INTERPRETATION:   Per the American Society of Clinical Oncology/College of American   Pathologists Clinical Practice Guideline   Focused Update (Claire LONGORIA et al, 2018, Arch Pathol Lab Med     doi:[email or web address]), the HER2/BREANNA 17   ratio of >12.4 and average number of HER2 signals/cell of >21.0 places   this specimen in Group 1 (DINESH   Positive).       IMAGING:  Left breast ultrasound 11/6/18:  FINDINGS:  Targeted ultrasound shows a hypoechoic mass at the 12:00  position, 8 cm from the nipple, measuring 1.5 x 1.6 x 1.5 cm. Survey  of the axilla shows only normal-appearing lymph nodes.      Echo 11/20/18:  Global peak LV longitudinal strain is averaged at -24.6%. This is within  reported normal limits (normal <-18%).  The visual ejection fraction is estimated at 64.7%.  Left ventricular systolic function is normal.      ASSESSMENT/PLAN:  Saray Huntley is a 48 year old post-menopausal female with:    1) Left-sided breast cancer:  1.5 x 1.6 x 1.5 cm on ultrasound.  Axilla survey showed only normal-appearing lymph nodes.   Biopsy showed invasive ductal carcinoma, grade 3, weakly positive for ER (1-3%), UT negative, HER-2 positive.  Clinical stage IA (cT1cN0).    She has HER-2 positive tumor measuring 1.6 cm.  I recommended neoadjuvant chemotherapy with HER-2 directed therapy.  Because she has a relatively small tumor that is stage I, we can start with paclitaxel+Herceptin+Perjeta x 12 weeks and assess response, as there is data that this is adequate in small lymph node negative tumors.  If she has a good clinical response, she may be able to go on to surgery, and if she has a complete pathologic response, we may be able to avoid anthracycline.  If she does not have a complete response, we could give further chemotherapy with dose-dense Adriamycin+cyclophosphamide x 4 cycles.  She will undergo Herceptin to complete 1 year  treatment after surgery.  Although her ER positivity was weak, she may benefit from anti-hormonal therapy after surgery as well.      She has already met with Dr. Montaño and reviewed surgical options, but she has not decided if she wants mastectomy or lumpectomy with radiation.      She is overall tolerating chemotherapy well.  Her left breast lump is no longer palpable.  She had many questions, which were answered.    -proceed with C3D1 of chemotherapy today  -she will continue with weekly treatment.  She will see Osmin in clinic on 2/1/19, with C4D1 same day  -I will see her back in clinic on 2/15/19, which will be her last dose of chemotherapy  -she had a lot of questions and expressed anxiety about the cosmetic outcome of her surgery.  She is still undecided on mastectomy or lumpectomy.   She also wants to await the genetic counseling appointment.  She wants to meet with her surgeon sooner rather than later, so I will have her see Dr. Montaño again.  She wants to know if the chemotherapy is working.  Her breast lump is much smaller on exam now, so chemotherapy appears to be working.  However, it would be reasonable to obtain another ultrasound after her chemotherapy is done, prior to surgery, due to the patient's anxiety.      2) Nutrition: She was contacted by nutrition.    3) Genetics: She is only 48 years old with new diagnosis of breast cancer.   -referred to genetic counseling - appointment scheduled on 2/4/19    4) Chemotherapy-induced nausea: mild, controlled with home anti-emetics.    5) Mouth rawness: mild, controlled with home salt/baking soda rinses.    6) Fatigue: Secondary to chemotherapy.    6) Coping: Patient seems to be doing better now.    - following    7) GERD: on omeprazole      I spent a total of 40 minutes with the patient, with over >50% of the time in counseling and/or coordination of care.       Cayla Stock MD  Hematology/Oncology  [facility]  [facility]icians    Again, thank you for allowing me to participate in the care of your patient.        Sincerely,        Calya Stock MD

## 2019-01-11 NOTE — PROGRESS NOTES
[facility]    Hematology/Oncology Established Patient Note      Today's Date: 1/11/19    Reason for Follow-up: left-sided breast cancer      HISTORY OF PRESENT ILLNESS: Saray Huntley is a 48 year old female who presents with left-sided breast cancer.  It was found on a screening mammogram.  Left breast ultrasound found a hypoechoic mass at the 12:00 position, 8 cm from the nipple, measuring 1.5 x 1.6 x 1.5 cm.  Axilla survey showed only normal-appearing lymph nodes.   Biopsy showed invasive ductal carcinoma, grade 3, weakly positive for ER (1-3%), NJ negative, HER-2 positive.      Case was reviewed at tumor conference, and recommendation was for neoadjuvant chemotherapy.    Port was placed on 11/28/18.    She started neoadjuvant chemotherapy on 11/30/18 with weekly paclitaxel, Herceptin, Perjeta.  C1D1: 11/30/18; C1D8: 12/7/18; C1D15: 12/14/18  C2D1: 12/21/18; C2D8: 12/28/18; C2D15: 1/4/19  C3D1: 1/11/19; C3D8: anticipated for 1/18/19; C3D15: anticipated for 1/25/19  C4D1: anticipated for 2/1/19; C4D8: anticipated for 2/8/19; C4D15: anticipated for 2/15/19        INTERIM HISTORY: Patient is here for follow-up today.  She has been tolerating chemotherapy well.  She feels fatigued, which she knows is expected.  She notes feeling some tingling in her fingers.  She expresses anxiety about upcoming surgery and has a lot of questions for the surgeon.      REVIEW OF SYSTEMS:   14 point ROS was reviewed and is negative other than as noted above in HPI.       HOME MEDICATIONS:  Current Outpatient Medications   Medication Sig Dispense Refill     HYDROcodone-acetaminophen (NORCO) 5-325 MG tablet Take 1-2 tablets by mouth every 4 hours as needed for moderate to severe pain 15 tablet 0     Lidocaine-Prilocaine (STUDY - LIDOCAINE 2.5%/PRILOCAINE 2.5% CREAM, IDS# 4243,) Externally apply topically daily as needed for moderate pain 30 g 3     LORazepam (ATIVAN) 0.5 MG tablet Take 1 tablet (0.5 mg) by mouth  every 4 hours as needed (Anxiety, Nausea/Vomiting or Sleep) 30 tablet 2     ondansetron (ZOFRAN ODT) 4 MG ODT tab Take 1 tablet (4 mg) by mouth 3 times daily (before meals) 60 tablet 1     prochlorperazine (COMPAZINE) 10 MG tablet Take 1 tablet (10 mg) by mouth every 6 hours as needed (Nausea/Vomiting) 30 tablet 2         ALLERGIES:  Allergies   Allergen Reactions     Sulfa Drugs Rash         PAST MEDICAL HISTORY:  Past Medical History:   Diagnosis Date     Breast cancer (H)      Hypothyroidism, unspecified type 2017     PONV (postoperative nausea and vomiting)          PAST SURGICAL HISTORY:  Past Surgical History:   Procedure Laterality Date      SECTION  2003     INSERT PORT VASCULAR ACCESS N/A 2018    Procedure: PORT PLACEMENT ;  Surgeon: Sae Montaño MD;  Location:  OR         SOCIAL HISTORY:  Social History     Socioeconomic History     Marital status:      Spouse name: Not on file     Number of children: Not on file     Years of education: Not on file     Highest education level: Not on file   Social Needs     Financial resource strain: Not on file     Food insecurity - worry: Not on file     Food insecurity - inability: Not on file     Transportation needs - medical: Not on file     Transportation needs - non-medical: Not on file   Occupational History     Not on file   Tobacco Use     Smoking status: Never Smoker     Smokeless tobacco: Never Used   Substance and Sexual Activity     Alcohol use: No     Drug use: No     Sexual activity: Not Currently   Other Topics Concern     Parent/sibling w/ CABG, MI or angioplasty before 65F 55M? Not Asked   Social History Narrative     Not on file   She has never smoked.  She does not drink or use illicit drugs.  She is going to school at [employer] / ?[employer] studying IT.  She has 1 daughter who is 15 years old.  She says that she has menopause a few years old.  She tried oral contraceptive, but she did not tolerate it well, so did not  take it long term.  She has never taken hormone replacement therapy          FAMILY HISTORY:  Family History   Problem Relation Age of Onset     Hyperlipidemia Mother      Kidney Disease Mother      Diabetes Father    Patient does not know much about her family history and is unaware if there is breast cancer or ovarian cancer in her family.        PHYSICAL EXAM:  Vital signs:  /74 (BP Location: Left arm, Patient Position: Sitting, Cuff Size: Adult Regular)   Pulse 70   Temp 98.9  F (37.2  C) (Oral)   Resp 16   Ht 1.524 m (5')   Wt 77.1 kg (169 lb 16 oz)   LMP 2012 (Approximate)   SpO2 97%   BMI 33.20 kg/m     ECO  GENERAL/CONSTITUTIONAL: No acute distress. Accompanied by mother and father.  EYES: No scleral icterus.  LYMPH: No anterior cervical, posterior cervical, supraclavicular, or axillary adenopathy.   RESPIRATORY: Clear to auscultation bilaterally. No crackles or wheezing.   CARDIOVASCULAR: Regular rate and rhythm without murmurs, gallops, or rubs.  GASTROINTESTINAL: No tenderness. The patient has normal bowel sounds. No guarding.  No distention.  BREAST: Right-no palpable mass, discharge, rash, or axillary lymphadenopathy.  Left-palpable mass at the 12:00 position, now no longer palpable.   MUSCULOSKELETAL: Warm and well-perfused, no cyanosis, clubbing, or edema.  NEUROLOGIC: Alert, oriented, answers questions appropriately.  INTEGUMENTARY: No jaundice.  GAIT: Steady, does not use assistive device  Port in place at right upper chest.      LABS:  CBC RESULTS:   Recent Labs   Lab Test 19  0840   WBC 5.8   RBC 3.69*   HGB 11.6*   HCT 33.6*   MCV 91   MCH 31.4   MCHC 34.5   RDW 13.6        Recent Labs   Lab Test 19  0840 18  0825    142   POTASSIUM 3.7 3.8   CHLORIDE 107 107   CO2 24 26   ANIONGAP 8 9   GLC 99 86   BUN 19 17   CR 0.54 0.62   ELIJAH 8.5 8.8     Lab Results   Component Value Date    AST 26 2019     Lab Results   Component Value Date    ALT  81 01/11/2019     No results found for: BILICONJ   Lab Results   Component Value Date    BILITOTAL 0.6 01/11/2019     Lab Results   Component Value Date    ALBUMIN 3.4 01/11/2019     Lab Results   Component Value Date    PROTTOTAL 7.1 01/11/2019      Lab Results   Component Value Date    ALKPHOS 88 01/11/2019           PATHOLOGY:  11/6/18:  FINAL DIAGNOSIS:   Left breast, 12:00, 8.0 cm from nipple, ultrasound-guided needle biopsy   - Invasive ductal carcinoma, [place] grade 3 (of 3)   - Weakly positive for estrogen receptor (1-3%)   - Negative for progesterone receptor     INTERPRETATION:   Per the American Society of Clinical Oncology/College of American   Pathologists Clinical Practice Guideline   Focused Update (Claire LONGORIA et al, 2018, Arch Pathol Lab Med     doi:[email or web address].[email or web address]), the HER2/BREANNA 17   ratio of >12.4 and average number of HER2 signals/cell of >21.0 places   this specimen in Group 1 (DINESH   Positive).       IMAGING:  Left breast ultrasound 11/6/18:  FINDINGS:  Targeted ultrasound shows a hypoechoic mass at the 12:00  position, 8 cm from the nipple, measuring 1.5 x 1.6 x 1.5 cm. Survey  of the axilla shows only normal-appearing lymph nodes.      Echo 11/20/18:  Global peak LV longitudinal strain is averaged at -24.6%. This is within  reported normal limits (normal <-18%).  The visual ejection fraction is estimated at 64.7%.  Left ventricular systolic function is normal.      ASSESSMENT/PLAN:  Saray Huntley is a 48 year old post-menopausal female with:    1) Left-sided breast cancer:  1.5 x 1.6 x 1.5 cm on ultrasound.  Axilla survey showed only normal-appearing lymph nodes.   Biopsy showed invasive ductal carcinoma, grade 3, weakly positive for ER (1-3%), MD negative, HER-2 positive.  Clinical stage IA (cT1cN0).    She has HER-2 positive tumor measuring 1.6 cm.  I recommended neoadjuvant chemotherapy with HER-2 directed therapy.  Because she has a relatively small tumor that is stage I, we can  start with paclitaxel+Herceptin+Perjeta x 12 weeks and assess response, as there is data that this is adequate in small lymph node negative tumors.  If she has a good clinical response, she may be able to go on to surgery, and if she has a complete pathologic response, we may be able to avoid anthracycline.  If she does not have a complete response, we could give further chemotherapy with dose-dense Adriamycin+cyclophosphamide x 4 cycles.  She will undergo Herceptin to complete 1 year treatment after surgery.  Although her ER positivity was weak, she may benefit from anti-hormonal therapy after surgery as well.      She has already met with Dr. Montaño and reviewed surgical options, but she has not decided if she wants mastectomy or lumpectomy with radiation.      She is overall tolerating chemotherapy well.  Her left breast lump is no longer palpable.  She had many questions, which were answered.    -proceed with C3D1 of chemotherapy today  -she will continue with weekly treatment.  She will see Osmin in clinic on 2/1/19, with C4D1 same day  -I will see her back in clinic on 2/15/19, which will be her last dose of chemotherapy  -she had a lot of questions and expressed anxiety about the cosmetic outcome of her surgery.  She is still undecided on mastectomy or lumpectomy.   She also wants to await the genetic counseling appointment.  She wants to meet with her surgeon sooner rather than later, so I will have her see Dr. Montaño again.  She wants to know if the chemotherapy is working.  Her breast lump is much smaller on exam now, so chemotherapy appears to be working.  However, it would be reasonable to obtain another ultrasound after her chemotherapy is done, prior to surgery, due to the patient's anxiety.      2) Nutrition: She was contacted by nutrition.    3) Genetics: She is only 48 years old with new diagnosis of breast cancer.   -referred to genetic counseling - appointment scheduled on 2/4/19    4)  Chemotherapy-induced nausea: mild, controlled with home anti-emetics.    5) Mouth rawness: mild, controlled with home salt/baking soda rinses.    6) Fatigue: Secondary to chemotherapy.    6) Coping: Patient seems to be doing better now.    - following    7) GERD: on omeprazole      I spent a total of 40 minutes with the patient, with over >50% of the time in counseling and/or coordination of care.       Cayla Stock MD  Hematology/Oncology  [facility]

## 2019-01-18 ENCOUNTER — HOSPITAL ENCOUNTER (OUTPATIENT)
Facility: CLINIC | Age: 49
Setting detail: SPECIMEN
Discharge: HOME | End: 2019-01-18
Attending: INTERNAL MEDICINE | Admitting: INTERNAL MEDICINE
Payer: COMMERCIAL

## 2019-01-18 ENCOUNTER — INFUSION THERAPY VISIT (OUTPATIENT)
Dept: INFUSION THERAPY | Facility: CLINIC | Age: 49
End: 2019-01-18
Attending: INTERNAL MEDICINE
Payer: COMMERCIAL

## 2019-01-18 VITALS
TEMPERATURE: 98.3 F | OXYGEN SATURATION: 98 % | WEIGHT: 170 LBS | SYSTOLIC BLOOD PRESSURE: 120 MMHG | HEART RATE: 61 BPM | DIASTOLIC BLOOD PRESSURE: 76 MMHG | HEIGHT: 60 IN | BODY MASS INDEX: 33.38 KG/M2 | RESPIRATION RATE: 16 BRPM

## 2019-01-18 DIAGNOSIS — C50.412 MALIGNANT NEOPLASM OF UPPER-OUTER QUADRANT OF LEFT BREAST IN FEMALE, ESTROGEN RECEPTOR POSITIVE (H): Primary | ICD-10-CM

## 2019-01-18 DIAGNOSIS — Z95.828 PORT-A-CATH IN PLACE: ICD-10-CM

## 2019-01-18 DIAGNOSIS — Z17.0 MALIGNANT NEOPLASM OF UPPER-OUTER QUADRANT OF LEFT BREAST IN FEMALE, ESTROGEN RECEPTOR POSITIVE (H): Primary | ICD-10-CM

## 2019-01-18 LAB
BASOPHILS # BLD AUTO: 0.1 10E9/L (ref 0–0.2)
BASOPHILS NFR BLD AUTO: 0.8 %
DIFFERENTIAL METHOD BLD: ABNORMAL
EOSINOPHIL # BLD AUTO: 0.2 10E9/L (ref 0–0.7)
EOSINOPHIL NFR BLD AUTO: 3 %
ERYTHROCYTE [DISTWIDTH] IN BLOOD BY AUTOMATED COUNT: 13.5 % (ref 10–15)
HCT VFR BLD AUTO: 33.4 % (ref 35–47)
HGB BLD-MCNC: 11.6 G/DL (ref 11.7–15.7)
IMM GRANULOCYTES # BLD: 0 10E9/L (ref 0–0.4)
IMM GRANULOCYTES NFR BLD: 0.5 %
LAB SCANNED RESULT: NORMAL
LYMPHOCYTES # BLD AUTO: 2.1 10E9/L (ref 0.8–5.3)
LYMPHOCYTES NFR BLD AUTO: 34.8 %
MCH RBC QN AUTO: 31.6 PG (ref 26.5–33)
MCHC RBC AUTO-ENTMCNC: 34.7 G/DL (ref 31.5–36.5)
MCV RBC AUTO: 91 FL (ref 78–100)
MONOCYTES # BLD AUTO: 0.3 10E9/L (ref 0–1.3)
MONOCYTES NFR BLD AUTO: 4.8 %
NEUTROPHILS # BLD AUTO: 3.4 10E9/L (ref 1.6–8.3)
NEUTROPHILS NFR BLD AUTO: 56.1 %
NRBC # BLD AUTO: 0 10*3/UL
NRBC BLD AUTO-RTO: 0 /100
PLATELET # BLD AUTO: 342 10E9/L (ref 150–450)
RBC # BLD AUTO: 3.67 10E12/L (ref 3.8–5.2)
WBC # BLD AUTO: 6.1 10E9/L (ref 4–11)

## 2019-01-18 PROCEDURE — 25000132 ZZH RX MED GY IP 250 OP 250 PS 637: Performed by: INTERNAL MEDICINE

## 2019-01-18 PROCEDURE — 25000128 H RX IP 250 OP 636: Performed by: INTERNAL MEDICINE

## 2019-01-18 PROCEDURE — 96367 TX/PROPH/DG ADDL SEQ IV INF: CPT

## 2019-01-18 PROCEDURE — 96413 CHEMO IV INFUSION 1 HR: CPT

## 2019-01-18 PROCEDURE — 85025 COMPLETE CBC W/AUTO DIFF WBC: CPT | Performed by: INTERNAL MEDICINE

## 2019-01-18 RX ORDER — DIPHENHYDRAMINE HCL 25 MG
50 CAPSULE ORAL ONCE
Status: COMPLETED | OUTPATIENT
Start: 2019-01-18 | End: 2019-01-18

## 2019-01-18 RX ORDER — HEPARIN SODIUM (PORCINE) LOCK FLUSH IV SOLN 100 UNIT/ML 100 UNIT/ML
500 SOLUTION INTRAVENOUS ONCE
Status: COMPLETED | OUTPATIENT
Start: 2019-01-18 | End: 2019-01-18

## 2019-01-18 RX ORDER — HEPARIN SODIUM (PORCINE) LOCK FLUSH IV SOLN 100 UNIT/ML 100 UNIT/ML
500 SOLUTION INTRAVENOUS ONCE
Status: CANCELLED
Start: 2019-01-18 | End: 2019-01-18

## 2019-01-18 RX ADMIN — PACLITAXEL 146 MG: 6 INJECTION, SOLUTION INTRAVENOUS at 09:34

## 2019-01-18 RX ADMIN — DEXAMETHASONE SODIUM PHOSPHATE 20 MG: 10 INJECTION, SOLUTION INTRAMUSCULAR; INTRAVENOUS at 08:49

## 2019-01-18 RX ADMIN — DIPHENHYDRAMINE HYDROCHLORIDE 25 MG: 25 CAPSULE ORAL at 08:42

## 2019-01-18 RX ADMIN — HEPARIN SODIUM (PORCINE) LOCK FLUSH IV SOLN 100 UNIT/ML 500 UNITS: 100 SOLUTION at 10:42

## 2019-01-18 RX ADMIN — FAMOTIDINE 20 MG: 20 INJECTION, SOLUTION INTRAVENOUS at 09:07

## 2019-01-18 RX ADMIN — SODIUM CHLORIDE 250 ML: 9 INJECTION, SOLUTION INTRAVENOUS at 08:44

## 2019-01-18 ASSESSMENT — MIFFLIN-ST. JEOR: SCORE: 1312.61

## 2019-01-18 ASSESSMENT — PAIN SCALES - GENERAL: PAINLEVEL: NO PAIN (0)

## 2019-01-18 NOTE — PROGRESS NOTES
Infusion Nursing Note:  Saray Huntley presents today for C3D8 taxol   Patient seen by provider today: No   present during visit today: Not Applicable.    Note: N/A.    Intravenous Access:  Labs drawn without difficulty.  Implanted Port.    Treatment Conditions:  Lab Results   Component Value Date    HGB 11.6 01/18/2019     Lab Results   Component Value Date    WBC 6.1 01/18/2019      Lab Results   Component Value Date    ANEU 3.4 01/18/2019     Lab Results   Component Value Date     01/18/2019      Results reviewed, labs MET treatment parameters, ok to proceed with treatment.      Post Infusion Assessment:  Patient tolerated infusion without incident.  Blood return noted pre and post infusion.  Site patent and intact, free from redness, edema or discomfort.  No evidence of extravasations.  Access discontinued per protocol.    Discharge Plan:   Copy of AVS reviewed with patient and/or family.  Patient will return 1/25 for next appointment.  Patient discharged in stable condition accompanied by: mother and father.  Departure Mode: Ambulatory.    Yadiel Campos RN

## 2019-01-22 ENCOUNTER — TELEPHONE (OUTPATIENT)
Dept: ONCOLOGY | Facility: CLINIC | Age: 49
End: 2019-01-22

## 2019-01-22 NOTE — TELEPHONE ENCOUNTER
Patient called clinic requesting to see a Breast surgeon after her chemotherapy. Explained to patient that Dr. Montaño who she has seen in the past is a Breast Surgeon. Patient stated that he is a general surgeon. Explained to patient that he is also who surgeon who specializes in Breast Surgery. Stated to Saray that writer will have Surgical Breast Care Coordinator Ena contact her. Saray will complete her Taxol chemotherapy 2/15/19 then will go on to see surgery. Nicole José RN,BSN,OCN

## 2019-01-22 NOTE — TELEPHONE ENCOUNTER
I spoke with patient this morning who will be completing her neoadjuvent chemotherapy on 2/15/2019.   Patient had questions about seeing a breast surgeon.  Patient initially saw Dr. Montaño after her breast cancer diagnosis and didn't realize he wasn't a breast surgeon.        I informed patient that Dr. Montaño is a general surgeon, but a big part of his practice is doing breast surgeries.  I offered to assist patient in getting scheduled to meet with another surgeon if she would like, and she declined.  Patient states she will see Dr. Montaño for surgical consultation, and let us know if she wants to get a second opinion or not.      Patient is now scheduled to meet Dr. Montaño again on 2/28/2019 @ 1:00p.m. At the [facility].  Tatyana Pimentel RN, BSN

## 2019-01-25 ENCOUNTER — HOSPITAL ENCOUNTER (OUTPATIENT)
Facility: CLINIC | Age: 49
Setting detail: SPECIMEN
Discharge: HOME | End: 2019-01-25
Attending: INTERNAL MEDICINE | Admitting: INTERNAL MEDICINE
Payer: COMMERCIAL

## 2019-01-25 ENCOUNTER — INFUSION THERAPY VISIT (OUTPATIENT)
Dept: INFUSION THERAPY | Facility: CLINIC | Age: 49
End: 2019-01-25
Attending: INTERNAL MEDICINE
Payer: COMMERCIAL

## 2019-01-25 VITALS
HEART RATE: 68 BPM | TEMPERATURE: 98.5 F | OXYGEN SATURATION: 96 % | RESPIRATION RATE: 18 BRPM | SYSTOLIC BLOOD PRESSURE: 112 MMHG | WEIGHT: 173.2 LBS | DIASTOLIC BLOOD PRESSURE: 70 MMHG | BODY MASS INDEX: 33.83 KG/M2

## 2019-01-25 DIAGNOSIS — Z95.828 PORT-A-CATH IN PLACE: ICD-10-CM

## 2019-01-25 DIAGNOSIS — C50.412 MALIGNANT NEOPLASM OF UPPER-OUTER QUADRANT OF LEFT BREAST IN FEMALE, ESTROGEN RECEPTOR POSITIVE (H): Primary | ICD-10-CM

## 2019-01-25 DIAGNOSIS — Z17.0 MALIGNANT NEOPLASM OF UPPER-OUTER QUADRANT OF LEFT BREAST IN FEMALE, ESTROGEN RECEPTOR POSITIVE (H): Primary | ICD-10-CM

## 2019-01-25 LAB
BASOPHILS # BLD AUTO: 0.1 10E9/L (ref 0–0.2)
BASOPHILS NFR BLD AUTO: 0.7 %
DIFFERENTIAL METHOD BLD: ABNORMAL
EOSINOPHIL # BLD AUTO: 0.2 10E9/L (ref 0–0.7)
EOSINOPHIL NFR BLD AUTO: 2.4 %
ERYTHROCYTE [DISTWIDTH] IN BLOOD BY AUTOMATED COUNT: 13.8 % (ref 10–15)
HCT VFR BLD AUTO: 33.3 % (ref 35–47)
HGB BLD-MCNC: 11.4 G/DL (ref 11.7–15.7)
IMM GRANULOCYTES # BLD: 0.1 10E9/L (ref 0–0.4)
IMM GRANULOCYTES NFR BLD: 0.7 %
LYMPHOCYTES # BLD AUTO: 1.9 10E9/L (ref 0.8–5.3)
LYMPHOCYTES NFR BLD AUTO: 28.9 %
MCH RBC QN AUTO: 31.4 PG (ref 26.5–33)
MCHC RBC AUTO-ENTMCNC: 34.2 G/DL (ref 31.5–36.5)
MCV RBC AUTO: 92 FL (ref 78–100)
MONOCYTES # BLD AUTO: 0.4 10E9/L (ref 0–1.3)
MONOCYTES NFR BLD AUTO: 6.1 %
NEUTROPHILS # BLD AUTO: 4.1 10E9/L (ref 1.6–8.3)
NEUTROPHILS NFR BLD AUTO: 61.2 %
NRBC # BLD AUTO: 0 10*3/UL
NRBC BLD AUTO-RTO: 0 /100
PLATELET # BLD AUTO: 320 10E9/L (ref 150–450)
RBC # BLD AUTO: 3.63 10E12/L (ref 3.8–5.2)
WBC # BLD AUTO: 6.7 10E9/L (ref 4–11)

## 2019-01-25 PROCEDURE — 25000128 H RX IP 250 OP 636: Performed by: INTERNAL MEDICINE

## 2019-01-25 PROCEDURE — 25000132 ZZH RX MED GY IP 250 OP 250 PS 637: Performed by: INTERNAL MEDICINE

## 2019-01-25 PROCEDURE — 96367 TX/PROPH/DG ADDL SEQ IV INF: CPT

## 2019-01-25 PROCEDURE — 96413 CHEMO IV INFUSION 1 HR: CPT

## 2019-01-25 PROCEDURE — 85025 COMPLETE CBC W/AUTO DIFF WBC: CPT | Performed by: INTERNAL MEDICINE

## 2019-01-25 RX ORDER — HEPARIN SODIUM (PORCINE) LOCK FLUSH IV SOLN 100 UNIT/ML 100 UNIT/ML
500 SOLUTION INTRAVENOUS ONCE
Status: COMPLETED | OUTPATIENT
Start: 2019-01-25 | End: 2019-01-25

## 2019-01-25 RX ORDER — HEPARIN SODIUM (PORCINE) LOCK FLUSH IV SOLN 100 UNIT/ML 100 UNIT/ML
500 SOLUTION INTRAVENOUS ONCE
Status: CANCELLED
Start: 2019-01-25 | End: 2019-01-25

## 2019-01-25 RX ORDER — DIPHENHYDRAMINE HCL 25 MG
50 CAPSULE ORAL ONCE
Status: COMPLETED | OUTPATIENT
Start: 2019-01-25 | End: 2019-01-25

## 2019-01-25 RX ADMIN — SODIUM CHLORIDE 250 ML: 9 INJECTION, SOLUTION INTRAVENOUS at 08:52

## 2019-01-25 RX ADMIN — FAMOTIDINE 20 MG: 20 INJECTION, SOLUTION INTRAVENOUS at 08:54

## 2019-01-25 RX ADMIN — DEXAMETHASONE SODIUM PHOSPHATE 20 MG: 10 INJECTION, SOLUTION INTRAMUSCULAR; INTRAVENOUS at 09:15

## 2019-01-25 RX ADMIN — PACLITAXEL 146 MG: 6 INJECTION, SOLUTION INTRAVENOUS at 09:41

## 2019-01-25 RX ADMIN — DIPHENHYDRAMINE HYDROCHLORIDE 25 MG: 25 CAPSULE ORAL at 08:53

## 2019-01-25 RX ADMIN — HEPARIN SODIUM (PORCINE) LOCK FLUSH IV SOLN 100 UNIT/ML 500 UNITS: 100 SOLUTION at 10:56

## 2019-01-25 ASSESSMENT — PAIN SCALES - GENERAL: PAINLEVEL: NO PAIN (0)

## 2019-01-25 NOTE — PROGRESS NOTES
Infusion Nursing Note:  Saray Huntley presents today for C3D15 Taxol.    Patient seen by provider today: No    Note: Patient reporting increase in peripheral nueropathy in bilateral toes and fingertips.  Per patient, neuropathy is intermittent but more frequent and more noticeable.  Patient reports she is still able to adequately preform her ADLs.  Osmin Rose NP informed and he did briefly assess the patient's neuropathy.  No new orders obtained, continue with chemotherapy as ordered.  Ice applied to patient's hands and feet during Taxol infusion per her request.  Patient tolerated without issue.    Intravenous Access:  Implanted Port.      Treatment Conditions:  Lab Results   Component Value Date    HGB 11.4 01/25/2019     Lab Results   Component Value Date    WBC 6.7 01/25/2019      Lab Results   Component Value Date    ANEU 4.1 01/25/2019     Lab Results   Component Value Date     01/25/2019      Results reviewed, labs MET treatment parameters, ok to proceed with treatment.      Post Infusion Assessment:  Patient tolerated infusion without incident.  Blood return noted pre and post infusion.  Site patent and intact, free from redness, edema or discomfort.  No evidence of extravasations.  Access discontinued per protocol.    Discharge Plan:   Patient declined prescription refills.  Discharge instructions reviewed with: Patient and Family.  Patient and/or family verbalized understanding of discharge instructions and all questions answered.  Copy of AVS reviewed with patient and/or family.  Patient will return 2/1/19 for next appointment.  Patient discharged in stable condition accompanied by: sister and father.  Departure Mode: Ambulatory.    Celena Overton RN

## 2019-01-31 RX ORDER — METHYLPREDNISOLONE SODIUM SUCCINATE 125 MG/2ML
125 INJECTION, POWDER, LYOPHILIZED, FOR SOLUTION INTRAMUSCULAR; INTRAVENOUS
Status: CANCELLED
Start: 2019-02-15

## 2019-01-31 RX ORDER — DIPHENHYDRAMINE HCL 25 MG
50 CAPSULE ORAL ONCE
Status: CANCELLED
Start: 2019-02-08

## 2019-01-31 RX ORDER — METHYLPREDNISOLONE SODIUM SUCCINATE 125 MG/2ML
125 INJECTION, POWDER, LYOPHILIZED, FOR SOLUTION INTRAMUSCULAR; INTRAVENOUS
Status: CANCELLED
Start: 2019-02-01

## 2019-01-31 RX ORDER — DIPHENHYDRAMINE HCL 25 MG
50 CAPSULE ORAL ONCE
Status: CANCELLED
Start: 2019-02-15

## 2019-01-31 RX ORDER — ALBUTEROL SULFATE 0.83 MG/ML
2.5 SOLUTION RESPIRATORY (INHALATION)
Status: CANCELLED | OUTPATIENT
Start: 2019-02-08

## 2019-01-31 RX ORDER — SODIUM CHLORIDE 9 MG/ML
1000 INJECTION, SOLUTION INTRAVENOUS CONTINUOUS PRN
Status: CANCELLED
Start: 2019-02-15

## 2019-01-31 RX ORDER — METHYLPREDNISOLONE SODIUM SUCCINATE 125 MG/2ML
125 INJECTION, POWDER, LYOPHILIZED, FOR SOLUTION INTRAMUSCULAR; INTRAVENOUS
Status: CANCELLED
Start: 2019-02-08

## 2019-01-31 RX ORDER — ALBUTEROL SULFATE 90 UG/1
1-2 AEROSOL, METERED RESPIRATORY (INHALATION)
Status: CANCELLED
Start: 2019-02-01

## 2019-01-31 RX ORDER — EPINEPHRINE 1 MG/ML
0.3 INJECTION, SOLUTION INTRAMUSCULAR; SUBCUTANEOUS EVERY 5 MIN PRN
Status: CANCELLED | OUTPATIENT
Start: 2019-02-08

## 2019-01-31 RX ORDER — ALBUTEROL SULFATE 90 UG/1
1-2 AEROSOL, METERED RESPIRATORY (INHALATION)
Status: CANCELLED
Start: 2019-02-08

## 2019-01-31 RX ORDER — DIPHENHYDRAMINE HCL 25 MG
50 CAPSULE ORAL ONCE
Status: CANCELLED
Start: 2019-02-01

## 2019-01-31 RX ORDER — SODIUM CHLORIDE 9 MG/ML
1000 INJECTION, SOLUTION INTRAVENOUS CONTINUOUS PRN
Status: CANCELLED
Start: 2019-02-08

## 2019-01-31 RX ORDER — EPINEPHRINE 0.3 MG/.3ML
0.3 INJECTION SUBCUTANEOUS EVERY 5 MIN PRN
Status: CANCELLED | OUTPATIENT
Start: 2019-02-01

## 2019-01-31 RX ORDER — DIPHENHYDRAMINE HYDROCHLORIDE 50 MG/ML
50 INJECTION INTRAMUSCULAR; INTRAVENOUS
Status: CANCELLED
Start: 2019-02-08

## 2019-01-31 RX ORDER — ALBUTEROL SULFATE 90 UG/1
1-2 AEROSOL, METERED RESPIRATORY (INHALATION)
Status: CANCELLED
Start: 2019-02-15

## 2019-01-31 RX ORDER — MEPERIDINE HYDROCHLORIDE 25 MG/ML
25 INJECTION INTRAMUSCULAR; INTRAVENOUS; SUBCUTANEOUS EVERY 30 MIN PRN
Status: CANCELLED | OUTPATIENT
Start: 2019-02-01

## 2019-01-31 RX ORDER — ALBUTEROL SULFATE 0.83 MG/ML
2.5 SOLUTION RESPIRATORY (INHALATION)
Status: CANCELLED | OUTPATIENT
Start: 2019-02-01

## 2019-01-31 RX ORDER — MEPERIDINE HYDROCHLORIDE 25 MG/ML
25 INJECTION INTRAMUSCULAR; INTRAVENOUS; SUBCUTANEOUS EVERY 30 MIN PRN
Status: CANCELLED | OUTPATIENT
Start: 2019-02-08

## 2019-01-31 RX ORDER — EPINEPHRINE 1 MG/ML
0.3 INJECTION, SOLUTION INTRAMUSCULAR; SUBCUTANEOUS EVERY 5 MIN PRN
Status: CANCELLED | OUTPATIENT
Start: 2019-02-15

## 2019-01-31 RX ORDER — LORAZEPAM 2 MG/ML
0.5 INJECTION INTRAMUSCULAR EVERY 4 HOURS PRN
Status: CANCELLED
Start: 2019-02-15

## 2019-01-31 RX ORDER — DIPHENHYDRAMINE HYDROCHLORIDE 50 MG/ML
50 INJECTION INTRAMUSCULAR; INTRAVENOUS
Status: CANCELLED
Start: 2019-02-15

## 2019-01-31 RX ORDER — ACETAMINOPHEN 325 MG/1
650 TABLET ORAL
Status: CANCELLED | OUTPATIENT
Start: 2019-02-01

## 2019-01-31 RX ORDER — LORAZEPAM 2 MG/ML
0.5 INJECTION INTRAMUSCULAR EVERY 4 HOURS PRN
Status: CANCELLED
Start: 2019-02-01

## 2019-01-31 RX ORDER — SODIUM CHLORIDE 9 MG/ML
1000 INJECTION, SOLUTION INTRAVENOUS CONTINUOUS PRN
Status: CANCELLED
Start: 2019-02-01

## 2019-01-31 RX ORDER — EPINEPHRINE 0.3 MG/.3ML
0.3 INJECTION SUBCUTANEOUS EVERY 5 MIN PRN
Status: CANCELLED | OUTPATIENT
Start: 2019-02-15

## 2019-01-31 RX ORDER — LORAZEPAM 2 MG/ML
0.5 INJECTION INTRAMUSCULAR EVERY 4 HOURS PRN
Status: CANCELLED
Start: 2019-02-08

## 2019-01-31 RX ORDER — EPINEPHRINE 0.3 MG/.3ML
0.3 INJECTION SUBCUTANEOUS EVERY 5 MIN PRN
Status: CANCELLED | OUTPATIENT
Start: 2019-02-08

## 2019-01-31 RX ORDER — EPINEPHRINE 1 MG/ML
0.3 INJECTION, SOLUTION INTRAMUSCULAR; SUBCUTANEOUS EVERY 5 MIN PRN
Status: CANCELLED | OUTPATIENT
Start: 2019-02-01

## 2019-01-31 RX ORDER — ALBUTEROL SULFATE 0.83 MG/ML
2.5 SOLUTION RESPIRATORY (INHALATION)
Status: CANCELLED | OUTPATIENT
Start: 2019-02-15

## 2019-01-31 RX ORDER — DIPHENHYDRAMINE HYDROCHLORIDE 50 MG/ML
50 INJECTION INTRAMUSCULAR; INTRAVENOUS
Status: CANCELLED
Start: 2019-02-01

## 2019-01-31 RX ORDER — MEPERIDINE HYDROCHLORIDE 25 MG/ML
25 INJECTION INTRAMUSCULAR; INTRAVENOUS; SUBCUTANEOUS EVERY 30 MIN PRN
Status: CANCELLED | OUTPATIENT
Start: 2019-02-15

## 2019-02-01 ENCOUNTER — HOSPITAL ENCOUNTER (OUTPATIENT)
Facility: CLINIC | Age: 49
Setting detail: SPECIMEN
Discharge: HOME | End: 2019-02-01
Attending: INTERNAL MEDICINE | Admitting: NURSE PRACTITIONER
Payer: COMMERCIAL

## 2019-02-01 ENCOUNTER — INFUSION THERAPY VISIT (OUTPATIENT)
Dept: INFUSION THERAPY | Facility: CLINIC | Age: 49
End: 2019-02-01
Attending: INTERNAL MEDICINE
Payer: COMMERCIAL

## 2019-02-01 ENCOUNTER — ALLIED HEALTH/NURSE VISIT (OUTPATIENT)
Dept: ONCOLOGY | Facility: CLINIC | Age: 49
End: 2019-02-01

## 2019-02-01 ENCOUNTER — ONCOLOGY VISIT (OUTPATIENT)
Dept: ONCOLOGY | Facility: CLINIC | Age: 49
End: 2019-02-01
Attending: NURSE PRACTITIONER
Payer: COMMERCIAL

## 2019-02-01 VITALS
BODY MASS INDEX: 33.96 KG/M2 | OXYGEN SATURATION: 98 % | DIASTOLIC BLOOD PRESSURE: 76 MMHG | SYSTOLIC BLOOD PRESSURE: 111 MMHG | RESPIRATION RATE: 16 BRPM | HEART RATE: 71 BPM | TEMPERATURE: 98.5 F | HEIGHT: 60 IN | WEIGHT: 173 LBS

## 2019-02-01 VITALS
DIASTOLIC BLOOD PRESSURE: 73 MMHG | SYSTOLIC BLOOD PRESSURE: 114 MMHG | TEMPERATURE: 98.1 F | WEIGHT: 173 LBS | HEIGHT: 60 IN | OXYGEN SATURATION: 98 % | RESPIRATION RATE: 16 BRPM | HEART RATE: 73 BPM | BODY MASS INDEX: 33.96 KG/M2

## 2019-02-01 DIAGNOSIS — C50.412 MALIGNANT NEOPLASM OF UPPER-OUTER QUADRANT OF LEFT BREAST IN FEMALE, ESTROGEN RECEPTOR POSITIVE (H): Primary | ICD-10-CM

## 2019-02-01 DIAGNOSIS — Z17.0 MALIGNANT NEOPLASM OF UPPER-OUTER QUADRANT OF LEFT BREAST IN FEMALE, ESTROGEN RECEPTOR POSITIVE (H): Primary | ICD-10-CM

## 2019-02-01 DIAGNOSIS — Z95.828 PORT-A-CATH IN PLACE: ICD-10-CM

## 2019-02-01 DIAGNOSIS — Z71.9 COUNSELING NOS(V65.40): Primary | ICD-10-CM

## 2019-02-01 LAB
ALBUMIN SERPL-MCNC: 3.4 G/DL (ref 3.4–5)
ALP SERPL-CCNC: 89 U/L (ref 40–150)
ALT SERPL W P-5'-P-CCNC: 74 U/L (ref 0–50)
ANION GAP SERPL CALCULATED.3IONS-SCNC: 7 MMOL/L (ref 3–14)
AST SERPL W P-5'-P-CCNC: 25 U/L (ref 0–45)
BASOPHILS # BLD AUTO: 0 10E9/L (ref 0–0.2)
BASOPHILS NFR BLD AUTO: 0.6 %
BILIRUB SERPL-MCNC: 0.5 MG/DL (ref 0.2–1.3)
BUN SERPL-MCNC: 18 MG/DL (ref 7–30)
CALCIUM SERPL-MCNC: 8.6 MG/DL (ref 8.5–10.1)
CHLORIDE SERPL-SCNC: 105 MMOL/L (ref 94–109)
CO2 SERPL-SCNC: 26 MMOL/L (ref 20–32)
CREAT SERPL-MCNC: 0.56 MG/DL (ref 0.52–1.04)
DIFFERENTIAL METHOD BLD: ABNORMAL
EOSINOPHIL # BLD AUTO: 0.2 10E9/L (ref 0–0.7)
EOSINOPHIL NFR BLD AUTO: 2.6 %
ERYTHROCYTE [DISTWIDTH] IN BLOOD BY AUTOMATED COUNT: 13.6 % (ref 10–15)
GFR SERPL CREATININE-BSD FRML MDRD: >90 ML/MIN/{1.73_M2}
GLUCOSE SERPL-MCNC: 94 MG/DL (ref 70–99)
HCT VFR BLD AUTO: 34.1 % (ref 35–47)
HGB BLD-MCNC: 11.7 G/DL (ref 11.7–15.7)
IMM GRANULOCYTES # BLD: 0 10E9/L (ref 0–0.4)
IMM GRANULOCYTES NFR BLD: 0.6 %
LYMPHOCYTES # BLD AUTO: 2 10E9/L (ref 0.8–5.3)
LYMPHOCYTES NFR BLD AUTO: 32 %
MCH RBC QN AUTO: 31.3 PG (ref 26.5–33)
MCHC RBC AUTO-ENTMCNC: 34.3 G/DL (ref 31.5–36.5)
MCV RBC AUTO: 91 FL (ref 78–100)
MONOCYTES # BLD AUTO: 0.3 10E9/L (ref 0–1.3)
MONOCYTES NFR BLD AUTO: 4.1 %
NEUTROPHILS # BLD AUTO: 3.7 10E9/L (ref 1.6–8.3)
NEUTROPHILS NFR BLD AUTO: 60.1 %
NRBC # BLD AUTO: 0 10*3/UL
NRBC BLD AUTO-RTO: 0 /100
PLATELET # BLD AUTO: 333 10E9/L (ref 150–450)
POTASSIUM SERPL-SCNC: 3.5 MMOL/L (ref 3.4–5.3)
PROT SERPL-MCNC: 7.1 G/DL (ref 6.8–8.8)
RBC # BLD AUTO: 3.74 10E12/L (ref 3.8–5.2)
SODIUM SERPL-SCNC: 138 MMOL/L (ref 133–144)
WBC # BLD AUTO: 6.2 10E9/L (ref 4–11)

## 2019-02-01 PROCEDURE — 96417 CHEMO IV INFUS EACH ADDL SEQ: CPT

## 2019-02-01 PROCEDURE — 96367 TX/PROPH/DG ADDL SEQ IV INF: CPT

## 2019-02-01 PROCEDURE — 25000128 H RX IP 250 OP 636: Performed by: INTERNAL MEDICINE

## 2019-02-01 PROCEDURE — 25000128 H RX IP 250 OP 636: Performed by: NURSE PRACTITIONER

## 2019-02-01 PROCEDURE — 99213 OFFICE O/P EST LOW 20 MIN: CPT | Performed by: NURSE PRACTITIONER

## 2019-02-01 PROCEDURE — 25000132 ZZH RX MED GY IP 250 OP 250 PS 637: Performed by: NURSE PRACTITIONER

## 2019-02-01 PROCEDURE — 80053 COMPREHEN METABOLIC PANEL: CPT | Performed by: NURSE PRACTITIONER

## 2019-02-01 PROCEDURE — 96413 CHEMO IV INFUSION 1 HR: CPT

## 2019-02-01 PROCEDURE — 85025 COMPLETE CBC W/AUTO DIFF WBC: CPT | Performed by: NURSE PRACTITIONER

## 2019-02-01 PROCEDURE — 96375 TX/PRO/DX INJ NEW DRUG ADDON: CPT

## 2019-02-01 RX ORDER — DIPHENHYDRAMINE HCL 25 MG
50 CAPSULE ORAL ONCE
Status: COMPLETED | OUTPATIENT
Start: 2019-02-01 | End: 2019-02-01

## 2019-02-01 RX ORDER — HEPARIN SODIUM (PORCINE) LOCK FLUSH IV SOLN 100 UNIT/ML 100 UNIT/ML
500 SOLUTION INTRAVENOUS ONCE
Status: CANCELLED
Start: 2019-02-01 | End: 2019-02-01

## 2019-02-01 RX ORDER — HEPARIN SODIUM (PORCINE) LOCK FLUSH IV SOLN 100 UNIT/ML 100 UNIT/ML
500 SOLUTION INTRAVENOUS ONCE
Status: COMPLETED | OUTPATIENT
Start: 2019-02-01 | End: 2019-02-01

## 2019-02-01 RX ADMIN — PACLITAXEL 146 MG: 6 INJECTION, SOLUTION INTRAVENOUS at 11:47

## 2019-02-01 RX ADMIN — DEXAMETHASONE SODIUM PHOSPHATE 20 MG: 10 INJECTION, SOLUTION INTRAMUSCULAR; INTRAVENOUS at 09:51

## 2019-02-01 RX ADMIN — HEPARIN SODIUM (PORCINE) LOCK FLUSH IV SOLN 100 UNIT/ML 500 UNITS: 100 SOLUTION at 13:07

## 2019-02-01 RX ADMIN — DIPHENHYDRAMINE HYDROCHLORIDE 25 MG: 25 CAPSULE ORAL at 10:57

## 2019-02-01 RX ADMIN — PERTUZUMAB 420 MG: 30 INJECTION, SOLUTION, CONCENTRATE INTRAVENOUS at 10:31

## 2019-02-01 RX ADMIN — FAMOTIDINE 20 MG: 20 INJECTION, SOLUTION INTRAVENOUS at 10:06

## 2019-02-01 RX ADMIN — TRASTUZUMAB 450 MG: 150 INJECTION, POWDER, LYOPHILIZED, FOR SOLUTION INTRAVENOUS at 11:03

## 2019-02-01 RX ADMIN — SODIUM CHLORIDE 250 ML: 9 INJECTION, SOLUTION INTRAVENOUS at 09:43

## 2019-02-01 ASSESSMENT — PAIN SCALES - GENERAL
PAINLEVEL: NO PAIN (0)
PAINLEVEL: NO PAIN (0)

## 2019-02-01 ASSESSMENT — MIFFLIN-ST. JEOR
SCORE: 1326.22
SCORE: 1326.22

## 2019-02-01 NOTE — PROGRESS NOTES
"Oncology/Hematology Visit Note  Feb 1, 2019    Reason for Visit: follow up of breast cancer    History of Present Illness: Saray Huntley is a 49 year old female with  Left sided breast cancer biopsy showed invasive ductal carcinoma grade 3 n weakly positive for ER(1-3%) [place] negative HER-2 positive  paclitaxel Herceptin and Perjeta -started 11/30    Patient comes here today for routine follow-up prior chemotherapy    Interval History:  Mild neuropathy in fingers  that \" comes and goes\"  She denies edema . Overall, she has been tolerating chemo well.   Denies fever chills sweats, denies cough or SOB , denies NVD, denies abdominal pain     Review of Systems:  14 point ROS of systems including Constitutional, Eyes, Respiratory, Cardiovascular, Gastroenterology, Genitourinary, Integumentary, Muscularskeletal, Psychiatric were all negative except for pertinent positives noted in my HPI.      Current Outpatient Medications   Medication Sig Dispense Refill     HYDROcodone-acetaminophen (NORCO) 5-325 MG tablet Take 1-2 tablets by mouth every 4 hours as needed for moderate to severe pain 15 tablet 0     Lidocaine-Prilocaine (STUDY - LIDOCAINE 2.5%/PRILOCAINE 2.5% CREAM, IDS# 4243,) Externally apply topically daily as needed for moderate pain 30 g 3     LORazepam (ATIVAN) 0.5 MG tablet Take 1 tablet (0.5 mg) by mouth every 4 hours as needed (Anxiety, Nausea/Vomiting or Sleep) 30 tablet 2     ondansetron (ZOFRAN ODT) 4 MG ODT tab Take 1 tablet (4 mg) by mouth 3 times daily (before meals) 60 tablet 1     prochlorperazine (COMPAZINE) 10 MG tablet Take 1 tablet (10 mg) by mouth every 6 hours as needed (Nausea/Vomiting) 30 tablet 2       Physical Examination:  General: The patient is a pleasant female in no acute distress.  /76   Pulse 71   Temp 98.5  F (36.9  C) (Oral)   Resp 16   Ht 1.524 m (5')   Wt 78.5 kg (173 lb)   LMP 09/29/2012 (Approximate)   SpO2 98%   BMI 33.79 kg/m    HEENT: EOMI, PERRL. Sclerae are " anicteric. Oral mucosa is pink and moist with no lesions or thrush.   Lymph: Neck is supple with no lymphadenopathy in the cervical or supraclavicular areas.  Right cervical area tender to touch.  Heart: Regular rate and rhythm.   Lungs: Clear to auscultation bilaterally.   GI: Bowel sounds present, soft, nontender with no palpable hepatosplenomegaly or masses.   Extremities: No lower extremity edema noted bilaterally.   Skin: No rashes, petechiae, or bruising noted on exposed skin.    Laboratory Data:  Results for orders placed or performed in visit on 02/01/19 (from the past 24 hour(s))   CBC with platelets differential   Result Value Ref Range    WBC 6.2 4.0 - 11.0 10e9/L    RBC Count 3.74 (L) 3.8 - 5.2 10e12/L    Hemoglobin 11.7 11.7 - 15.7 g/dL    Hematocrit 34.1 (L) 35.0 - 47.0 %    MCV 91 78 - 100 fl    MCH 31.3 26.5 - 33.0 pg    MCHC 34.3 31.5 - 36.5 g/dL    RDW 13.6 10.0 - 15.0 %    Platelet Count 333 150 - 450 10e9/L    Diff Method Automated Method     % Neutrophils 60.1 %    % Lymphocytes 32.0 %    % Monocytes 4.1 %    % Eosinophils 2.6 %    % Basophils 0.6 %    % Immature Granulocytes 0.6 %    Nucleated RBCs 0 0 /100    Absolute Neutrophil 3.7 1.6 - 8.3 10e9/L    Absolute Lymphocytes 2.0 0.8 - 5.3 10e9/L    Absolute Monocytes 0.3 0.0 - 1.3 10e9/L    Absolute Eosinophils 0.2 0.0 - 0.7 10e9/L    Absolute Basophils 0.0 0.0 - 0.2 10e9/L    Abs Immature Granulocytes 0.0 0 - 0.4 10e9/L    Absolute Nucleated RBC 0.0    Comprehensive metabolic panel   Result Value Ref Range    Sodium 138 133 - 144 mmol/L    Potassium 3.5 3.4 - 5.3 mmol/L    Chloride 105 94 - 109 mmol/L    Carbon Dioxide 26 20 - 32 mmol/L    Anion Gap 7 3 - 14 mmol/L    Glucose 94 70 - 99 mg/dL    Urea Nitrogen 18 7 - 30 mg/dL    Creatinine 0.56 0.52 - 1.04 mg/dL    GFR Estimate >90 >60 mL/min/[1.73_m2]    GFR Estimate If Black >90 >60 mL/min/[1.73_m2]    Calcium 8.6 8.5 - 10.1 mg/dL    Bilirubin Total 0.5 0.2 - 1.3 mg/dL    Albumin 3.4 3.4 - 5.0  g/dL    Protein Total 7.1 6.8 - 8.8 g/dL    Alkaline Phosphatase 89 40 - 150 U/L    ALT 74 (H) 0 - 50 U/L    AST 25 0 - 45 U/L         Assessment and Plan:    This is a 48-year-old female with    Left sided breast cancer biopsy showed invasive ductal carcinoma grade 3 n weakly positive for ER(1-3%) CT negative HER-2 positive  started 11/30  paclitaxel Herceptin and Perjeta -started 11/30  Overall she has been tolerating treatment well today  Labs reviewed with patient.  Proceed with therapy today  Pt has follow up appointment with Dr Stock on 02/15  She has appt with surgeon on 02/28      Genetics  She will see genetic counseling in February    Coping-we talked about coping mechanisms.  She denies depression or suicidal ideation  Patient is doing little better     GERD  Continue omeprazole 20 mg p.o. Daily      IRINA Scruggs CNP  Hem/Onc   [facility]

## 2019-02-01 NOTE — PROGRESS NOTES
"Social Work Progress Note      Data/Intervention:  Patient Name:  Saray Huntley  /Age:  1970 (49 year old)    Reason for Follow-Up:  Saray is a 49-year-old woman with a new diagnosis of IA left sided-breast cancer who comes to clinic for Taxol, Herceptin accompanied sister and father. This clinician met with pt for routine psychosocial check-in and emotional support.     Intervention:   Saray reports that she is looking forward to the end of chemotherapy treatment in 2/15/19 and having more discussions with providers about next steps with surgery. Saray acknowledged that she continues to struggle with ongoing fatigue, remarking that she only has, \"2 good days a week\" in which she feels that she is able to concentrate, get work done, or attend to school. Saray reports that she continues to get great emotional and financial support from family at present time. Saray reports that she received wig from [employer] and acknowledges that she feels confident when wearing wig as it is \"just like my old hair.\" Saray smiling and laughing at times during encounter with this clinician. Saray acknowledged that greatest financial hardship as been ongoing affordability of food. Discussed options available through [employer] for meal assistance, and [facility]. Saray in support of [employer] submission for [employer] for [employer] gift cards.     Resources Provided:  [employer]- [employer]    Assessment:  Saray continues to adjust to cancer treatment and cope with its impacts. Saray appears to gain great strength from engagement with family, and their continued presence and support. Saray receptive to ongoing discussion regarding resource support as she continues to adjust to treatment. Aware of ongoing SW support to support emotional health and coping.      Plan:  1) This clinician will continue to follow for ongoing psychosocial support as pt continues to adjust to treatment and realize its impacts. Pt knows to " contact this clinician in the case of psychosocial distress or need.   2) This clinician will continue to collaborate with pt's ongoing care team.     Please call or page if needs or concerns arise.     ALEA Tapia, [facility]  Direct Phone: [phone]  Pager: [phone]

## 2019-02-01 NOTE — PROGRESS NOTES
Infusion Nursing Note:  Saray Huntley presents today for taxol. herceptin.    Patient seen by provider today: Yes: PAULINO Rose NP   present during visit today: Not Applicable.    Note: N/A.    Intravenous Access:  Implanted Port.    Treatment Conditions:  Lab Results   Component Value Date    HGB 11.7 02/01/2019     Lab Results   Component Value Date    WBC 6.2 02/01/2019      Lab Results   Component Value Date    ANEU 3.7 02/01/2019     Lab Results   Component Value Date     02/01/2019      Lab Results   Component Value Date     02/01/2019                   Lab Results   Component Value Date    POTASSIUM 3.5 02/01/2019           No results found for: MAG         Lab Results   Component Value Date    CR 0.56 02/01/2019                   Lab Results   Component Value Date    ELIJAH 8.6 02/01/2019                Lab Results   Component Value Date    BILITOTAL 0.5 02/01/2019           Lab Results   Component Value Date    ALBUMIN 3.4 02/01/2019                    Lab Results   Component Value Date    ALT 74 02/01/2019           Lab Results   Component Value Date    AST 25 02/01/2019       Results reviewed, labs MET treatment parameters, ok to proceed with treatment.  ECHO/MUGA completed 11/20/19  EF 64.7%.      Post Infusion Assessment:  Patient tolerated infusion without incident.  Blood return noted pre and post infusion.  Site patent and intact, free from redness, edema or discomfort.  No evidence of extravasations.  Access discontinued per protocol.    Discharge Plan:   Discharge instructions reviewed with: Patient and Family.  Patient and/or family verbalized understanding of discharge instructions and all questions answered.  Copy of AVS reviewed with patient and/or family. Patient discharged in stable condition accompanied by: family.  Departure Mode: Ambulatory.    Austin Khan RN

## 2019-02-01 NOTE — LETTER
"    2/1/2019         RE: Saray Huntley  [address]  [place] [address]        Dear Colleague,    Thank you for referring your patient, Saray Huntley, to the [facility]. Please see a copy of my visit note below.    Oncology/Hematology Visit Note  Feb 1, 2019    Reason for Visit: follow up of breast cancer    History of Present Illness: Saray Huntley is a 49 year old female with  Left sided breast cancer biopsy showed invasive ductal carcinoma grade 3 n weakly positive for ER(1-3%) [place] negative HER-2 positive  paclitaxel Herceptin and Perjeta -started 11/30    Patient comes here today for routine follow-up prior chemotherapy    Interval History:  Mild neuropathy in fingers  that \" comes and goes\"  She denies edema . Overall, she has been tolerating chemo well.   Denies fever chills sweats, denies cough or SOB , denies NVD, denies abdominal pain     Review of Systems:  14 point ROS of systems including Constitutional, Eyes, Respiratory, Cardiovascular, Gastroenterology, Genitourinary, Integumentary, Muscularskeletal, Psychiatric were all negative except for pertinent positives noted in my HPI.      Current Outpatient Medications   Medication Sig Dispense Refill     HYDROcodone-acetaminophen (NORCO) 5-325 MG tablet Take 1-2 tablets by mouth every 4 hours as needed for moderate to severe pain 15 tablet 0     Lidocaine-Prilocaine (STUDY - LIDOCAINE 2.5%/PRILOCAINE 2.5% CREAM, IDS# [id number],) Externally apply topically daily as needed for moderate pain 30 g 3     LORazepam (ATIVAN) 0.5 MG tablet Take 1 tablet (0.5 mg) by mouth every 4 hours as needed (Anxiety, Nausea/Vomiting or Sleep) 30 tablet 2     ondansetron (ZOFRAN ODT) 4 MG ODT tab Take 1 tablet (4 mg) by mouth 3 times daily (before meals) 60 tablet 1     prochlorperazine (COMPAZINE) 10 MG tablet Take 1 tablet (10 mg) by mouth every 6 hours as needed (Nausea/Vomiting) 30 tablet 2       Physical Examination:  General: The patient is a pleasant female " in no acute distress.  /76   Pulse 71   Temp 98.5  F (36.9  C) (Oral)   Resp 16   Ht 1.524 m (5')   Wt 78.5 kg (173 lb)   LMP 09/29/2012 (Approximate)   SpO2 98%   BMI 33.79 kg/m     HEENT: EOMI, PERRL. Sclerae are anicteric. Oral mucosa is pink and moist with no lesions or thrush.   Lymph: Neck is supple with no lymphadenopathy in the cervical or supraclavicular areas.  Right cervical area tender to touch.  Heart: Regular rate and rhythm.   Lungs: Clear to auscultation bilaterally.   GI: Bowel sounds present, soft, nontender with no palpable hepatosplenomegaly or masses.   Extremities: No lower extremity edema noted bilaterally.   Skin: No rashes, petechiae, or bruising noted on exposed skin.    Laboratory Data:  Results for orders placed or performed in visit on 02/01/19 (from the past 24 hour(s))   CBC with platelets differential   Result Value Ref Range    WBC 6.2 4.0 - 11.0 10e9/L    RBC Count 3.74 (L) 3.8 - 5.2 10e12/L    Hemoglobin 11.7 11.7 - 15.7 g/dL    Hematocrit 34.1 (L) 35.0 - 47.0 %    MCV 91 78 - 100 fl    MCH 31.3 26.5 - 33.0 pg    MCHC 34.3 31.5 - 36.5 g/dL    RDW 13.6 10.0 - 15.0 %    Platelet Count 333 150 - 450 10e9/L    Diff Method Automated Method     % Neutrophils 60.1 %    % Lymphocytes 32.0 %    % Monocytes 4.1 %    % Eosinophils 2.6 %    % Basophils 0.6 %    % Immature Granulocytes 0.6 %    Nucleated RBCs 0 0 /100    Absolute Neutrophil 3.7 1.6 - 8.3 10e9/L    Absolute Lymphocytes 2.0 0.8 - 5.3 10e9/L    Absolute Monocytes 0.3 0.0 - 1.3 10e9/L    Absolute Eosinophils 0.2 0.0 - 0.7 10e9/L    Absolute Basophils 0.0 0.0 - 0.2 10e9/L    Abs Immature Granulocytes 0.0 0 - 0.4 10e9/L    Absolute Nucleated RBC 0.0    Comprehensive metabolic panel   Result Value Ref Range    Sodium 138 133 - 144 mmol/L    Potassium 3.5 3.4 - 5.3 mmol/L    Chloride 105 94 - 109 mmol/L    Carbon Dioxide 26 20 - 32 mmol/L    Anion Gap 7 3 - 14 mmol/L    Glucose 94 70 - 99 mg/dL    Urea Nitrogen 18 7 - 30  mg/dL    Creatinine 0.56 0.52 - 1.04 mg/dL    GFR Estimate >90 >60 mL/min/[1.73_m2]    GFR Estimate If Black >90 >60 mL/min/[1.73_m2]    Calcium 8.6 8.5 - 10.1 mg/dL    Bilirubin Total 0.5 0.2 - 1.3 mg/dL    Albumin 3.4 3.4 - 5.0 g/dL    Protein Total 7.1 6.8 - 8.8 g/dL    Alkaline Phosphatase 89 40 - 150 U/L    ALT 74 (H) 0 - 50 U/L    AST 25 0 - 45 U/L         Assessment and Plan:    This is a 48-year-old female with    Left sided breast cancer biopsy showed invasive ductal carcinoma grade 3 n weakly positive for ER(1-3%) [place] negative HER-2 positive  started 11/30  paclitaxel Herceptin and Perjeta -started 11/30  Overall she has been tolerating treatment well today  Labs reviewed with patient.  Proceed with therapy today  Pt has follow up appointment with Dr Stock on 02/15  She has appt with surgeon on 02/28      Genetics  She will see genetic counseling in February    Coping-we talked about coping mechanisms.  She denies depression or suicidal ideation  Patient is doing little better     GERD  Continue omeprazole 20 mg p.o. Daily      IRINA Scruggs CNP  Hem/Onc   [facility]                     Again, thank you for allowing me to participate in the care of your patient.        Sincerely,        IRINA Scruggs CNP

## 2019-02-04 ENCOUNTER — VIRTUAL VISIT (OUTPATIENT)
Dept: ONCOLOGY | Facility: CLINIC | Age: 49
End: 2019-02-04
Attending: INTERNAL MEDICINE
Payer: COMMERCIAL

## 2019-02-04 DIAGNOSIS — C50.412 MALIGNANT NEOPLASM OF UPPER-OUTER QUADRANT OF LEFT BREAST IN FEMALE, ESTROGEN RECEPTOR POSITIVE (H): Primary | ICD-10-CM

## 2019-02-04 DIAGNOSIS — Z17.0 MALIGNANT NEOPLASM OF UPPER-OUTER QUADRANT OF LEFT BREAST IN FEMALE, ESTROGEN RECEPTOR POSITIVE (H): Primary | ICD-10-CM

## 2019-02-04 PROCEDURE — 40000269 ZZH STATISTIC NO CHARGE FACILITY FEE

## 2019-02-04 NOTE — LETTER
Cancer Risk Management  Program [facility]  [facility]  Mailing Address  Cancer Risk Management Program  [facility]  [address]  [place] [address]    New patient appointments  [phone]  February 5, 2019    Saray Huntley  [address] [place] [address]      Dear Saray,    It was a pleasure speaking with you on the phone on 2/4/2019.  Here is a copy of the progress note from our discussion.  If you have any additional questions, please feel free to call.    Referring Provider: Cayla Stock MD    Presenting Information:  I spoke to Saray by phone today to discuss her genetic testing results. Her blood was drawn on 1/7/2019. CustomNext-Cancer testing (19 genes, a combination of GynPlus and BRCAplus + NBN, STK11, and NF1) was ordered  from [facility]. This testing was done because of Saray's recent breast cancer diagnosis for surgical decision making.    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  Medical management decisions should NOT be made based on this test result alone.    Of note, Saray is negative for mutations in the MIRELA, BRCA1, BRCA2, BRIP1, CDH1, CHEK2, EPCAM, MLH1, MSH2, MSH6, NBN, NF1, PALB2, PMS2, PTEN, RAD51C, RAD51D, STK11, and TP53 genes.  No mutations were found in any of the 19 genes analyzed. This test involved sequencing and deletion/duplication analysis of all genes with the exception of EPCAM (deletions/duplications only).    Testing did not detect an identifiable mutation associated with Hereditary Breast and Ovarian Cancer syndrome (BRCA1, BRCA2), Hereditary Diffuse Gastric Cancer (CDH1), Day syndrome (EPCAM, MLH1,  MSH2, MSH6, PMS2), Li Fraumeni syndrome (TP53), Peutz-Jeghers syndrome (STK11), Neurofibromatosis type 1 (NF1), or Cowden syndrome (PTEN).  We reviewed the autosomal dominant inheritance of these genes.  Saray cannot pass on a mutation in any of these genes to her children based on this test result.  Mutations in these genes do not skip generations.      Interpretation:  We discussed several different interpretations of this inconclusive test result.  It is not clear if this variant in the MIRELA gene is associated with increased cancer risk.  1. This variant may be a benign change that does not increase cancer risk.  If this variant is determined to be a benign change, there may be a different gene or combination of genes and environment that are associated with Saray's breast cancer.    2. This variant may be a harmful mutation that is associated with a moderate increase in risk for certain cancers:  o The lifetime breast cancer risk for women who carry one MIRELA mutation is approximately 24-48%, which is a 2-4 fold increase compared to the general population lifetime population risk of 12%. Some mutations in the MIRELA gene may confer a higher risk for breast cancer.  o We also discussed the association of MIRELA mutations with increased risk for pancreatic and prostate cancer; however data is still limited in this area.  Though no exact lifetime risks are available at this time, there may be an increased risk for other cancers.   o In rare situations in which both parents have a harmful mutation in the MIRELA gene, their children each have a 25% risk for ataxia-telangiectasia. Ataxia-telangiectasia is a rare autosomal recessive disorder that typically presents in childhood and can present with widened blood vessels called telangiectasias, progressive neurologic symptoms, immune deficiency, and increased risk for childhood cancers.  If this variant is later classified as harmful, Saray would be considered a carrier for  ataxia-telangiectasia. For individuals of childbearing age with MIRELA mutations, genetic counseling and genetic testing may be advised for their partners.    The lab is working to determine if this variant is harmful or benign, and they will contact me if it is reclassified.      Inheritance:  We reviewed the autosomal dominant inheritance of this variant in the MIRELA gene.  We discussed that Saray has a 50% chance to pass this variant to her children.   Likewise, each of her siblings also has a 50% risk of having the same variant.  Because it is unclear what, if any, risk is associated with this variant, clinical genetic testing is not recommended for relatives.    Family Studies:  The laboratory may offer additional research based testing for specific relatives in order to help reclassify this variant.    Screening:  Based on this inconclusive test result, it is important for Saray and her relatives to refer back to the family history for appropriate cancer screening.      Saray s close female relatives remain at increased risk for breast cancer given their family history. Breast cancer screening is generally recommended to begin approximately 10 years younger than the earliest age of breast cancer diagnosis in the family, or at age 40, whichever comes first. In this family, screening may begin at age 39. Breast screening options should be discussed with an individual's primary care provider and a genetic counselor, to determine at what age to begin screening, what screening is appropriate, and if additional screening (such as breast MRI) is necessary based on personal/family history factors.      Saray should follow recommendations made by her oncology providers/managing physician.      Other population cancer screening, such as recommendations by the American Cancer Society and the National Comprehensive Cancer Network (NCCN), are also appropriate for Saray and her family.  These screening recommendations may change  if there are changes to Saray's personal and/or family history.  Final screening recommendations should be made by each individual's managing physician.      Additional Testing Considerations:  It is possible Saray does carry a gene or combination of genes and environment that increase her risk for breast cancer. Larger gene panels covering more moderate risk genes associated with breast cancer are available, however these genes may not have clear recommendations for management/screening. No additional testing was completed at this time, but can be revisited in the future.      Summary:  We do not have an explanation for Saray's breast cancer.  Because of that, it is important that she continue with cancer screening based on her personal and family history as discussed above.    Genetic testing is rapidly advancing, and new cancer susceptibility genes will most likely be identified in the future.  Therefore, I encouraged Saray to contact me annually or if there are changes in her personal or family history.  This may change how we assess her cancer risk, screening, and the testing we would offer.    Plan:  1.  A copy of Saray's test results will be sent with this letter.    2. She plans to follow-up with her care team for her breast cancer treatment.  3. She should contact me annually, or sooner if family history changes.  4. I will contact Saray if the lab informs me that this VUS has been reclassified.  This may change screening and testing recommendations for relatives.    If Saray has any further questions, I encouraged her to contact me at [phone].    Brenna Callahan MS, [facility]  Licensed Genetic Counselor  [phone]

## 2019-02-04 NOTE — Clinical Note
Please print and send a copy of this letter and the patient's genetic testing report to the patient.    Please enclose test report: SEND OUTS MISCELLANEOUS order Send outs misc test [[id number]] (Order [id number])

## 2019-02-04 NOTE — PROGRESS NOTES
"2/4/2019    Referring Provider: Cayla Stock MD    Presenting Information:  I spoke to Saray by phone today to discuss her genetic testing results. Her blood was drawn on 1/7/2019. CustomNext-Cancer testing (19 genes, a combination of GynPlus and BRCAplus + NBN, STK11, and NF1) was ordered  from [facility]. This testing was done because of Saray's recent breast cancer diagnosis for surgical decision making.    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  Medical management decisions should NOT be made based on this test result alone.    Of note, Saray is negative for mutations in the MIRELA, BRCA1, BRCA2, BRIP1, CDH1, CHEK2, EPCAM, MLH1, MSH2, MSH6, NBN, NF1, PALB2, PMS2, PTEN, RAD51C, RAD51D, STK11, and TP53 genes.  No mutations were found in any of the 19 genes analyzed. This test involved sequencing and deletion/duplication analysis of all genes with the exception of EPCAM (deletions/duplications only).    Testing did not detect an identifiable mutation associated with Hereditary Breast and Ovarian Cancer syndrome (BRCA1, BRCA2), Hereditary Diffuse Gastric Cancer (CDH1), Day syndrome (EPCAM, MLH1, MSH2, MSH6, PMS2), Li Fraumeni syndrome (TP53), Peutz-Jeghers syndrome (STK11), Neurofibromatosis type 1 (NF1), or Cowden syndrome (PTEN).  We reviewed the autosomal dominant inheritance of these genes.  Saray cannot pass on a mutation in any of these genes to her children based on this test result.  Mutations in these genes do not skip generations.      A copy of the test report can be found in the Laboratory tab, dated 1/7/2019, and named \"SEND OUTS MISC TEST\". The report is scanned in as a linked document.    Interpretation:  We discussed several different interpretations of this inconclusive test result.  It is not clear if this variant in the MIRELA gene is associated with " increased cancer risk.  1. This variant may be a benign change that does not increase cancer risk.  If this variant is determined to be a benign change, there may be a different gene or combination of genes and environment that are associated with Saray's breast cancer.    2. This variant may be a harmful mutation that is associated with a moderate increase in risk for certain cancers:  o The lifetime breast cancer risk for women who carry one MIRELA mutation is approximately 24-48%, which is a 2-4 fold increase compared to the general population lifetime population risk of 12%. Some mutations in the MIRELA gene may confer a higher risk for breast cancer.  o We also discussed the association of MIRELA mutations with increased risk for pancreatic and prostate cancer; however data is still limited in this area.  Though no exact lifetime risks are available at this time, there may be an increased risk for other cancers.   o In rare situations in which both parents have a harmful mutation in the MIRELA gene, their children each have a 25% risk for ataxia-telangiectasia. Ataxia-telangiectasia is a rare autosomal recessive disorder that typically presents in childhood and can present with widened blood vessels called telangiectasias, progressive neurologic symptoms, immune deficiency, and increased risk for childhood cancers.  If this variant is later classified as harmful, Saray would be considered a carrier for ataxia-telangiectasia. For individuals of childbearing age with MIRELA mutations, genetic counseling and genetic testing may be advised for their partners.    The lab is working to determine if this variant is harmful or benign, and they will contact me if it is reclassified.      Inheritance:  We reviewed the autosomal dominant inheritance of this variant in the MIRELA gene.  We discussed that Saray has a 50% chance to pass this variant to her children.   Likewise, each of her siblings also has a 50% risk of having the same  variant.  Because it is unclear what, if any, risk is associated with this variant, clinical genetic testing is not recommended for relatives.    Family Studies:  The laboratory may offer additional research based testing for specific relatives in order to help reclassify this variant.    Screening:  Based on this inconclusive test result, it is important for Saray and her relatives to refer back to the family history for appropriate cancer screening.      Saray s close female relatives remain at increased risk for breast cancer given their family history. Breast cancer screening is generally recommended to begin approximately 10 years younger than the earliest age of breast cancer diagnosis in the family, or at age 40, whichever comes first. In this family, screening may begin at age 39. Breast screening options should be discussed with an individual's primary care provider and a genetic counselor, to determine at what age to begin screening, what screening is appropriate, and if additional screening (such as breast MRI) is necessary based on personal/family history factors.      Saray should follow recommendations made by her oncology providers/managing physician.      Other population cancer screening, such as recommendations by the American Cancer Society and the National Comprehensive Cancer Network (NCCN), are also appropriate for Saray and her family.  These screening recommendations may change if there are changes to Saray's personal and/or family history.  Final screening recommendations should be made by each individual's managing physician.      Additional Testing Considerations:  It is possible Saray does carry a gene or combination of genes and environment that increase her risk for breast cancer. Larger gene panels covering more moderate risk genes associated with breast cancer are available, however these genes may not have clear recommendations for management/screening. No additional testing was  completed at this time, but can be revisited in the future.      Summary:  We do not have an explanation for Saray's breast cancer.  Because of that, it is important that she continue with cancer screening based on her personal and family history as discussed above.    Genetic testing is rapidly advancing, and new cancer susceptibility genes will most likely be identified in the future.  Therefore, I encouraged Saray to contact me annually or if there are changes in her personal or family history.  This may change how we assess her cancer risk, screening, and the testing we would offer.    Plan:  1.  A copy of Saray's test results will be sent to her with this letter.    2. She plans to follow-up with her care team for her breast cancer treatment.  3. She should contact me annually, or sooner if family history changes.  4. I will contact Saray if the lab informs me that this VUS has been reclassified.  This may change screening and testing recommendations for relatives.    If Saray has any further questions, I encouraged her to contact me at [phone].    Time spent over phone: 15 minutes.    Brenna Callahan MS, [facility]  Licensed Genetic Counselor  [phone]

## 2019-02-08 ENCOUNTER — HOSPITAL ENCOUNTER (OUTPATIENT)
Facility: CLINIC | Age: 49
Setting detail: SPECIMEN
Discharge: HOME | End: 2019-02-08
Attending: INTERNAL MEDICINE | Admitting: NURSE PRACTITIONER
Payer: COMMERCIAL

## 2019-02-08 ENCOUNTER — INFUSION THERAPY VISIT (OUTPATIENT)
Dept: INFUSION THERAPY | Facility: CLINIC | Age: 49
End: 2019-02-08
Attending: INTERNAL MEDICINE
Payer: COMMERCIAL

## 2019-02-08 VITALS
SYSTOLIC BLOOD PRESSURE: 125 MMHG | WEIGHT: 175.2 LBS | TEMPERATURE: 98.7 F | HEART RATE: 80 BPM | DIASTOLIC BLOOD PRESSURE: 76 MMHG | BODY MASS INDEX: 34.22 KG/M2 | OXYGEN SATURATION: 98 %

## 2019-02-08 DIAGNOSIS — Z17.0 MALIGNANT NEOPLASM OF UPPER-OUTER QUADRANT OF LEFT BREAST IN FEMALE, ESTROGEN RECEPTOR POSITIVE (H): Primary | ICD-10-CM

## 2019-02-08 DIAGNOSIS — C50.412 MALIGNANT NEOPLASM OF UPPER-OUTER QUADRANT OF LEFT BREAST IN FEMALE, ESTROGEN RECEPTOR POSITIVE (H): Primary | ICD-10-CM

## 2019-02-08 DIAGNOSIS — Z95.828 PORT-A-CATH IN PLACE: ICD-10-CM

## 2019-02-08 LAB
BASOPHILS # BLD AUTO: 0 10E9/L (ref 0–0.2)
BASOPHILS NFR BLD AUTO: 0.4 %
DIFFERENTIAL METHOD BLD: ABNORMAL
EOSINOPHIL # BLD AUTO: 0.2 10E9/L (ref 0–0.7)
EOSINOPHIL NFR BLD AUTO: 2.3 %
ERYTHROCYTE [DISTWIDTH] IN BLOOD BY AUTOMATED COUNT: 13.9 % (ref 10–15)
HCT VFR BLD AUTO: 34.3 % (ref 35–47)
HGB BLD-MCNC: 11.5 G/DL (ref 11.7–15.7)
IMM GRANULOCYTES # BLD: 0 10E9/L (ref 0–0.4)
IMM GRANULOCYTES NFR BLD: 0.6 %
LYMPHOCYTES # BLD AUTO: 2.3 10E9/L (ref 0.8–5.3)
LYMPHOCYTES NFR BLD AUTO: 33.2 %
MCH RBC QN AUTO: 31.1 PG (ref 26.5–33)
MCHC RBC AUTO-ENTMCNC: 33.5 G/DL (ref 31.5–36.5)
MCV RBC AUTO: 93 FL (ref 78–100)
MONOCYTES # BLD AUTO: 0.4 10E9/L (ref 0–1.3)
MONOCYTES NFR BLD AUTO: 5.5 %
NEUTROPHILS # BLD AUTO: 4 10E9/L (ref 1.6–8.3)
NEUTROPHILS NFR BLD AUTO: 58 %
PLATELET # BLD AUTO: 351 10E9/L (ref 150–450)
RBC # BLD AUTO: 3.7 10E12/L (ref 3.8–5.2)
WBC # BLD AUTO: 6.9 10E9/L (ref 4–11)

## 2019-02-08 PROCEDURE — 96367 TX/PROPH/DG ADDL SEQ IV INF: CPT

## 2019-02-08 PROCEDURE — 25000132 ZZH RX MED GY IP 250 OP 250 PS 637: Performed by: NURSE PRACTITIONER

## 2019-02-08 PROCEDURE — 85025 COMPLETE CBC W/AUTO DIFF WBC: CPT | Performed by: NURSE PRACTITIONER

## 2019-02-08 PROCEDURE — 25000128 H RX IP 250 OP 636: Performed by: NURSE PRACTITIONER

## 2019-02-08 PROCEDURE — 96413 CHEMO IV INFUSION 1 HR: CPT

## 2019-02-08 PROCEDURE — 25000128 H RX IP 250 OP 636: Performed by: INTERNAL MEDICINE

## 2019-02-08 RX ORDER — HEPARIN SODIUM (PORCINE) LOCK FLUSH IV SOLN 100 UNIT/ML 100 UNIT/ML
500 SOLUTION INTRAVENOUS ONCE
Status: CANCELLED
Start: 2019-02-08 | End: 2019-02-08

## 2019-02-08 RX ORDER — HEPARIN SODIUM (PORCINE) LOCK FLUSH IV SOLN 100 UNIT/ML 100 UNIT/ML
500 SOLUTION INTRAVENOUS ONCE
Status: COMPLETED | OUTPATIENT
Start: 2019-02-08 | End: 2019-02-08

## 2019-02-08 RX ORDER — DIPHENHYDRAMINE HCL 25 MG
50 CAPSULE ORAL ONCE
Status: COMPLETED | OUTPATIENT
Start: 2019-02-08 | End: 2019-02-08

## 2019-02-08 RX ADMIN — ALTEPLASE 2 MG: 2.2 INJECTION, POWDER, LYOPHILIZED, FOR SOLUTION INTRAVENOUS at 09:10

## 2019-02-08 RX ADMIN — HEPARIN SODIUM (PORCINE) LOCK FLUSH IV SOLN 100 UNIT/ML 500 UNITS: 100 SOLUTION at 11:41

## 2019-02-08 RX ADMIN — DEXAMETHASONE SODIUM PHOSPHATE 20 MG: 10 INJECTION, SOLUTION INTRAMUSCULAR; INTRAVENOUS at 09:43

## 2019-02-08 RX ADMIN — DIPHENHYDRAMINE HYDROCHLORIDE 25 MG: 25 CAPSULE ORAL at 09:34

## 2019-02-08 RX ADMIN — FAMOTIDINE 20 MG: 20 INJECTION, SOLUTION INTRAVENOUS at 10:06

## 2019-02-08 RX ADMIN — SODIUM CHLORIDE 250 ML: 9 INJECTION, SOLUTION INTRAVENOUS at 09:28

## 2019-02-08 RX ADMIN — PACLITAXEL 146 MG: 6 INJECTION, SOLUTION INTRAVENOUS at 10:33

## 2019-02-08 ASSESSMENT — PAIN SCALES - GENERAL: PAINLEVEL: NO PAIN (0)

## 2019-02-08 NOTE — PROGRESS NOTES
Infusion Nursing Note:  Saray Huntley presents today for C4D8 taxol.    Patient seen by provider today: No   present during visit today: Not Applicable.    Note: N/A.    Intravenous Access:  Implanted Port with poor blood return. Labs drawn from left AC, butterfly 23G.     Treatment Conditions:  Lab Results   Component Value Date    HGB 11.5 02/08/2019     Lab Results   Component Value Date    WBC 6.9 02/08/2019      Lab Results   Component Value Date    ANEU 4.0 02/08/2019     Lab Results   Component Value Date     02/08/2019      Results reviewed, labs MET treatment parameters, ok to proceed with treatment.      Post Infusion Assessment:  Patient tolerated infusion without incident.  Site patent and intact, free from redness, edema or discomfort.  No evidence of extravasations.  Access discontinued per protocol.    Discharge Plan:   Patient and/or family verbalized understanding of discharge instructions and all questions answered.  Copy of AVS reviewed with patient and/or family.  Patient will return 2/15 for next appointment.  Patient discharged in stable condition accompanied by: sister.  Departure Mode: Ambulatory.    Reta Bassett RN

## 2019-02-15 ENCOUNTER — HOSPITAL ENCOUNTER (OUTPATIENT)
Facility: CLINIC | Age: 49
Setting detail: SPECIMEN
Discharge: HOME | End: 2019-02-15
Attending: INTERNAL MEDICINE | Admitting: NURSE PRACTITIONER
Payer: COMMERCIAL

## 2019-02-15 ENCOUNTER — ONCOLOGY VISIT (OUTPATIENT)
Dept: ONCOLOGY | Facility: CLINIC | Age: 49
End: 2019-02-15
Attending: INTERNAL MEDICINE
Payer: COMMERCIAL

## 2019-02-15 ENCOUNTER — INFUSION THERAPY VISIT (OUTPATIENT)
Dept: INFUSION THERAPY | Facility: CLINIC | Age: 49
End: 2019-02-15
Attending: INTERNAL MEDICINE
Payer: COMMERCIAL

## 2019-02-15 VITALS
WEIGHT: 173.8 LBS | HEART RATE: 63 BPM | DIASTOLIC BLOOD PRESSURE: 71 MMHG | BODY MASS INDEX: 34.12 KG/M2 | SYSTOLIC BLOOD PRESSURE: 104 MMHG | TEMPERATURE: 98.3 F | RESPIRATION RATE: 18 BRPM | HEIGHT: 60 IN | OXYGEN SATURATION: 97 %

## 2019-02-15 VITALS
WEIGHT: 173 LBS | HEART RATE: 72 BPM | HEIGHT: 60 IN | OXYGEN SATURATION: 98 % | RESPIRATION RATE: 18 BRPM | TEMPERATURE: 98.2 F | SYSTOLIC BLOOD PRESSURE: 114 MMHG | DIASTOLIC BLOOD PRESSURE: 77 MMHG | BODY MASS INDEX: 33.96 KG/M2

## 2019-02-15 DIAGNOSIS — Z95.828 PORT-A-CATH IN PLACE: ICD-10-CM

## 2019-02-15 DIAGNOSIS — C50.412 MALIGNANT NEOPLASM OF UPPER-OUTER QUADRANT OF LEFT BREAST IN FEMALE, ESTROGEN RECEPTOR POSITIVE (H): Primary | ICD-10-CM

## 2019-02-15 DIAGNOSIS — Z17.0 MALIGNANT NEOPLASM OF UPPER-OUTER QUADRANT OF LEFT BREAST IN FEMALE, ESTROGEN RECEPTOR POSITIVE (H): Primary | ICD-10-CM

## 2019-02-15 LAB
BASOPHILS # BLD AUTO: 0 10E9/L (ref 0–0.2)
BASOPHILS NFR BLD AUTO: 0.3 %
DIFFERENTIAL METHOD BLD: ABNORMAL
EOSINOPHIL # BLD AUTO: 0.2 10E9/L (ref 0–0.7)
EOSINOPHIL NFR BLD AUTO: 2.9 %
ERYTHROCYTE [DISTWIDTH] IN BLOOD BY AUTOMATED COUNT: 13.7 % (ref 10–15)
HCT VFR BLD AUTO: 33.5 % (ref 35–47)
HGB BLD-MCNC: 11.2 G/DL (ref 11.7–15.7)
IMM GRANULOCYTES # BLD: 0 10E9/L (ref 0–0.4)
IMM GRANULOCYTES NFR BLD: 0.7 %
LYMPHOCYTES # BLD AUTO: 1.9 10E9/L (ref 0.8–5.3)
LYMPHOCYTES NFR BLD AUTO: 32.6 %
MCH RBC QN AUTO: 30.9 PG (ref 26.5–33)
MCHC RBC AUTO-ENTMCNC: 33.4 G/DL (ref 31.5–36.5)
MCV RBC AUTO: 93 FL (ref 78–100)
MONOCYTES # BLD AUTO: 0.3 10E9/L (ref 0–1.3)
MONOCYTES NFR BLD AUTO: 5.7 %
NEUTROPHILS # BLD AUTO: 3.4 10E9/L (ref 1.6–8.3)
NEUTROPHILS NFR BLD AUTO: 57.8 %
NRBC # BLD AUTO: 0 10*3/UL
NRBC BLD AUTO-RTO: 0 /100
PLATELET # BLD AUTO: 333 10E9/L (ref 150–450)
RBC # BLD AUTO: 3.62 10E12/L (ref 3.8–5.2)
WBC # BLD AUTO: 5.9 10E9/L (ref 4–11)

## 2019-02-15 PROCEDURE — 96413 CHEMO IV INFUSION 1 HR: CPT

## 2019-02-15 PROCEDURE — 25000128 H RX IP 250 OP 636: Performed by: INTERNAL MEDICINE

## 2019-02-15 PROCEDURE — 25800030 ZZH RX IP 258 OP 636: Performed by: NURSE PRACTITIONER

## 2019-02-15 PROCEDURE — 25000128 H RX IP 250 OP 636: Performed by: NURSE PRACTITIONER

## 2019-02-15 PROCEDURE — 25000132 ZZH RX MED GY IP 250 OP 250 PS 637: Performed by: NURSE PRACTITIONER

## 2019-02-15 PROCEDURE — 96367 TX/PROPH/DG ADDL SEQ IV INF: CPT

## 2019-02-15 PROCEDURE — 85025 COMPLETE CBC W/AUTO DIFF WBC: CPT | Performed by: NURSE PRACTITIONER

## 2019-02-15 PROCEDURE — 99215 OFFICE O/P EST HI 40 MIN: CPT | Performed by: INTERNAL MEDICINE

## 2019-02-15 RX ORDER — HEPARIN SODIUM (PORCINE) LOCK FLUSH IV SOLN 100 UNIT/ML 100 UNIT/ML
500 SOLUTION INTRAVENOUS ONCE
Status: COMPLETED | OUTPATIENT
Start: 2019-02-15 | End: 2019-02-15

## 2019-02-15 RX ORDER — DIPHENHYDRAMINE HCL 25 MG
50 CAPSULE ORAL ONCE
Status: COMPLETED | OUTPATIENT
Start: 2019-02-15 | End: 2019-02-15

## 2019-02-15 RX ORDER — HEPARIN SODIUM (PORCINE) LOCK FLUSH IV SOLN 100 UNIT/ML 100 UNIT/ML
500 SOLUTION INTRAVENOUS ONCE
Status: CANCELLED
Start: 2019-02-15 | End: 2019-02-15

## 2019-02-15 RX ADMIN — PACLITAXEL 146 MG: 6 INJECTION, SOLUTION INTRAVENOUS at 10:20

## 2019-02-15 RX ADMIN — FAMOTIDINE 20 MG: 20 INJECTION, SOLUTION INTRAVENOUS at 09:56

## 2019-02-15 RX ADMIN — SODIUM CHLORIDE 250 ML: 9 INJECTION, SOLUTION INTRAVENOUS at 09:37

## 2019-02-15 RX ADMIN — HEPARIN SODIUM (PORCINE) LOCK FLUSH IV SOLN 100 UNIT/ML 500 UNITS: 100 SOLUTION at 11:24

## 2019-02-15 RX ADMIN — DEXAMETHASONE SODIUM PHOSPHATE 20 MG: 10 INJECTION, SOLUTION INTRAMUSCULAR; INTRAVENOUS at 09:39

## 2019-02-15 RX ADMIN — DIPHENHYDRAMINE HYDROCHLORIDE 25 MG: 25 CAPSULE ORAL at 09:30

## 2019-02-15 ASSESSMENT — MIFFLIN-ST. JEOR
SCORE: 1329.85
SCORE: 1326.22

## 2019-02-15 ASSESSMENT — PAIN SCALES - GENERAL
PAINLEVEL: MILD PAIN (3)
PAINLEVEL: MILD PAIN (2)

## 2019-02-15 NOTE — PROGRESS NOTES
Infusion Nursing Note:  Saray Huntley presents today for D15 C4 Taxol.    Patient seen by provider today: Yes: Dr. Stock   present during visit today: Not Applicable.    Note: Pt verbalized that her neuropathy bilateral toes and fingertips are getting worst. Instructed pt to mention this to Dr. Stock.    Intravenous Access:  Implanted Port.    Treatment Conditions:  Lab Results   Component Value Date    HGB 11.2 02/15/2019     Lab Results   Component Value Date    WBC 5.9 02/15/2019      Lab Results   Component Value Date    ANEU 3.4 02/15/2019     Lab Results   Component Value Date     02/15/2019      Results reviewed, labs MET treatment parameters, ok to proceed with treatment.      Post Infusion Assessment:  Patient tolerated infusion without incident.  Blood return noted pre and post infusion.  Site patent and intact, free from redness, edema or discomfort.  No evidence of extravasations.  Access discontinued per protocol.    Discharge Plan:   Patient declined prescription refills.  Discharge instructions reviewed with: Patient and Family.  Patient and/or family verbalized understanding of discharge instructions and all questions answered.  Copy of AVS reviewed with patient and/or family.  Patient will return 2/22/2019 for next appointment.  Patient discharged in stable condition accompanied by: self, , brother, sister and mother.  Departure Mode: Ambulatory.    Kim Elder RN

## 2019-02-15 NOTE — PATIENT INSTRUCTIONS
-proceed with paclitaxel today  -schedule Herceptin next Friday, 2/22/19 - patient wants early appointment  -schedule left breast ultrasound next Friday, 2/22/19    Pt. Is in [facility]

## 2019-02-15 NOTE — PROGRESS NOTES
Oncology Rooming Note    February 15, 2019 8:40 AM   Saray Huntley is a 49 year old female who presents for:    Chief Complaint   Patient presents with     Oncology Clinic Visit     Malignant neoplasm of upper-outer quadrant of left breast in female, estrogen receptor positive (H)     Initial Vitals: /77   Pulse 72   Temp 98.2  F (36.8  C) (Oral)   Resp 18   Ht 1.524 m (5')   Wt 78.5 kg (173 lb)   LMP 09/29/2012 (Approximate)   SpO2 98%   BMI 33.79 kg/m   Estimated body mass index is 33.79 kg/m  as calculated from the following:    Height as of this encounter: 1.524 m (5').    Weight as of this encounter: 78.5 kg (173 lb). Body surface area is 1.82 meters squared.  Mild Pain (2) Comment: Data Unavailable   Patient's last menstrual period was 09/29/2012 (approximate).  Allergies reviewed: Yes  Medications reviewed: Yes    Medications: Medication refills not needed today.  Pharmacy name entered into [facility]:    [id number] - [place], [place] - [address] AT [facility] [address] [facility] [place]  [place] - [address]    Clinical concerns: none     8 minutes for nursing intake (face to face time)     Yancy Juarez CMA

## 2019-02-15 NOTE — LETTER
2/15/2019         RE: Saray Huntley  [address]  [place] [address]        Dear Colleague,    Thank you for referring your patient, Saray Huntley, to the [facility]. Please see a copy of my visit note below.    Oncology Rooming Note    February 15, 2019 8:40 AM   Saray Huntley is a 49 year old female who presents for:    Chief Complaint   Patient presents with     Oncology Clinic Visit     Malignant neoplasm of upper-outer quadrant of left breast in female, estrogen receptor positive (H)     Initial Vitals: /77   Pulse 72   Temp 98.2  F (36.8  C) (Oral)   Resp 18   Ht 1.524 m (5')   Wt 78.5 kg (173 lb)   LMP 09/29/2012 (Approximate)   SpO2 98%   BMI 33.79 kg/m    Estimated body mass index is 33.79 kg/m  as calculated from the following:    Height as of this encounter: 1.524 m (5').    Weight as of this encounter: 78.5 kg (173 lb). Body surface area is 1.82 meters squared.  Mild Pain (2) Comment: Data Unavailable   Patient's last menstrual period was 09/29/2012 (approximate).  Allergies reviewed: Yes  Medications reviewed: Yes    Medications: Medication refills not needed today.  Pharmacy name entered into [facility]:    [id number]  [place], [place] - [address] AT [facility] OF [address] [facility] [place]  [place] - [address]    Clinical concerns: none     8 minutes for nursing intake (face to face time)     Yancy Juarez [facility]    Hematology/Oncology Established Patient Note      Today's Date: 2/15/19    Reason for Follow-up: left-sided breast cancer      HISTORY OF PRESENT ILLNESS: Saray Huntley is a 48 year old female who presents with left-sided breast cancer.  It was found on a screening mammogram.  Left breast ultrasound found a hypoechoic mass at the 12:00 position, 8 cm from the nipple, measuring 1.5 x 1.6 x 1.5 cm.  Axilla survey showed only normal-appearing lymph nodes.    Biopsy showed invasive ductal carcinoma, grade 3, weakly positive for ER (1-3%), WA negative, HER-2 positive.      Case was reviewed at tumor conference, and recommendation was for neoadjuvant chemotherapy.    Port was placed on 11/28/18.    She started neoadjuvant chemotherapy on 11/30/18 with weekly paclitaxel, Herceptin, Perjeta.  C1D1: 11/30/18; C1D8: 12/7/18; C1D15: 12/14/18  C2D1: 12/21/18; C2D8: 12/28/18; C2D15: 1/4/19  C3D1: 1/11/19; C3D8: 1/18/19; C3D15: 1/25/19  C4D1: 2/1/19; C4D8: 2/8/19; C4D15: 2/15/19        INTERIM HISTORY: Patient is here for follow-up today.  She is happy that this is her last day of chemotherapy, but is nervous about upcoming surgery.  She still has neuropathy in her fingers that is stable.        REVIEW OF SYSTEMS:   14 point ROS was reviewed and is negative other than as noted above in HPI.       HOME MEDICATIONS:  Current Outpatient Medications   Medication Sig Dispense Refill     HYDROcodone-acetaminophen (NORCO) 5-325 MG tablet Take 1-2 tablets by mouth every 4 hours as needed for moderate to severe pain (Patient not taking: Reported on 2/8/2019) 15 tablet 0     Lidocaine-Prilocaine (STUDY - LIDOCAINE 2.5%/PRILOCAINE 2.5% CREAM, IDS# 4243,) Externally apply topically daily as needed for moderate pain 30 g 3     LORazepam (ATIVAN) 0.5 MG tablet Take 1 tablet (0.5 mg) by mouth every 4 hours as needed (Anxiety, Nausea/Vomiting or Sleep) (Patient not taking: Reported on 2/8/2019) 30 tablet 2     ondansetron (ZOFRAN ODT) 4 MG ODT tab Take 1 tablet (4 mg) by mouth 3 times daily (before meals) (Patient not taking: Reported on 2/8/2019) 60 tablet 1     prochlorperazine (COMPAZINE) 10 MG tablet Take 1 tablet (10 mg) by mouth every 6 hours as needed (Nausea/Vomiting) (Patient not taking: Reported on 2/8/2019) 30 tablet 2         ALLERGIES:  Allergies   Allergen Reactions     Sulfa Drugs Rash         PAST MEDICAL HISTORY:  Past Medical History:   Diagnosis Date     Breast cancer (H)       Hypothyroidism, unspecified type 2017     PONV (postoperative nausea and vomiting)          PAST SURGICAL HISTORY:  Past Surgical History:   Procedure Laterality Date      SECTION  2003     INSERT PORT VASCULAR ACCESS N/A 2018    Procedure: PORT PLACEMENT ;  Surgeon: Sae Montaño MD;  Location:  OR         SOCIAL HISTORY:  Social History     Socioeconomic History     Marital status:      Spouse name: Not on file     Number of children: Not on file     Years of education: Not on file     Highest education level: Not on file   Social Needs     Financial resource strain: Not on file     Food insecurity - worry: Not on file     Food insecurity - inability: Not on file     Transportation needs - medical: Not on file     Transportation needs - non-medical: Not on file   Occupational History     Not on file   Tobacco Use     Smoking status: Never Smoker     Smokeless tobacco: Never Used   Substance and Sexual Activity     Alcohol use: No     Drug use: No     Sexual activity: Not Currently   Other Topics Concern     Parent/sibling w/ CABG, MI or angioplasty before 65F 55M? Not Asked   Social History Narrative     Not on file   She has never smoked.  She does not drink or use illicit drugs.  She is going to school at [employer] studying IT.  She has 1 daughter who is 15 years old.  She says that she has menopause a few years old.  She tried oral contraceptive, but she did not tolerate it well, so did not take it long term.  She has never taken hormone replacement therapy          FAMILY HISTORY:  Family History   Problem Relation Age of Onset     Hyperlipidemia Mother      Kidney Disease Mother      Diabetes Father    Patient does not know much about her family history and is unaware if there is breast cancer or ovarian cancer in her family.        PHYSICAL EXAM:  Vital signs:  /77   Pulse 72   Temp 98.2  F (36.8  C) (Oral)   Resp 18   Ht 1.524 m (5')   Wt 78.5 kg (173  lb)   LMP 2012 (Approximate)   SpO2 98%   BMI 33.79 kg/m      ECO  GENERAL/CONSTITUTIONAL: No acute distress. Accompanied by father and sister.  EYES: No scleral icterus.  LYMPH: No anterior cervical, posterior cervical, supraclavicular, or axillary adenopathy.   RESPIRATORY: Clear to auscultation bilaterally. No crackles or wheezing.   CARDIOVASCULAR: Regular rate and rhythm without murmurs, gallops, or rubs.  GASTROINTESTINAL: No tenderness. The patient has normal bowel sounds. No guarding.  No distention.  BREAST: Right-no palpable mass, discharge, rash, or axillary lymphadenopathy.  Left-there was palpable mass at the 12:00 position, now no longer palpable.   MUSCULOSKELETAL: Warm and well-perfused, no cyanosis, clubbing, or edema.  NEUROLOGIC: Alert, oriented, answers questions appropriately.  INTEGUMENTARY: No jaundice.  GAIT: Steady, does not use assistive device  Port in place at right upper chest.      LABS:  CBC RESULTS:   Recent Labs   Lab Test 02/15/19  0824   WBC 5.9   RBC 3.62*   HGB 11.2*   HCT 33.5*   MCV 93   MCH 30.9   MCHC 33.4   RDW 13.7            PATHOLOGY:  18:  FINAL DIAGNOSIS:   Left breast, 12:00, 8.0 cm from nipple, ultrasound-guided needle biopsy   - Invasive ductal carcinoma, [place] grade 3 (of 3)   - Weakly positive for estrogen receptor (1-3%)   - Negative for progesterone receptor     INTERPRETATION:   Per the American Society of Clinical Oncology/College of American   Pathologists Clinical Practice Guideline   Focused Update (Claire LONGORIA et al, 2018, Arch Pathol Lab Med     doi:[email or web address]/arpa.[id number]), the HER2/BREANNA 17   ratio of >12.4 and average number of HER2 signals/cell of >21.0 places   this specimen in Group 1 (DINESH   Positive).       IMAGING:  Left breast ultrasound 18:  FINDINGS:  Targeted ultrasound shows a hypoechoic mass at the 12:00  position, 8 cm from the nipple, measuring 1.5 x 1.6 x 1.5 cm. Survey  of the axilla shows only  normal-appearing lymph nodes.      Echo 11/20/18:  Global peak LV longitudinal strain is averaged at -24.6%. This is within  reported normal limits (normal <-18%).  The visual ejection fraction is estimated at 64.7%.  Left ventricular systolic function is normal.      ASSESSMENT/PLAN:  Saray Huntley is a 48 year old post-menopausal female with:    1) Left-sided breast cancer:  1.5 x 1.6 x 1.5 cm on ultrasound.  Axilla survey showed only normal-appearing lymph nodes.   Biopsy showed invasive ductal carcinoma, grade 3, weakly positive for ER (1-3%), AR negative, HER-2 positive.  Clinical stage IA (cT1cN0).    She has HER-2 positive tumor measuring 1.6 cm.  I recommended neoadjuvant chemotherapy with HER-2 directed therapy.  Because she has a relatively small tumor that is stage I, we can start with paclitaxel+Herceptin+Perjeta x 12 weeks and assess response, as there is data that this is adequate in small lymph node negative tumors.  If she has a good clinical response, she may be able to go on to surgery, and if she has a complete pathologic response, we may be able to avoid anthracycline.  She will undergo Herceptin to complete 1 year treatment after surgery.  Although her ER positivity was weak, she may benefit from anti-hormonal therapy after surgery as well.      She has already met with Dr. Montaño and reviewed surgical options, but she has not decided if she wants mastectomy or lumpectomy with radiation.      She is overall tolerating chemotherapy well.  Her left breast lump is no longer palpable.  She had many questions, which were answered.  She also expressed concern that her appearance after surgery is very important to her.  She has many questions for the surgeon regarding how the site will look after surgery and about scarring.      -proceed with C4D15 of chemotherapy today - last day of chemotherapy.    -she had a lot of questions and expressed anxiety about the cosmetic outcome of her surgery.  With her  genetic testing results, she thinks that she wants to proceed with lumpectomy.  She is going to meet with Dr. Montaño again soon.  She wants to know if the chemotherapy is working.  Her breast lump is much smaller on exam now, so chemotherapy appears to be working.  However, it would be reasonable to obtain another ultrasound after her chemotherapy is done, prior to surgery, due to the patient's anxiety.    -will obtain left breast ultrasound next week  -follow-up on recommendations from Dr. Montaño  -she will continue with Herceptin every 3 weeks while awaiting surgery    2) Nutrition: She was contacted by nutrition.    3) Genetics: She was diagnosed at age 48 with breast cancer  -she met with genetic counseling.  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    4) Chemotherapy-induced nausea: mild, controlled with home anti-emetics.    5) Mouth rawness: mild, controlled with home salt/baking soda rinses.    6) Fatigue: Secondary to chemotherapy.    6) Coping: Patient seems to be doing better now.    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  Today is her last dose of paclitaxel.    -monitor for now  -if it does not improve after completion of chemotherapy, we can discuss other options, such as medications, acupuncture      I spent a total of 40 minutes with the patient, with over >50% of the time in counseling and/or coordination of care.       Cayla Stock MD  Hematology/Oncology  [facility]    Again, thank you for allowing me to participate in the care of your patient.        Sincerely,        Cayla Stock MD

## 2019-02-15 NOTE — PROGRESS NOTES
[facility]    Hematology/Oncology Established Patient Note      Today's Date: 2/15/19    Reason for Follow-up: left-sided breast cancer      HISTORY OF PRESENT ILLNESS: Saray Huntley is a 48 year old female who presents with left-sided breast cancer.  It was found on a screening mammogram.  Left breast ultrasound found a hypoechoic mass at the 12:00 position, 8 cm from the nipple, measuring 1.5 x 1.6 x 1.5 cm.  Axilla survey showed only normal-appearing lymph nodes.   Biopsy showed invasive ductal carcinoma, grade 3, weakly positive for ER (1-3%), TX negative, HER-2 positive.      Case was reviewed at tumor conference, and recommendation was for neoadjuvant chemotherapy.    Port was placed on 11/28/18.    She started neoadjuvant chemotherapy on 11/30/18 with weekly paclitaxel, Herceptin, Perjeta.  C1D1: 11/30/18; C1D8: 12/7/18; C1D15: 12/14/18  C2D1: 12/21/18; C2D8: 12/28/18; C2D15: 1/4/19  C3D1: 1/11/19; C3D8: 1/18/19; C3D15: 1/25/19  C4D1: 2/1/19; C4D8: 2/8/19; C4D15: 2/15/19        INTERIM HISTORY: Patient is here for follow-up today.  She is happy that this is her last day of chemotherapy, but is nervous about upcoming surgery.  She still has neuropathy in her fingers that is stable.        REVIEW OF SYSTEMS:   14 point ROS was reviewed and is negative other than as noted above in HPI.       HOME MEDICATIONS:  Current Outpatient Medications   Medication Sig Dispense Refill     HYDROcodone-acetaminophen (NORCO) 5-325 MG tablet Take 1-2 tablets by mouth every 4 hours as needed for moderate to severe pain (Patient not taking: Reported on 2/8/2019) 15 tablet 0     Lidocaine-Prilocaine (STUDY - LIDOCAINE 2.5%/PRILOCAINE 2.5% CREAM, IDS# 4243,) Externally apply topically daily as needed for moderate pain 30 g 3     LORazepam (ATIVAN) 0.5 MG tablet Take 1 tablet (0.5 mg) by mouth every 4 hours as needed (Anxiety, Nausea/Vomiting or Sleep) (Patient not taking: Reported on 2/8/2019) 30 tablet 2      ondansetron (ZOFRAN ODT) 4 MG ODT tab Take 1 tablet (4 mg) by mouth 3 times daily (before meals) (Patient not taking: Reported on 2019) 60 tablet 1     prochlorperazine (COMPAZINE) 10 MG tablet Take 1 tablet (10 mg) by mouth every 6 hours as needed (Nausea/Vomiting) (Patient not taking: Reported on 2019) 30 tablet 2         ALLERGIES:  Allergies   Allergen Reactions     Sulfa Drugs Rash         PAST MEDICAL HISTORY:  Past Medical History:   Diagnosis Date     Breast cancer (H)      Hypothyroidism, unspecified type 2017     PONV (postoperative nausea and vomiting)          PAST SURGICAL HISTORY:  Past Surgical History:   Procedure Laterality Date      SECTION  2003     INSERT PORT VASCULAR ACCESS N/A 2018    Procedure: PORT PLACEMENT ;  Surgeon: Sae Montaño MD;  Location:  OR         SOCIAL HISTORY:  Social History     Socioeconomic History     Marital status:      Spouse name: Not on file     Number of children: Not on file     Years of education: Not on file     Highest education level: Not on file   Social Needs     Financial resource strain: Not on file     Food insecurity - worry: Not on file     Food insecurity - inability: Not on file     Transportation needs - medical: Not on file     Transportation needs - non-medical: Not on file   Occupational History     Not on file   Tobacco Use     Smoking status: Never Smoker     Smokeless tobacco: Never Used   Substance and Sexual Activity     Alcohol use: No     Drug use: No     Sexual activity: Not Currently   Other Topics Concern     Parent/sibling w/ CABG, MI or angioplasty before 65F 55M? Not Asked   Social History Narrative     Not on file   She has never smoked.  She does not drink or use illicit drugs.  She is going to school at [employer] studying IT.  She has 1 daughter who is 15 years old.  She says that she has menopause a few years old.  She tried oral contraceptive, but she did not tolerate it well, so  did not take it long term.  She has never taken hormone replacement therapy          FAMILY HISTORY:  Family History   Problem Relation Age of Onset     Hyperlipidemia Mother      Kidney Disease Mother      Diabetes Father    Patient does not know much about her family history and is unaware if there is breast cancer or ovarian cancer in her family.        PHYSICAL EXAM:  Vital signs:  /77   Pulse 72   Temp 98.2  F (36.8  C) (Oral)   Resp 18   Ht 1.524 m (5')   Wt 78.5 kg (173 lb)   LMP 2012 (Approximate)   SpO2 98%   BMI 33.79 kg/m     ECO  GENERAL/CONSTITUTIONAL: No acute distress. Accompanied by father and sister.  EYES: No scleral icterus.  LYMPH: No anterior cervical, posterior cervical, supraclavicular, or axillary adenopathy.   RESPIRATORY: Clear to auscultation bilaterally. No crackles or wheezing.   CARDIOVASCULAR: Regular rate and rhythm without murmurs, gallops, or rubs.  GASTROINTESTINAL: No tenderness. The patient has normal bowel sounds. No guarding.  No distention.  BREAST: Right-no palpable mass, discharge, rash, or axillary lymphadenopathy.  Left-there was palpable mass at the 12:00 position, now no longer palpable.   MUSCULOSKELETAL: Warm and well-perfused, no cyanosis, clubbing, or edema.  NEUROLOGIC: Alert, oriented, answers questions appropriately.  INTEGUMENTARY: No jaundice.  GAIT: Steady, does not use assistive device  Port in place at right upper chest.      LABS:  CBC RESULTS:   Recent Labs   Lab Test 02/15/19  0824   WBC 5.9   RBC 3.62*   HGB 11.2*   HCT 33.5*   MCV 93   MCH 30.9   MCHC 33.4   RDW 13.7            PATHOLOGY:  18:  FINAL DIAGNOSIS:   Left breast, 12:00, 8.0 cm from nipple, ultrasound-guided needle biopsy   - Invasive ductal carcinoma, [place] grade 3 (of 3)   - Weakly positive for estrogen receptor (1-3%)   - Negative for progesterone receptor     INTERPRETATION:   Per the American Society of Clinical Oncology/College of American    Pathologists Clinical Practice Guideline   Focused Update (Claire LONGORIA et al, 2018, Arch Pathol Lab Med     doi:[email or web address]), the HER2/BREANNA 17   ratio of >12.4 and average number of HER2 signals/cell of >21.0 places   this specimen in Group 1 (DINESH   Positive).       IMAGING:  Left breast ultrasound 11/6/18:  FINDINGS:  Targeted ultrasound shows a hypoechoic mass at the 12:00  position, 8 cm from the nipple, measuring 1.5 x 1.6 x 1.5 cm. Survey  of the axilla shows only normal-appearing lymph nodes.      Echo 11/20/18:  Global peak LV longitudinal strain is averaged at -24.6%. This is within  reported normal limits (normal <-18%).  The visual ejection fraction is estimated at 64.7%.  Left ventricular systolic function is normal.      ASSESSMENT/PLAN:  Saray Huntley is a 48 year old post-menopausal female with:    1) Left-sided breast cancer:  1.5 x 1.6 x 1.5 cm on ultrasound.  Axilla survey showed only normal-appearing lymph nodes.   Biopsy showed invasive ductal carcinoma, grade 3, weakly positive for ER (1-3%), TX negative, HER-2 positive.  Clinical stage IA (cT1cN0).    She has HER-2 positive tumor measuring 1.6 cm.  I recommended neoadjuvant chemotherapy with HER-2 directed therapy.  Because she has a relatively small tumor that is stage I, we can start with paclitaxel+Herceptin+Perjeta x 12 weeks and assess response, as there is data that this is adequate in small lymph node negative tumors.  If she has a good clinical response, she may be able to go on to surgery, and if she has a complete pathologic response, we may be able to avoid anthracycline.  She will undergo Herceptin to complete 1 year treatment after surgery.  Although her ER positivity was weak, she may benefit from anti-hormonal therapy after surgery as well.      She has already met with Dr. Montaño and reviewed surgical options, but she has not decided if she wants mastectomy or lumpectomy with radiation.      She is overall tolerating  chemotherapy well.  Her left breast lump is no longer palpable.  She had many questions, which were answered.  She also expressed concern that her appearance after surgery is very important to her.  She has many questions for the surgeon regarding how the site will look after surgery and about scarring.      -proceed with C4D15 of chemotherapy today - last day of chemotherapy.    -she had a lot of questions and expressed anxiety about the cosmetic outcome of her surgery.  With her genetic testing results, she thinks that she wants to proceed with lumpectomy.  She is going to meet with Dr. Montaño again soon.  She wants to know if the chemotherapy is working.  Her breast lump is much smaller on exam now, so chemotherapy appears to be working.  However, it would be reasonable to obtain another ultrasound after her chemotherapy is done, prior to surgery, due to the patient's anxiety.    -will obtain left breast ultrasound next week  -follow-up on recommendations from Dr. Montaño  -she will continue with Herceptin every 3 weeks while awaiting surgery    2) Nutrition: She was contacted by nutrition.    3) Genetics: She was diagnosed at age 48 with breast cancer  -she met with genetic counseling.  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    4) Chemotherapy-induced nausea: mild, controlled with home anti-emetics.    5) Mouth rawness: mild, controlled with home salt/baking soda rinses.    6) Fatigue: Secondary to chemotherapy.    6) Coping: Patient seems to be doing better now.    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  Today is her last dose of paclitaxel.    -monitor for now  -if it does not improve after completion of chemotherapy, we can discuss other options, such as medications, acupuncture      I spent a total of 40 minutes with the patient, with over >50% of the time in counseling and/or coordination of care.       Cayla  MD Montrell  Hematology/Oncology  [facility]

## 2019-02-18 ENCOUNTER — TELEPHONE (OUTPATIENT)
Dept: ONCOLOGY | Facility: CLINIC | Age: 49
End: 2019-02-18

## 2019-02-18 NOTE — TELEPHONE ENCOUNTER
Oncology Distress Screening Follow-up  Clinical Social Work  [facility]    Identified Concern and Score From Distress Screenin. How concerned are you about your ability to eat?   0        2. How concerned are you about unintended weight loss or your current weight?   3        3. How concerned are you about feeling depressed or very sad?   3        4. How concerned are you about feeling anxious or very scared?   3        5. Do you struggle with the loss of meaning and valdemra in your life?       Quite a bit Abnormal         6. How concerned are you about work and home life issues that may be affected by your cancer?   10 Abnormal         7. How concerned are you about knowing what resources are available to help you?   0        8. Do you currently have what you would describe as Judaism or spiritual struggles?              Not at all          Date of Distress Screenin/15/19    Intervention:   Saray is a 49-year-old woman who comes to clinic for follow-up with Dr. Stock and for last cycle of weekly treatment. This clinician called Saray to follow-up regarding positive distress screen. Saray reports that she is feeling better physically today, and is looking forward to going to the mall with her daughter. Saray acknowledges that she feels that she has been coping better from an emotional standpoint, although she has some understandable concerns about upcoming surgery and the physical appearance of her breast after it is done. Saray reports that she anticipates a lumpectomy, and reports that not researching this has been supportive for her coping as she wants to await additional discussion with MD. Provided safe place for Saray to acknowledge complex feelings that have arisen in the context of finishing weekly treatment and adjusting to continued infusions every 3 weeks and anticipating surgery; emotional support and normalization of concerns provided.     Follow-up Required:   Saray appears to be adjusting well  to end of weekly treatment, and coping as well as can be expected with anticipation of upcoming surgery. Offered Saray group support options or additional resources for written information, but Saray declined these at present time with goal of having further discussion with surgeon prior to additional resources. Saray aware of ongoing SW support available, and knows to reach out in the case of psychosocial distress or need.     ALEA Tapia, [facility]  Phone: [phone]  Pager: [phone]    [facility]: M, T  *every other Thursday, 8am-4:30pm  [facility] [facility]: W, F, *every other Thursday, 8am-4:30pm

## 2019-02-19 ENCOUNTER — TELEPHONE (OUTPATIENT)
Dept: SPIRITUAL SERVICES | Facility: CLINIC | Age: 49
End: 2019-02-19

## 2019-02-19 NOTE — TELEPHONE ENCOUNTER
[facility]  Spiritual Health    Called pt, per her response to oncology distress screen.    Left message on private voice mail (cell).    SH team available for support, per pt need or request.

## 2019-02-20 ENCOUNTER — TELEPHONE (OUTPATIENT)
Dept: ONCOLOGY | Facility: CLINIC | Age: 49
End: 2019-02-20

## 2019-02-20 DIAGNOSIS — C50.412 MALIGNANT NEOPLASM OF UPPER-OUTER QUADRANT OF LEFT BREAST IN FEMALE, ESTROGEN RECEPTOR POSITIVE (H): ICD-10-CM

## 2019-02-20 DIAGNOSIS — Z17.0 MALIGNANT NEOPLASM OF UPPER-OUTER QUADRANT OF LEFT BREAST IN FEMALE, ESTROGEN RECEPTOR POSITIVE (H): ICD-10-CM

## 2019-02-20 RX ORDER — ALBUTEROL SULFATE 0.83 MG/ML
2.5 SOLUTION RESPIRATORY (INHALATION)
Status: CANCELLED | OUTPATIENT
Start: 2019-02-22

## 2019-02-20 RX ORDER — DIPHENHYDRAMINE HYDROCHLORIDE 50 MG/ML
50 INJECTION INTRAMUSCULAR; INTRAVENOUS
Status: CANCELLED
Start: 2019-02-22

## 2019-02-20 RX ORDER — EPINEPHRINE 1 MG/ML
0.3 INJECTION, SOLUTION INTRAMUSCULAR; SUBCUTANEOUS EVERY 5 MIN PRN
Status: CANCELLED | OUTPATIENT
Start: 2019-02-22

## 2019-02-20 RX ORDER — MEPERIDINE HYDROCHLORIDE 25 MG/ML
25 INJECTION INTRAMUSCULAR; INTRAVENOUS; SUBCUTANEOUS EVERY 30 MIN PRN
Status: CANCELLED | OUTPATIENT
Start: 2019-02-22

## 2019-02-20 RX ORDER — METHYLPREDNISOLONE SODIUM SUCCINATE 125 MG/2ML
125 INJECTION, POWDER, LYOPHILIZED, FOR SOLUTION INTRAMUSCULAR; INTRAVENOUS
Status: CANCELLED
Start: 2019-02-22

## 2019-02-20 RX ORDER — SODIUM CHLORIDE 9 MG/ML
1000 INJECTION, SOLUTION INTRAVENOUS CONTINUOUS PRN
Status: CANCELLED
Start: 2019-02-22

## 2019-02-20 RX ORDER — LORAZEPAM 2 MG/ML
0.5 INJECTION INTRAMUSCULAR EVERY 4 HOURS PRN
Status: CANCELLED
Start: 2019-02-22

## 2019-02-20 RX ORDER — ALBUTEROL SULFATE 90 UG/1
1-2 AEROSOL, METERED RESPIRATORY (INHALATION)
Status: CANCELLED
Start: 2019-02-22

## 2019-02-20 RX ORDER — EPINEPHRINE 0.3 MG/.3ML
0.3 INJECTION SUBCUTANEOUS EVERY 5 MIN PRN
Status: CANCELLED | OUTPATIENT
Start: 2019-02-22

## 2019-02-20 NOTE — TELEPHONE ENCOUNTER
Saray called clinic inquiring about her upcoming appointment with Dr. Montaño, Saray has questions regarding Lumpectomy vs Mastectomy and plastic surgery. Stated to Saray that writer will have Dr. Montaño's RN Ena call her to help explain the surgical procedure. Nicole José RN,BSN,OCN

## 2019-02-21 ENCOUNTER — TELEPHONE (OUTPATIENT)
Dept: MAMMOGRAPHY | Facility: CLINIC | Age: 49
End: 2019-02-21

## 2019-02-21 NOTE — TELEPHONE ENCOUNTER
----- Message from Ena Trinidad RN sent at 2/20/2019  4:36 PM CST -----  Regarding: Surgical Options  Please call Saray re: lumpectomy vs mastectomy. Thanks! Ary

## 2019-02-21 NOTE — TELEPHONE ENCOUNTER
Patient called 2/20/2019 at 1630 and left a message asking if I could call her back to discuss her questions regarding breast surgery.    I called patient @ [phone], and left her a message to call me back.  Awaiting a return call.    Patient will be finishing up neoadjuvent chemotherapy soon, and is scheduled to see Dr. Montaño to discuss surgery options on 2/28/2019.  Tatyana Pimentel RN, BSN

## 2019-02-21 NOTE — TELEPHONE ENCOUNTER
Patient called back and I informed her of the appointment with Dr. Rayo on 2/27/2019.  I gave Saray directions and the contact information to Dr. Rayo's office.   Patient verbalized understanding and agrees with the plan of care.  Tatyana Pimentel, RN, BSN

## 2019-02-22 ENCOUNTER — INFUSION THERAPY VISIT (OUTPATIENT)
Dept: INFUSION THERAPY | Facility: CLINIC | Age: 49
End: 2019-02-22
Attending: INTERNAL MEDICINE
Payer: COMMERCIAL

## 2019-02-22 ENCOUNTER — HOSPITAL ENCOUNTER (OUTPATIENT)
Facility: CLINIC | Age: 49
Setting detail: SPECIMEN
End: 2019-02-22
Attending: INTERNAL MEDICINE
Payer: COMMERCIAL

## 2019-02-22 ENCOUNTER — HOSPITAL ENCOUNTER (OUTPATIENT)
Dept: MAMMOGRAPHY | Facility: CLINIC | Age: 49
End: 2019-02-22
Attending: INTERNAL MEDICINE
Payer: COMMERCIAL

## 2019-02-22 VITALS
WEIGHT: 174.5 LBS | OXYGEN SATURATION: 100 % | BODY MASS INDEX: 34.26 KG/M2 | HEIGHT: 60 IN | TEMPERATURE: 98.4 F | SYSTOLIC BLOOD PRESSURE: 128 MMHG | DIASTOLIC BLOOD PRESSURE: 68 MMHG | RESPIRATION RATE: 16 BRPM | HEART RATE: 65 BPM

## 2019-02-22 DIAGNOSIS — Z17.0 MALIGNANT NEOPLASM OF UPPER-OUTER QUADRANT OF LEFT BREAST IN FEMALE, ESTROGEN RECEPTOR POSITIVE (H): ICD-10-CM

## 2019-02-22 DIAGNOSIS — C50.412 MALIGNANT NEOPLASM OF UPPER-OUTER QUADRANT OF LEFT BREAST IN FEMALE, ESTROGEN RECEPTOR POSITIVE (H): Primary | ICD-10-CM

## 2019-02-22 DIAGNOSIS — Z17.0 MALIGNANT NEOPLASM OF UPPER-OUTER QUADRANT OF LEFT BREAST IN FEMALE, ESTROGEN RECEPTOR POSITIVE (H): Primary | ICD-10-CM

## 2019-02-22 DIAGNOSIS — Z79.899 ENCOUNTER FOR LONG-TERM (CURRENT) USE OF MEDICATIONS: ICD-10-CM

## 2019-02-22 DIAGNOSIS — C50.412 MALIGNANT NEOPLASM OF UPPER-OUTER QUADRANT OF LEFT BREAST IN FEMALE, ESTROGEN RECEPTOR POSITIVE (H): ICD-10-CM

## 2019-02-22 DIAGNOSIS — Z95.828 PORT-A-CATH IN PLACE: ICD-10-CM

## 2019-02-22 PROCEDURE — 76642 ULTRASOUND BREAST LIMITED: CPT | Mod: LT

## 2019-02-22 PROCEDURE — 25800030 ZZH RX IP 258 OP 636: Performed by: INTERNAL MEDICINE

## 2019-02-22 PROCEDURE — 25000128 H RX IP 250 OP 636: Performed by: INTERNAL MEDICINE

## 2019-02-22 PROCEDURE — 96413 CHEMO IV INFUSION 1 HR: CPT

## 2019-02-22 RX ORDER — HEPARIN SODIUM (PORCINE) LOCK FLUSH IV SOLN 100 UNIT/ML 100 UNIT/ML
500 SOLUTION INTRAVENOUS ONCE
Status: CANCELLED
Start: 2019-02-22 | End: 2019-02-22

## 2019-02-22 RX ORDER — HEPARIN SODIUM (PORCINE) LOCK FLUSH IV SOLN 100 UNIT/ML 100 UNIT/ML
500 SOLUTION INTRAVENOUS ONCE
Status: COMPLETED | OUTPATIENT
Start: 2019-02-22 | End: 2019-02-22

## 2019-02-22 RX ADMIN — SODIUM CHLORIDE 250 ML: 9 INJECTION, SOLUTION INTRAVENOUS at 11:47

## 2019-02-22 RX ADMIN — TRASTUZUMAB 450 MG: 150 INJECTION, POWDER, LYOPHILIZED, FOR SOLUTION INTRAVENOUS at 11:48

## 2019-02-22 RX ADMIN — HEPARIN SODIUM (PORCINE) LOCK FLUSH IV SOLN 100 UNIT/ML 500 UNITS: 100 SOLUTION at 12:23

## 2019-02-22 ASSESSMENT — MIFFLIN-ST. JEOR: SCORE: 1333.03

## 2019-02-22 ASSESSMENT — PAIN SCALES - GENERAL: PAINLEVEL: MILD PAIN (3)

## 2019-02-22 NOTE — PROGRESS NOTES
Infusion Nursing Note:  Saray Huntley presents today for C1 D1 Herceptin (not first dose, has had prebviously).    Patient seen by provider today: No   present during visit today: Not Applicable.    Note: Refer to doc flowsheet for assessment, no new concerns today. Patient's last echocardiogram was on 11/20/18, EF 64.7%. Reviewed with Osmin Rose NP, orders as follows:    Ok to proceed with Herceptin today  Patient needs echo completed prior to next herceptin infusion on 3/15/19    Patient verbalized understanding of above, echo scheduled for 2/27/19.    Intravenous Access:  Implanted Port.    Treatment Conditions:  ECHO/MUGA completed 11/20/18  EF 64.7%.      Post Infusion Assessment:  Patient tolerated infusion without incident.  Blood return noted pre and post infusion.  Site patent and intact, free from redness, edema or discomfort.  No evidence of extravasations.  Access discontinued per protocol.    Discharge Plan:   Discharge instructions reviewed with: Patient and Family.  Patient and/or family verbalized understanding of discharge instructions and all questions answered.  Copy of AVS reviewed with patient and/or family.  Patient will return 3/15/19 for next appointment.  Patient discharged in stable condition accompanied by: friend.  Departure Mode: Ambulatory.  Nursing face to face time: 15 minutes.    DAR Mclaughlin RN

## 2019-02-27 ENCOUNTER — HOSPITAL ENCOUNTER (OUTPATIENT)
Dept: CARDIOLOGY | Facility: CLINIC | Age: 49
Discharge: HOME | End: 2019-02-27
Attending: INTERNAL MEDICINE | Admitting: INTERNAL MEDICINE
Payer: COMMERCIAL

## 2019-02-27 ENCOUNTER — TRANSFERRED RECORDS (OUTPATIENT)
Dept: HEALTH INFORMATION MANAGEMENT | Facility: CLINIC | Age: 49
End: 2019-02-27

## 2019-02-27 DIAGNOSIS — Z17.0 MALIGNANT NEOPLASM OF UPPER-OUTER QUADRANT OF LEFT BREAST IN FEMALE, ESTROGEN RECEPTOR POSITIVE (H): ICD-10-CM

## 2019-02-27 DIAGNOSIS — C50.412 MALIGNANT NEOPLASM OF UPPER-OUTER QUADRANT OF LEFT BREAST IN FEMALE, ESTROGEN RECEPTOR POSITIVE (H): ICD-10-CM

## 2019-02-27 DIAGNOSIS — Z79.899 ENCOUNTER FOR LONG-TERM (CURRENT) USE OF MEDICATIONS: ICD-10-CM

## 2019-02-27 PROCEDURE — 93308 TTE F-UP OR LMTD: CPT | Mod: 26 | Performed by: INTERNAL MEDICINE

## 2019-02-27 PROCEDURE — 93308 TTE F-UP OR LMTD: CPT

## 2019-02-27 PROCEDURE — 93325 DOPPLER ECHO COLOR FLOW MAPG: CPT | Mod: 26 | Performed by: INTERNAL MEDICINE

## 2019-02-27 PROCEDURE — 93321 DOPPLER ECHO F-UP/LMTD STD: CPT | Mod: 26 | Performed by: INTERNAL MEDICINE

## 2019-02-28 ENCOUNTER — TELEPHONE (OUTPATIENT)
Dept: ONCOLOGY | Facility: CLINIC | Age: 49
End: 2019-02-28

## 2019-02-28 ENCOUNTER — TELEPHONE (OUTPATIENT)
Dept: SURGERY | Facility: CLINIC | Age: 49
End: 2019-02-28

## 2019-02-28 ENCOUNTER — OFFICE VISIT (OUTPATIENT)
Dept: SURGERY | Facility: CLINIC | Age: 49
End: 2019-02-28
Payer: COMMERCIAL

## 2019-02-28 VITALS
RESPIRATION RATE: 12 BRPM | BODY MASS INDEX: 32.66 KG/M2 | SYSTOLIC BLOOD PRESSURE: 128 MMHG | HEIGHT: 61 IN | OXYGEN SATURATION: 98 % | HEART RATE: 68 BPM | WEIGHT: 173 LBS | DIASTOLIC BLOOD PRESSURE: 88 MMHG

## 2019-02-28 DIAGNOSIS — Z17.0 MALIGNANT NEOPLASM OF UPPER-OUTER QUADRANT OF LEFT BREAST IN FEMALE, ESTROGEN RECEPTOR POSITIVE (H): Primary | ICD-10-CM

## 2019-02-28 DIAGNOSIS — C50.912 MALIGNANT NEOPLASM OF LEFT FEMALE BREAST, UNSPECIFIED ESTROGEN RECEPTOR STATUS, UNSPECIFIED SITE OF BREAST (H): ICD-10-CM

## 2019-02-28 DIAGNOSIS — C50.412 MALIGNANT NEOPLASM OF UPPER-OUTER QUADRANT OF LEFT BREAST IN FEMALE, ESTROGEN RECEPTOR POSITIVE (H): Primary | ICD-10-CM

## 2019-02-28 PROCEDURE — 99215 OFFICE O/P EST HI 40 MIN: CPT | Performed by: SURGERY

## 2019-02-28 RX ORDER — MULTIPLE VITAMINS W/ MINERALS TAB 9MG-400MCG
1 TAB ORAL DAILY
COMMUNITY
End: 2019-03-08

## 2019-02-28 RX ORDER — MULTIVITAMIN WITH IRON
100 TABLET ORAL DAILY
COMMUNITY
End: 2019-03-08

## 2019-02-28 ASSESSMENT — PAIN SCALES - GENERAL: PAINLEVEL: MODERATE PAIN (5)

## 2019-02-28 ASSESSMENT — MIFFLIN-ST. JEOR: SCORE: 1342.1

## 2019-02-28 NOTE — TELEPHONE ENCOUNTER
Type of surgery: seed localized left breast lumpectomy and left SLN biopsy, port removal  Location of surgery: [facility]  Date and time of surgery: 03/19/19 10:30am  Surgeon: Dr Montaño  Pre-Op Appt Date: pt to schedule  Post-Op Appt Date: pt to schedule   Packet sent out: Yes  Pre-cert/Authorization completed:  Not Applicable  Date: 02/28/19    General anesthesia  Seed localization scheduled at 8:30, SLN injection scheduled at 9:30

## 2019-02-28 NOTE — LETTER
2019       Re: Saray Huntley - 1970    Saray presents to the office after finishing her neoadjuvant chemotherapy for HER-2 positive breast cancer.She tolerated her chemotherapy fairly well but is very relieved that it is over. She has had a lot of anxiety over upcoming surgery. She is very concerned about the scarring and end result of having a lumpectomy. She met with Dr. Ruvalcaba from plastic surgery and was given the option of mastopexy and reduction. She is still unsure of what she would like to go forward with. She denies any other complaints at this time.     A bilateral breast exam was performed in the sitting and supine position. The hands were placed at this side and above the head. Bilateral breasts were palpated in a circumferential clockwise fashion including the supraclavicular and axillary areas. Left-no masses or adenopathy palpated. Right-no masses or adenopathy.     A/P  I had a very long discussion regarding options for her management. Her recent ultrasound showed no residual disease which could be compatible with a complete pathologic response.She is very concerned about scarring and the long-term outcome of having a lumpectomy. I described that since she has had such a great response that we would be able to take out a very small portion and would be unlikely that she would have any significant defect in the long run.  After a lengthy discussion, she is leaning towards getting surgery and completing her therapy prior to undergoing any plastic surgery procedures. She will take some time to think it over but would like to schedule surgery today. I described the risks, complications, but not limited to, bleeding, infection, lymphedema, nerve injury, need for additional procedures depending on final pathology.     Sae Montaño M.D.  [facility]  [phone]

## 2019-02-28 NOTE — PROGRESS NOTES
Surgery    Saray presents to the office after finishing her neoadjuvant chemotherapy for HER-2 positive breast cancer.She tolerated her chemotherapy fairly well but is very relieved that it is over. She has had a lot of anxiety over upcoming surgery. She is very concerned about the scarring and end result of having a lumpectomy. She met with Dr. Ruvalcaba from plastic surgery and was given the option of mastopexy and reduction. She is still unsure of what she would like to go forward with. She denies any other complaints at this time.    A bilateral breast exam was performed in the sitting and supine position. The hands were placed at this side and above the head. Bilateral breasts were palpated in a circumferential clockwise fashion including the supraclavicular and axillary areas. Left-no masses or adenopathy palpated. Right-no masses or adenopathy.    A/P  I had a very long discussion regarding options for her management. Her recent ultrasound showed no residual disease which could be compatible with a complete pathologic response.She is very concerned about scarring and the long-term outcome of having a lumpectomy. I described that since she has had such a great response that we would be able to take out a very small portion and would be unlikely that she would have any significant defect in the long run.  After a lengthy discussion, she is leaning towards getting surgery and completing her therapy prior to undergoing any plastic surgery procedures. She will take some time to think it over but would like to schedule surgery today. I described the risks, complications, but not limited to, bleeding, infection, lymphedema, nerve injury, need for additional procedures depending on final pathology.    Sae Montaño M.D.  [facility]  [phone]      TIME SPENT:  50 minutes was spent with patient and greater than 50% of the time was spent counseling and coordination of care.    Please CC to  referring providers

## 2019-02-28 NOTE — TELEPHONE ENCOUNTER
Contacted Saray per Dr. Stock, she is aware that she will receive Herceptin 3/15/19 prior to her Lumpectomy. Saray is aware that she will be scheduled 4/5/19 for Herceptin and F/U with Dr. Meredith to discuss pathology. Nicole José RN,BSN,OCN

## 2019-03-04 ENCOUNTER — DOCUMENTATION ONLY (OUTPATIENT)
Dept: MAMMOGRAPHY | Facility: CLINIC | Age: 49
End: 2019-03-04

## 2019-03-04 NOTE — TELEPHONE ENCOUNTER
Breast Nurse Care Coordination:  I mailed out educational booklets from the [employer] titled:  Exercises After Breast Surgery, and What You Need To Know About Breast-Conserving Surgery to patient's home address.    I left a note with the booklets informing patient to contact me with any questions or concerns regarding her upcoming surgery.  I will follow up with patient in 3 days to check in with her.  Tatyana Pimentel RN, BSN

## 2019-03-08 ENCOUNTER — OFFICE VISIT (OUTPATIENT)
Dept: FAMILY MEDICINE | Facility: CLINIC | Age: 49
End: 2019-03-08
Payer: COMMERCIAL

## 2019-03-08 VITALS
DIASTOLIC BLOOD PRESSURE: 70 MMHG | WEIGHT: 177 LBS | TEMPERATURE: 97.8 F | OXYGEN SATURATION: 98 % | SYSTOLIC BLOOD PRESSURE: 112 MMHG | BODY MASS INDEX: 33.42 KG/M2 | HEIGHT: 61 IN | HEART RATE: 62 BPM

## 2019-03-08 DIAGNOSIS — Z01.818 PREOP GENERAL PHYSICAL EXAM: Primary | ICD-10-CM

## 2019-03-08 DIAGNOSIS — C50.412 MALIGNANT NEOPLASM OF UPPER-OUTER QUADRANT OF LEFT BREAST IN FEMALE, ESTROGEN RECEPTOR POSITIVE (H): ICD-10-CM

## 2019-03-08 DIAGNOSIS — Z17.0 MALIGNANT NEOPLASM OF UPPER-OUTER QUADRANT OF LEFT BREAST IN FEMALE, ESTROGEN RECEPTOR POSITIVE (H): ICD-10-CM

## 2019-03-08 LAB
ALBUMIN SERPL-MCNC: 3.9 G/DL (ref 3.4–5)
ALP SERPL-CCNC: 97 U/L (ref 40–150)
ALT SERPL W P-5'-P-CCNC: 81 U/L (ref 0–50)
ANION GAP SERPL CALCULATED.3IONS-SCNC: 9 MMOL/L (ref 3–14)
AST SERPL W P-5'-P-CCNC: 39 U/L (ref 0–45)
BILIRUB SERPL-MCNC: 0.3 MG/DL (ref 0.2–1.3)
BUN SERPL-MCNC: 22 MG/DL (ref 7–30)
CALCIUM SERPL-MCNC: 9.1 MG/DL (ref 8.5–10.1)
CHLORIDE SERPL-SCNC: 107 MMOL/L (ref 94–109)
CO2 SERPL-SCNC: 26 MMOL/L (ref 20–32)
CREAT SERPL-MCNC: 0.64 MG/DL (ref 0.52–1.04)
ERYTHROCYTE [DISTWIDTH] IN BLOOD BY AUTOMATED COUNT: 13.1 % (ref 10–15)
GFR SERPL CREATININE-BSD FRML MDRD: >90 ML/MIN/{1.73_M2}
GLUCOSE SERPL-MCNC: 98 MG/DL (ref 70–99)
HCT VFR BLD AUTO: 37.3 % (ref 35–47)
HGB BLD-MCNC: 11.9 G/DL (ref 11.7–15.7)
MCH RBC QN AUTO: 30.8 PG (ref 26.5–33)
MCHC RBC AUTO-ENTMCNC: 31.9 G/DL (ref 31.5–36.5)
MCV RBC AUTO: 97 FL (ref 78–100)
PLATELET # BLD AUTO: 305 10E9/L (ref 150–450)
POTASSIUM SERPL-SCNC: 4 MMOL/L (ref 3.4–5.3)
PROT SERPL-MCNC: 7.3 G/DL (ref 6.8–8.8)
RBC # BLD AUTO: 3.86 10E12/L (ref 3.8–5.2)
SODIUM SERPL-SCNC: 142 MMOL/L (ref 133–144)
TSH SERPL DL<=0.005 MIU/L-ACNC: 2.69 MU/L (ref 0.4–4)
WBC # BLD AUTO: 6.9 10E9/L (ref 4–11)

## 2019-03-08 PROCEDURE — 36415 COLL VENOUS BLD VENIPUNCTURE: CPT | Performed by: FAMILY MEDICINE

## 2019-03-08 PROCEDURE — 85027 COMPLETE CBC AUTOMATED: CPT | Performed by: FAMILY MEDICINE

## 2019-03-08 PROCEDURE — 84443 ASSAY THYROID STIM HORMONE: CPT | Performed by: FAMILY MEDICINE

## 2019-03-08 PROCEDURE — 99214 OFFICE O/P EST MOD 30 MIN: CPT | Performed by: FAMILY MEDICINE

## 2019-03-08 PROCEDURE — 80053 COMPREHEN METABOLIC PANEL: CPT | Performed by: FAMILY MEDICINE

## 2019-03-08 ASSESSMENT — MIFFLIN-ST. JEOR: SCORE: 1360.25

## 2019-03-08 NOTE — H&P (VIEW-ONLY)
[facility] [place]  [address] [address]  [phone]  Dept: [phone]    PRE-OP EVALUATION:  Today's date: 3/8/2019    Saray Huntley (: 1970) presents for pre-operative evaluation assessment as requested by Dr. Montaño.  She requires evaluation and anesthesia risk assessment prior to undergoing surgery/procedure for treatment of breast cancer .    Proposed Surgery/ Procedure: Seed localized left breast lumpectomy  Date of Surgery/ Procedure: 3/19/19  Time of Surgery/ Procedure: 10am  Hospital/Surgical Facility: [facility]  Fax number for surgical facility:   Primary Physician: Jeannie Ross  Type of Anesthesia Anticipated: General    Patient has a Health Care Directive or Living Will:  NO    1. NO - Do you have a history of heart attack, stroke, stent, bypass or surgery on an artery in the head, neck, heart or legs?  2. NO - Do you ever have any pain or discomfort in your chest?  3. NO - Do you have a history of  Heart Failure?  4. NO - Are you troubled by shortness of breath when: walking on the level, up a slight hill or at night?  5. NO - Do you currently have a cold, bronchitis or other respiratory infection?  6. NO - Do you have a cough, shortness of breath or wheezing?  7. NO - Do you sometimes get pains in the calves of your legs when you walk?  8. NO - Do you or anyone in your family have previous history of blood clots?  9. NO - Do you or does anyone in your family have a serious bleeding problem such as prolonged bleeding following surgeries or cuts?  10. NO - Have you ever had problems with anemia or been told to take iron pills?  11. NO - Have you had any abnormal blood loss such as black, tarry or bloody stools, or abnormal vaginal bleeding?  12. NO - Have you ever had a blood transfusion?  13. NO - Have you or any of your relatives ever had problems with anesthesia?  14. NO - Do you have sleep apnea, excessive snoring or daytime drowsiness?  15. NO - Do  you have any prosthetic heart valves?  16. NO - Do you have prosthetic joints?  17. NO - Is there any chance that you may be pregnant?        MEDICAL HISTORY:     Patient Active Problem List    Diagnosis Date Noted     Port-A-Cath in place 2018     Priority: Medium     Malignant neoplasm of upper-outer quadrant of left breast in female, estrogen receptor positive (H) 2018     Priority: Medium     Hypothyroidism, unspecified type 2017     Priority: Medium      Past Medical History:   Diagnosis Date     Breast cancer (H)      Hypothyroidism, unspecified type 2017     PONV (postoperative nausea and vomiting)      Past Surgical History:   Procedure Laterality Date      SECTION  2003     INSERT PORT VASCULAR ACCESS N/A 2018    Procedure: PORT PLACEMENT ;  Surgeon: Sae Montaño MD;  Location:  OR     Current Outpatient Medications   Medication Sig Dispense Refill     Lidocaine-Prilocaine (STUDY - LIDOCAINE 2.5%/PRILOCAINE 2.5% CREAM, IDS# [id number],) Externally apply topically daily as needed for moderate pain 30 g 3     OTC products: None, except as noted above    Allergies   Allergen Reactions     Sulfa Drugs Rash      Latex Allergy: NO    Social History     Tobacco Use     Smoking status: Never Smoker     Smokeless tobacco: Never Used   Substance Use Topics     Alcohol use: No     History   Drug Use No       REVIEW OF SYSTEMS:   CONSTITUTIONAL: NEGATIVE for fever, chills, change in weight  INTEGUMENTARY/SKIN: NEGATIVE for worrisome rashes, moles or lesions  EYES: NEGATIVE for vision changes or irritation  ENT/MOUTH: NEGATIVE for ear, mouth and throat problems  RESP: NEGATIVE for significant cough or SOB  BREAST: NEGATIVE for masses, tenderness or discharge  CV: NEGATIVE for chest pain, palpitations or peripheral edema  GI: NEGATIVE for nausea, abdominal pain, heartburn, or change in bowel habits  : NEGATIVE for frequency, dysuria, or hematuria  MUSCULOSKELETAL: NEGATIVE  "for significant arthralgias or myalgia  NEURO: NEGATIVE for weakness, dizziness or paresthesias  ENDOCRINE: NEGATIVE for temperature intolerance, skin/hair changes  HEME: NEGATIVE for bleeding problems  PSYCHIATRIC: NEGATIVE for changes in mood or affect    EXAM:   /70   Pulse 62   Temp 97.8  F (36.6  C) (Tympanic)   Ht 1.549 m (5' 1\")   Wt 80.3 kg (177 lb)   LMP 09/29/2012 (Approximate)   SpO2 98%   BMI 33.44 kg/m      GENERAL APPEARANCE: healthy, alert and no distress     EYES: EOMI, PERRL     HENT: ear canals and TM's normal and nose and mouth without ulcers or lesions     NECK: no adenopathy, no asymmetry, masses, or scars and thyroid normal to palpation     RESP: lungs clear to auscultation - no rales, rhonchi or wheezes     CV: regular rates and rhythm, normal S1 S2, no S3 or S4 and no murmur, click or rub     ABDOMEN:  soft, nontender, no HSM or masses and bowel sounds normal     MS: extremities normal- no gross deformities noted, no evidence of inflammation in joints, FROM in all extremities.     SKIN: no suspicious lesions or rashes     NEURO: Normal strength and tone, sensory exam grossly normal, mentation intact and speech normal     PSYCH: mentation appears normal. and affect normal/bright     LYMPHATICS: No cervical adenopathy    DIAGNOSTICS:     Results for orders placed or performed in visit on 03/08/19   TSH with free T4 reflex   Result Value Ref Range    TSH 2.69 0.40 - 4.00 mU/L   Comprehensive metabolic panel   Result Value Ref Range    Sodium 142 133 - 144 mmol/L    Potassium 4.0 3.4 - 5.3 mmol/L    Chloride 107 94 - 109 mmol/L    Carbon Dioxide 26 20 - 32 mmol/L    Anion Gap 9 3 - 14 mmol/L    Glucose 98 70 - 99 mg/dL    Urea Nitrogen 22 7 - 30 mg/dL    Creatinine 0.64 0.52 - 1.04 mg/dL    GFR Estimate >90 >60 mL/min/[1.73_m2]    GFR Estimate If Black >90 >60 mL/min/[1.73_m2]    Calcium 9.1 8.5 - 10.1 mg/dL    Bilirubin Total 0.3 0.2 - 1.3 mg/dL    Albumin 3.9 3.4 - 5.0 g/dL    " Protein Total 7.3 6.8 - 8.8 g/dL    Alkaline Phosphatase 97 40 - 150 U/L    ALT 81 (H) 0 - 50 U/L    AST 39 0 - 45 U/L   CBC with platelets   Result Value Ref Range    WBC 6.9 4.0 - 11.0 10e9/L    RBC Count 3.86 3.8 - 5.2 10e12/L    Hemoglobin 11.9 11.7 - 15.7 g/dL    Hematocrit 37.3 35.0 - 47.0 %    MCV 97 78 - 100 fl    MCH 30.8 26.5 - 33.0 pg    MCHC 31.9 31.5 - 36.5 g/dL    RDW 13.1 10.0 - 15.0 %    Platelet Count 305 150 - 450 10e9/L         Recent Labs   Lab Test 02/15/19  0824 02/08/19  0825 02/01/19  0819  01/11/19  0840   HGB 11.2* 11.5* 11.7   < > 11.6*    351 333   < > 328   NA  --   --  138  --  139   POTASSIUM  --   --  3.5  --  3.7   CR  --   --  0.56  --  0.54    < > = values in this interval not displayed.        IMPRESSION:   Reason for surgery/procedure: Lumpectomy of the breast  Diagnosis/reason for consult: Preop exam    The proposed surgical procedure is considered INTERMEDIATE risk.    REVISED CARDIAC RISK INDEX  The patient has the following serious cardiovascular risks for perioperative complications such as (MI, PE, VFib and 3  AV Block):  No serious cardiac risks  INTERPRETATION: 0 risks: Class I (very low risk - 0.4% complication rate)    The patient has the following additional risks for perioperative complications:  No identified additional risks      ICD-10-CM    1. Preop general physical exam Z01.818 TSH with free T4 reflex     Comprehensive metabolic panel     CBC with platelets   2. Malignant neoplasm of upper-outer quadrant of left breast in female, estrogen receptor positive (H) C50.412     Z17.0      Patient instructed to follow-up after the surgery is done regarding her liver function abnormality.  RECOMMENDATIONS:       APPROVAL GIVEN to proceed with proposed procedure, without further diagnostic evaluation       Signed Electronically by: Jeannie Ross MD    Copy of this evaluation report is provided to requesting physician.    [facility] Preop Guidelines    Revised Cardiac  Risk Index

## 2019-03-08 NOTE — PROGRESS NOTES
[facility] [place]  [address] [address]  [phone]  Dept: [phone]    PRE-OP EVALUATION:  Today's date: 3/8/2019    Saray Huntley (: 1970) presents for pre-operative evaluation assessment as requested by Dr. Montaño.  She requires evaluation and anesthesia risk assessment prior to undergoing surgery/procedure for treatment of breast cancer .    Proposed Surgery/ Procedure: Seed localized left breast lumpectomy  Date of Surgery/ Procedure: 3/19/19  Time of Surgery/ Procedure: 10am  Hospital/Surgical Facility: [facility]  Fax number for surgical facility:   Primary Physician: Jeannie Ross  Type of Anesthesia Anticipated: General    Patient has a Health Care Directive or Living Will:  NO    1. NO - Do you have a history of heart attack, stroke, stent, bypass or surgery on an artery in the head, neck, heart or legs?  2. NO - Do you ever have any pain or discomfort in your chest?  3. NO - Do you have a history of  Heart Failure?  4. NO - Are you troubled by shortness of breath when: walking on the level, up a slight hill or at night?  5. NO - Do you currently have a cold, bronchitis or other respiratory infection?  6. NO - Do you have a cough, shortness of breath or wheezing?  7. NO - Do you sometimes get pains in the calves of your legs when you walk?  8. NO - Do you or anyone in your family have previous history of blood clots?  9. NO - Do you or does anyone in your family have a serious bleeding problem such as prolonged bleeding following surgeries or cuts?  10. NO - Have you ever had problems with anemia or been told to take iron pills?  11. NO - Have you had any abnormal blood loss such as black, tarry or bloody stools, or abnormal vaginal bleeding?  12. NO - Have you ever had a blood transfusion?  13. NO - Have you or any of your relatives ever had problems with anesthesia?  14. NO - Do you have sleep apnea, excessive snoring or daytime drowsiness?  15. NO - Do  you have any prosthetic heart valves?  16. NO - Do you have prosthetic joints?  17. NO - Is there any chance that you may be pregnant?        MEDICAL HISTORY:     Patient Active Problem List    Diagnosis Date Noted     Port-A-Cath in place 2018     Priority: Medium     Malignant neoplasm of upper-outer quadrant of left breast in female, estrogen receptor positive (H) 2018     Priority: Medium     Hypothyroidism, unspecified type 2017     Priority: Medium      Past Medical History:   Diagnosis Date     Breast cancer (H)      Hypothyroidism, unspecified type 2017     PONV (postoperative nausea and vomiting)      Past Surgical History:   Procedure Laterality Date      SECTION  2003     INSERT PORT VASCULAR ACCESS N/A 2018    Procedure: PORT PLACEMENT ;  Surgeon: Sae Montaño MD;  Location:  OR     Current Outpatient Medications   Medication Sig Dispense Refill     Lidocaine-Prilocaine (STUDY - LIDOCAINE 2.5%/PRILOCAINE 2.5% CREAM, IDS# 4243,) Externally apply topically daily as needed for moderate pain 30 g 3     OTC products: None, except as noted above    Allergies   Allergen Reactions     Sulfa Drugs Rash      Latex Allergy: NO    Social History     Tobacco Use     Smoking status: Never Smoker     Smokeless tobacco: Never Used   Substance Use Topics     Alcohol use: No     History   Drug Use No       REVIEW OF SYSTEMS:   CONSTITUTIONAL: NEGATIVE for fever, chills, change in weight  INTEGUMENTARY/SKIN: NEGATIVE for worrisome rashes, moles or lesions  EYES: NEGATIVE for vision changes or irritation  ENT/MOUTH: NEGATIVE for ear, mouth and throat problems  RESP: NEGATIVE for significant cough or SOB  BREAST: NEGATIVE for masses, tenderness or discharge  CV: NEGATIVE for chest pain, palpitations or peripheral edema  GI: NEGATIVE for nausea, abdominal pain, heartburn, or change in bowel habits  : NEGATIVE for frequency, dysuria, or hematuria  MUSCULOSKELETAL: NEGATIVE  "for significant arthralgias or myalgia  NEURO: NEGATIVE for weakness, dizziness or paresthesias  ENDOCRINE: NEGATIVE for temperature intolerance, skin/hair changes  HEME: NEGATIVE for bleeding problems  PSYCHIATRIC: NEGATIVE for changes in mood or affect    EXAM:   /70   Pulse 62   Temp 97.8  F (36.6  C) (Tympanic)   Ht 1.549 m (5' 1\")   Wt 80.3 kg (177 lb)   LMP 09/29/2012 (Approximate)   SpO2 98%   BMI 33.44 kg/m      GENERAL APPEARANCE: healthy, alert and no distress     EYES: EOMI, PERRL     HENT: ear canals and TM's normal and nose and mouth without ulcers or lesions     NECK: no adenopathy, no asymmetry, masses, or scars and thyroid normal to palpation     RESP: lungs clear to auscultation - no rales, rhonchi or wheezes     CV: regular rates and rhythm, normal S1 S2, no S3 or S4 and no murmur, click or rub     ABDOMEN:  soft, nontender, no HSM or masses and bowel sounds normal     MS: extremities normal- no gross deformities noted, no evidence of inflammation in joints, FROM in all extremities.     SKIN: no suspicious lesions or rashes     NEURO: Normal strength and tone, sensory exam grossly normal, mentation intact and speech normal     PSYCH: mentation appears normal. and affect normal/bright     LYMPHATICS: No cervical adenopathy    DIAGNOSTICS:     Results for orders placed or performed in visit on 03/08/19   TSH with free T4 reflex   Result Value Ref Range    TSH 2.69 0.40 - 4.00 mU/L   Comprehensive metabolic panel   Result Value Ref Range    Sodium 142 133 - 144 mmol/L    Potassium 4.0 3.4 - 5.3 mmol/L    Chloride 107 94 - 109 mmol/L    Carbon Dioxide 26 20 - 32 mmol/L    Anion Gap 9 3 - 14 mmol/L    Glucose 98 70 - 99 mg/dL    Urea Nitrogen 22 7 - 30 mg/dL    Creatinine 0.64 0.52 - 1.04 mg/dL    GFR Estimate >90 >60 mL/min/[1.73_m2]    GFR Estimate If Black >90 >60 mL/min/[1.73_m2]    Calcium 9.1 8.5 - 10.1 mg/dL    Bilirubin Total 0.3 0.2 - 1.3 mg/dL    Albumin 3.9 3.4 - 5.0 g/dL    " Protein Total 7.3 6.8 - 8.8 g/dL    Alkaline Phosphatase 97 40 - 150 U/L    ALT 81 (H) 0 - 50 U/L    AST 39 0 - 45 U/L   CBC with platelets   Result Value Ref Range    WBC 6.9 4.0 - 11.0 10e9/L    RBC Count 3.86 3.8 - 5.2 10e12/L    Hemoglobin 11.9 11.7 - 15.7 g/dL    Hematocrit 37.3 35.0 - 47.0 %    MCV 97 78 - 100 fl    MCH 30.8 26.5 - 33.0 pg    MCHC 31.9 31.5 - 36.5 g/dL    RDW 13.1 10.0 - 15.0 %    Platelet Count 305 150 - 450 10e9/L         Recent Labs   Lab Test 02/15/19  0824 02/08/19  0825 02/01/19  0819  01/11/19  0840   HGB 11.2* 11.5* 11.7   < > 11.6*    351 333   < > 328   NA  --   --  138  --  139   POTASSIUM  --   --  3.5  --  3.7   CR  --   --  0.56  --  0.54    < > = values in this interval not displayed.        IMPRESSION:   Reason for surgery/procedure: Lumpectomy of the breast  Diagnosis/reason for consult: Preop exam    The proposed surgical procedure is considered INTERMEDIATE risk.    REVISED CARDIAC RISK INDEX  The patient has the following serious cardiovascular risks for perioperative complications such as (MI, PE, VFib and 3  AV Block):  No serious cardiac risks  INTERPRETATION: 0 risks: Class I (very low risk - 0.4% complication rate)    The patient has the following additional risks for perioperative complications:  No identified additional risks      ICD-10-CM    1. Preop general physical exam Z01.818 TSH with free T4 reflex     Comprehensive metabolic panel     CBC with platelets   2. Malignant neoplasm of upper-outer quadrant of left breast in female, estrogen receptor positive (H) C50.412     Z17.0      Patient instructed to follow-up after the surgery is done regarding her liver function abnormality.  RECOMMENDATIONS:       APPROVAL GIVEN to proceed with proposed procedure, without further diagnostic evaluation       Signed Electronically by: Jeannie Ross MD    Copy of this evaluation report is provided to requesting physician.    [place] Preop Guidelines    Revised Cardiac  Risk Index

## 2019-03-08 NOTE — LETTER
March 12, 2019      Saray Huntley  [address]  [place] [address]        Dear ,    Blood work shows normal thyroid functions.  Normal kidney functions noted as well.  Blood counts are within normal range.  Hemoglobin is borderline low though.  Increase diet rich in iron.  Liver function ALT is elevated.  I would recommend getting it further worked up after your upcoming surgery.      Results for orders placed or performed in visit on 03/08/19   TSH with free T4 reflex   Result Value Ref Range    TSH 2.69 0.40 - 4.00 mU/L   Comprehensive metabolic panel   Result Value Ref Range    Sodium 142 133 - 144 mmol/L    Potassium 4.0 3.4 - 5.3 mmol/L    Chloride 107 94 - 109 mmol/L    Carbon Dioxide 26 20 - 32 mmol/L    Anion Gap 9 3 - 14 mmol/L    Glucose 98 70 - 99 mg/dL    Urea Nitrogen 22 7 - 30 mg/dL    Creatinine 0.64 0.52 - 1.04 mg/dL    GFR Estimate >90 >60 mL/min/[1.73_m2]    GFR Estimate If Black >90 >60 mL/min/[1.73_m2]    Calcium 9.1 8.5 - 10.1 mg/dL    Bilirubin Total 0.3 0.2 - 1.3 mg/dL    Albumin 3.9 3.4 - 5.0 g/dL    Protein Total 7.3 6.8 - 8.8 g/dL    Alkaline Phosphatase 97 40 - 150 U/L    ALT 81 (H) 0 - 50 U/L    AST 39 0 - 45 U/L   CBC with platelets   Result Value Ref Range    WBC 6.9 4.0 - 11.0 10e9/L    RBC Count 3.86 3.8 - 5.2 10e12/L    Hemoglobin 11.9 11.7 - 15.7 g/dL    Hematocrit 37.3 35.0 - 47.0 %    MCV 97 78 - 100 fl    MCH 30.8 26.5 - 33.0 pg    MCHC 31.9 31.5 - 36.5 g/dL    RDW 13.1 10.0 - 15.0 %    Platelet Count 305 150 - 450 10e9/L             If you have any questions or concerns, please call the clinic at the number listed above.       Sincerely,        Jeannie Ross MD

## 2019-03-11 ENCOUNTER — OFFICE VISIT (OUTPATIENT)
Dept: FAMILY MEDICINE | Facility: CLINIC | Age: 49
End: 2019-03-11
Payer: COMMERCIAL

## 2019-03-11 ENCOUNTER — ANCILLARY PROCEDURE (OUTPATIENT)
Dept: GENERAL RADIOLOGY | Facility: CLINIC | Age: 49
End: 2019-03-11
Attending: FAMILY MEDICINE
Payer: COMMERCIAL

## 2019-03-11 VITALS
OXYGEN SATURATION: 98 % | DIASTOLIC BLOOD PRESSURE: 62 MMHG | TEMPERATURE: 97.8 F | BODY MASS INDEX: 33.63 KG/M2 | SYSTOLIC BLOOD PRESSURE: 112 MMHG | WEIGHT: 178 LBS | HEART RATE: 62 BPM

## 2019-03-11 DIAGNOSIS — S90.122A: ICD-10-CM

## 2019-03-11 DIAGNOSIS — S90.122A: Primary | ICD-10-CM

## 2019-03-11 PROCEDURE — 73660 X-RAY EXAM OF TOE(S): CPT | Mod: LT

## 2019-03-11 PROCEDURE — 99214 OFFICE O/P EST MOD 30 MIN: CPT | Performed by: FAMILY MEDICINE

## 2019-03-11 NOTE — PROGRESS NOTES
SUBJECTIVE:   Saray Huntley is a 49 year old female who presents to clinic today for the following health issues:      Joint Pain    Onset: x one day    Description:   Location: left middle toe  Character: Dull ache    Intensity: mild    Progression of Symptoms: same    Accompanying Signs & Symptoms:  Other symptoms: bruised and little swollen    History:   Previous similar pain: no       Precipitating factors:   Trauma or overuse: YES- bumped it    Alleviating factors:  Improved by: nothing    Therapies Tried and outcome:             Problem list and histories reviewed & adjusted, as indicated.  Additional history: as documented    Patient Active Problem List   Diagnosis     Hypothyroidism, unspecified type     Malignant neoplasm of upper-outer quadrant of left breast in female, estrogen receptor positive (H)     Port-A-Cath in place     Past Surgical History:   Procedure Laterality Date      SECTION  2003     INSERT PORT VASCULAR ACCESS N/A 2018    Procedure: PORT PLACEMENT ;  Surgeon: Sae Montaño MD;  Location:  OR       Social History     Tobacco Use     Smoking status: Never Smoker     Smokeless tobacco: Never Used   Substance Use Topics     Alcohol use: No     Family History   Problem Relation Age of Onset     Hyperlipidemia Mother      Kidney Disease Mother      Diabetes Father          Current Outpatient Medications   Medication Sig Dispense Refill     Lidocaine-Prilocaine (STUDY - LIDOCAINE 2.5%/PRILOCAINE 2.5% CREAM, IDS# 4243,) Externally apply topically daily as needed for moderate pain 30 g 3     Allergies   Allergen Reactions     Sulfa Drugs Rash       Reviewed and updated as needed this visit by clinical staff  Tobacco  Allergies  Meds       Reviewed and updated as needed this visit by Provider         ROS:  CONSTITUTIONAL: NEGATIVE for fever, chills, change in weight  ENT/MOUTH: NEGATIVE for ear, mouth and throat problems  RESP: NEGATIVE for significant cough or  SOB  CV: NEGATIVE for chest pain, palpitations or peripheral edema    OBJECTIVE:                                                    /62   Pulse 62   Temp 97.8  F (36.6  C) (Tympanic)   Wt 80.7 kg (178 lb)   LMP 09/29/2012 (Approximate)   SpO2 98%   BMI 33.63 kg/m    Body mass index is 33.63 kg/m .   GENERAL: healthy, alert, well nourished, well hydrated, no distress  RESP: lungs clear to auscultation - no rales, no rhonchi, no wheezes  CV: regular rates and rhythm, normal S1 S2, no S3 or S4 and no murmur, no click or rub -  Left middle toe with no swelling.  Mild tenderness and bruise noted on the dorsum       ASSESSMENT/PLAN:                                                        ICD-10-CM    1. Contusion of middle toe, left, initial encounter S90.122A XR Toe Left G/E 2 Views     X-ray of the right middle toe ordered and reviewed.  No fracture noted.  Patient reassured.  X-ray images reviewed with the patient.  Recommending to ice the affected area and elevate.  Use a comfortable supporting shoe.  If any worsening symptoms noted, instructed to notify me back.  Avoid NSAIDs as she has an upcoming surgery.  Patient verbalized understanding and agreement to the plan      Jeannie Ross MD  [facility] [place] [facility]

## 2019-03-12 DIAGNOSIS — Z17.0 MALIGNANT NEOPLASM OF UPPER-OUTER QUADRANT OF LEFT BREAST IN FEMALE, ESTROGEN RECEPTOR POSITIVE (H): ICD-10-CM

## 2019-03-12 DIAGNOSIS — C50.412 MALIGNANT NEOPLASM OF UPPER-OUTER QUADRANT OF LEFT BREAST IN FEMALE, ESTROGEN RECEPTOR POSITIVE (H): ICD-10-CM

## 2019-03-12 RX ORDER — EPINEPHRINE 0.3 MG/.3ML
0.3 INJECTION SUBCUTANEOUS EVERY 5 MIN PRN
Status: CANCELLED | OUTPATIENT
Start: 2019-03-15

## 2019-03-12 RX ORDER — ALBUTEROL SULFATE 90 UG/1
1-2 AEROSOL, METERED RESPIRATORY (INHALATION)
Status: CANCELLED
Start: 2019-03-15

## 2019-03-12 RX ORDER — SODIUM CHLORIDE 9 MG/ML
1000 INJECTION, SOLUTION INTRAVENOUS CONTINUOUS PRN
Status: CANCELLED
Start: 2019-03-15

## 2019-03-12 RX ORDER — ALBUTEROL SULFATE 0.83 MG/ML
2.5 SOLUTION RESPIRATORY (INHALATION)
Status: CANCELLED | OUTPATIENT
Start: 2019-03-15

## 2019-03-12 RX ORDER — METHYLPREDNISOLONE SODIUM SUCCINATE 125 MG/2ML
125 INJECTION, POWDER, LYOPHILIZED, FOR SOLUTION INTRAMUSCULAR; INTRAVENOUS
Status: CANCELLED
Start: 2019-03-15

## 2019-03-12 RX ORDER — LORAZEPAM 2 MG/ML
0.5 INJECTION INTRAMUSCULAR EVERY 4 HOURS PRN
Status: CANCELLED
Start: 2019-03-15

## 2019-03-12 RX ORDER — MEPERIDINE HYDROCHLORIDE 25 MG/ML
25 INJECTION INTRAMUSCULAR; INTRAVENOUS; SUBCUTANEOUS EVERY 30 MIN PRN
Status: CANCELLED | OUTPATIENT
Start: 2019-03-15

## 2019-03-12 RX ORDER — ACETAMINOPHEN 325 MG/1
650 TABLET ORAL
Status: CANCELLED
Start: 2019-03-15

## 2019-03-12 RX ORDER — EPINEPHRINE 1 MG/ML
0.3 INJECTION, SOLUTION INTRAMUSCULAR; SUBCUTANEOUS EVERY 5 MIN PRN
Status: CANCELLED | OUTPATIENT
Start: 2019-03-15

## 2019-03-12 RX ORDER — DIPHENHYDRAMINE HYDROCHLORIDE 50 MG/ML
50 INJECTION INTRAMUSCULAR; INTRAVENOUS
Status: CANCELLED
Start: 2019-03-15

## 2019-03-12 RX ORDER — DIPHENHYDRAMINE HCL 25 MG
50 CAPSULE ORAL
Status: CANCELLED
Start: 2019-03-15

## 2019-03-15 ENCOUNTER — INFUSION THERAPY VISIT (OUTPATIENT)
Dept: INFUSION THERAPY | Facility: CLINIC | Age: 49
End: 2019-03-15
Attending: INTERNAL MEDICINE
Payer: COMMERCIAL

## 2019-03-15 VITALS
HEART RATE: 62 BPM | BODY MASS INDEX: 33.33 KG/M2 | DIASTOLIC BLOOD PRESSURE: 72 MMHG | WEIGHT: 176.4 LBS | SYSTOLIC BLOOD PRESSURE: 105 MMHG | TEMPERATURE: 98.4 F | RESPIRATION RATE: 16 BRPM

## 2019-03-15 DIAGNOSIS — Z17.0 MALIGNANT NEOPLASM OF UPPER-OUTER QUADRANT OF LEFT BREAST IN FEMALE, ESTROGEN RECEPTOR POSITIVE (H): Primary | ICD-10-CM

## 2019-03-15 DIAGNOSIS — Z95.828 PORT-A-CATH IN PLACE: ICD-10-CM

## 2019-03-15 DIAGNOSIS — C50.412 MALIGNANT NEOPLASM OF UPPER-OUTER QUADRANT OF LEFT BREAST IN FEMALE, ESTROGEN RECEPTOR POSITIVE (H): Primary | ICD-10-CM

## 2019-03-15 PROCEDURE — 25800030 ZZH RX IP 258 OP 636: Performed by: INTERNAL MEDICINE

## 2019-03-15 PROCEDURE — 25000128 H RX IP 250 OP 636: Performed by: INTERNAL MEDICINE

## 2019-03-15 PROCEDURE — 96413 CHEMO IV INFUSION 1 HR: CPT

## 2019-03-15 RX ORDER — HEPARIN SODIUM (PORCINE) LOCK FLUSH IV SOLN 100 UNIT/ML 100 UNIT/ML
500 SOLUTION INTRAVENOUS ONCE
Status: CANCELLED
Start: 2019-03-15 | End: 2019-03-15

## 2019-03-15 RX ORDER — HEPARIN SODIUM (PORCINE) LOCK FLUSH IV SOLN 100 UNIT/ML 100 UNIT/ML
500 SOLUTION INTRAVENOUS ONCE
Status: COMPLETED | OUTPATIENT
Start: 2019-03-15 | End: 2019-03-15

## 2019-03-15 RX ADMIN — TRASTUZUMAB 450 MG: 150 INJECTION, POWDER, LYOPHILIZED, FOR SOLUTION INTRAVENOUS at 09:02

## 2019-03-15 RX ADMIN — HEPARIN SODIUM (PORCINE) LOCK FLUSH IV SOLN 100 UNIT/ML 500 UNITS: 100 SOLUTION at 09:34

## 2019-03-15 NOTE — PROGRESS NOTES
Infusion Nursing Note:  Saray Huntley presents today for Herceptin C2D1.    Patient seen by provider today: No   present during visit today: Not Applicable.    Note: N/A.    Intravenous Access:  Implanted Port.    Treatment Conditions:  ECHO/MUGA completed 2/29/2019  EF 60-65%.    Post Infusion Assessment:  Patient tolerated infusion without incident.  Blood return noted pre and post infusion.  Site patent and intact, free from redness, edema or discomfort.  No evidence of extravasations.  Access discontinued per protocol.     Discharge Plan:   Discharge instructions reviewed with: Patient.  Patient and/or family verbalized understanding of discharge instructions and all questions answered.  AVS to patient via [employer].  Patient will return 4/5/2019 for next appointment.   Patient discharged in stable condition accompanied by: self and .  Departure Mode: Ambulatory.    Nat Benites RN

## 2019-03-18 ENCOUNTER — TELEPHONE (OUTPATIENT)
Dept: SURGERY | Facility: CLINIC | Age: 49
End: 2019-03-18

## 2019-03-18 NOTE — TELEPHONE ENCOUNTER
Patient calling wanting FINNDS to speak with her ex  who is a general surgeon also, about her upcoming surgery tomorrow. He is not listed on the consent to communicate but did call and ask for him to be called.    Dr. Steve James  Phone: [phone]    If there is any problem with that please call patient     Phone:  [phone]    Thanks

## 2019-03-18 NOTE — TELEPHONE ENCOUNTER
Dr Steve James calling back, he missed return call from [facility] as he was in surgery, but is available fro the rest of the day.    Phone:  [phone]    Thanks

## 2019-03-18 NOTE — TELEPHONE ENCOUNTER
Breast Nurse Care Coordination:  Call placed to patient today to discuss her upcoming breast surgery on 3/19/2019.    Patient is scheduled for a Left Breast Seed Localized Lumpectomy with SLNBx on 3/19/19 with Dr. Montaño at the [facility].    I explained the seed localization procedure to patient and informed her what to expect the day of surgery and the first few days following.  I informed patient to wear or bring a good supportive bra, preferably one that clasps in the front and has no underwire.  I also informed patient to wear lose, comfortable fitted clothing.      I informed patient that Dr. Montaño will be giving her a call about 2-3 days after surgery with the final pathology results and then she will need a 2 week post operative appointment.  I informed patient at her post op visit, Dr. Montaño's schedulers will assist her in getting her oncology and radiation oncology appointments scheduled.      Patient had no other questions at this time.  I informed patient to call me back with any questions or concerns.  Patient verbalized understanding and agrees with the plan of care.  Tatyana Pimentel, RN, BSN

## 2019-03-19 ENCOUNTER — HOSPITAL ENCOUNTER (OUTPATIENT)
Dept: NUCLEAR MEDICINE | Facility: CLINIC | Age: 49
Setting detail: NUCLEAR MEDICINE
End: 2019-03-19
Attending: SURGERY
Payer: COMMERCIAL

## 2019-03-19 ENCOUNTER — HOSPITAL ENCOUNTER (OUTPATIENT)
Dept: MAMMOGRAPHY | Facility: CLINIC | Age: 49
End: 2019-03-19
Attending: SURGERY
Payer: COMMERCIAL

## 2019-03-19 ENCOUNTER — ANESTHESIA EVENT (OUTPATIENT)
Dept: SURGERY | Facility: CLINIC | Age: 49
End: 2019-03-19
Payer: COMMERCIAL

## 2019-03-19 ENCOUNTER — ANESTHESIA (OUTPATIENT)
Dept: SURGERY | Facility: CLINIC | Age: 49
End: 2019-03-19
Payer: COMMERCIAL

## 2019-03-19 ENCOUNTER — HOSPITAL ENCOUNTER (OUTPATIENT)
Facility: CLINIC | Age: 49
Discharge: HOME | End: 2019-03-19
Attending: SURGERY | Admitting: SURGERY
Payer: COMMERCIAL

## 2019-03-19 ENCOUNTER — OFFICE VISIT (OUTPATIENT)
Dept: SURGERY | Facility: PHYSICIAN GROUP | Age: 49
End: 2019-03-19
Payer: COMMERCIAL

## 2019-03-19 VITALS
WEIGHT: 176 LBS | RESPIRATION RATE: 16 BRPM | BODY MASS INDEX: 33.23 KG/M2 | DIASTOLIC BLOOD PRESSURE: 70 MMHG | OXYGEN SATURATION: 98 % | SYSTOLIC BLOOD PRESSURE: 105 MMHG | TEMPERATURE: 97.7 F | HEIGHT: 61 IN | HEART RATE: 61 BPM

## 2019-03-19 DIAGNOSIS — C50.412 MALIGNANT NEOPLASM OF UPPER-OUTER QUADRANT OF LEFT BREAST IN FEMALE, ESTROGEN RECEPTOR POSITIVE (H): Primary | ICD-10-CM

## 2019-03-19 DIAGNOSIS — Z17.0 MALIGNANT NEOPLASM OF UPPER-OUTER QUADRANT OF LEFT BREAST IN FEMALE, ESTROGEN RECEPTOR POSITIVE (H): ICD-10-CM

## 2019-03-19 DIAGNOSIS — Z17.0 MALIGNANT NEOPLASM OF UPPER-OUTER QUADRANT OF LEFT BREAST IN FEMALE, ESTROGEN RECEPTOR POSITIVE (H): Primary | ICD-10-CM

## 2019-03-19 DIAGNOSIS — C50.412 MALIGNANT NEOPLASM OF UPPER-OUTER QUADRANT OF LEFT BREAST IN FEMALE, ESTROGEN RECEPTOR POSITIVE (H): ICD-10-CM

## 2019-03-19 PROCEDURE — 36590 REMOVAL TUNNELED CV CATH: CPT | Mod: LT | Performed by: SURGERY

## 2019-03-19 PROCEDURE — 40000986 MA POST PROCEDURE LEFT

## 2019-03-19 PROCEDURE — 88307 TISSUE EXAM BY PATHOLOGIST: CPT | Mod: 26 | Performed by: SURGERY

## 2019-03-19 PROCEDURE — 25000125 ZZHC RX 250: Performed by: SURGERY

## 2019-03-19 PROCEDURE — 40000170 ZZH STATISTIC PRE-PROCEDURE ASSESSMENT II: Performed by: SURGERY

## 2019-03-19 PROCEDURE — A9520 TC99 TILMANOCEPT DIAG 0.5MCI: HCPCS | Performed by: SURGERY

## 2019-03-19 PROCEDURE — 37000009 ZZH ANESTHESIA TECHNICAL FEE, EACH ADDTL 15 MIN: Performed by: SURGERY

## 2019-03-19 PROCEDURE — 38792 RA TRACER ID OF SENTINL NODE: CPT

## 2019-03-19 PROCEDURE — 25000128 H RX IP 250 OP 636: Performed by: NURSE ANESTHETIST, CERTIFIED REGISTERED

## 2019-03-19 PROCEDURE — 36000060 ZZH SURGERY LEVEL 3 W FLUORO 1ST 30 MIN: Performed by: SURGERY

## 2019-03-19 PROCEDURE — 25000125 ZZHC RX 250: Performed by: ANESTHESIOLOGY

## 2019-03-19 PROCEDURE — 88307 TISSUE EXAM BY PATHOLOGIST: CPT | Performed by: SURGERY

## 2019-03-19 PROCEDURE — 40000268 MA BREAST SPECIMEN LEFT OR

## 2019-03-19 PROCEDURE — 36000058 ZZH SURGERY LEVEL 3 EA 15 ADDTL MIN: Performed by: SURGERY

## 2019-03-19 PROCEDURE — 25000125 ZZHC RX 250: Performed by: NURSE ANESTHETIST, CERTIFIED REGISTERED

## 2019-03-19 PROCEDURE — 34300033 ZZH RX 343: Performed by: SURGERY

## 2019-03-19 PROCEDURE — 71000013 ZZH RECOVERY PHASE 1 LEVEL 1 EA ADDTL HR: Performed by: SURGERY

## 2019-03-19 PROCEDURE — 25000128 H RX IP 250 OP 636: Performed by: SURGERY

## 2019-03-19 PROCEDURE — 37000008 ZZH ANESTHESIA TECHNICAL FEE, 1ST 30 MIN: Performed by: SURGERY

## 2019-03-19 PROCEDURE — 25000132 ZZH RX MED GY IP 250 OP 250 PS 637: Performed by: ANESTHESIOLOGY

## 2019-03-19 PROCEDURE — 27210794 ZZH OR GENERAL SUPPLY STERILE: Performed by: SURGERY

## 2019-03-19 PROCEDURE — 38525 BIOPSY/REMOVAL LYMPH NODES: CPT | Mod: 51 | Performed by: SURGERY

## 2019-03-19 PROCEDURE — 19285 PERQ DEV BREAST 1ST US IMAG: CPT | Mod: LT,XP

## 2019-03-19 PROCEDURE — 19301 PARTIAL MASTECTOMY: CPT | Mod: AS | Performed by: PHYSICIAN ASSISTANT

## 2019-03-19 PROCEDURE — 38525 BIOPSY/REMOVAL LYMPH NODES: CPT | Mod: AS | Performed by: PHYSICIAN ASSISTANT

## 2019-03-19 PROCEDURE — 71000012 ZZH RECOVERY PHASE 1 LEVEL 1 FIRST HR: Performed by: SURGERY

## 2019-03-19 PROCEDURE — 19301 PARTIAL MASTECTOMY: CPT | Mod: LT | Performed by: SURGERY

## 2019-03-19 PROCEDURE — 71000027 ZZH RECOVERY PHASE 2 EACH 15 MINS: Performed by: SURGERY

## 2019-03-19 PROCEDURE — 25800030 ZZH RX IP 258 OP 636: Performed by: NURSE ANESTHETIST, CERTIFIED REGISTERED

## 2019-03-19 RX ORDER — NALOXONE HYDROCHLORIDE 0.4 MG/ML
.1-.4 INJECTION, SOLUTION INTRAMUSCULAR; INTRAVENOUS; SUBCUTANEOUS
Status: DISCONTINUED | OUTPATIENT
Start: 2019-03-19 | End: 2019-03-19 | Stop reason: HOSPADM

## 2019-03-19 RX ORDER — CEFAZOLIN SODIUM 1 G/3ML
1 INJECTION, POWDER, FOR SOLUTION INTRAMUSCULAR; INTRAVENOUS SEE ADMIN INSTRUCTIONS
Status: DISCONTINUED | OUTPATIENT
Start: 2019-03-19 | End: 2019-03-19 | Stop reason: HOSPADM

## 2019-03-19 RX ORDER — ACETAMINOPHEN 325 MG/1
650 TABLET ORAL ONCE
Status: COMPLETED | OUTPATIENT
Start: 2019-03-19 | End: 2019-03-19

## 2019-03-19 RX ORDER — FENTANYL CITRATE 50 UG/ML
25-50 INJECTION, SOLUTION INTRAMUSCULAR; INTRAVENOUS
Status: CANCELLED | OUTPATIENT
Start: 2019-03-19

## 2019-03-19 RX ORDER — HYDROCODONE BITARTRATE AND ACETAMINOPHEN 5; 325 MG/1; MG/1
1 TABLET ORAL
Status: DISCONTINUED | OUTPATIENT
Start: 2019-03-19 | End: 2019-03-19 | Stop reason: HOSPADM

## 2019-03-19 RX ORDER — ONDANSETRON 2 MG/ML
INJECTION INTRAMUSCULAR; INTRAVENOUS PRN
Status: DISCONTINUED | OUTPATIENT
Start: 2019-03-19 | End: 2019-03-19

## 2019-03-19 RX ORDER — SCOLOPAMINE TRANSDERMAL SYSTEM 1 MG/1
1 PATCH, EXTENDED RELEASE TRANSDERMAL ONCE
Status: COMPLETED | OUTPATIENT
Start: 2019-03-19 | End: 2019-03-19

## 2019-03-19 RX ORDER — MEPERIDINE HYDROCHLORIDE 25 MG/ML
12.5 INJECTION INTRAMUSCULAR; INTRAVENOUS; SUBCUTANEOUS
Status: DISCONTINUED | OUTPATIENT
Start: 2019-03-19 | End: 2019-03-19 | Stop reason: HOSPADM

## 2019-03-19 RX ORDER — CEFAZOLIN SODIUM 2 G/100ML
2 INJECTION, SOLUTION INTRAVENOUS
Status: COMPLETED | OUTPATIENT
Start: 2019-03-19 | End: 2019-03-19

## 2019-03-19 RX ORDER — PROPOFOL 10 MG/ML
INJECTION, EMULSION INTRAVENOUS CONTINUOUS PRN
Status: DISCONTINUED | OUTPATIENT
Start: 2019-03-19 | End: 2019-03-19

## 2019-03-19 RX ORDER — ONDANSETRON 4 MG/1
4 TABLET, ORALLY DISINTEGRATING ORAL EVERY 30 MIN PRN
Status: DISCONTINUED | OUTPATIENT
Start: 2019-03-19 | End: 2019-03-19 | Stop reason: HOSPADM

## 2019-03-19 RX ORDER — MAGNESIUM HYDROXIDE 1200 MG/15ML
LIQUID ORAL PRN
Status: DISCONTINUED | OUTPATIENT
Start: 2019-03-19 | End: 2019-03-19 | Stop reason: HOSPADM

## 2019-03-19 RX ORDER — PROPOFOL 10 MG/ML
INJECTION, EMULSION INTRAVENOUS PRN
Status: DISCONTINUED | OUTPATIENT
Start: 2019-03-19 | End: 2019-03-19

## 2019-03-19 RX ORDER — SODIUM CHLORIDE, SODIUM LACTATE, POTASSIUM CHLORIDE, CALCIUM CHLORIDE 600; 310; 30; 20 MG/100ML; MG/100ML; MG/100ML; MG/100ML
INJECTION, SOLUTION INTRAVENOUS CONTINUOUS
Status: DISCONTINUED | OUTPATIENT
Start: 2019-03-19 | End: 2019-03-19 | Stop reason: HOSPADM

## 2019-03-19 RX ORDER — HYDROMORPHONE HYDROCHLORIDE 1 MG/ML
.3-.5 INJECTION, SOLUTION INTRAMUSCULAR; INTRAVENOUS; SUBCUTANEOUS EVERY 10 MIN PRN
Status: DISCONTINUED | OUTPATIENT
Start: 2019-03-19 | End: 2019-03-19 | Stop reason: HOSPADM

## 2019-03-19 RX ORDER — FENTANYL CITRATE 50 UG/ML
INJECTION, SOLUTION INTRAMUSCULAR; INTRAVENOUS PRN
Status: DISCONTINUED | OUTPATIENT
Start: 2019-03-19 | End: 2019-03-19

## 2019-03-19 RX ORDER — ONDANSETRON 2 MG/ML
4 INJECTION INTRAMUSCULAR; INTRAVENOUS EVERY 30 MIN PRN
Status: DISCONTINUED | OUTPATIENT
Start: 2019-03-19 | End: 2019-03-19 | Stop reason: HOSPADM

## 2019-03-19 RX ORDER — DEXAMETHASONE SODIUM PHOSPHATE 4 MG/ML
INJECTION, SOLUTION INTRA-ARTICULAR; INTRALESIONAL; INTRAMUSCULAR; INTRAVENOUS; SOFT TISSUE PRN
Status: DISCONTINUED | OUTPATIENT
Start: 2019-03-19 | End: 2019-03-19

## 2019-03-19 RX ORDER — FENTANYL CITRATE 50 UG/ML
25-50 INJECTION, SOLUTION INTRAMUSCULAR; INTRAVENOUS
Status: DISCONTINUED | OUTPATIENT
Start: 2019-03-19 | End: 2019-03-19 | Stop reason: HOSPADM

## 2019-03-19 RX ORDER — SODIUM CHLORIDE, SODIUM LACTATE, POTASSIUM CHLORIDE, CALCIUM CHLORIDE 600; 310; 30; 20 MG/100ML; MG/100ML; MG/100ML; MG/100ML
INJECTION, SOLUTION INTRAVENOUS CONTINUOUS PRN
Status: DISCONTINUED | OUTPATIENT
Start: 2019-03-19 | End: 2019-03-19

## 2019-03-19 RX ORDER — LIDOCAINE HYDROCHLORIDE 20 MG/ML
INJECTION, SOLUTION INFILTRATION; PERINEURAL PRN
Status: DISCONTINUED | OUTPATIENT
Start: 2019-03-19 | End: 2019-03-19

## 2019-03-19 RX ORDER — HYDROCODONE BITARTRATE AND ACETAMINOPHEN 5; 325 MG/1; MG/1
1-2 TABLET ORAL EVERY 4 HOURS PRN
Qty: 20 TABLET | Refills: 0 | Status: SHIPPED | OUTPATIENT
Start: 2019-03-19 | End: 2019-08-20

## 2019-03-19 RX ORDER — EPHEDRINE SULFATE 50 MG/ML
INJECTION, SOLUTION INTRAMUSCULAR; INTRAVENOUS; SUBCUTANEOUS PRN
Status: DISCONTINUED | OUTPATIENT
Start: 2019-03-19 | End: 2019-03-19

## 2019-03-19 RX ADMIN — MIDAZOLAM 2 MG: 1 INJECTION INTRAMUSCULAR; INTRAVENOUS at 10:49

## 2019-03-19 RX ADMIN — PROPOFOL 200 MCG/KG/MIN: 10 INJECTION, EMULSION INTRAVENOUS at 10:53

## 2019-03-19 RX ADMIN — SODIUM CHLORIDE, POTASSIUM CHLORIDE, SODIUM LACTATE AND CALCIUM CHLORIDE: 600; 310; 30; 20 INJECTION, SOLUTION INTRAVENOUS at 10:49

## 2019-03-19 RX ADMIN — DEXMEDETOMIDINE HYDROCHLORIDE 8 MCG: 100 INJECTION, SOLUTION INTRAVENOUS at 10:49

## 2019-03-19 RX ADMIN — LIDOCAINE HYDROCHLORIDE 100 MG: 20 INJECTION, SOLUTION INFILTRATION; PERINEURAL at 10:53

## 2019-03-19 RX ADMIN — FENTANYL CITRATE 25 MCG: 50 INJECTION, SOLUTION INTRAMUSCULAR; INTRAVENOUS at 10:52

## 2019-03-19 RX ADMIN — LIDOCAINE HYDROCHLORIDE 5 ML: 10 INJECTION, SOLUTION INFILTRATION; PERINEURAL at 09:08

## 2019-03-19 RX ADMIN — CEFAZOLIN SODIUM 2 G: 2 INJECTION, SOLUTION INTRAVENOUS at 11:04

## 2019-03-19 RX ADMIN — DEXMEDETOMIDINE HYDROCHLORIDE 8 MCG: 100 INJECTION, SOLUTION INTRAVENOUS at 11:43

## 2019-03-19 RX ADMIN — FENTANYL CITRATE 25 MCG: 50 INJECTION, SOLUTION INTRAMUSCULAR; INTRAVENOUS at 11:44

## 2019-03-19 RX ADMIN — SCOPALAMINE 1 PATCH: 1 PATCH, EXTENDED RELEASE TRANSDERMAL at 10:03

## 2019-03-19 RX ADMIN — PROPOFOL 200 MG: 10 INJECTION, EMULSION INTRAVENOUS at 10:53

## 2019-03-19 RX ADMIN — FENTANYL CITRATE 25 MCG: 50 INJECTION, SOLUTION INTRAMUSCULAR; INTRAVENOUS at 11:09

## 2019-03-19 RX ADMIN — TILMANOCEPT 0.55 MILLICURIE: KIT at 09:40

## 2019-03-19 RX ADMIN — FENTANYL CITRATE 25 MCG: 50 INJECTION, SOLUTION INTRAMUSCULAR; INTRAVENOUS at 11:04

## 2019-03-19 RX ADMIN — DEXAMETHASONE SODIUM PHOSPHATE 4 MG: 4 INJECTION, SOLUTION INTRA-ARTICULAR; INTRALESIONAL; INTRAMUSCULAR; INTRAVENOUS; SOFT TISSUE at 10:58

## 2019-03-19 RX ADMIN — Medication 10 MG: at 11:14

## 2019-03-19 RX ADMIN — PHENYLEPHRINE HYDROCHLORIDE 0.25 MCG/KG/MIN: 10 INJECTION, SOLUTION INTRAMUSCULAR; INTRAVENOUS; SUBCUTANEOUS at 11:27

## 2019-03-19 RX ADMIN — ONDANSETRON 4 MG: 2 INJECTION INTRAMUSCULAR; INTRAVENOUS at 11:03

## 2019-03-19 RX ADMIN — ACETAMINOPHEN 650 MG: 325 TABLET, FILM COATED ORAL at 10:04

## 2019-03-19 ASSESSMENT — ENCOUNTER SYMPTOMS: ORTHOPNEA: 0

## 2019-03-19 ASSESSMENT — MIFFLIN-ST. JEOR: SCORE: 1355.71

## 2019-03-19 NOTE — ANESTHESIA PREPROCEDURE EVALUATION
Anesthesia Pre-Procedure Evaluation    Patient: Saray Huntley   MRN: [id number] : 1970          Preoperative Diagnosis: LEFT BREAST CANCER    Procedure(s):  SEED LOCALIZED LEFT BREAST LUMPECTOMY  WITH SENTINEL LYMPH NODE BIOPSY  PORT REMOVAL    Past Medical History:   Diagnosis Date     Breast cancer (H)      Hypothyroidism, unspecified type 2017     PONV (postoperative nausea and vomiting)      Past Surgical History:   Procedure Laterality Date      SECTION  2003     INSERT PORT VASCULAR ACCESS N/A 2018    Procedure: PORT PLACEMENT ;  Surgeon: Sae Montaño MD;  Location:  OR     Echo 2019  Interpretation Summary     Global peak LV longitudinal strain is averaged at -24.2%. This is within  reported normal limits (normal <-18%).  The visual ejection fraction is estimated at 60-65%.  The right ventricle is normal in size and function.  The study was technically difficult.  _____________________________________________________________________________  __        Left Ventricle  The left ventricle is normal in size. There is normal left ventricular wall  thickness. The visual ejection fraction is estimated at 60-65%. Global peak LV  longitudinal strain is averaged at -24.2%. This is within reported normal  limits (normal <-18%). Left ventricular diastolic function is normal. No  regional wall motion abnormalities noted.     Right Ventricle  The right ventricle is normal in size and function. The right ventricle is not  well visualized.     Atria  Normal left atrial size. Right atrial size is normal. There is no atrial shunt  seen.     Mitral Valve  The mitral valve is normal in structure and function. There is trace mitral  regurgitation.     Tricuspid Valve  The tricuspid valve is not well visualized, but is grossly normal. There is  trace tricuspid regurgitation. Right ventricular systolic pressure could not  be approximated due to inadequate tricuspid  regurgitation.        Aortic Valve  The aortic valve is trileaflet. No aortic regurgitation is present. No  hemodynamically significant valvular aortic stenosis.     Pulmonic Valve  The pulmonic valve is not well seen, but is grossly normal. There is no  pulmonic valvular regurgitation.     Vessels  The aortic root is normal size.     Pericardium  There is no pericardial effusion.     Rhythm  Sinus rhythm was noted.    Anesthesia Evaluation     . Pt has had prior anesthetic.     History of anesthetic complications (Last procedure - port placement - nauseated after getting home)   - PONV        ROS/MED HX    ENT/Pulmonary:  - neg pulmonary ROS    (-) sleep apnea and recent URI   Neurologic:     (+)neuropathy - fingers and toes post chemo,     Cardiovascular:  - neg cardiovascular ROS      (-) hypertension, CHF and orthopnea/PND   METS/Exercise Tolerance:     Hematologic:  - neg hematologic  ROS       Musculoskeletal:  - neg musculoskeletal ROS       GI/Hepatic:        (-) GERD   Renal/Genitourinary:         Endo:     (+) thyroid problem hypothyroidism, Obesity, .      Psychiatric:  - neg psychiatric ROS       Infectious Disease:         Malignancy:   (+) Malignancy History of Breast  Breast CA Active status post.         Other:                          Physical Exam  Normal systems: cardiovascular, pulmonary and dental    Airway   Mallampati: III  TM distance: >3 FB  Neck ROM: full    Dental     Cardiovascular   Rhythm and rate: regular and normal      Pulmonary    breath sounds clear to auscultation            Lab Results   Component Value Date    WBC 6.9 03/08/2019    HGB 11.9 03/08/2019    HCT 37.3 03/08/2019     03/08/2019     03/08/2019    POTASSIUM 4.0 03/08/2019    CHLORIDE 107 03/08/2019    CO2 26 03/08/2019    BUN 22 03/08/2019    CR 0.64 03/08/2019    GLC 98 03/08/2019    ELIJAH 9.1 03/08/2019    ALBUMIN 3.9 03/08/2019    PROTTOTAL 7.3 03/08/2019    ALT 81 (H) 03/08/2019    AST 39 03/08/2019     "ALKPHOS 97 03/08/2019    BILITOTAL 0.3 03/08/2019    TSH 2.69 03/08/2019       Preop Vitals  BP Readings from Last 3 Encounters:   03/15/19 105/72   03/11/19 112/62   03/08/19 112/70    Pulse Readings from Last 3 Encounters:   03/15/19 62   03/11/19 62   03/08/19 62      Resp Readings from Last 3 Encounters:   03/15/19 16   02/28/19 12   02/22/19 16    SpO2 Readings from Last 3 Encounters:   03/11/19 98%   03/08/19 98%   02/28/19 98%      Temp Readings from Last 1 Encounters:   03/15/19 36.9  C (98.4  F) (Oral)    Ht Readings from Last 1 Encounters:   03/08/19 1.549 m (5' 1\")      Wt Readings from Last 1 Encounters:   03/15/19 80 kg (176 lb 6.4 oz)    Estimated body mass index is 33.33 kg/m  as calculated from the following:    Height as of 3/8/19: 1.549 m (5' 1\").    Weight as of 3/15/19: 80 kg (176 lb 6.4 oz).       Anesthesia Plan      History & Physical Review  History and physical reviewed and following examination; no interval change.    ASA Status:  3 .    NPO Status:  > 8 hours    Plan for General and LMA with Propofol induction. Maintenance will be TIVA.    PONV prophylaxis:  Ondansetron (or other 5HT-3), Dexamethasone or Solumedrol and Scopolamine patch  Propofol infusion      Postoperative Care  Postoperative pain management:  IV analgesics and Oral pain medications.      Consents  Anesthetic plan, risks, benefits and alternatives discussed with:  Patient..                 Jaleel Rutledge MD  "

## 2019-03-19 NOTE — BRIEF OP NOTE
[facility]    Brief Operative Note    Pre-operative diagnosis: LEFT BREAST CANCER  Post-operative diagnosis Breast cancer  Procedure: Procedure(s):  SEED LOCALIZED LEFT BREAST LUMPECTOMY  WITH SENTINEL LYMPH NODE BIOPSY  PORT REMOVAL  Surgeon: Surgeon(s) and Role:     * Sae Montaño MD - Primary     * Kristi Marsh PA-C - Assisting  Anesthesia: General   Estimated blood loss: 10 mL  Drains: None  Specimens:   ID Type Source Tests Collected by Time Destination   A : Left Breast Mass Tissue Breast, Left SURGICAL PATHOLOGY EXAM Sae Montaño MD 3/19/2019 11:30 AM    B : Left Axillary [place] Lymph Node #1 Tissue Axilla, Left SURGICAL PATHOLOGY EXAM Sae Montaño MD 3/19/2019 11:48 AM    C : Left Axillary [place] Lymph Node #2 Tissue Axilla, Left SURGICAL PATHOLOGY EXAM Sae Montaño MD 3/19/2019 11:50 AM    D : Left Axillary [place] Lymph Node #3 Tissue Axilla, Left SURGICAL PATHOLOGY EXAM Sae Montaño MD 3/19/2019 11:53 AM      Findings:   as expected.  Complications: None.  Implants: None.

## 2019-03-19 NOTE — ANESTHESIA CARE TRANSFER NOTE
Patient: Saray Huntley    Procedure(s):  SEED LOCALIZED LEFT BREAST LUMPECTOMY  WITH SENTINEL LYMPH NODE BIOPSY  PORT REMOVAL    Diagnosis: LEFT BREAST CANCER  Diagnosis Additional Information: No value filed.    Anesthesia Type:   General, LMA     Note:  Airway :Nasal Cannula  Patient transferred to:PACU  Comments: At end of procedure, spontaneous respirations, adequate tidal volumes, followed commands to voice, LMA removed atraumatically, airway patent after LMA removal. Oxygen via nasal cannula at 2 liters per minute to PACU. Oxygen tubing connected to wall O2 in PACU, SpO2, NiBP, and EKG monitors and alarms on and functioning, Sanjeev Hugger warmer connected to patient gown, report on patient's clinical status given to PACU RN, RN questions answered.Handoff Report: Identifed the Patient, Identified the Reponsible Provider, Reviewed the pertinent medical history, Discussed the surgical course, Reviewed Intra-OP anesthesia mangement and issues during anesthesia, Set expectations for post-procedure period and Allowed opportunity for questions and acknowledgement of understanding      Vitals: (Last set prior to Anesthesia Care Transfer)    CRNA VITALS  3/19/2019 1142 - 3/19/2019 1217      3/19/2019             Resp Rate (set):  10                Electronically Signed By: IRINA Mina CRNA  March 19, 2019  12:17 PM

## 2019-03-19 NOTE — PROGRESS NOTES
SBAR Seed Localization    SITUATION:  Patient to breast imaging center for imaging guided seed localizations before breast lumpectomy or excision biopsy with sentinel node injection.    BACKGROUND:  Breast imaging cancer, breast abnormality  Ordered procedure completed: Yes  Special needs identified: Yes     ASSESSMENT:  SBAR report called to patient care unit because of unexpected event in radiology: No  Allergies and medication list reviewed prior to procedure. Yes  Skin cleansed with ChloraPrep One-Step.  Anesthesia: approximately 5ml of 1% Lidocaine injection subcutaneous before seed insertion administered by the radiologist.   Gauze dressing over insertion site(s).  Post procedure mammogram completed: Yes    Patient tolerance: Patient tolerated procedure well.    RECOMMENDATIONS:  Patient transferred to Same Day Surgery in stable condition via wheelchair with Breast Imaging Staff.  Copy of note given to patient and instructions to hand this note to surgery staff.    Please call [facility] [phone] if there are any questions.

## 2019-03-19 NOTE — ANESTHESIA POSTPROCEDURE EVALUATION
Patient: Saray Huntley    Procedure(s):  SEED LOCALIZED LEFT BREAST LUMPECTOMY  WITH SENTINEL LYMPH NODE BIOPSY  PORT REMOVAL    Diagnosis:LEFT BREAST CANCER  Diagnosis Additional Information: No value filed.    Anesthesia Type:  General, LMA    Note:  Anesthesia Post Evaluation    Patient location during evaluation: PACU  Patient participation: Able to fully participate in evaluation  Level of consciousness: awake  Pain management: adequate  Airway patency: patent  Cardiovascular status: acceptable  Respiratory status: acceptable  Hydration status: acceptable  PONV: none     Anesthetic complications: None    Comments: In PACU patient reported some chest pressure and difficulty taking a deep breath.  Vital signs stable with 95% sat on room air, HR in 60's and /67.  Breath sounds equal bilaterally.  EKG without signs of ischemia.  Patient given incentive spirometry.  In phase 2 recovery patient fell better - up moving around and sitting in chair.         Last vitals:  Vitals:    03/19/19 1245 03/19/19 1300 03/19/19 1315   BP: 98/65 103/67 107/70   Pulse: 60  61   Resp: 13 12 12   Temp:  36.5  C (97.7  F)    SpO2: 96% 94% 96%         Electronically Signed By: Jaleel Rutledge MD  March 19, 2019  1:50 PM

## 2019-03-19 NOTE — OR NURSING
"Pt complaining of some chest pressure and feeling of \"can't take full breaths\". Vital sounds stable. Lung sounds clear. Dr. Rutledge notified and at bedside.   "

## 2019-03-19 NOTE — OP NOTE
PREOPERATIVE DIAGNOSIS: Left breast cancer status post neoadjuvant therapy      POSTOPERATIVE DIAGNOSIS: Left breast cancer status post neoadjuvant therapy      PROCEDURE:   1. Left breast radioactive seed lumpectomy  2. Left sentinel lymph node biopsy  3. Right sided port removal with layered closure      ASSISTANT: Kristi Marsh PA-C      ANESTHESIA: LMA.       COMPLICATIONS: None.       BLOOD LOSS: 15 cc.       FINDINGS: 1-The seed and clip were found in the specimen            2-SLNBX count-Node 1-1500. Node 2- 700 Node 3- 100      INDICATIONS: Mrs. Huntley presented with a Left-sided breast cancer that was HER-2 positive. She completed neoadjuvant therapy.She has elected to go forward with radioactive seed lumpectomy and SLNBX. I explained the risks, benefits, complications including but not limited to bleeding, infection, possible postop hematoma, seroma, skin necrosis, lymphedema, axillary nerve injury. If any of these occurred, she would require additional procedures. Patient did agree and did sign consent.       DETAILS OF PROCEDURE:  A radioactive seed was placed next to the clip and a sentinel node injection was performed without difficult. The patient was brought to the operating room per anesthesia and she tolerated anesthesia without difficulty. She was then prepped and draped in the usual fashion using ChloraPrep. A surgical timeout was then performed, verifying the correct surgeon, site, procedure and patient, and all in the room were in agreement. The Moclips counter was used to locate the seed in the 12:00 position. A skin incision was made at nipple areolar complex. The incision was carried through the subcutaneous tissue with cautery. The Moclips counter was used to help guide dissection with cautery. The lesion was circumferentially freed without bleeding. It was painted per pathology protocol. A mammogram confirmed the clip and seed was in the middle of the specimen. Multiple clips were placed  for radiologic marking. The underlying breast tissue was approximated with multiple 3-0 Vicryl sutures.     Attention was turned to the sentinel lymph node biopsy. A small incsion was made at the axillary hair line. Cautery was used to gain entry into the fat pad. The Dylan counter was used to follow the count into the axilla. Immediately, I found 1 small  node. It was removed and had a count of 1500. I then got into the axillary fat pad further and found 2 additional nodes with a count of 700 and 100 respectively. These were removed without bleeding with cautery. The Bowen counter was placed back into the axilla and no count was found. I explored the area and did not palpate any other abnormalities. There was no bleeding. It was irrigated until clear. The skin was approximated with multiple 3-0 Vicryls and a 4-0 Monocryl in subcuticular fashion.    Attention was turned to the right upper chest port. Local was injected in the skin incision was made down to the port pocket. Cautery was used to free up the port. The catheter was removed and pressure was held for 1 minute at the neck. Cautery was used for hemostasis. The wound bed was dry and was closed in multiple layers with a 3-0 Vicryl and a 4-0 Monocryl in subcuticular fashion. Dermabond was applied to all wounds.  All sponge, needle, and instrument counts were correct at the conclusion of the procedure.  The patient was awoken from anesthesia and transferred to PACU in stable condition.  A physician assistant was medically necessary for prepping, patient positioning, to aid in retraction, control of bleeding,and complex closure.           TOMY WEST MD      Please CC to referring provider and PCP

## 2019-03-19 NOTE — DISCHARGE INSTRUCTIONS
Discharge Instructions: Using an   Incentive Spirometer    An incentive spirometer is a device that helps you do deep breathing exercises. These exercises will help you breathe better and improve the function of your lungs. The incentive spirometer provides a way for you to take an active part in your recovery.  Follow these steps to use your incentive spirometer:  Inhale normally. Relax and breathe out.  Place your lips tightly around the mouthpiece.  Make sure the device is upright and not tilted.  Breathe in slowly and deeply. Fill your lungs with as much air as you can.   Hold your breath long enough to keep the disk raised for at least 3 seconds while keeping the bead on the right side between the two arrows.   Perform this exercise 10 times every hour while you re awake or as often as your doctor instructs.  If you were also taught coughing exercises, perform them regularly as instructed.        Information for Patients Discharging with a Transderm Scopolamine Patch       Dry mouth is a common side effect.    Drowsiness is another common side effect especially when combined with pain medication.  Please avoid activities that require mental alertness such as driving a car or making important legal decisions.    Since Scopolamine can cause temporary dilation of the pupils and blurred vision if it comes in contact with the eyes; be sure to wash your hands thoroughly with soap and water immediately after handling the patch.   When you remove your patch, please stick it to a tissue or paper towel for disposal.      Remove the patch immediately and contact a physician in the unlikely event that you experience symptoms of acute glaucoma (pain and reddening of the eyes, accompanied by dilated pupils).    Remove the patch if you develop any difficulties urinating.  If you cannot urinate after removing your patch, please notify your surgeon.    Remove the patch 24 hours after surgery.        Today you were given 625 mg  of Tylenol at . The recommended daily maximum dose is 4000 mg.     Same Day Surgery Discharge Instructions for  Sedation and General Anesthesia       It's not unusual to feel dizzy, light-headed or faint for up to 24 hours after surgery or while taking pain medication.  If you have these symptoms: sit for a few minutes before standing and have someone assist you when you get up to walk or use the bathroom.      You should rest and relax for the next 24 hours. We recommend you make arrangements to have an adult stay with you for at least 24 hours after your discharge.  Avoid hazardous and strenuous activity.      DO NOT DRIVE any vehicle or operate mechanical equipment for 24 hours following the end of your surgery.  Even though you may feel normal, your reactions may be affected by the medication you have received.      Do not drink alcoholic beverages for 24 hours following surgery.       Slowly progress to your regular diet as you feel able. It's not unusual to feel nauseated and/or vomit after receiving anesthesia.  If you develop these symptoms, drink clear liquids (apple juice, ginger ale, broth, 7-up, etc. ) until you feel better.  If your nausea and vomiting persists for 24 hours, please notify your surgeon.        All narcotic pain medications, along with inactivity and anesthesia, can cause constipation. Drinking plenty of liquids and increasing fiber intake will help.      For any questions of a medical nature, call your surgeon.      Do not make important decisions for 24 hours.      If you had general anesthesia, you may have a sore throat for a couple of days related to the breathing tube used during surgery.  You may use Cepacol lozenges to help with this discomfort.  If it worsens or if you develop a fever, contact your surgeon.       If you feel your pain is not well managed with the pain medications prescribed by your surgeon, please contact your surgeon's office to let them know so they can address  your concerns.     Reasons to contact your surgeon:    1. Signs of possible infection: Check your incision daily for redness, swelling, warmth, red streaks or foul drainage.   2. Elevated temperature.  3. Pain not controlled with pain medication and/or rest.   4. Uncontrolled nausea or vomiting.  5. Any questions or concerns.      [facility]  Discharge Instructions: Post-Operative Breast Surgery    ACTIVITY    Take frequent short walks and increase your activity gradually.      Avoid strenuous physical activity or heavy lifting greater than 15-20 lbs. for 1-2 weeks with arm on the surgery side.  You may climb stairs.    Gentle rotation and stretching of your arms and shoulders will prevent joint stiffness.    You may drive without restrictions when you are not using any prescription pain medication and feel comfortable in a car.    You may return to work/school when you are comfortable without any prescription pain medication.    WOUND CARE    You may remove your outer dressing and shower 48 hours after the surgery.  Pat your incisions dry and leave them open to air.  Re-apply dressing (Band-Aids or gauze/tape) as needed for drainage.    You have steri-strips (looks like white tape).  You may peel off the steri-strips 1 week after your surgery if they have not peeled off on their own.    Do not soak your incisions in a tub or pool for 2 weeks.     Do not apply any lotions, creams, or ointments to your incisions.    A ridge under your incisions is normal and will gradually resolve.    Wear a supportive bra for 1-2 weeks, day and night.    DIET    Start with liquids, then gradually resume your regular diet as tolerated.     Drink plenty of liquids to stay hydrated.    PAIN    Expect some tenderness and discomfort at the incision site(s).  Use the prescribed pain medication at your discretion.  Expect gradual resolution of your pain over several days.    You may take ibuprofen  with food (unless you have been told not to) instead of or in addition to your prescribed pain medication.  If you are taking Norco or Percocet, do not take any additional acetaminophen/APAP/Tylenol.    Do not drink alcohol or drive while you are taking pain medications.    You may apply ice to your incisions in 20 minute intervals as needed for the next 48 hours.  After that time, consider switching to heat if you prefer.    EXPECTATIONS    Pain medications can cause constipation.  Limit use when possible.  Take over the counter stool softener/stimulant, such as Colace or Senna, 1-2 times a day with plenty of water.  You may take a mild over the counter laxative, such as Miralax or a suppository, as needed.      You may discontinue these medications once you are having regular bowel movements and/or are no longer taking your narcotic pain medication.      RETURN APPOINTMENT    Follow up with Dr Montaño in 2 weeks.  Please call the office at [phone] to schedule your appointment.      CALL OUR OFFICE -[phone] IF YOU HAVE:     Chills or fever above 101 F.    Increased redness, warmth, or drainage at your incisions.    Significant bleeding.    Pain not relieved by your pain medication or rest.    Increasing pain after the first 48 hours.    Any other concerns or questions.    Revised January 2018

## 2019-03-20 ENCOUNTER — DOCUMENTATION ONLY (OUTPATIENT)
Dept: MAMMOGRAPHY | Facility: CLINIC | Age: 49
End: 2019-03-20

## 2019-03-20 LAB — COPATH REPORT: NORMAL

## 2019-03-20 NOTE — TELEPHONE ENCOUNTER
Breast Nurse Care Coordination:  Message sent to Dr. Montaño to inform that final surgical pathology results below are now available for him to review and inform patient.  Tatyana Pimentel, RN, BSN      Patient Name: AARON COSBY   MR#: [id number]   Specimen #: [id number]   Collected: 3/19/2019   Received: 3/19/2019   Reported: 3/20/2019 12:25   Ordering Phy(s): TOMY MONTAÑO       SPECIMEN(S):   A: Left breast mass   B: [place] lymph node, left axillary #1   C: [place] lymph node, left axillary #2   D: [place] lymph node, left axillary #3     FINAL DIAGNOSIS:   A: Breast, left, seed localized lumpectomy   - Benign breast tissue with biopsy site changes, no evidence of residual   carcinoma     B: Lymph node, left axillary, sentinel, excision   - Benign lymph node (1)     C: Lymph node, left axillary, sentinel, excision   - Benign lymph node (1)     D: Lymph node, left axillary, excision   - Benign lymph nodes (2)     Electronically signed out by:     Syd Mcmullen M.D.

## 2019-03-23 ENCOUNTER — APPOINTMENT (OUTPATIENT)
Dept: ULTRASOUND IMAGING | Facility: CLINIC | Age: 49
End: 2019-03-23
Attending: EMERGENCY MEDICINE
Payer: COMMERCIAL

## 2019-03-23 ENCOUNTER — HOSPITAL ENCOUNTER (EMERGENCY)
Facility: CLINIC | Age: 49
Discharge: HOME | End: 2019-03-24
Attending: EMERGENCY MEDICINE | Admitting: EMERGENCY MEDICINE
Payer: COMMERCIAL

## 2019-03-23 ENCOUNTER — APPOINTMENT (OUTPATIENT)
Dept: GENERAL RADIOLOGY | Facility: CLINIC | Age: 49
End: 2019-03-23
Attending: EMERGENCY MEDICINE
Payer: COMMERCIAL

## 2019-03-23 ENCOUNTER — NURSE TRIAGE (OUTPATIENT)
Dept: NURSING | Facility: CLINIC | Age: 49
End: 2019-03-23

## 2019-03-23 DIAGNOSIS — R68.83 CHILLS: ICD-10-CM

## 2019-03-23 DIAGNOSIS — R79.89 ELEVATED LFTS: ICD-10-CM

## 2019-03-23 DIAGNOSIS — J18.9 PNEUMONIA OF BOTH LOWER LOBES DUE TO INFECTIOUS ORGANISM: ICD-10-CM

## 2019-03-23 LAB
ALBUMIN SERPL-MCNC: 3.9 G/DL (ref 3.4–5)
ALP SERPL-CCNC: 113 U/L (ref 40–150)
ALT SERPL W P-5'-P-CCNC: 122 U/L (ref 0–50)
ANION GAP SERPL CALCULATED.3IONS-SCNC: 11 MMOL/L (ref 3–14)
AST SERPL W P-5'-P-CCNC: 52 U/L (ref 0–45)
BASOPHILS # BLD AUTO: 0.1 10E9/L (ref 0–0.2)
BASOPHILS NFR BLD AUTO: 0.9 %
BILIRUB SERPL-MCNC: 0.4 MG/DL (ref 0.2–1.3)
BUN SERPL-MCNC: 15 MG/DL (ref 7–30)
CALCIUM SERPL-MCNC: 9.3 MG/DL (ref 8.5–10.1)
CHLORIDE SERPL-SCNC: 106 MMOL/L (ref 94–109)
CO2 SERPL-SCNC: 25 MMOL/L (ref 20–32)
CREAT SERPL-MCNC: 0.85 MG/DL (ref 0.52–1.04)
DIFFERENTIAL METHOD BLD: NORMAL
EOSINOPHIL # BLD AUTO: 0.3 10E9/L (ref 0–0.7)
EOSINOPHIL NFR BLD AUTO: 3.8 %
ERYTHROCYTE [DISTWIDTH] IN BLOOD BY AUTOMATED COUNT: 12.7 % (ref 10–15)
FLUAV+FLUBV AG SPEC QL: NEGATIVE
FLUAV+FLUBV AG SPEC QL: NEGATIVE
GFR SERPL CREATININE-BSD FRML MDRD: 80 ML/MIN/{1.73_M2}
GLUCOSE SERPL-MCNC: 123 MG/DL (ref 70–99)
HCT VFR BLD AUTO: 38.2 % (ref 35–47)
HGB BLD-MCNC: 13.2 G/DL (ref 11.7–15.7)
IMM GRANULOCYTES # BLD: 0 10E9/L (ref 0–0.4)
IMM GRANULOCYTES NFR BLD: 0.1 %
LIPASE SERPL-CCNC: 209 U/L (ref 73–393)
LYMPHOCYTES # BLD AUTO: 3.5 10E9/L (ref 0.8–5.3)
LYMPHOCYTES NFR BLD AUTO: 46.1 %
MCH RBC QN AUTO: 31.7 PG (ref 26.5–33)
MCHC RBC AUTO-ENTMCNC: 34.6 G/DL (ref 31.5–36.5)
MCV RBC AUTO: 92 FL (ref 78–100)
MONOCYTES # BLD AUTO: 0.3 10E9/L (ref 0–1.3)
MONOCYTES NFR BLD AUTO: 4.5 %
NEUTROPHILS # BLD AUTO: 3.4 10E9/L (ref 1.6–8.3)
NEUTROPHILS NFR BLD AUTO: 44.6 %
NRBC # BLD AUTO: 0 10*3/UL
NRBC BLD AUTO-RTO: 0 /100
PLATELET # BLD AUTO: 297 10E9/L (ref 150–450)
POTASSIUM SERPL-SCNC: 3.7 MMOL/L (ref 3.4–5.3)
PROT SERPL-MCNC: 7.7 G/DL (ref 6.8–8.8)
RBC # BLD AUTO: 4.17 10E12/L (ref 3.8–5.2)
SODIUM SERPL-SCNC: 142 MMOL/L (ref 133–144)
SPECIMEN SOURCE: NORMAL
WBC # BLD AUTO: 7.6 10E9/L (ref 4–11)

## 2019-03-23 PROCEDURE — 96374 THER/PROPH/DIAG INJ IV PUSH: CPT

## 2019-03-23 PROCEDURE — 96361 HYDRATE IV INFUSION ADD-ON: CPT

## 2019-03-23 PROCEDURE — 99285 EMERGENCY DEPT VISIT HI MDM: CPT | Mod: 25

## 2019-03-23 PROCEDURE — 87804 INFLUENZA ASSAY W/OPTIC: CPT | Performed by: EMERGENCY MEDICINE

## 2019-03-23 PROCEDURE — 83690 ASSAY OF LIPASE: CPT | Performed by: EMERGENCY MEDICINE

## 2019-03-23 PROCEDURE — 85025 COMPLETE CBC W/AUTO DIFF WBC: CPT | Performed by: EMERGENCY MEDICINE

## 2019-03-23 PROCEDURE — 96375 TX/PRO/DX INJ NEW DRUG ADDON: CPT

## 2019-03-23 PROCEDURE — 25000128 H RX IP 250 OP 636: Performed by: EMERGENCY MEDICINE

## 2019-03-23 PROCEDURE — 80053 COMPREHEN METABOLIC PANEL: CPT | Performed by: EMERGENCY MEDICINE

## 2019-03-23 PROCEDURE — 71046 X-RAY EXAM CHEST 2 VIEWS: CPT

## 2019-03-23 PROCEDURE — 76705 ECHO EXAM OF ABDOMEN: CPT

## 2019-03-23 RX ORDER — ONDANSETRON 2 MG/ML
4 INJECTION INTRAMUSCULAR; INTRAVENOUS ONCE
Status: COMPLETED | OUTPATIENT
Start: 2019-03-23 | End: 2019-03-23

## 2019-03-23 RX ORDER — KETOROLAC TROMETHAMINE 15 MG/ML
15 INJECTION, SOLUTION INTRAMUSCULAR; INTRAVENOUS ONCE
Status: COMPLETED | OUTPATIENT
Start: 2019-03-23 | End: 2019-03-23

## 2019-03-23 RX ADMIN — KETOROLAC TROMETHAMINE 15 MG: 15 INJECTION, SOLUTION INTRAMUSCULAR; INTRAVENOUS at 23:08

## 2019-03-23 RX ADMIN — ONDANSETRON 4 MG: 2 INJECTION INTRAMUSCULAR; INTRAVENOUS at 23:08

## 2019-03-23 RX ADMIN — SODIUM CHLORIDE 1000 ML: 9 INJECTION, SOLUTION INTRAVENOUS at 22:39

## 2019-03-23 ASSESSMENT — ENCOUNTER SYMPTOMS
WEAKNESS: 1
SHORTNESS OF BREATH: 1
RHINORRHEA: 1
COUGH: 0
SORE THROAT: 0
DIZZINESS: 1
CHILLS: 1
ABDOMINAL PAIN: 0
FEVER: 1

## 2019-03-23 ASSESSMENT — MIFFLIN-ST. JEOR: SCORE: 1357.97

## 2019-03-23 NOTE — ED AVS SNAPSHOT
Emergency Department  [address] [place] [address]  Phone:  [phone]  Fax:  [phone]                                    Saray Huntley   MRN: [id number]    Department:   Emergency Department   Date of Visit:  3/23/2019           After Visit Summary Signature Page    I have received my discharge instructions, and my questions have been answered. I have discussed any challenges I see with this plan with the nurse or doctor.    ..........................................................................................................................................  Patient/Patient Representative Signature      ..........................................................................................................................................  Patient Representative Print Name and Relationship to Patient    ..................................................               ................................................  Date                                   Time    ..........................................................................................................................................  Reviewed by Signature/Title    ...................................................              ..............................................  Date                                               Time          22EPIC Rev 08/18

## 2019-03-24 VITALS
TEMPERATURE: 99.5 F | SYSTOLIC BLOOD PRESSURE: 109 MMHG | BODY MASS INDEX: 31.83 KG/M2 | WEIGHT: 173 LBS | RESPIRATION RATE: 15 BRPM | OXYGEN SATURATION: 95 % | HEART RATE: 69 BPM | HEIGHT: 62 IN | DIASTOLIC BLOOD PRESSURE: 67 MMHG

## 2019-03-24 PROCEDURE — 25000132 ZZH RX MED GY IP 250 OP 250 PS 637: Performed by: EMERGENCY MEDICINE

## 2019-03-24 RX ORDER — LEVOFLOXACIN 750 MG/1
750 TABLET, FILM COATED ORAL DAILY
Qty: 4 TABLET | Refills: 0 | Status: SHIPPED | OUTPATIENT
Start: 2019-03-24 | End: 2019-04-02

## 2019-03-24 RX ORDER — LEVOFLOXACIN 750 MG/1
750 TABLET, FILM COATED ORAL ONCE
Status: COMPLETED | OUTPATIENT
Start: 2019-03-24 | End: 2019-03-24

## 2019-03-24 RX ADMIN — LEVOFLOXACIN 750 MG: 750 TABLET, FILM COATED ORAL at 00:27

## 2019-03-24 NOTE — DISCHARGE INSTRUCTIONS
Prescription strength dosing instructions:  - 600mg ibuprofen (Advil, Motrin) every 6 hours as needed for fever/pain/inflammation.  Naprosyn (Naproxen, Aleve) works similarly and can be used instead, if preferred.  This does not affect your liver and you should try it before considering Tylenol.  - 650mg acetaminophen (tylenol) every 6 hours or 1000mg every 8 hours (maximum of 3000mg in 24 hours).  Acetaminophen is often in combination over the counter and prescription pain medications.  Be sure to include this in daily total.  This can affect your liver and should be used only when needed.    These medications work differently and can be used in combination.

## 2019-03-24 NOTE — TELEPHONE ENCOUNTER
"    Reason for Disposition    Patient sounds very sick or weak to the triager    Additional Information    Negative: Sounds like a life-threatening emergency to the triager    Negative: Chest pain    Negative: Difficulty breathing    Negative: Surgical incision symptoms and questions    Negative: [1] Discomfort (pain, burning or stinging) when passing urine AND [2] male    Negative: [1] Discomfort (pain, burning or stinging) when passing urine AND [2] female    Negative: Constipation    Negative: Calf pain    Negative: Dizziness is severe, or persists > 24 hours after surgery    Negative: Pain, redness, swelling, or pus at IV Site    Negative: Symptoms arising from use of a urinary catheter ([place] or Coude)    Negative: Cast problems or questions    Negative: Medication question    Negative: [1] Widespread rash AND [2] bright red, sunburn-like    Negative: [1] Drinking very little AND [2] dehydration suspected (e.g., no urine > 12 hours, very dry mouth, very lightheaded)    Negative: [1] Vomiting AND [2] abdomen looks much more swollen than usual    Negative: [1] Vomiting AND [2] persists > 4 hours    Negative: [1] SEVERE headache AND [2] after spinal (epidural) anesthesia    Answer Assessment - Initial Assessment Questions  1. SYMPTOM: \"What's the main symptom you're concerned about?\" (e.g., pain, fever, vomiting)      Weak dizzy nauseated shaky and SOB  2. ONSET: \"When did ________  start?\"      30 minutes ago  3. SURGERY: \"What surgery was performed?\"      lumpectomy  4. DATE of SURGERY: \"When was surgery performed?\"       One week ago  5. ANESTHESIA: \" What type of anesthesia did you have?\" (e.g., general, spinal, epidural, local)      general  6. PAIN: \"Is there any pain?\" If so, ask: \"How bad is it?\"  (Scale 1-10; or mild, moderate, severe)      yes  7. FEVER: \"Do you have a fever?\" If so, ask: \"What is your temperature, how was it measured, and when did it start?\"      no  8. VOMITING: \"Is there any " "vomiting?\" If yes, ask: \"How many times?\"      nausea  9. BLEEDING: \"Is there any bleeding?\" If so, ask: \"How much?\" and \"Where?\"      no  10. OTHER SYMPTOMS: \"Do you have any other symptoms?\" (e.g., drainage from wound, painful urination, constipation)        shaky    Protocols used: POST-OP SYMPTOMS AND QUESTIONS-ADULT-      "

## 2019-03-24 NOTE — ED PROVIDER NOTES
History     Chief Complaint:    Shortness of Breath and Fever    HPI   Saray Huntley is a 49 year old female with a history of breast cancer who presents to the ED for evaluation of shortness of breath and a fever. The patient states that she had a lumpectomy with seed localization and her port-a-cath removed on 3/19 with Dr. Montaño here at [facility]. She states that she has had shortness of breath since then. Tonight, however, about two hours prior to presenting, the patient states that she became nauseous and vomited. She then developed shaking chills, dizziness, generalized weakness, and felt a subjective fever so she took 650 mg of Tylenol and then presented to the ED for further evaluation. She also complains of some mild rhinorrhea and notes that he mother, whom she was exposed to, was sick with a cold. She otherwise denies any sore throat, cough, chest pain, leg swelling, abdominal pain, dysuria, wheezing, or pain/erythema at her incision sites. Of note, the patient does receive Herceptin infusions every three weeks, most recently on 3/15 with her next appointment scheduled on 4/5.      Allergies:  Sulfa drugs     Medications:    Norco  Lidocaine-prilocaine     Past Medical History:    Breast cancer  Hypothyroidism  Neuropathy  Vertigo    Past Surgical History:    Biopsy node sentinel  C/S  Insert port vascular access  Lumpectomy breast with seed localization  Remove port vascular access    Family History:    HLD  Kidney disease  Diabetes    Social History:  Negative for tobacco use.  Negative for alcohol use.  Marital Status:   [2]       Review of Systems   Constitutional: Positive for chills and fever.   HENT: Positive for rhinorrhea. Negative for congestion and sore throat.    Respiratory: Positive for shortness of breath. Negative for cough.    Cardiovascular: Negative for chest pain and leg swelling.   Gastrointestinal: Negative for abdominal pain.   Neurological: Positive for dizziness  "and weakness.   All other systems reviewed and are negative.      Physical Exam     Patient Vitals for the past 24 hrs:   BP Temp Temp src Pulse Heart Rate Resp SpO2 Height Weight   03/24/19 0056 109/67 -- -- -- 65 15 95 % -- --   03/23/19 2311 -- -- -- -- -- -- 94 % -- --   03/23/19 2310 112/69 -- -- 69 -- -- -- -- --   03/23/19 2234 -- -- -- -- -- -- 93 % -- --   03/23/19 2226 129/87 -- -- 87 -- -- -- -- --   03/23/19 2212 (!) 190/109 99.5  F (37.5  C) Oral -- 95 20 94 % 1.575 m (5' 2\") 78.5 kg (173 lb)         Physical Exam  Eyes:               Sclera white; Pupils are equal and round  ENT:                External ears and nares normal, oropharynx normal  CV:                  Rate as above with regular rhythm   Resp:               Breath sounds clear and equal bilaterally                          Non-labored, no retractions or accessory muscle use  GI:                   Abdomen is soft, non-tender, non-distended                          No rebound tenderness or peritoneal features  MS:                  Moves all extremities  Skin:                Warm and dry, port site R CW c/d/i w/o erythema, lumpectomy and LN sites on L also c/d/i w/o erythema.  No sites tender or indurated.  Neuro:             Speech is normal and fluent. No apparent deficit.    Emergency Department Course   Imaging:  Radiographic findings were communicated with the patient who voiced understanding of the findings.  XR Chest PA and LAT:   Shallow inspiration. Mild opacities in both lower lungs  medially may be atelectasis or pneumonia. Mild linear atelectasis or  scarring in the left lower lung laterally. Normal heart size as per radiology.    US Abdomen, limited (RUQ):  1. No acute sonographic findings in the right upper quadrant.    2. Fatty infiltration of the liver as per radiology.     Laboratory:  CBC: WBC: 7.6, HGB: 13.2, PLT: 297  CMP: Glucose 123 (H),  (H), AST 52 (H), o/w WNL (Creatinine: 0.85)    Lipase: 209  Influenza A/B: " negative    Interventions:  2239 NS 1,000 mLs IV  2308 Zofran 4 mg IV   Toradol 15 mg IV  0027 Levaquin 750 mg PO    Emergency Department Course:  Nursing notes and vitals reviewed. (2233) I performed an exam of the patient as documented above.     IV inserted. Medicine administered as documented above. Blood drawn. This was sent to the lab for further testing, results above.     The patient was sent for a chest XR and RUQ abdomen US while in the emergency department, findings above.     0033 I rechecked the patient and discussed the results of her workup thus far.     Findings and plan explained to the Patient. Patient discharged home with instructions regarding supportive care, medications, and reasons to return. The importance of close follow-up was reviewed. The patient was prescribed Levaquin    I personally reviewed the laboratory results with the Patient and answered all related questions prior to discharge.   Impression & Plan    Medical Decision Making:  This patient is here for evaluation for persistent shortness of breath post-op with new onset chills. All incision sites appear normal without signs of cellulitis or abscess formation. She does not meet SIRS criteria. Chest XR demonstrates the potential for bilateral lobe pneumonias. Given her associated shortness of breath, I think its appropriate to treat for these. These are health care associated and she was started on Levaquin. We discussed the risk of tendonopathies with this medication and she will seek evaluation immediately should she develop pain over a joint. LFTs are elevated compared to previous values.  She reports being told of elevation and suspicion was due to her infusions.  US was obtained with no acute findings. She was informed of the fatty infiltration of the liver. We discussed that Tylenol should be limited in the setting of LFT abnormalities. She will preferentially use ibuprofen and she felt better after the use of NSAIDs here.        Diagnosis:    ICD-10-CM    1. Chills R68.83    2. Pneumonia of both lower lobes due to infectious organism (H) J18.1    3. Elevated LFTs R94.5        Disposition:  discharged to home    Discharge Medications:     Medication List      Started    levofloxacin 750 MG tablet  Commonly known as:  LEVAQUIN  750 mg, Oral, DAILY          Scribe Disclosure:  I,  Jay Ramos, am serving as a scribe on 3/23/2019 at 10:33 PM to personally document services performed by Maranda Lilly, * based on my observations and the provider's statements to me.        Jay Ramos  3/23/2019    EMERGENCY DEPARTMENT       Maranda Lilly MD  03/24/19 0208

## 2019-03-25 ENCOUNTER — TELEPHONE (OUTPATIENT)
Dept: SURGERY | Facility: CLINIC | Age: 49
End: 2019-03-25

## 2019-03-25 NOTE — TELEPHONE ENCOUNTER
Saray is scheduled for her post op appointment on April 2nd check in at 2:15 pm at [facility] [place]. Patient verbalized understanding.    Ena Trinidad BSN, RN, OCN  Oncology Care Coordinator  [facility]  [phone]

## 2019-04-01 ENCOUNTER — TELEPHONE (OUTPATIENT)
Dept: MAMMOGRAPHY | Facility: CLINIC | Age: 49
End: 2019-04-01

## 2019-04-01 NOTE — TELEPHONE ENCOUNTER
Breast Nurse Care Coordination:  Call placed to patient today to assess her needs and to check in on how she is doing post operatively.    Patient was recently diagnosed with breast cancer, and underwent a Left seed localized lumpectomy, with sentinel lymph node biopsy with Dr. Montaño on 3/19/2019.      Patient reports good pain control with tylenol. She is currently taking 1-2 tabs of tylenol approximately 1-2 times per day.  She has removed her dressing and the incision is clean, dry, pink and intact. Patient denies fevers.     Patient is tolerating fluids, eating well balanced meals, and urinating with no difficulty. She is having regular BM's and is gradually increasing her activity.    Patient is scheduled to see Dr. Montaño on 4/2/19 for her first post op visit, and Dr. Stock on 4/5/2019 for follow up regarding surgical path, and treatment plan.  I informed patient to call me with any questions or concerns.    Patient verbalized understanding and agrees with the plan of care.    Tatyana Pimentel RN BSN  Breast Nurse Coordinator  [facility]  [phone]

## 2019-04-02 ENCOUNTER — OFFICE VISIT (OUTPATIENT)
Dept: SURGERY | Facility: CLINIC | Age: 49
End: 2019-04-02
Payer: COMMERCIAL

## 2019-04-02 DIAGNOSIS — Z09 SURGERY FOLLOW-UP EXAMINATION: Primary | ICD-10-CM

## 2019-04-02 PROCEDURE — 99024 POSTOP FOLLOW-UP VISIT: CPT | Performed by: SURGERY

## 2019-04-02 NOTE — PROGRESS NOTES
Surgery Postoperative Note    Doing well. Tolerating diet. Recently diagnosed with pneumonia but is doing better now. Only complaint is left arm pain and stiffness.     Breast- Incision healing well. No seroma or hematoma.   Left arm- Good mobility but sore. Incision healed    A/P  Saray Huntley is recovering well from a Lumpectomy and SLNBX. We once again discussed her final pathology which showed a complete pathologic response.her only issue at this point his left arm soreness and decreased mobility. It is improving but I would like her to start exercising and doing stretching. An information packet was given to her. She'll follow-up with oncology next week.I advised her to slowly return to regular activity. I expect she will make a complete recovery without issues.    Thank you for the opportunity to help in her care.    Sae Montaño M.D.  [facility]  [phone]    Please route or send letter to:  Primary Care Provider (PCP) and Referring Provider

## 2019-04-05 ENCOUNTER — ONCOLOGY VISIT (OUTPATIENT)
Dept: ONCOLOGY | Facility: CLINIC | Age: 49
End: 2019-04-05
Attending: INTERNAL MEDICINE
Payer: COMMERCIAL

## 2019-04-05 ENCOUNTER — ALLIED HEALTH/NURSE VISIT (OUTPATIENT)
Dept: ONCOLOGY | Facility: CLINIC | Age: 49
End: 2019-04-05

## 2019-04-05 ENCOUNTER — INFUSION THERAPY VISIT (OUTPATIENT)
Dept: INFUSION THERAPY | Facility: CLINIC | Age: 49
End: 2019-04-05
Attending: INTERNAL MEDICINE
Payer: COMMERCIAL

## 2019-04-05 VITALS
SYSTOLIC BLOOD PRESSURE: 134 MMHG | WEIGHT: 179.6 LBS | HEART RATE: 60 BPM | OXYGEN SATURATION: 98 % | DIASTOLIC BLOOD PRESSURE: 78 MMHG | RESPIRATION RATE: 16 BRPM | BODY MASS INDEX: 32.85 KG/M2 | TEMPERATURE: 98.8 F

## 2019-04-05 VITALS
SYSTOLIC BLOOD PRESSURE: 134 MMHG | BODY MASS INDEX: 33.06 KG/M2 | TEMPERATURE: 98.8 F | OXYGEN SATURATION: 98 % | WEIGHT: 179.68 LBS | HEIGHT: 62 IN | DIASTOLIC BLOOD PRESSURE: 78 MMHG | HEART RATE: 60 BPM

## 2019-04-05 DIAGNOSIS — Z17.0 MALIGNANT NEOPLASM OF UPPER-OUTER QUADRANT OF LEFT BREAST IN FEMALE, ESTROGEN RECEPTOR POSITIVE (H): Primary | ICD-10-CM

## 2019-04-05 DIAGNOSIS — C50.412 MALIGNANT NEOPLASM OF UPPER-OUTER QUADRANT OF LEFT BREAST IN FEMALE, ESTROGEN RECEPTOR POSITIVE (H): Primary | ICD-10-CM

## 2019-04-05 DIAGNOSIS — Z71.9 COUNSELING NOS(V65.40): Primary | ICD-10-CM

## 2019-04-05 DIAGNOSIS — C50.412 MALIGNANT NEOPLASM OF UPPER-OUTER QUADRANT OF LEFT BREAST IN FEMALE, ESTROGEN RECEPTOR POSITIVE (H): ICD-10-CM

## 2019-04-05 DIAGNOSIS — Z17.0 MALIGNANT NEOPLASM OF UPPER-OUTER QUADRANT OF LEFT BREAST IN FEMALE, ESTROGEN RECEPTOR POSITIVE (H): ICD-10-CM

## 2019-04-05 PROCEDURE — 99215 OFFICE O/P EST HI 40 MIN: CPT | Performed by: INTERNAL MEDICINE

## 2019-04-05 PROCEDURE — 96413 CHEMO IV INFUSION 1 HR: CPT

## 2019-04-05 PROCEDURE — 25800030 ZZH RX IP 258 OP 636: Performed by: INTERNAL MEDICINE

## 2019-04-05 PROCEDURE — 25000128 H RX IP 250 OP 636: Performed by: INTERNAL MEDICINE

## 2019-04-05 RX ORDER — SODIUM CHLORIDE 9 MG/ML
1000 INJECTION, SOLUTION INTRAVENOUS CONTINUOUS PRN
Status: CANCELLED
Start: 2019-04-05

## 2019-04-05 RX ORDER — EPINEPHRINE 1 MG/ML
0.3 INJECTION, SOLUTION INTRAMUSCULAR; SUBCUTANEOUS EVERY 5 MIN PRN
Status: CANCELLED | OUTPATIENT
Start: 2019-04-05

## 2019-04-05 RX ORDER — DIPHENHYDRAMINE HYDROCHLORIDE 50 MG/ML
50 INJECTION INTRAMUSCULAR; INTRAVENOUS
Status: CANCELLED
Start: 2019-04-05

## 2019-04-05 RX ORDER — EPINEPHRINE 0.3 MG/.3ML
0.3 INJECTION SUBCUTANEOUS EVERY 5 MIN PRN
Status: CANCELLED | OUTPATIENT
Start: 2019-04-05

## 2019-04-05 RX ORDER — ALBUTEROL SULFATE 0.83 MG/ML
2.5 SOLUTION RESPIRATORY (INHALATION)
Status: CANCELLED | OUTPATIENT
Start: 2019-04-05

## 2019-04-05 RX ORDER — ALBUTEROL SULFATE 90 UG/1
1-2 AEROSOL, METERED RESPIRATORY (INHALATION)
Status: CANCELLED
Start: 2019-04-05

## 2019-04-05 RX ORDER — LORAZEPAM 2 MG/ML
0.5 INJECTION INTRAMUSCULAR EVERY 4 HOURS PRN
Status: CANCELLED
Start: 2019-04-05

## 2019-04-05 RX ORDER — ACETAMINOPHEN 325 MG/1
650 TABLET ORAL
Status: CANCELLED
Start: 2019-04-05

## 2019-04-05 RX ORDER — METHYLPREDNISOLONE SODIUM SUCCINATE 125 MG/2ML
125 INJECTION, POWDER, LYOPHILIZED, FOR SOLUTION INTRAMUSCULAR; INTRAVENOUS
Status: CANCELLED
Start: 2019-04-05

## 2019-04-05 RX ORDER — DIPHENHYDRAMINE HCL 25 MG
50 CAPSULE ORAL
Status: CANCELLED
Start: 2019-04-05

## 2019-04-05 RX ORDER — MEPERIDINE HYDROCHLORIDE 25 MG/ML
25 INJECTION INTRAMUSCULAR; INTRAVENOUS; SUBCUTANEOUS EVERY 30 MIN PRN
Status: CANCELLED | OUTPATIENT
Start: 2019-04-05

## 2019-04-05 RX ADMIN — SODIUM CHLORIDE 250 ML: 9 INJECTION, SOLUTION INTRAVENOUS at 11:28

## 2019-04-05 RX ADMIN — TRASTUZUMAB 450 MG: 150 INJECTION, POWDER, LYOPHILIZED, FOR SOLUTION INTRAVENOUS at 11:28

## 2019-04-05 ASSESSMENT — PAIN SCALES - GENERAL: PAINLEVEL: MILD PAIN (3)

## 2019-04-05 ASSESSMENT — MIFFLIN-ST. JEOR: SCORE: 1388.25

## 2019-04-05 NOTE — PROGRESS NOTES
"Oncology Rooming Note    April 5, 2019 10:18 AM   Saray Huntley is a 49 year old female who presents for:    Chief Complaint   Patient presents with     Oncology Clinic Visit     Initial Vitals: /78   Pulse 60   Temp 98.8  F (37.1  C) (Oral)   Ht 1.575 m (5' 2\")   Wt 81.5 kg (179 lb 10.8 oz)   LMP 09/29/2012 (Approximate)   SpO2 98%   BMI 32.86 kg/m   Estimated body mass index is 32.86 kg/m  as calculated from the following:    Height as of this encounter: 1.575 m (5' 2\").    Weight as of this encounter: 81.5 kg (179 lb 10.8 oz). Body surface area is 1.89 meters squared.  Mild Pain (3) Comment: surgical site, neuropathy pain   Patient's last menstrual period was 09/29/2012 (approximate).  Allergies reviewed: Yes  Medications reviewed: yes.  Unsure about refills..    Medications: Medication refills not needed today.  Pharmacy name entered into [facility]:    [id number] [place] [place] - [address] AT [facility] [place] [address] & [facility], [place] - [address]    Clinical concerns:  doctor was notified.   Fabiola is talking with her. Will finish visit with Yadiel Baker. Schedule apt afterwards.    Rebekah Moses CMA            "

## 2019-04-05 NOTE — PROGRESS NOTES
[facility]    Hematology/Oncology Established Patient Note      Today's Date: 4/05/19    Reason for Follow-up: left-sided breast cancer      HISTORY OF PRESENT ILLNESS: Saray Huntley is a 49 year old female who presents with left-sided breast cancer.  It was found on a screening mammogram.  Left breast ultrasound found a hypoechoic mass at the 12:00 position, 8 cm from the nipple, measuring 1.5 x 1.6 x 1.5 cm.  Axilla survey showed only normal-appearing lymph nodes.   Biopsy showed invasive ductal carcinoma, grade 3, weakly positive for ER (1-3%), HI negative, HER-2 positive.      Case was reviewed at tumor conference, and recommendation was for neoadjuvant chemotherapy.    Port was placed on 11/28/18.  It was removed on 3/19/19.    She started neoadjuvant chemotherapy on 11/30/18 with weekly paclitaxel, Herceptin, Perjeta and completed on 2/15/19.    On 3/19/19, she underwent left breast lumpectomy and sentinel lymph node biopsy.  Port was also removed.  Pathology showed no evidence of residual carcinoma, consistent with a complete pathologic response.      She will continue on Herceptin to complete 1 year.        INTERIM HISTORY: Patient is here for follow-up today.  She has completed her surgery and is recovering well from that.  She still has some left arm soreness and decreased mobility.  She was cleared by surgeon to exercise and stretch.  She went to the ED on 3/23/19 with pneumonia and completed 5 day course of Levaquin.  She says that she experienced bilateral leg weakness after that, but that is better now.        REVIEW OF SYSTEMS:   14 point ROS was reviewed and is negative other than as noted above in HPI.       HOME MEDICATIONS:  Current Outpatient Medications   Medication Sig Dispense Refill     HYDROcodone-acetaminophen (NORCO) 5-325 MG tablet Take 1-2 tablets by mouth every 4 hours as needed for moderate to severe pain (Patient not taking: Reported on 4/5/2019) 20 tablet 0      Lidocaine-Prilocaine (STUDY - LIDOCAINE 2.5%/PRILOCAINE 2.5% CREAM, IDS# [id number],) Externally apply topically daily as needed for moderate pain (Patient not taking: Reported on 2019) 30 g 3         ALLERGIES:  Allergies   Allergen Reactions     Sulfa Drugs Rash         PAST MEDICAL HISTORY:  Past Medical History:   Diagnosis Date     Breast cancer (H)      Hypothyroidism, unspecified type 2017     Neuropathy      PONV (postoperative nausea and vomiting)      Vertigo          PAST SURGICAL HISTORY:  Past Surgical History:   Procedure Laterality Date     BIOPSY NODE SENTINEL Left 3/19/2019    Procedure: WITH SENTINEL LYMPH NODE BIOPSY;  Surgeon: Sae Montaño MD;  Location:  OR      SECTION  2003     INSERT PORT VASCULAR ACCESS N/A 2018    Procedure: PORT PLACEMENT ;  Surgeon: Sae Montaño MD;  Location:  OR     LUMPECTOMY BREAST WITH SEED LOCALIZATION Left 3/19/2019    Procedure: SEED LOCALIZED LEFT BREAST LUMPECTOMY;  Surgeon: Sae Montaño MD;  Location:  OR     REMOVE PORT VASCULAR ACCESS N/A 3/19/2019    Procedure: PORT REMOVAL;  Surgeon: Sae Montaño MD;  Location:  OR         SOCIAL HISTORY:  Social History     Socioeconomic History     Marital status:      Spouse name: Not on file     Number of children: Not on file     Years of education: Not on file     Highest education level: Not on file   Occupational History     Not on file   Social Needs     Financial resource strain: Not on file     Food insecurity:     Worry: Not on file     Inability: Not on file     Transportation needs:     Medical: Not on file     Non-medical: Not on file   Tobacco Use     Smoking status: Never Smoker     Smokeless tobacco: Never Used   Substance and Sexual Activity     Alcohol use: No     Drug use: No     Sexual activity: Not Currently   Lifestyle     Physical activity:     Days per week: Not on file     Minutes per session: Not on file     Stress: Not on  "file   Relationships     Social connections:     Talks on phone: Not on file     Gets together: Not on file     Attends Zoroastrianism service: Not on file     Active member of club or organization: Not on file     Attends meetings of clubs or organizations: Not on file     Relationship status: Not on file     Intimate partner violence:     Fear of current or ex partner: Not on file     Emotionally abused: Not on file     Physically abused: Not on file     Forced sexual activity: Not on file   Other Topics Concern     Parent/sibling w/ CABG, MI or angioplasty before 65F 55M? Not Asked   Social History Narrative     Not on file   She has never smoked.  She does not drink or use illicit drugs.  She is going to school at [employer] studying IT.  She has 1 daughter who is 15 years old.  She says that she has menopause a few years old.  She tried oral contraceptive, but she did not tolerate it well, so did not take it long term.  She has never taken hormone replacement therapy          FAMILY HISTORY:  Family History   Problem Relation Age of Onset     Hyperlipidemia Mother      Kidney Disease Mother      Diabetes Father    Patient does not know much about her family history and is unaware if there is breast cancer or ovarian cancer in her family.        PHYSICAL EXAM:  Vital signs:  /78   Pulse 60   Temp 98.8  F (37.1  C) (Oral)   Ht 1.575 m (5' 2\")   Wt 81.5 kg (179 lb 10.8 oz)   LMP 2012 (Approximate)   SpO2 98%   BMI 32.86 kg/m     ECO  GENERAL/CONSTITUTIONAL: No acute distress. Accompanied by father.  EYES: No scleral icterus.  BREAST: s/p left lumpectomy, scar healing well.     MUSCULOSKELETAL: Warm and well-perfused, no cyanosis, clubbing, or edema.  NEUROLOGIC: Alert, oriented, answers questions appropriately.  INTEGUMENTARY: No jaundice.  Port has been removed.      LABS:  None today.      PATHOLOGY:  3/19/19:  FINAL DIAGNOSIS:   A: Breast, left, seed localized lumpectomy   - Benign breast " tissue with biopsy site changes, no evidence of residual   carcinoma     B: Lymph node, left axillary, sentinel, excision   - Benign lymph node (1)     C: Lymph node, left axillary, sentinel, excision   - Benign lymph node (1)     D: Lymph node, left axillary, excision   - Benign lymph nodes (2)     IMAGING:  Left breast ultrasound 11/6/18:  FINDINGS:  Targeted ultrasound shows a hypoechoic mass at the 12:00  position, 8 cm from the nipple, measuring 1.5 x 1.6 x 1.5 cm. Survey  of the axilla shows only normal-appearing lymph nodes.      Echo 2/22/19:  Global peak LV longitudinal strain is averaged at -24.2%. This is within  reported normal limits (normal <-18%).  The visual ejection fraction is estimated at 60-65%.  The right ventricle is normal in size and function.  The study was technically difficult.      ASSESSMENT/PLAN:  Saray Huntley is a 49 year old post-menopausal female with:    1) Left-sided breast cancer:  1.5 x 1.6 x 1.5 cm on ultrasound.  Axilla survey showed only normal-appearing lymph nodes.   Biopsy showed invasive ductal carcinoma, grade 3, weakly positive for ER (1-3%), FL negative, HER-2 positive.  Clinical stage IA (cT1cN0).    She completed neoadjuvant chemotherapy with weekly paclitaxel, Herceptin, Perjeta on 2/15/19.  On 3/19/19, she underwent left breast lumpectomy and sentinel lymph node biopsy.  Pathology showed no evidence of residual carcinoma, consistent with a complete pathologic response.      We discussed the pathology results today.  She will continue with Herceptin every 3 weeks to complete 1 year.  She will now go on to radiation.      We discussed hormonal therapy after completion of radiation.  The ER was weakly positive, on 1-3%.  We discussed trying anastrozole, but patient does not want to take it for fear of side effects, and with the ER being so weakly positive, there may not be significant benefit anyway, so it would be reasonable not to pursue adjuvant hormonal therapy.       -radiation oncology referral  -proceed with Herceptin today and every 3 weeks  -RTC in 6 weeks for follow-up    2) Nutrition: She was contacted by nutrition.    3) Genetics: She was diagnosed at age 48 with breast cancer  -she met with genetic counseling.  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    4) Fatigue: Secondary to recent surgery, prior chemo.  She is going to start exercising and stretching.      5) Coping: Patient seems to be doing better now.    - following and met with patient today.    6) GERD: on omeprazole    7) Peripheral neuropathy: secondary to paclitaxel.  She has completed chemotherapy, and symptoms should gradually improve.      I spent a total of 40 minutes with the patient, with over >50% of the time in counseling and/or coordination of care.       Cayla Stock MD  Hematology/Oncology  [facility]

## 2019-04-05 NOTE — PROGRESS NOTES
Infusion Nursing Note:  Saray Huntley presents today for herceptin  Patient seen by provider today: Yes: Dr. Stock   present during visit today: Not Applicable.    Note: N/A.    Intravenous Access:  Peripheral IV placed.    Treatment Conditions:  Results reviewed, labs MET treatment parameters, ok to proceed with treatment.  ECHO/MUGA completed 2/27/29  EF 60-65%.      Post Infusion Assessment:  Patient tolerated infusion without incident.  Site patent and intact, free from redness, edema or discomfort.  No evidence of extravasations.  Access discontinued per protocol.       Discharge Plan:   AVS to patient via MYCHART.  Patient will return as [facility] for next appointment.   Patient discharged in stable condition accompanied by: father.  Departure Mode: Ambulatory.    Yadiel Campos RN

## 2019-04-05 NOTE — PATIENT INSTRUCTIONS
Talking with Fabiola, finish up with Yadiel in [facility]. Schedule apt afterwards.      patient would like to call back after she knows her radiation schedule. /Tia

## 2019-04-05 NOTE — PROGRESS NOTES
"Social Work Progress Note      Data/Intervention:  Patient Name:  Saray Huntley  /Age:  1970 (49 year old)    Reason for Follow-Up:  Saray is a 49-year-old woman with a diagnosis of left-sided breast cancer who is s/p chemotherapy and lumpectomy, now anticipating radiation and ongoing Herceptin. Saray comes to clinic today accompanied by father for Herceptin and follow-up with Dr. Stock. This clinician met with Saray for psychosocial check-in and emotional support.     Intervention:   Saray's affect is bright, she engages easily in conversation with this clinician. Saray reports that she is continuing to recovery from surgery, that she is continuing to engage with exercises that have been recommended. Saray reports that despite this, she feels that she \"cannot be entirely happy\" because she is anticipating radiation therapy daily for 5 weeks. Provided safe place for Saray to voice frustrations and worry about upcoming radiation therapy, normalizing and validating concerns. Saray acknowledged that when she is done with radiation therapy and has recovered from her surgery, she is hopeful that she will be able to engage more in weight loss programming and support. Discussed with Saray different local resources for cancer specific healthy living in survivorship.     Resources Provided:  Survivors to [facility]  [employer]    Plan:  Previously provided patient/family with writer's contact information and availability. Will continue to follow for ongoing psychosocial support as Saray continues to adjust to treatment and realize its impacts. Will continue to collaborate with multidisciplinary team.     Please call or page if needs or concerns arise.     ALEA Tapia, [facility]  Direct Phone: [phone]  Pager: [phone]  "

## 2019-04-05 NOTE — LETTER
"    4/5/2019         RE: Saray Huntley  [address]  [place] [address]        Dear Colleague,    Thank you for referring your patient, Saray Huntley, to the [facility]. Please see a copy of my visit note below.    Oncology Rooming Note    April 5, 2019 10:18 AM   Saray Huntley is a 49 year old female who presents for:    Chief Complaint   Patient presents with     Oncology Clinic Visit     Initial Vitals: /78   Pulse 60   Temp 98.8  F (37.1  C) (Oral)   Ht 1.575 m (5' 2\")   Wt 81.5 kg (179 lb 10.8 oz)   LMP 09/29/2012 (Approximate)   SpO2 98%   BMI 32.86 kg/m    Estimated body mass index is 32.86 kg/m  as calculated from the following:    Height as of this encounter: 1.575 m (5' 2\").    Weight as of this encounter: 81.5 kg (179 lb 10.8 oz). Body surface area is 1.89 meters squared.  Mild Pain (3) Comment: surgical site, neuropathy pain   Patient's last menstrual period was 09/29/2012 (approximate).  Allergies reviewed: Yes  Medications reviewed: yes.  Unsure about refills..    Medications: Medication refills not needed today.  Pharmacy name entered into [facility]:    [id number] - [place], [place] - [address] AT [facility] OF [address] [facility], [place] - [address]    Clinical concerns:  doctor was notified.   Fabiola is talking with her. Will finish visit with Yadiel Baker. Schedule apt afterwards.    Rebekah Moses CMA              [facility]    Hematology/Oncology Established Patient Note      Today's Date: 4/05/19    Reason for Follow-up: left-sided breast cancer      HISTORY OF PRESENT ILLNESS: Saray Huntley is a 49 year old female who presents with left-sided breast cancer.  It was found on a screening mammogram.  Left breast ultrasound found a hypoechoic mass at the 12:00 position, 8 cm from the nipple, measuring 1.5 x 1.6 x 1.5 cm.  Axilla survey showed only normal-appearing " lymph nodes.   Biopsy showed invasive ductal carcinoma, grade 3, weakly positive for ER (1-3%), [place] negative, HER-2 positive.      Case was reviewed at tumor conference, and recommendation was for neoadjuvant chemotherapy.    Port was placed on 11/28/18.  It was removed on 3/19/19.    She started neoadjuvant chemotherapy on 11/30/18 with weekly paclitaxel, Herceptin, Perjeta and completed on 2/15/19.    On 3/19/19, she underwent left breast lumpectomy and sentinel lymph node biopsy.  Port was also removed.  Pathology showed no evidence of residual carcinoma, consistent with a complete pathologic response.      She will continue on Herceptin to complete 1 year.        INTERIM HISTORY: Patient is here for follow-up today.  She has completed her surgery and is recovering well from that.  She still has some left arm soreness and decreased mobility.  She was cleared by surgeon to exercise and stretch.  She went to the ED on 3/23/19 with pneumonia and completed 5 day course of Levaquin.  She says that she experienced bilateral leg weakness after that, but that is better now.        REVIEW OF SYSTEMS:   14 point ROS was reviewed and is negative other than as noted above in HPI.       HOME MEDICATIONS:  Current Outpatient Medications   Medication Sig Dispense Refill     HYDROcodone-acetaminophen (NORCO) 5-325 MG tablet Take 1-2 tablets by mouth every 4 hours as needed for moderate to severe pain (Patient not taking: Reported on 4/5/2019) 20 tablet 0     Lidocaine-Prilocaine (STUDY - LIDOCAINE 2.5%/PRILOCAINE 2.5% CREAM, IDS# 4243,) Externally apply topically daily as needed for moderate pain (Patient not taking: Reported on 4/5/2019) 30 g 3         ALLERGIES:  Allergies   Allergen Reactions     Sulfa Drugs Rash         PAST MEDICAL HISTORY:  Past Medical History:   Diagnosis Date     Breast cancer (H)      Hypothyroidism, unspecified type 9/29/2017     Neuropathy      PONV (postoperative nausea and vomiting)      Vertigo           PAST SURGICAL HISTORY:  Past Surgical History:   Procedure Laterality Date     BIOPSY NODE SENTINEL Left 3/19/2019    Procedure: WITH SENTINEL LYMPH NODE BIOPSY;  Surgeon: Sae Montaño MD;  Location:  OR      SECTION  2003     INSERT PORT VASCULAR ACCESS N/A 2018    Procedure: PORT PLACEMENT ;  Surgeon: Sae Montaño MD;  Location:  OR     LUMPECTOMY BREAST WITH SEED LOCALIZATION Left 3/19/2019    Procedure: SEED LOCALIZED LEFT BREAST LUMPECTOMY;  Surgeon: Sae Montaño MD;  Location:  OR     REMOVE PORT VASCULAR ACCESS N/A 3/19/2019    Procedure: PORT REMOVAL;  Surgeon: Sae Montaño MD;  Location:  OR         SOCIAL HISTORY:  Social History     Socioeconomic History     Marital status:      Spouse name: Not on file     Number of children: Not on file     Years of education: Not on file     Highest education level: Not on file   Occupational History     Not on file   Social Needs     Financial resource strain: Not on file     Food insecurity:     Worry: Not on file     Inability: Not on file     Transportation needs:     Medical: Not on file     Non-medical: Not on file   Tobacco Use     Smoking status: Never Smoker     Smokeless tobacco: Never Used   Substance and Sexual Activity     Alcohol use: No     Drug use: No     Sexual activity: Not Currently   Lifestyle     Physical activity:     Days per week: Not on file     Minutes per session: Not on file     Stress: Not on file   Relationships     Social connections:     Talks on phone: Not on file     Gets together: Not on file     Attends Caodaism service: Not on file     Active member of club or organization: Not on file     Attends meetings of clubs or organizations: Not on file     Relationship status: Not on file     Intimate partner violence:     Fear of current or ex partner: Not on file     Emotionally abused: Not on file     Physically abused: Not on file     Forced sexual activity:  "Not on file   Other Topics Concern     Parent/sibling w/ CABG, MI or angioplasty before 65F 55M? Not Asked   Social History Narrative     Not on file   She has never smoked.  She does not drink or use illicit drugs.  She is going to school at [employer] studying IT.  She has 1 daughter who is 15 years old.  She says that she has menopause a few years old.  She tried oral contraceptive, but she did not tolerate it well, so did not take it long term.  She has never taken hormone replacement therapy          FAMILY HISTORY:  Family History   Problem Relation Age of Onset     Hyperlipidemia Mother      Kidney Disease Mother      Diabetes Father    Patient does not know much about her family history and is unaware if there is breast cancer or ovarian cancer in her family.        PHYSICAL EXAM:  Vital signs:  /78   Pulse 60   Temp 98.8  F (37.1  C) (Oral)   Ht 1.575 m (5' 2\")   Wt 81.5 kg (179 lb 10.8 oz)   LMP 2012 (Approximate)   SpO2 98%   BMI 32.86 kg/m      ECO  GENERAL/CONSTITUTIONAL: No acute distress. Accompanied by father.  EYES: No scleral icterus.  BREAST: s/p left lumpectomy, scar healing well.     MUSCULOSKELETAL: Warm and well-perfused, no cyanosis, clubbing, or edema.  NEUROLOGIC: Alert, oriented, answers questions appropriately.  INTEGUMENTARY: No jaundice.  Port has been removed.      LABS:  None today.      PATHOLOGY:  3/19/19:  FINAL DIAGNOSIS:   A: Breast, left, seed localized lumpectomy   - Benign breast tissue with biopsy site changes, no evidence of residual   carcinoma     B: Lymph node, left axillary, sentinel, excision   - Benign lymph node (1)     C: Lymph node, left axillary, sentinel, excision   - Benign lymph node (1)     D: Lymph node, left axillary, excision   - Benign lymph nodes (2)     IMAGING:  Left breast ultrasound 18:  FINDINGS:  Targeted ultrasound shows a hypoechoic mass at the 12:00  position, 8 cm from the nipple, measuring 1.5 x 1.6 x 1.5 cm. " Survey  of the axilla shows only normal-appearing lymph nodes.      Echo 2/22/19:  Global peak LV longitudinal strain is averaged at -24.2%. This is within  reported normal limits (normal <-18%).  The visual ejection fraction is estimated at 60-65%.  The right ventricle is normal in size and function.  The study was technically difficult.      ASSESSMENT/PLAN:  Saray Huntley is a 49 year old post-menopausal female with:    1) Left-sided breast cancer:  1.5 x 1.6 x 1.5 cm on ultrasound.  Axilla survey showed only normal-appearing lymph nodes.   Biopsy showed invasive ductal carcinoma, grade 3, weakly positive for ER (1-3%), [place] negative, HER-2 positive.  Clinical stage IA (cT1cN0).    She completed neoadjuvant chemotherapy with weekly paclitaxel, Herceptin, Perjeta on 2/15/19.  On 3/19/19, she underwent left breast lumpectomy and sentinel lymph node biopsy.  Pathology showed no evidence of residual carcinoma, consistent with a complete pathologic response.      We discussed the pathology results today.  She will continue with Herceptin every 3 weeks to complete 1 year.  She will now go on to radiation.      We discussed hormonal therapy after completion of radiation.  The ER was weakly positive, on 1-3%.  We discussed trying anastrozole, but patient does not want to take it for fear of side effects, and with the ER being so weakly positive, there may not be significant benefit anyway, so it would be reasonable not to pursue adjuvant hormonal therapy.      -radiation oncology referral  -proceed with Herceptin today and every 3 weeks  -RTC in 6 weeks for follow-up    2) Nutrition: She was contacted by nutrition.    3) Genetics: She was diagnosed at age 48 with breast cancer  -she met with genetic counseling.  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    4) Fatigue: Secondary to recent surgery, prior chemo.  She is going to start exercising and stretching.      5) Coping: Patient seems to be doing  better now.    - following and met with patient today.    6) GERD: on omeprazole    7) Peripheral neuropathy: secondary to paclitaxel.  She has completed chemotherapy, and symptoms should gradually improve.      I spent a total of 40 minutes with the patient, with over >50% of the time in counseling and/or coordination of care.       Cayla Stock MD  Hematology/Oncology  [facility]    Again, thank you for allowing me to participate in the care of your patient.        Sincerely,        Cayla Stock MD

## 2019-04-11 ENCOUNTER — TELEPHONE (OUTPATIENT)
Dept: SURGERY | Facility: CLINIC | Age: 49
End: 2019-04-11

## 2019-04-11 NOTE — TELEPHONE ENCOUNTER
Breast Nurse Care Coordination :  F/u Call placed to patient today to check in on whether she has been scheduled to see a Radiation Oncologist for consultation.     I called [facility] prior to calling patient to see if she is scheduled, and I was told by answering service that they have no access to the schedule at this time, and I should try back in the morning.    Patient informed me that Dr. Stock's office said they were placing referral to Radiation Oncology, but patient wasn't sure if she was going to get a call from them to schedule, or if she should call [facility] clinic to schedule.     I gave patient the phone number to [facility] and advised she call them tomorrow to schedule.  Informed patient I will follow up with her next week when I am back in office to make sure she has appointment scheduled.      Patient verbalized understanding and agrees with the plan of care.  Tatyana Pimentel, RN, BSN

## 2019-04-12 ENCOUNTER — PATIENT OUTREACH (OUTPATIENT)
Dept: ONCOLOGY | Facility: CLINIC | Age: 49
End: 2019-04-12

## 2019-04-12 ENCOUNTER — TELEPHONE (OUTPATIENT)
Dept: ONCOLOGY | Facility: CLINIC | Age: 49
End: 2019-04-12

## 2019-04-12 DIAGNOSIS — C50.412 MALIGNANT NEOPLASM OF UPPER-OUTER QUADRANT OF LEFT BREAST IN FEMALE, ESTROGEN RECEPTOR POSITIVE (H): Primary | ICD-10-CM

## 2019-04-12 DIAGNOSIS — Z17.0 MALIGNANT NEOPLASM OF UPPER-OUTER QUADRANT OF LEFT BREAST IN FEMALE, ESTROGEN RECEPTOR POSITIVE (H): Primary | ICD-10-CM

## 2019-04-12 NOTE — PROGRESS NOTES
Saray called clinic stating that she has not been able to schedule with radiation oncology because she  needs a referral. Referral will be placed. Informed patient that she needs to also schedule her 3 week Herceptin and follow up with Dr. Stock in 6 weeks. Nicole José RN,BSN,OCN

## 2019-04-15 NOTE — TELEPHONE ENCOUNTER
I called patient this morning and left her a message to call me back.  Awaiting a return call.  Tatyana Pimentel, RN, BSN

## 2019-04-15 NOTE — TELEPHONE ENCOUNTER
Patient is scheduled to see Radiation Oncologist- Dr. Rafa Reyez for consultation on 4/18/2019 @ 10:00 a.m.  Tatyana Pimentel RN, BSN

## 2019-04-18 ENCOUNTER — TRANSFERRED RECORDS (OUTPATIENT)
Dept: HEALTH INFORMATION MANAGEMENT | Facility: CLINIC | Age: 49
End: 2019-04-18

## 2019-04-25 DIAGNOSIS — C50.412 MALIGNANT NEOPLASM OF UPPER-OUTER QUADRANT OF LEFT BREAST IN FEMALE, ESTROGEN RECEPTOR POSITIVE (H): ICD-10-CM

## 2019-04-25 DIAGNOSIS — Z17.0 MALIGNANT NEOPLASM OF UPPER-OUTER QUADRANT OF LEFT BREAST IN FEMALE, ESTROGEN RECEPTOR POSITIVE (H): ICD-10-CM

## 2019-04-25 RX ORDER — MEPERIDINE HYDROCHLORIDE 25 MG/ML
25 INJECTION INTRAMUSCULAR; INTRAVENOUS; SUBCUTANEOUS EVERY 30 MIN PRN
Status: CANCELLED | OUTPATIENT
Start: 2019-04-26

## 2019-04-25 RX ORDER — EPINEPHRINE 0.3 MG/.3ML
0.3 INJECTION SUBCUTANEOUS EVERY 5 MIN PRN
Status: CANCELLED | OUTPATIENT
Start: 2019-04-26

## 2019-04-25 RX ORDER — ALBUTEROL SULFATE 90 UG/1
1-2 AEROSOL, METERED RESPIRATORY (INHALATION)
Status: CANCELLED
Start: 2019-04-26

## 2019-04-25 RX ORDER — LORAZEPAM 2 MG/ML
0.5 INJECTION INTRAMUSCULAR EVERY 4 HOURS PRN
Status: CANCELLED
Start: 2019-04-26

## 2019-04-25 RX ORDER — METHYLPREDNISOLONE SODIUM SUCCINATE 125 MG/2ML
125 INJECTION, POWDER, LYOPHILIZED, FOR SOLUTION INTRAMUSCULAR; INTRAVENOUS
Status: CANCELLED
Start: 2019-04-26

## 2019-04-25 RX ORDER — DIPHENHYDRAMINE HYDROCHLORIDE 50 MG/ML
50 INJECTION INTRAMUSCULAR; INTRAVENOUS
Status: CANCELLED
Start: 2019-04-26

## 2019-04-25 RX ORDER — ALBUTEROL SULFATE 0.83 MG/ML
2.5 SOLUTION RESPIRATORY (INHALATION)
Status: CANCELLED | OUTPATIENT
Start: 2019-04-26

## 2019-04-25 RX ORDER — SODIUM CHLORIDE 9 MG/ML
1000 INJECTION, SOLUTION INTRAVENOUS CONTINUOUS PRN
Status: CANCELLED
Start: 2019-04-26

## 2019-04-25 RX ORDER — DIPHENHYDRAMINE HCL 25 MG
50 CAPSULE ORAL
Status: CANCELLED
Start: 2019-04-26

## 2019-04-25 RX ORDER — EPINEPHRINE 1 MG/ML
0.3 INJECTION, SOLUTION INTRAMUSCULAR; SUBCUTANEOUS EVERY 5 MIN PRN
Status: CANCELLED | OUTPATIENT
Start: 2019-04-26

## 2019-04-25 RX ORDER — ACETAMINOPHEN 325 MG/1
650 TABLET ORAL
Status: CANCELLED
Start: 2019-04-26

## 2019-04-26 ENCOUNTER — INFUSION THERAPY VISIT (OUTPATIENT)
Dept: INFUSION THERAPY | Facility: CLINIC | Age: 49
End: 2019-04-26
Attending: INTERNAL MEDICINE
Payer: COMMERCIAL

## 2019-04-26 VITALS
SYSTOLIC BLOOD PRESSURE: 118 MMHG | HEART RATE: 65 BPM | WEIGHT: 179.6 LBS | DIASTOLIC BLOOD PRESSURE: 82 MMHG | TEMPERATURE: 98.1 F | RESPIRATION RATE: 16 BRPM | OXYGEN SATURATION: 96 % | BODY MASS INDEX: 32.85 KG/M2

## 2019-04-26 DIAGNOSIS — Z17.0 MALIGNANT NEOPLASM OF UPPER-OUTER QUADRANT OF LEFT BREAST IN FEMALE, ESTROGEN RECEPTOR POSITIVE (H): Primary | ICD-10-CM

## 2019-04-26 DIAGNOSIS — C50.412 MALIGNANT NEOPLASM OF UPPER-OUTER QUADRANT OF LEFT BREAST IN FEMALE, ESTROGEN RECEPTOR POSITIVE (H): Primary | ICD-10-CM

## 2019-04-26 PROCEDURE — 25000128 H RX IP 250 OP 636: Performed by: INTERNAL MEDICINE

## 2019-04-26 PROCEDURE — 25800030 ZZH RX IP 258 OP 636: Performed by: INTERNAL MEDICINE

## 2019-04-26 PROCEDURE — 96413 CHEMO IV INFUSION 1 HR: CPT

## 2019-04-26 RX ADMIN — TRASTUZUMAB 450 MG: 150 INJECTION, POWDER, LYOPHILIZED, FOR SOLUTION INTRAVENOUS at 09:33

## 2019-04-26 RX ADMIN — SODIUM CHLORIDE 250 ML: 9 INJECTION, SOLUTION INTRAVENOUS at 09:33

## 2019-04-26 ASSESSMENT — PAIN SCALES - GENERAL: PAINLEVEL: MILD PAIN (2)

## 2019-04-26 NOTE — PROGRESS NOTES
Infusion Nursing Note:  Saray Huntley presents today for C4D1 Herceptin.    Patient seen by provider today: No   present during visit today: Not Applicable.    Note: patient states she is feeling better and not as tired as she was before. Patient came from radiation and stated that today was her second treatment.    Intravenous Access:  Peripheral IV placed.    Treatment Conditions:  ECHO/MUGA completed 2/27/19  EF 60-65%.      Post Infusion Assessment:  Patient tolerated infusion without incident.  Site patent and intact, free from redness, edema or discomfort.  No evidence of extravasations.  Access discontinued per protocol.       Discharge Plan:   Discharge instructions reviewed with: Patient.  Patient and/or family verbalized understanding of discharge instructions and all questions answered.  Copy of AVS reviewed with patient and/or family.  Patient will return 5/17/19 for next appointment.  Patient discharged in stable condition accompanied by: sister.  Departure Mode: Ambulatory.    Maris Morales RN

## 2019-05-17 ENCOUNTER — INFUSION THERAPY VISIT (OUTPATIENT)
Dept: INFUSION THERAPY | Facility: CLINIC | Age: 49
End: 2019-05-17
Attending: INTERNAL MEDICINE
Payer: COMMERCIAL

## 2019-05-17 ENCOUNTER — ONCOLOGY VISIT (OUTPATIENT)
Dept: ONCOLOGY | Facility: CLINIC | Age: 49
End: 2019-05-17
Attending: INTERNAL MEDICINE
Payer: COMMERCIAL

## 2019-05-17 VITALS
WEIGHT: 176.15 LBS | HEIGHT: 62 IN | RESPIRATION RATE: 20 BRPM | HEART RATE: 66 BPM | OXYGEN SATURATION: 97 % | BODY MASS INDEX: 32.42 KG/M2 | TEMPERATURE: 98.6 F | DIASTOLIC BLOOD PRESSURE: 73 MMHG | SYSTOLIC BLOOD PRESSURE: 120 MMHG

## 2019-05-17 VITALS
TEMPERATURE: 98.6 F | OXYGEN SATURATION: 97 % | DIASTOLIC BLOOD PRESSURE: 73 MMHG | RESPIRATION RATE: 20 BRPM | BODY MASS INDEX: 32.23 KG/M2 | WEIGHT: 176.2 LBS | SYSTOLIC BLOOD PRESSURE: 120 MMHG | HEART RATE: 66 BPM

## 2019-05-17 DIAGNOSIS — Z17.0 MALIGNANT NEOPLASM OF UPPER-OUTER QUADRANT OF LEFT BREAST IN FEMALE, ESTROGEN RECEPTOR POSITIVE (H): ICD-10-CM

## 2019-05-17 DIAGNOSIS — C50.412 MALIGNANT NEOPLASM OF UPPER-OUTER QUADRANT OF LEFT BREAST IN FEMALE, ESTROGEN RECEPTOR POSITIVE (H): Primary | ICD-10-CM

## 2019-05-17 DIAGNOSIS — Z17.0 MALIGNANT NEOPLASM OF UPPER-OUTER QUADRANT OF LEFT BREAST IN FEMALE, ESTROGEN RECEPTOR POSITIVE (H): Primary | ICD-10-CM

## 2019-05-17 DIAGNOSIS — C50.412 MALIGNANT NEOPLASM OF UPPER-OUTER QUADRANT OF LEFT BREAST IN FEMALE, ESTROGEN RECEPTOR POSITIVE (H): ICD-10-CM

## 2019-05-17 PROCEDURE — 96413 CHEMO IV INFUSION 1 HR: CPT

## 2019-05-17 PROCEDURE — 25000128 H RX IP 250 OP 636: Performed by: INTERNAL MEDICINE

## 2019-05-17 PROCEDURE — 25800030 ZZH RX IP 258 OP 636: Performed by: INTERNAL MEDICINE

## 2019-05-17 PROCEDURE — 99214 OFFICE O/P EST MOD 30 MIN: CPT | Performed by: INTERNAL MEDICINE

## 2019-05-17 RX ORDER — ACETAMINOPHEN 325 MG/1
650 TABLET ORAL
Status: CANCELLED
Start: 2019-06-07

## 2019-05-17 RX ORDER — MEPERIDINE HYDROCHLORIDE 25 MG/ML
25 INJECTION INTRAMUSCULAR; INTRAVENOUS; SUBCUTANEOUS EVERY 30 MIN PRN
Status: CANCELLED | OUTPATIENT
Start: 2019-05-17

## 2019-05-17 RX ORDER — MEPERIDINE HYDROCHLORIDE 25 MG/ML
25 INJECTION INTRAMUSCULAR; INTRAVENOUS; SUBCUTANEOUS EVERY 30 MIN PRN
Status: CANCELLED | OUTPATIENT
Start: 2019-06-07

## 2019-05-17 RX ORDER — ALBUTEROL SULFATE 90 UG/1
1-2 AEROSOL, METERED RESPIRATORY (INHALATION)
Status: CANCELLED
Start: 2019-06-07

## 2019-05-17 RX ORDER — DIPHENHYDRAMINE HYDROCHLORIDE 50 MG/ML
50 INJECTION INTRAMUSCULAR; INTRAVENOUS
Status: CANCELLED
Start: 2019-06-07

## 2019-05-17 RX ORDER — ALBUTEROL SULFATE 0.83 MG/ML
2.5 SOLUTION RESPIRATORY (INHALATION)
Status: CANCELLED | OUTPATIENT
Start: 2019-06-07

## 2019-05-17 RX ORDER — DIPHENHYDRAMINE HCL 25 MG
50 CAPSULE ORAL
Status: CANCELLED
Start: 2019-05-17

## 2019-05-17 RX ORDER — SODIUM CHLORIDE 9 MG/ML
1000 INJECTION, SOLUTION INTRAVENOUS CONTINUOUS PRN
Status: CANCELLED
Start: 2019-05-17

## 2019-05-17 RX ORDER — ALBUTEROL SULFATE 90 UG/1
1-2 AEROSOL, METERED RESPIRATORY (INHALATION)
Status: CANCELLED
Start: 2019-05-17

## 2019-05-17 RX ORDER — DIPHENHYDRAMINE HCL 25 MG
50 CAPSULE ORAL
Status: CANCELLED
Start: 2019-06-07

## 2019-05-17 RX ORDER — EPINEPHRINE 1 MG/ML
0.3 INJECTION, SOLUTION INTRAMUSCULAR; SUBCUTANEOUS EVERY 5 MIN PRN
Status: CANCELLED | OUTPATIENT
Start: 2019-06-07

## 2019-05-17 RX ORDER — ACETAMINOPHEN 325 MG/1
650 TABLET ORAL
Status: CANCELLED
Start: 2019-05-17

## 2019-05-17 RX ORDER — METHYLPREDNISOLONE SODIUM SUCCINATE 125 MG/2ML
125 INJECTION, POWDER, LYOPHILIZED, FOR SOLUTION INTRAMUSCULAR; INTRAVENOUS
Status: CANCELLED
Start: 2019-05-17

## 2019-05-17 RX ORDER — LORAZEPAM 2 MG/ML
0.5 INJECTION INTRAMUSCULAR EVERY 4 HOURS PRN
Status: CANCELLED
Start: 2019-06-07

## 2019-05-17 RX ORDER — SODIUM CHLORIDE 9 MG/ML
1000 INJECTION, SOLUTION INTRAVENOUS CONTINUOUS PRN
Status: CANCELLED
Start: 2019-06-07

## 2019-05-17 RX ORDER — METHYLPREDNISOLONE SODIUM SUCCINATE 125 MG/2ML
125 INJECTION, POWDER, LYOPHILIZED, FOR SOLUTION INTRAMUSCULAR; INTRAVENOUS
Status: CANCELLED
Start: 2019-06-07

## 2019-05-17 RX ORDER — EPINEPHRINE 0.3 MG/.3ML
0.3 INJECTION SUBCUTANEOUS EVERY 5 MIN PRN
Status: CANCELLED | OUTPATIENT
Start: 2019-06-07

## 2019-05-17 RX ORDER — ALBUTEROL SULFATE 0.83 MG/ML
2.5 SOLUTION RESPIRATORY (INHALATION)
Status: CANCELLED | OUTPATIENT
Start: 2019-05-17

## 2019-05-17 RX ORDER — DIPHENHYDRAMINE HYDROCHLORIDE 50 MG/ML
50 INJECTION INTRAMUSCULAR; INTRAVENOUS
Status: CANCELLED
Start: 2019-05-17

## 2019-05-17 RX ORDER — EPINEPHRINE 1 MG/ML
0.3 INJECTION, SOLUTION INTRAMUSCULAR; SUBCUTANEOUS EVERY 5 MIN PRN
Status: CANCELLED | OUTPATIENT
Start: 2019-05-17

## 2019-05-17 RX ORDER — EPINEPHRINE 0.3 MG/.3ML
0.3 INJECTION SUBCUTANEOUS EVERY 5 MIN PRN
Status: CANCELLED | OUTPATIENT
Start: 2019-05-17

## 2019-05-17 RX ORDER — LORAZEPAM 2 MG/ML
0.5 INJECTION INTRAMUSCULAR EVERY 4 HOURS PRN
Status: CANCELLED
Start: 2019-05-17

## 2019-05-17 RX ADMIN — TRASTUZUMAB 450 MG: 150 INJECTION, POWDER, LYOPHILIZED, FOR SOLUTION INTRAVENOUS at 10:11

## 2019-05-17 ASSESSMENT — MIFFLIN-ST. JEOR: SCORE: 1372.25

## 2019-05-17 ASSESSMENT — PAIN SCALES - GENERAL
PAINLEVEL: NO PAIN (0)
PAINLEVEL: NO PAIN (0)

## 2019-05-17 NOTE — PROGRESS NOTES
"Oncology Rooming Note    May 17, 2019 8:51 AM   Saray Huntley is a 49 year old female who presents for:    Chief Complaint   Patient presents with     Oncology Clinic Visit     Initial Vitals: /73   Pulse 66   Temp 98.6  F (37  C) (Oral)   Resp 20   Ht 1.575 m (5' 2\")   Wt 79.9 kg (176 lb 2.4 oz)   LMP 09/29/2012 (Approximate)   SpO2 97%   BMI 32.22 kg/m   Estimated body mass index is 32.22 kg/m  as calculated from the following:    Height as of this encounter: 1.575 m (5' 2\").    Weight as of this encounter: 79.9 kg (176 lb 2.4 oz). Body surface area is 1.87 meters squared.  No Pain (0) Comment: Data Unavailable   Patient's last menstrual period was 09/29/2012 (approximate).  Allergies reviewed: Yes  Medications reviewed: Yes    Medications: Medication refills not needed today.  Pharmacy name entered into [employer]    [id number] [place]    Clinical concerns: no      Jeanne Wood CMA            "

## 2019-05-17 NOTE — PROGRESS NOTES
[facility]    Hematology/Oncology Established Patient Note      Today's Date: 5/17/19    Reason for Follow-up: left-sided breast cancer      HISTORY OF PRESENT ILLNESS: Saray Huntley is a 49 year old female who presents with left-sided breast cancer.  It was found on a screening mammogram.  Left breast ultrasound found a hypoechoic mass at the 12:00 position, 8 cm from the nipple, measuring 1.5 x 1.6 x 1.5 cm.  Axilla survey showed only normal-appearing lymph nodes.   Biopsy showed invasive ductal carcinoma, grade 3, weakly positive for ER (1-3%), [place] negative, HER-2 positive.      Case was reviewed at tumor conference, and recommendation was for neoadjuvant chemotherapy.    Port was placed on 11/28/18.  It was removed on 3/19/19.    She started neoadjuvant chemotherapy on 11/30/18 with weekly paclitaxel, Herceptin, Perjeta and completed on 2/15/19.    On 3/19/19, she underwent left breast lumpectomy and sentinel lymph node biopsy.  Port was also removed.  Pathology showed no evidence of residual carcinoma, consistent with a complete pathologic response.      She will continue on Herceptin to complete 1 year.    She will complete radiation on 5/22/19.        INTERIM HISTORY: Patient is here for follow-up today.  She says that she is doing well.  She will be done with radiation next week.  She says that radiation is going fine.  She hasn't had skin breakdown.  She notes occasional aches.  Exercise helps.  Of note, she is taking trip to [place] in July 2019.          REVIEW OF SYSTEMS:   14 point ROS was reviewed and is negative other than as noted above in HPI.       HOME MEDICATIONS:  Current Outpatient Medications   Medication Sig Dispense Refill     HYDROcodone-acetaminophen (NORCO) 5-325 MG tablet Take 1-2 tablets by mouth every 4 hours as needed for moderate to severe pain (Patient not taking: Reported on 4/5/2019) 20 tablet 0     Lidocaine-Prilocaine (STUDY - LIDOCAINE 2.5%/PRILOCAINE 2.5%  CREAM, IDS# 4243,) Externally apply topically daily as needed for moderate pain (Patient not taking: Reported on 2019) 30 g 3         ALLERGIES:  Allergies   Allergen Reactions     Sulfa Drugs Rash         PAST MEDICAL HISTORY:  Past Medical History:   Diagnosis Date     Breast cancer (H)      Hypothyroidism, unspecified type 2017     Neuropathy      PONV (postoperative nausea and vomiting)      Vertigo          PAST SURGICAL HISTORY:  Past Surgical History:   Procedure Laterality Date     BIOPSY NODE SENTINEL Left 3/19/2019    Procedure: WITH SENTINEL LYMPH NODE BIOPSY;  Surgeon: Sae Montaño MD;  Location:  OR      SECTION  2003     INSERT PORT VASCULAR ACCESS N/A 2018    Procedure: PORT PLACEMENT ;  Surgeon: Sae Montaño MD;  Location: [facility]     LUMPECTOMY BREAST WITH SEED LOCALIZATION Left 3/19/2019    Procedure: SEED LOCALIZED LEFT BREAST LUMPECTOMY;  Surgeon: Sae Montaño MD;  Location:  OR     REMOVE PORT VASCULAR ACCESS N/A 3/19/2019    Procedure: PORT REMOVAL;  Surgeon: Sae Montaño MD;  Location:  OR         SOCIAL HISTORY:  Social History     Socioeconomic History     Marital status:      Spouse name: Not on file     Number of children: Not on file     Years of education: Not on file     Highest education level: Not on file   Occupational History     Not on file   Social Needs     Financial resource strain: Not on file     Food insecurity:     Worry: Not on file     Inability: Not on file     Transportation needs:     Medical: Not on file     Non-medical: Not on file   Tobacco Use     Smoking status: Never Smoker     Smokeless tobacco: Never Used   Substance and Sexual Activity     Alcohol use: No     Drug use: No     Sexual activity: Not Currently   Lifestyle     Physical activity:     Days per week: Not on file     Minutes per session: Not on file     Stress: Not on file   Relationships     Social connections:     Talks on phone:  "Not on file     Gets together: Not on file     Attends Hinduism service: Not on file     Active member of club or organization: Not on file     Attends meetings of clubs or organizations: Not on file     Relationship status: Not on file     Intimate partner violence:     Fear of current or ex partner: Not on file     Emotionally abused: Not on file     Physically abused: Not on file     Forced sexual activity: Not on file   Other Topics Concern     Parent/sibling w/ CABG, MI or angioplasty before 65F 55M? Not Asked   Social History Narrative     Not on file   She has never smoked.  She does not drink or use illicit drugs.  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  She has 1 daughter who is 15 years old.  She says that she has menopause a few years old.  She tried oral contraceptive, but she did not tolerate it well, so did not take it long term.  She has never taken hormone replacement therapy          FAMILY HISTORY:  Family History   Problem Relation Age of Onset     Hyperlipidemia Mother      Kidney Disease Mother      Diabetes Father    Patient does not know much about her family history and is unaware if there is breast cancer or ovarian cancer in her family.        PHYSICAL EXAM:  Vital signs:  /73   Pulse 66   Temp 98.6  F (37  C) (Oral)   Resp 20   Ht 1.575 m (5' 2\")   Wt 79.9 kg (176 lb 2.4 oz)   LMP 2012 (Approximate)   SpO2 97%   BMI 32.22 kg/m     ECO  GENERAL/CONSTITUTIONAL: No acute distress. Accompanied by father.  EYES: No scleral icterus.  BREAST: s/p left lumpectomy, scar healing well.  There are darkened  skin changes of radiation, no skin breakdown.    MUSCULOSKELETAL: Warm and well-perfused, no cyanosis, clubbing, or edema.  NEUROLOGIC: Alert, oriented, answers questions appropriately.  INTEGUMENTARY: No jaundice.  Port has been removed.      LABS:  None today.      IMAGING:  Left breast ultrasound 18:  FINDINGS:  Targeted ultrasound shows a hypoechoic " mass at the 12:00  position, 8 cm from the nipple, measuring 1.5 x 1.6 x 1.5 cm. Survey  of the axilla shows only normal-appearing lymph nodes.      Echo 2/22/19:  Global peak LV longitudinal strain is averaged at -24.2%. This is within  reported normal limits (normal <-18%).  The visual ejection fraction is estimated at 60-65%.  The right ventricle is normal in size and function.  The study was technically difficult.      ASSESSMENT/PLAN:  Saray Huntley is a 49 year old post-menopausal female with:    1) Left-sided breast cancer:  1.5 x 1.6 x 1.5 cm on ultrasound.  Axilla survey showed only normal-appearing lymph nodes.   Biopsy showed invasive ductal carcinoma, grade 3, weakly positive for ER (1-3%), [place] negative, HER-2 positive.  Clinical stage IA (cT1cN0).    She completed neoadjuvant chemotherapy with weekly paclitaxel, Herceptin, Perjeta on 2/15/19.  On 3/19/19, she underwent left breast lumpectomy and sentinel lymph node biopsy.  Pathology showed no evidence of residual carcinoma, consistent with a complete pathologic response.      She will continue with Herceptin every 3 weeks to complete 1 year.  She will be completed with radiation on 5/22/19.    We previously discussed hormonal therapy after completion of radiation.  The ER was weakly positive, on 1-3%.  We discussed trying anastrozole, but patient does not want to take it for fear of side effects, and with the ER being so weakly positive, there may not be significant benefit anyway, so it would be reasonable not to pursue adjuvant hormonal therapy.      -echo every 3 months while on Herceptin; next one due later this month - ordered.  -proceed with Herceptin today and every 3 weeks  -RTC in 6 weeks for follow-up    2) Genetics: She was diagnosed at age 48 with breast cancer  -she met with genetic counseling.  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    3) Occasional aches: may be related to Herceptin.  Exercise helps.  She feels  much better when she exercises.    4) Coping: Patient seems to be doing better now.      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  She has completed chemotherapy, and symptoms should gradually improve.  She says that it is getting better slowly.      I spent a total of 25 minutes with the patient, with over >50% of the time in counseling and/or coordination of care.       Cayla Stock MD  Hematology/Oncology  [facility]

## 2019-05-17 NOTE — PROGRESS NOTES
Infusion Nursing Note:  Saray Huntley presents today for Cycle 5 Day 1 Herceptin.    Patient seen by provider today: Yes: Dr. Stock   present during visit today: Not Applicable.    Note: N/A.    Intravenous Access:  Peripheral IV placed.    Treatment Conditions:  Not Applicable.      Post Infusion Assessment:  Patient tolerated infusion without incident.  Blood return noted pre and post infusion.  Site patent and intact, free from redness, edema or discomfort.  No evidence of extravasations.  Access discontinued per protocol.       Discharge Plan:   Patient declined prescription refills.  Discharge instructions reviewed with: Patient.  Patient and family verbalized understanding of discharge instructions and all questions answered.  Copy of AVS reviewed with patient and family.  Patient will return 6/7/19 for next appointment.  Patient discharged in stable condition accompanied by: self and father.  Departure Mode: Ambulatory.    Betty Gayle RN

## 2019-05-17 NOTE — LETTER
"    5/17/2019         RE: Saray Huntley  [address]  [place] [address]        Dear Colleague,    Thank you for referring your patient, Saray Huntley, to the [facility]CER [facility]. Please see a copy of my visit note below.    Oncology Rooming Note    May 17, 2019 8:51 AM   Saray Huntley is a 49 year old female who presents for:    Chief Complaint   Patient presents with     Oncology Clinic Visit     Initial Vitals: /73   Pulse 66   Temp 98.6  F (37  C) (Oral)   Resp 20   Ht 1.575 m (5' 2\")   Wt 79.9 kg (176 lb 2.4 oz)   LMP 09/29/2012 (Approximate)   SpO2 97%   BMI 32.22 kg/m    Estimated body mass index is 32.22 kg/m  as calculated from the following:    Height as of this encounter: 1.575 m (5' 2\").    Weight as of this encounter: 79.9 kg (176 lb 2.4 oz). Body surface area is 1.87 meters squared.  No Pain (0) Comment: Data Unavailable   Patient's last menstrual period was 09/29/2012 (approximate).  Allergies reviewed: Yes  Medications reviewed: Yes    Medications: Medication refills not needed today.  Pharmacy name entered into [employer]:    [id number] - [place], [place]    Clinical concerns: no      Jeanne Wood, [facility]    Hematology/Oncology Established Patient Note      Today's Date: 5/17/19    Reason for Follow-up: left-sided breast cancer      HISTORY OF PRESENT ILLNESS: Saray Huntley is a 49 year old female who presents with left-sided breast cancer.  It was found on a screening mammogram.  Left breast ultrasound found a hypoechoic mass at the 12:00 position, 8 cm from the nipple, measuring 1.5 x 1.6 x 1.5 cm.  Axilla survey showed only normal-appearing lymph nodes.   Biopsy showed invasive ductal carcinoma, grade 3, weakly positive for ER (1-3%), PA negative, HER-2 positive.      Case was reviewed at tumor conference, and recommendation was for neoadjuvant chemotherapy.    Port was placed on 11/28/18.  It was removed on " 3/19/19.    She started neoadjuvant chemotherapy on 18 with weekly paclitaxel, Herceptin, Perjeta and completed on 2/15/19.    On 3/19/19, she underwent left breast lumpectomy and sentinel lymph node biopsy.  Port was also removed.  Pathology showed no evidence of residual carcinoma, consistent with a complete pathologic response.      She will continue on Herceptin to complete 1 year.    She will complete radiation on 19.        INTERIM HISTORY: Patient is here for follow-up today.  She says that she is doing well.  She will be done with radiation next week.  She says that radiation is going fine.  She hasn't had skin breakdown.  She notes occasional aches.  Exercise helps.  Of note, she is taking trip to [place] in 2019.          REVIEW OF SYSTEMS:   14 point ROS was reviewed and is negative other than as noted above in HPI.       HOME MEDICATIONS:  Current Outpatient Medications   Medication Sig Dispense Refill     HYDROcodone-acetaminophen (NORCO) 5-325 MG tablet Take 1-2 tablets by mouth every 4 hours as needed for moderate to severe pain (Patient not taking: Reported on 2019) 20 tablet 0     Lidocaine-Prilocaine (STUDY - LIDOCAINE 2.5%/PRILOCAINE 2.5% CREAM, IDS# 4243,) Externally apply topically daily as needed for moderate pain (Patient not taking: Reported on 2019) 30 g 3         ALLERGIES:  Allergies   Allergen Reactions     Sulfa Drugs Rash         PAST MEDICAL HISTORY:  Past Medical History:   Diagnosis Date     Breast cancer (H)      Hypothyroidism, unspecified type 2017     Neuropathy      PONV (postoperative nausea and vomiting)      Vertigo          PAST SURGICAL HISTORY:  Past Surgical History:   Procedure Laterality Date     BIOPSY NODE SENTINEL Left 3/19/2019    Procedure: WITH SENTINEL LYMPH NODE BIOPSY;  Surgeon: Sae Montaño MD;  Location: [facility] OR      SECTION  2003     INSERT PORT VASCULAR ACCESS N/A 2018    Procedure: PORT PLACEMENT ;   Surgeon: Sae Montaño MD;  Location: [facility] OR     LUMPECTOMY BREAST WITH SEED LOCALIZATION Left 3/19/2019    Procedure: SEED LOCALIZED LEFT BREAST LUMPECTOMY;  Surgeon: Sae Montaño MD;  Location: [facility] OR     REMOVE PORT VASCULAR ACCESS N/A 3/19/2019    Procedure: PORT REMOVAL;  Surgeon: Sae Montaño MD;  Location: [facility] OR         SOCIAL HISTORY:  Social History     Socioeconomic History     Marital status:      Spouse name: Not on file     Number of children: Not on file     Years of education: Not on file     Highest education level: Not on file   Occupational History     Not on file   Social Needs     Financial resource strain: Not on file     Food insecurity:     Worry: Not on file     Inability: Not on file     Transportation needs:     Medical: Not on file     Non-medical: Not on file   Tobacco Use     Smoking status: Never Smoker     Smokeless tobacco: Never Used   Substance and Sexual Activity     Alcohol use: No     Drug use: No     Sexual activity: Not Currently   Lifestyle     Physical activity:     Days per week: Not on file     Minutes per session: Not on file     Stress: Not on file   Relationships     Social connections:     Talks on phone: Not on file     Gets together: Not on file     Attends Yazidism service: Not on file     Active member of club or organization: Not on file     Attends meetings of clubs or organizations: Not on file     Relationship status: Not on file     Intimate partner violence:     Fear of current or ex partner: Not on file     Emotionally abused: Not on file     Physically abused: Not on file     Forced sexual activity: Not on file   Other Topics Concern     Parent/sibling w/ CABG, MI or angioplasty before 65F 55M? Not Asked   Social History Narrative     Not on file   She has never smoked.  She does not drink or use illicit drugs.  She is going to school at [employer] studying IT.  She has 1 daughter who is 15 years old.  She says that she has  "menopause a few years old.  She tried oral contraceptive, but she did not tolerate it well, so did not take it long term.  She has never taken hormone replacement therapy          FAMILY HISTORY:  Family History   Problem Relation Age of Onset     Hyperlipidemia Mother      Kidney Disease Mother      Diabetes Father    Patient does not know much about her family history and is unaware if there is breast cancer or ovarian cancer in her family.        PHYSICAL EXAM:  Vital signs:  /73   Pulse 66   Temp 98.6  F (37  C) (Oral)   Resp 20   Ht 1.575 m (5' 2\")   Wt 79.9 kg (176 lb 2.4 oz)   LMP 2012 (Approximate)   SpO2 97%   BMI 32.22 kg/m      ECO  GENERAL/CONSTITUTIONAL: No acute distress. Accompanied by father.  EYES: No scleral icterus.  BREAST: s/p left lumpectomy, scar healing well.  There are darkened  skin changes of radiation, no skin breakdown.    MUSCULOSKELETAL: Warm and well-perfused, no cyanosis, clubbing, or edema.  NEUROLOGIC: Alert, oriented, answers questions appropriately.  INTEGUMENTARY: No jaundice.  Port has been removed.      LABS:  None today.      IMAGING:  Left breast ultrasound 18:  FINDINGS:  Targeted ultrasound shows a hypoechoic mass at the 12:00  position, 8 cm from the nipple, measuring 1.5 x 1.6 x 1.5 cm. Survey  of the axilla shows only normal-appearing lymph nodes.      Echo 19:  Global peak LV longitudinal strain is averaged at -24.2%. This is within  reported normal limits (normal <-18%).  The visual ejection fraction is estimated at 60-65%.  The right ventricle is normal in size and function.  The study was technically difficult.      ASSESSMENT/PLAN:  Saray Huntley is a 49 year old post-menopausal female with:    1) Left-sided breast cancer:  1.5 x 1.6 x 1.5 cm on ultrasound.  Axilla survey showed only normal-appearing lymph nodes.   Biopsy showed invasive ductal carcinoma, grade 3, weakly positive for ER (1-3%), MT negative, HER-2 positive.  Clinical " stage IA (cT1cN0).    She completed neoadjuvant chemotherapy with weekly paclitaxel, Herceptin, Perjeta on 2/15/19.  On 3/19/19, she underwent left breast lumpectomy and sentinel lymph node biopsy.  Pathology showed no evidence of residual carcinoma, consistent with a complete pathologic response.      She will continue with Herceptin every 3 weeks to complete 1 year.  She will be completed with radiation on 5/22/19.    We previously discussed hormonal therapy after completion of radiation.  The ER was weakly positive, on 1-3%.  We discussed trying anastrozole, but patient does not want to take it for fear of side effects, and with the ER being so weakly positive, there may not be significant benefit anyway, so it would be reasonable not to pursue adjuvant hormonal therapy.      -echo every 3 months while on Herceptin; next one due later this month - ordered.  -proceed with Herceptin today and every 3 weeks  -RTC in 6 weeks for follow-up    2) Genetics: She was diagnosed at age 48 with breast cancer  -she met with genetic counseling.  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    3) Occasional aches: may be related to Herceptin.  Exercise helps.  She feels much better when she exercises.    4) Coping: Patient seems to be doing better now.      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  She has completed chemotherapy, and symptoms should gradually improve.  She says that it is getting better slowly.      I spent a total of 25 minutes with the patient, with over >50% of the time in counseling and/or coordination of care.       Cayla Stock MD  Hematology/Oncology  [facility]    Again, thank you for allowing me to participate in the care of your patient.        Sincerely,        Cayla Stock MD

## 2019-05-22 ENCOUNTER — TRANSFERRED RECORDS (OUTPATIENT)
Dept: HEALTH INFORMATION MANAGEMENT | Facility: CLINIC | Age: 49
End: 2019-05-22

## 2019-06-05 ENCOUNTER — HOSPITAL ENCOUNTER (OUTPATIENT)
Dept: CARDIOLOGY | Facility: CLINIC | Age: 49
Discharge: HOME | End: 2019-06-05
Attending: INTERNAL MEDICINE | Admitting: INTERNAL MEDICINE
Payer: COMMERCIAL

## 2019-06-05 DIAGNOSIS — C50.412 MALIGNANT NEOPLASM OF UPPER-OUTER QUADRANT OF LEFT BREAST IN FEMALE, ESTROGEN RECEPTOR POSITIVE (H): ICD-10-CM

## 2019-06-05 DIAGNOSIS — Z17.0 MALIGNANT NEOPLASM OF UPPER-OUTER QUADRANT OF LEFT BREAST IN FEMALE, ESTROGEN RECEPTOR POSITIVE (H): ICD-10-CM

## 2019-06-05 PROCEDURE — 93325 DOPPLER ECHO COLOR FLOW MAPG: CPT | Mod: 26 | Performed by: INTERNAL MEDICINE

## 2019-06-05 PROCEDURE — 93308 TTE F-UP OR LMTD: CPT

## 2019-06-05 PROCEDURE — 93321 DOPPLER ECHO F-UP/LMTD STD: CPT | Mod: 26 | Performed by: INTERNAL MEDICINE

## 2019-06-05 PROCEDURE — 93308 TTE F-UP OR LMTD: CPT | Mod: 26 | Performed by: INTERNAL MEDICINE

## 2019-06-07 ENCOUNTER — INFUSION THERAPY VISIT (OUTPATIENT)
Dept: INFUSION THERAPY | Facility: CLINIC | Age: 49
End: 2019-06-07
Attending: INTERNAL MEDICINE
Payer: COMMERCIAL

## 2019-06-07 VITALS
BODY MASS INDEX: 32.23 KG/M2 | WEIGHT: 176.2 LBS | TEMPERATURE: 98.1 F | RESPIRATION RATE: 16 BRPM | SYSTOLIC BLOOD PRESSURE: 116 MMHG | DIASTOLIC BLOOD PRESSURE: 68 MMHG

## 2019-06-07 DIAGNOSIS — C50.412 MALIGNANT NEOPLASM OF UPPER-OUTER QUADRANT OF LEFT BREAST IN FEMALE, ESTROGEN RECEPTOR POSITIVE (H): Primary | ICD-10-CM

## 2019-06-07 DIAGNOSIS — Z17.0 MALIGNANT NEOPLASM OF UPPER-OUTER QUADRANT OF LEFT BREAST IN FEMALE, ESTROGEN RECEPTOR POSITIVE (H): Primary | ICD-10-CM

## 2019-06-07 PROCEDURE — 25800030 ZZH RX IP 258 OP 636: Performed by: INTERNAL MEDICINE

## 2019-06-07 PROCEDURE — 25000128 H RX IP 250 OP 636: Performed by: INTERNAL MEDICINE

## 2019-06-07 PROCEDURE — 96413 CHEMO IV INFUSION 1 HR: CPT

## 2019-06-07 RX ADMIN — SODIUM CHLORIDE 250 ML: 9 INJECTION, SOLUTION INTRAVENOUS at 09:06

## 2019-06-07 RX ADMIN — TRASTUZUMAB 450 MG: 150 INJECTION, POWDER, LYOPHILIZED, FOR SOLUTION INTRAVENOUS at 09:08

## 2019-06-07 ASSESSMENT — PAIN SCALES - GENERAL: PAINLEVEL: NO PAIN (0)

## 2019-06-07 NOTE — PROGRESS NOTES
Infusion Nursing Note:  Saray Huntley presents today for herceptin.    Patient seen by provider today: No   present during visit today: Not Applicable.    Note: N/A.    Intravenous Access:  Peripheral IV placed.    Treatment Conditions:  Not Applicable.      Post Infusion Assessment:  Patient tolerated infusion without incident.  Blood return noted pre and post infusion.  Site patent and intact, free from redness, edema or discomfort.  No evidence of extravasations.  Access discontinued per protocol.       Discharge Plan:   Discharge instructions reviewed with: Patient.  Patient and/or family verbalized understanding of discharge instructions and all questions answered.  Patient discharged in stable condition accompanied by: self.  Departure Mode: Ambulatory.    Rajwinder Paniagua RN

## 2019-06-17 ENCOUNTER — PATIENT OUTREACH (OUTPATIENT)
Dept: ONCOLOGY | Facility: CLINIC | Age: 49
End: 2019-06-17

## 2019-06-17 NOTE — PROGRESS NOTES
Patient called and is requesting to move her Hereceptin from 6/28 to 6/27 and not have a provider exam. She has an important event/plans for 6/28 in the morning.    Dr. Stock to provide approval and schedulers can assist as indicated with any scheduling needs.    Message routed.    Donna Duke

## 2019-06-17 NOTE — PROGRESS NOTES
Called Saray she is aware that her appointment can be moved to to 6/27/19 and informed her that with her next Herceptin on 7/19/19, she will need to see BRITTON Solorio. Nicole José RN,BSN,OCN

## 2019-06-17 NOTE — PROGRESS NOTES
That is fine with me.    For her next infusion after that, on 7/19/19, she should see Osmin though, and then see me 6 weeks after that.

## 2019-06-25 DIAGNOSIS — C50.412 MALIGNANT NEOPLASM OF UPPER-OUTER QUADRANT OF LEFT BREAST IN FEMALE, ESTROGEN RECEPTOR POSITIVE (H): ICD-10-CM

## 2019-06-25 DIAGNOSIS — Z17.0 MALIGNANT NEOPLASM OF UPPER-OUTER QUADRANT OF LEFT BREAST IN FEMALE, ESTROGEN RECEPTOR POSITIVE (H): ICD-10-CM

## 2019-06-25 RX ORDER — DIPHENHYDRAMINE HYDROCHLORIDE 50 MG/ML
50 INJECTION INTRAMUSCULAR; INTRAVENOUS
Status: CANCELLED
Start: 2019-06-27

## 2019-06-25 RX ORDER — EPINEPHRINE 0.3 MG/.3ML
0.3 INJECTION SUBCUTANEOUS EVERY 5 MIN PRN
Status: CANCELLED | OUTPATIENT
Start: 2019-06-27

## 2019-06-25 RX ORDER — LORAZEPAM 2 MG/ML
0.5 INJECTION INTRAMUSCULAR EVERY 4 HOURS PRN
Status: CANCELLED
Start: 2019-06-27

## 2019-06-25 RX ORDER — ALBUTEROL SULFATE 0.83 MG/ML
2.5 SOLUTION RESPIRATORY (INHALATION)
Status: CANCELLED | OUTPATIENT
Start: 2019-06-27

## 2019-06-25 RX ORDER — MEPERIDINE HYDROCHLORIDE 25 MG/ML
25 INJECTION INTRAMUSCULAR; INTRAVENOUS; SUBCUTANEOUS EVERY 30 MIN PRN
Status: CANCELLED | OUTPATIENT
Start: 2019-06-27

## 2019-06-25 RX ORDER — ALBUTEROL SULFATE 90 UG/1
1-2 AEROSOL, METERED RESPIRATORY (INHALATION)
Status: CANCELLED
Start: 2019-06-27

## 2019-06-25 RX ORDER — SODIUM CHLORIDE 9 MG/ML
1000 INJECTION, SOLUTION INTRAVENOUS CONTINUOUS PRN
Status: CANCELLED
Start: 2019-06-27

## 2019-06-25 RX ORDER — DIPHENHYDRAMINE HCL 25 MG
50 CAPSULE ORAL
Status: CANCELLED
Start: 2019-06-27

## 2019-06-25 RX ORDER — EPINEPHRINE 1 MG/ML
0.3 INJECTION, SOLUTION INTRAMUSCULAR; SUBCUTANEOUS EVERY 5 MIN PRN
Status: CANCELLED | OUTPATIENT
Start: 2019-06-27

## 2019-06-25 RX ORDER — ACETAMINOPHEN 325 MG/1
650 TABLET ORAL
Status: CANCELLED
Start: 2019-06-27

## 2019-06-25 RX ORDER — METHYLPREDNISOLONE SODIUM SUCCINATE 125 MG/2ML
125 INJECTION, POWDER, LYOPHILIZED, FOR SOLUTION INTRAMUSCULAR; INTRAVENOUS
Status: CANCELLED
Start: 2019-06-27

## 2019-06-27 ENCOUNTER — INFUSION THERAPY VISIT (OUTPATIENT)
Dept: INFUSION THERAPY | Facility: CLINIC | Age: 49
End: 2019-06-27
Attending: INTERNAL MEDICINE
Payer: COMMERCIAL

## 2019-06-27 VITALS
DIASTOLIC BLOOD PRESSURE: 66 MMHG | BODY MASS INDEX: 32.34 KG/M2 | OXYGEN SATURATION: 98 % | WEIGHT: 176.8 LBS | RESPIRATION RATE: 16 BRPM | HEART RATE: 64 BPM | TEMPERATURE: 97.8 F | SYSTOLIC BLOOD PRESSURE: 116 MMHG

## 2019-06-27 DIAGNOSIS — C50.412 MALIGNANT NEOPLASM OF UPPER-OUTER QUADRANT OF LEFT BREAST IN FEMALE, ESTROGEN RECEPTOR POSITIVE (H): Primary | ICD-10-CM

## 2019-06-27 DIAGNOSIS — Z17.0 MALIGNANT NEOPLASM OF UPPER-OUTER QUADRANT OF LEFT BREAST IN FEMALE, ESTROGEN RECEPTOR POSITIVE (H): Primary | ICD-10-CM

## 2019-06-27 PROCEDURE — 96413 CHEMO IV INFUSION 1 HR: CPT

## 2019-06-27 PROCEDURE — 25000128 H RX IP 250 OP 636: Performed by: INTERNAL MEDICINE

## 2019-06-27 PROCEDURE — 25800030 ZZH RX IP 258 OP 636: Performed by: INTERNAL MEDICINE

## 2019-06-27 RX ADMIN — TRASTUZUMAB 450 MG: 150 INJECTION, POWDER, LYOPHILIZED, FOR SOLUTION INTRAVENOUS at 08:35

## 2019-06-27 ASSESSMENT — PAIN SCALES - GENERAL: PAINLEVEL: NO PAIN (0)

## 2019-06-27 NOTE — PROGRESS NOTES
Infusion Nursing Note:  Saray Huntley presents today for C7D1 Herceptin.    Patient seen by provider today: No   present during visit today: Not Applicable.    Note: N/A.    Intravenous Access:  Peripheral IV placed.    Treatment Conditions:  ECHO/MUGA completed 6/5/19  EF 59%.      Post Infusion Assessment:  Patient tolerated infusion without incident.  Site patent and intact, free from redness, edema or discomfort.  No evidence of extravasations.  Access discontinued per protocol.       Discharge Plan:   Discharge instructions reviewed with: Patient and Family.  Patient and/or family verbalized understanding of discharge instructions and all questions answered.  Copy of AVS reviewed with patient and/or family.  Patient will return 7/19/19 for next appointment.  Patient discharged in stable condition accompanied by: daughter and father.  Departure Mode: Ambulatory.    Maris Morales RN

## 2019-07-16 DIAGNOSIS — C50.412 MALIGNANT NEOPLASM OF UPPER-OUTER QUADRANT OF LEFT BREAST IN FEMALE, ESTROGEN RECEPTOR POSITIVE (H): ICD-10-CM

## 2019-07-16 DIAGNOSIS — Z17.0 MALIGNANT NEOPLASM OF UPPER-OUTER QUADRANT OF LEFT BREAST IN FEMALE, ESTROGEN RECEPTOR POSITIVE (H): ICD-10-CM

## 2019-07-16 RX ORDER — SODIUM CHLORIDE 9 MG/ML
1000 INJECTION, SOLUTION INTRAVENOUS CONTINUOUS PRN
Status: CANCELLED
Start: 2019-07-19

## 2019-07-16 RX ORDER — EPINEPHRINE 1 MG/ML
0.3 INJECTION, SOLUTION INTRAMUSCULAR; SUBCUTANEOUS EVERY 5 MIN PRN
Status: CANCELLED | OUTPATIENT
Start: 2019-07-19

## 2019-07-16 RX ORDER — DIPHENHYDRAMINE HYDROCHLORIDE 50 MG/ML
50 INJECTION INTRAMUSCULAR; INTRAVENOUS
Status: CANCELLED
Start: 2019-07-19

## 2019-07-16 RX ORDER — ALBUTEROL SULFATE 90 UG/1
1-2 AEROSOL, METERED RESPIRATORY (INHALATION)
Status: CANCELLED
Start: 2019-07-19

## 2019-07-16 RX ORDER — ACETAMINOPHEN 325 MG/1
650 TABLET ORAL
Status: CANCELLED
Start: 2019-07-19

## 2019-07-16 RX ORDER — MEPERIDINE HYDROCHLORIDE 25 MG/ML
25 INJECTION INTRAMUSCULAR; INTRAVENOUS; SUBCUTANEOUS EVERY 30 MIN PRN
Status: CANCELLED | OUTPATIENT
Start: 2019-07-19

## 2019-07-16 RX ORDER — METHYLPREDNISOLONE SODIUM SUCCINATE 125 MG/2ML
125 INJECTION, POWDER, LYOPHILIZED, FOR SOLUTION INTRAMUSCULAR; INTRAVENOUS
Status: CANCELLED
Start: 2019-07-19

## 2019-07-16 RX ORDER — EPINEPHRINE 0.3 MG/.3ML
0.3 INJECTION SUBCUTANEOUS EVERY 5 MIN PRN
Status: CANCELLED | OUTPATIENT
Start: 2019-07-19

## 2019-07-16 RX ORDER — LORAZEPAM 2 MG/ML
0.5 INJECTION INTRAMUSCULAR EVERY 4 HOURS PRN
Status: CANCELLED
Start: 2019-07-19

## 2019-07-16 RX ORDER — ALBUTEROL SULFATE 0.83 MG/ML
2.5 SOLUTION RESPIRATORY (INHALATION)
Status: CANCELLED | OUTPATIENT
Start: 2019-07-19

## 2019-07-16 RX ORDER — DIPHENHYDRAMINE HCL 25 MG
50 CAPSULE ORAL
Status: CANCELLED
Start: 2019-07-19

## 2019-07-19 ENCOUNTER — ONCOLOGY VISIT (OUTPATIENT)
Dept: ONCOLOGY | Facility: CLINIC | Age: 49
End: 2019-07-19
Attending: INTERNAL MEDICINE
Payer: COMMERCIAL

## 2019-07-19 ENCOUNTER — INFUSION THERAPY VISIT (OUTPATIENT)
Dept: INFUSION THERAPY | Facility: CLINIC | Age: 49
End: 2019-07-19
Attending: INTERNAL MEDICINE
Payer: COMMERCIAL

## 2019-07-19 VITALS
BODY MASS INDEX: 32.62 KG/M2 | SYSTOLIC BLOOD PRESSURE: 110 MMHG | RESPIRATION RATE: 16 BRPM | WEIGHT: 177.25 LBS | HEART RATE: 57 BPM | HEIGHT: 62 IN | TEMPERATURE: 98.1 F | OXYGEN SATURATION: 97 % | DIASTOLIC BLOOD PRESSURE: 70 MMHG

## 2019-07-19 VITALS
WEIGHT: 177.2 LBS | TEMPERATURE: 98.1 F | OXYGEN SATURATION: 97 % | BODY MASS INDEX: 32.41 KG/M2 | HEART RATE: 57 BPM | DIASTOLIC BLOOD PRESSURE: 70 MMHG | RESPIRATION RATE: 16 BRPM | SYSTOLIC BLOOD PRESSURE: 110 MMHG

## 2019-07-19 DIAGNOSIS — Z17.0 MALIGNANT NEOPLASM OF UPPER-OUTER QUADRANT OF LEFT BREAST IN FEMALE, ESTROGEN RECEPTOR POSITIVE (H): Primary | ICD-10-CM

## 2019-07-19 DIAGNOSIS — C50.412 MALIGNANT NEOPLASM OF UPPER-OUTER QUADRANT OF LEFT BREAST IN FEMALE, ESTROGEN RECEPTOR POSITIVE (H): Primary | ICD-10-CM

## 2019-07-19 PROCEDURE — 99213 OFFICE O/P EST LOW 20 MIN: CPT | Performed by: NURSE PRACTITIONER

## 2019-07-19 PROCEDURE — 25800030 ZZH RX IP 258 OP 636: Performed by: NURSE PRACTITIONER

## 2019-07-19 PROCEDURE — 25000128 H RX IP 250 OP 636: Performed by: NURSE PRACTITIONER

## 2019-07-19 PROCEDURE — 96413 CHEMO IV INFUSION 1 HR: CPT

## 2019-07-19 RX ADMIN — SODIUM CHLORIDE 250 ML: 9 INJECTION, SOLUTION INTRAVENOUS at 09:20

## 2019-07-19 RX ADMIN — TRASTUZUMAB 450 MG: 150 INJECTION, POWDER, LYOPHILIZED, FOR SOLUTION INTRAVENOUS at 09:21

## 2019-07-19 ASSESSMENT — MIFFLIN-ST. JEOR: SCORE: 1377.25

## 2019-07-19 ASSESSMENT — PAIN SCALES - GENERAL
PAINLEVEL: NO PAIN (0)
PAINLEVEL: NO PAIN (0)

## 2019-07-19 NOTE — LETTER
"    7/19/2019         RE: Saray Huntley  [address]  [place] [address]        Dear Colleague,    Thank you for referring your patient, Saray Huntley, to the [facility]. Please see a copy of my visit note below.    Oncology Rooming Note    July 19, 2019 8:58 AM   Saray Huntley is a 49 year old female who presents for:    Chief Complaint   Patient presents with     Oncology Clinic Visit     Initial Vitals: /70   Pulse 57   Temp 98.1  F (36.7  C) (Oral)   Resp 16   Ht 1.575 m (5' 2\")   Wt 80.4 kg (177 lb 4 oz)   LMP 09/29/2012 (Approximate)   SpO2 97%   BMI 32.42 kg/m    Estimated body mass index is 32.42 kg/m  as calculated from the following:    Height as of this encounter: 1.575 m (5' 2\").    Weight as of this encounter: 80.4 kg (177 lb 4 oz). Body surface area is 1.88 meters squared.  No Pain (0) Comment: Data Unavailable   Patient's last menstrual period was 09/29/2012 (approximate).  Allergies reviewed: Yes  Medications reviewed: Yes    Medications: Medication refills not needed today.  Pharmacy name entered into [facility]:    [id number] - [place], [place] - [address] AT [facility] [address] [place] [address] & [facility] [place], [place] - [address] [address]    Clinical concerns: fatigued      Jeanne Wood, [facility]              Oncology/Hematology Visit Note  Jul 19, 2019    Reason for Visit: follow up of left sided breast cancer  Status post neoadjuvant chemotherapy completed in February 2019 03/19/2019 left breast lumpectomy and sentinel lymph node biopsy done with pathology that showed no evidence of residual carcinoma  Patient completed radiation 05/2019  -Patient is currently on Herceptin every 3 weeks to complete 1 year    She comes to clinic prior Herceptin dose    Interval History:  Overall she reports feeling well.  She denies fever chills sweats denies cough no shortness of breath denies nausea vomiting diarrhea denies abdominal pain.  " "Denies lumps bumps.  Denies edema her energy and appetite are baseline.  No new concerns today    Review of Systems:  14 point ROS of systems including Constitutional, Eyes, Respiratory, Cardiovascular, Gastroenterology, Genitourinary, Integumentary, Muscularskeletal, Psychiatric were all negative except for pertinent positives noted in my HPI.      Current Outpatient Medications   Medication Sig Dispense Refill     HYDROcodone-acetaminophen (NORCO) 5-325 MG tablet Take 1-2 tablets by mouth every 4 hours as needed for moderate to severe pain (Patient not taking: Reported on 4/5/2019) 20 tablet 0     Lidocaine-Prilocaine (STUDY - LIDOCAINE 2.5%/PRILOCAINE 2.5% CREAM, IDS# [id number],) Externally apply topically daily as needed for moderate pain (Patient not taking: Reported on 4/5/2019) 30 g 3       Physical Examination:  General: The patient is a pleasant female in no acute distress.  /70   Pulse 57   Temp 98.1  F (36.7  C) (Oral)   Resp 16   Ht 1.575 m (5' 2\")   Wt 80.4 kg (177 lb 4 oz)   LMP 09/29/2012 (Approximate)   SpO2 97%   BMI 32.42 kg/m     HEENT: EOMI, PERRL. Sclerae are anicteric. Oral mucosa is pink and moist with no lesions or thrush.   Lymph: Neck is supple with no lymphadenopathy in the cervical or supraclavicular areas.   Heart: Regular rate and rhythm.   Lungs: Clear to auscultation bilaterally.   GI: Bowel sounds present, soft, nontender with no palpable hepatosplenomegaly or masses.   Extremities: No lower extremity edema noted bilaterally.   Skin: No rashes, petechiae, or bruising noted on exposed skin.    Laboratory Data:      Assessment and Plan:      Left side breast cancer  Status post neoadjuvant chemotherapy completed in February 2019 03/19/2019 left breast lumpectomy and sentinel lymph node biopsy done with pathology that showed no evidence of residual carcinoma  Patient completed radiation 05/2019  -Patient is currently on Herceptin every 3 weeks to complete 1 year  -Patient has " been tolerating Herceptin well  Okay to give Herceptin today and continue every 3 weeks  Continue with echocardiogram every 3 months  She has follow-up with Dr. Stock on 08/30    IRINA Scruggs CNP  Hem/Onc   [facility]     Chart documentation with Dragon Voice recognition Software. Although reviewed after completion, some words and grammatical errors may remain.          Again, thank you for allowing me to participate in the care of your patient.        Sincerely,        IRINA Scruggs CNP

## 2019-07-19 NOTE — PROGRESS NOTES
Infusion Nursing Note:  Saray Huntley presents today for C8D1 Herceptin.    Patient seen by provider today: Yes: Osmin Rose NP   present during visit today: Not Applicable.    Note: N/A.    Intravenous Access:  Peripheral IV placed.    Treatment Conditions:  ECHO/MUGA completed 6/5/19  EF 59%.      Post Infusion Assessment:  Patient tolerated infusion without incident.  Site patent and intact, free from redness, edema or discomfort.  No evidence of extravasations.  Access discontinued per protocol.       Discharge Plan:   Discharge instructions reviewed with: Patient.  Patient and/or family verbalized understanding of discharge instructions and all questions answered.  Copy of AVS reviewed with patient and/or family.  Patient will return 8/9/19 for next appointment.  Patient discharged in stable condition accompanied by: daughter.  Departure Mode: Ambulatory.    Maris Morales RN

## 2019-07-19 NOTE — PROGRESS NOTES
"Oncology/Hematology Visit Note  Jul 19, 2019    Reason for Visit: follow up of left sided breast cancer  Status post neoadjuvant chemotherapy completed in February 2019 03/19/2019 left breast lumpectomy and sentinel lymph node biopsy done with pathology that showed no evidence of residual carcinoma  Patient completed radiation 05/2019  -Patient is currently on Herceptin every 3 weeks to complete 1 year    She comes to clinic prior Herceptin dose    Interval History:  Overall she reports feeling well.  She denies fever chills sweats denies cough no shortness of breath denies nausea vomiting diarrhea denies abdominal pain.  Denies lumps bumps.  Denies edema her energy and appetite are baseline.  No new concerns today    Review of Systems:  14 point ROS of systems including Constitutional, Eyes, Respiratory, Cardiovascular, Gastroenterology, Genitourinary, Integumentary, Muscularskeletal, Psychiatric were all negative except for pertinent positives noted in my HPI.      Current Outpatient Medications   Medication Sig Dispense Refill     HYDROcodone-acetaminophen (NORCO) 5-325 MG tablet Take 1-2 tablets by mouth every 4 hours as needed for moderate to severe pain (Patient not taking: Reported on 4/5/2019) 20 tablet 0     Lidocaine-Prilocaine (STUDY - LIDOCAINE 2.5%/PRILOCAINE 2.5% CREAM, IDS# [id number],) Externally apply topically daily as needed for moderate pain (Patient not taking: Reported on 4/5/2019) 30 g 3       Physical Examination:  General: The patient is a pleasant female in no acute distress.  /70   Pulse 57   Temp 98.1  F (36.7  C) (Oral)   Resp 16   Ht 1.575 m (5' 2\")   Wt 80.4 kg (177 lb 4 oz)   LMP 09/29/2012 (Approximate)   SpO2 97%   BMI 32.42 kg/m    HEENT: EOMI, PERRL. Sclerae are anicteric. Oral mucosa is pink and moist with no lesions or thrush.   Lymph: Neck is supple with no lymphadenopathy in the cervical or supraclavicular areas.   Heart: Regular rate and rhythm.   Lungs: Clear to " auscultation bilaterally.   GI: Bowel sounds present, soft, nontender with no palpable hepatosplenomegaly or masses.   Extremities: No lower extremity edema noted bilaterally.   Skin: No rashes, petechiae, or bruising noted on exposed skin.    Laboratory Data:      Assessment and Plan:      Left side breast cancer  Status post neoadjuvant chemotherapy completed in February 2019 03/19/2019 left breast lumpectomy and sentinel lymph node biopsy done with pathology that showed no evidence of residual carcinoma  Patient completed radiation 05/2019  -Patient is currently on Herceptin every 3 weeks to complete 1 year  -Patient has been tolerating Herceptin well  Okay to give Herceptin today and continue every 3 weeks  Continue with echocardiogram every 3 months  She has follow-up with Dr. Stock on 08/30    IRINA Scruggs CNP  Hem/Onc   [facility]     Chart documentation with Dragon Voice recognition Software. Although reviewed after completion, some words and grammatical errors may remain.

## 2019-07-19 NOTE — PROGRESS NOTES
"Oncology Rooming Note    July 19, 2019 8:58 AM   Saray Huntley is a 49 year old female who presents for:    Chief Complaint   Patient presents with     Oncology Clinic Visit     Initial Vitals: /70   Pulse 57   Temp 98.1  F (36.7  C) (Oral)   Resp 16   Ht 1.575 m (5' 2\")   Wt 80.4 kg (177 lb 4 oz)   LMP 09/29/2012 (Approximate)   SpO2 97%   BMI 32.42 kg/m   Estimated body mass index is 32.42 kg/m  as calculated from the following:    Height as of this encounter: 1.575 m (5' 2\").    Weight as of this encounter: 80.4 kg (177 lb 4 oz). Body surface area is 1.88 meters squared.  No Pain (0) Comment: Data Unavailable   Patient's last menstrual period was 09/29/2012 (approximate).  Allergies reviewed: Yes  Medications reviewed: Yes    Medications: Medication refills not needed today.  Pharmacy name entered into [facility]:    [id number] - [place], [place] - [address] AT [facility] & [facility] [place] [facility] [place] - [address] [address]    Clinical concerns: fatigued      Jeanne Wood, JESUS            "

## 2019-08-19 DIAGNOSIS — Z17.0 MALIGNANT NEOPLASM OF UPPER-OUTER QUADRANT OF LEFT BREAST IN FEMALE, ESTROGEN RECEPTOR POSITIVE (H): ICD-10-CM

## 2019-08-19 DIAGNOSIS — C50.412 MALIGNANT NEOPLASM OF UPPER-OUTER QUADRANT OF LEFT BREAST IN FEMALE, ESTROGEN RECEPTOR POSITIVE (H): ICD-10-CM

## 2019-08-19 RX ORDER — ALBUTEROL SULFATE 90 UG/1
1-2 AEROSOL, METERED RESPIRATORY (INHALATION)
Status: CANCELLED
Start: 2019-09-13

## 2019-08-19 RX ORDER — MEPERIDINE HYDROCHLORIDE 25 MG/ML
25 INJECTION INTRAMUSCULAR; INTRAVENOUS; SUBCUTANEOUS EVERY 30 MIN PRN
Status: CANCELLED | OUTPATIENT
Start: 2019-08-20

## 2019-08-19 RX ORDER — MEPERIDINE HYDROCHLORIDE 25 MG/ML
25 INJECTION INTRAMUSCULAR; INTRAVENOUS; SUBCUTANEOUS EVERY 30 MIN PRN
Status: CANCELLED | OUTPATIENT
Start: 2019-09-13

## 2019-08-19 RX ORDER — DIPHENHYDRAMINE HYDROCHLORIDE 50 MG/ML
50 INJECTION INTRAMUSCULAR; INTRAVENOUS
Status: CANCELLED
Start: 2019-09-13

## 2019-08-19 RX ORDER — SODIUM CHLORIDE 9 MG/ML
1000 INJECTION, SOLUTION INTRAVENOUS CONTINUOUS PRN
Status: CANCELLED
Start: 2019-08-20

## 2019-08-19 RX ORDER — LORAZEPAM 2 MG/ML
0.5 INJECTION INTRAMUSCULAR EVERY 4 HOURS PRN
Status: CANCELLED
Start: 2019-08-20

## 2019-08-19 RX ORDER — EPINEPHRINE 1 MG/ML
0.3 INJECTION, SOLUTION INTRAMUSCULAR; SUBCUTANEOUS EVERY 5 MIN PRN
Status: CANCELLED | OUTPATIENT
Start: 2019-09-13

## 2019-08-19 RX ORDER — DIPHENHYDRAMINE HYDROCHLORIDE 50 MG/ML
50 INJECTION INTRAMUSCULAR; INTRAVENOUS
Status: CANCELLED
Start: 2019-08-20

## 2019-08-19 RX ORDER — ALBUTEROL SULFATE 90 UG/1
1-2 AEROSOL, METERED RESPIRATORY (INHALATION)
Status: CANCELLED
Start: 2019-08-20

## 2019-08-19 RX ORDER — ACETAMINOPHEN 325 MG/1
650 TABLET ORAL
Status: CANCELLED
Start: 2019-09-13

## 2019-08-19 RX ORDER — SODIUM CHLORIDE 9 MG/ML
1000 INJECTION, SOLUTION INTRAVENOUS CONTINUOUS PRN
Status: CANCELLED
Start: 2019-09-13

## 2019-08-19 RX ORDER — METHYLPREDNISOLONE SODIUM SUCCINATE 125 MG/2ML
125 INJECTION, POWDER, LYOPHILIZED, FOR SOLUTION INTRAMUSCULAR; INTRAVENOUS
Status: CANCELLED
Start: 2019-09-13

## 2019-08-19 RX ORDER — DIPHENHYDRAMINE HCL 25 MG
50 CAPSULE ORAL
Status: CANCELLED
Start: 2019-08-20

## 2019-08-19 RX ORDER — ALBUTEROL SULFATE 0.83 MG/ML
2.5 SOLUTION RESPIRATORY (INHALATION)
Status: CANCELLED | OUTPATIENT
Start: 2019-09-13

## 2019-08-19 RX ORDER — EPINEPHRINE 0.3 MG/.3ML
0.3 INJECTION SUBCUTANEOUS EVERY 5 MIN PRN
Status: CANCELLED | OUTPATIENT
Start: 2019-08-20

## 2019-08-19 RX ORDER — DIPHENHYDRAMINE HCL 25 MG
50 CAPSULE ORAL
Status: CANCELLED
Start: 2019-09-13

## 2019-08-19 RX ORDER — LORAZEPAM 2 MG/ML
0.5 INJECTION INTRAMUSCULAR EVERY 4 HOURS PRN
Status: CANCELLED
Start: 2019-09-13

## 2019-08-19 RX ORDER — EPINEPHRINE 1 MG/ML
0.3 INJECTION, SOLUTION INTRAMUSCULAR; SUBCUTANEOUS EVERY 5 MIN PRN
Status: CANCELLED | OUTPATIENT
Start: 2019-08-20

## 2019-08-19 RX ORDER — EPINEPHRINE 0.3 MG/.3ML
0.3 INJECTION SUBCUTANEOUS EVERY 5 MIN PRN
Status: CANCELLED | OUTPATIENT
Start: 2019-09-13

## 2019-08-19 RX ORDER — ALBUTEROL SULFATE 0.83 MG/ML
2.5 SOLUTION RESPIRATORY (INHALATION)
Status: CANCELLED | OUTPATIENT
Start: 2019-08-20

## 2019-08-19 RX ORDER — ACETAMINOPHEN 325 MG/1
650 TABLET ORAL
Status: CANCELLED
Start: 2019-08-20

## 2019-08-19 RX ORDER — METHYLPREDNISOLONE SODIUM SUCCINATE 125 MG/2ML
125 INJECTION, POWDER, LYOPHILIZED, FOR SOLUTION INTRAMUSCULAR; INTRAVENOUS
Status: CANCELLED
Start: 2019-08-20

## 2019-08-20 ENCOUNTER — INFUSION THERAPY VISIT (OUTPATIENT)
Dept: INFUSION THERAPY | Facility: CLINIC | Age: 49
End: 2019-08-20
Attending: INTERNAL MEDICINE
Payer: COMMERCIAL

## 2019-08-20 ENCOUNTER — ONCOLOGY VISIT (OUTPATIENT)
Dept: ONCOLOGY | Facility: CLINIC | Age: 49
End: 2019-08-20
Attending: INTERNAL MEDICINE
Payer: COMMERCIAL

## 2019-08-20 VITALS — WEIGHT: 183.4 LBS | HEIGHT: 62 IN | BODY MASS INDEX: 33.75 KG/M2

## 2019-08-20 VITALS
SYSTOLIC BLOOD PRESSURE: 106 MMHG | BODY MASS INDEX: 33.68 KG/M2 | HEIGHT: 62 IN | HEART RATE: 56 BPM | TEMPERATURE: 98.3 F | OXYGEN SATURATION: 97 % | DIASTOLIC BLOOD PRESSURE: 62 MMHG | WEIGHT: 183 LBS

## 2019-08-20 DIAGNOSIS — C50.412 MALIGNANT NEOPLASM OF UPPER-OUTER QUADRANT OF LEFT BREAST IN FEMALE, ESTROGEN RECEPTOR POSITIVE (H): Primary | ICD-10-CM

## 2019-08-20 DIAGNOSIS — Z17.0 MALIGNANT NEOPLASM OF UPPER-OUTER QUADRANT OF LEFT BREAST IN FEMALE, ESTROGEN RECEPTOR POSITIVE (H): Primary | ICD-10-CM

## 2019-08-20 PROCEDURE — 99214 OFFICE O/P EST MOD 30 MIN: CPT | Performed by: NURSE PRACTITIONER

## 2019-08-20 PROCEDURE — 25800030 ZZH RX IP 258 OP 636: Performed by: NURSE PRACTITIONER

## 2019-08-20 PROCEDURE — 96413 CHEMO IV INFUSION 1 HR: CPT

## 2019-08-20 PROCEDURE — 25000128 H RX IP 250 OP 636: Performed by: NURSE PRACTITIONER

## 2019-08-20 RX ADMIN — TRASTUZUMAB 600 MG: 150 INJECTION, POWDER, LYOPHILIZED, FOR SOLUTION INTRAVENOUS at 09:20

## 2019-08-20 RX ADMIN — SODIUM CHLORIDE 250 ML: 9 INJECTION, SOLUTION INTRAVENOUS at 09:20

## 2019-08-20 ASSESSMENT — MIFFLIN-ST. JEOR
SCORE: 1403.33
SCORE: 1405.15

## 2019-08-20 NOTE — LETTER
"    8/20/2019         RE: Saray Huntley  [address] [place] [address]        Dear Colleague,    Thank you for referring your patient, Saray Huntley, to the [facility]. Please see a copy of my visit note below.    Oncology/Hematology Visit Note  Aug 20, 2019    Reason for Visit: follow up of left sided breast cancer  Status post neoadjuvant chemotherapy completed in February 2019 03/19/2019 left breast lumpectomy and sentinel lymph node biopsy done with pathology that showed no evidence of residual carcinoma  Patient completed radiation 05/2019  -Patient is currently on Herceptin every 3 weeks to complete 1 year    She comes to clinic prior Herceptin dose    Interval History:    She came from her trip.  She is overall feels well.  She denies fever chills sweats denies cough no shortness of breath denies chest pain denies nausea vomiting diarrhea denies abdominal pain denies skin rash denies bleeding or edema    Review of Systems:  14 point ROS of systems including Constitutional, Eyes, Respiratory, Cardiovascular, Gastroenterology, Genitourinary, Integumentary, Muscularskeletal, Psychiatric were all negative except for pertinent positives noted in my HPI.      No current outpatient medications on file.       Physical Examination:  General: The patient is a pleasant female in no acute distress.  /62 (BP Location: Right arm, Patient Position: Chair, Cuff Size: Adult Regular)   Pulse 56   Temp 98.3  F (36.8  C) (Oral)   Ht 1.575 m (5' 2\")   Wt 83 kg (183 lb)   LMP 09/29/2012 (Approximate)   SpO2 97%   BMI 33.47 kg/m     HEENT: EOMI, PERRL. Sclerae are anicteric. Oral mucosa is pink and moist with no lesions or thrush.   Lymph: Neck is supple with no lymphadenopathy in the cervical or supraclavicular areas.   Heart: Regular rate and rhythm.   Lungs: Clear to auscultation bilaterally.   GI: Bowel sounds present, soft, nontender with no palpable hepatosplenomegaly or masses. "   Extremities: No lower extremity edema noted bilaterally.   Skin: No rashes, petechiae, or bruising noted on exposed skin.    Laboratory Data:      Assessment and Plan:      Left side breast cancer  Status post neoadjuvant chemotherapy completed in February 2019 03/19/2019 left breast lumpectomy and sentinel lymph node biopsy done with pathology that showed no evidence of residual carcinoma  Patient completed radiation 05/2019  -Patient is currently on Herceptin every 3 weeks to complete 1 year  -Patient has been tolerating Herceptin well  Okay to give Herceptin today and continue every 3 weeks  Continue with echocardiogram every 3 months  -Scheduled for echocardiogram first week of September  She has follow-up with Dr. Stock in September.        IRINA Scruggs CNP  Hem/Onc   [facility]     Chart documentation with Dragon Voice recognition Software. Although reviewed after completion, some words and grammatical errors may remain.          Again, thank you for allowing me to participate in the care of your patient.        Sincerely,        IRINA Scruggs CNP

## 2019-08-20 NOTE — PATIENT INSTRUCTIONS
Patient is with infusion     Patient contacted by telephone for instructions Echocardiogram scheduled for 8/6/19 at [facility]/[place]

## 2019-08-20 NOTE — PROGRESS NOTES
Infusion Nursing Note:  Saray Huntley presents today for herceptin.    Patient seen by provider today: Yes: PAULINO Rose   present during visit today: Not Applicable.    Note: [place].  Today's dose is reloaded at 8mg/kg as herceptin today is 11 days late.    Intravenous Access:  Peripheral IV placed.    Treatment Conditions:  ECHO/MUGA completed 5/17/19  EF 59%.      Post Infusion Assessment:  Patient tolerated infusion without incident.  Blood return noted pre and post infusion.  Site patent and intact, free from redness, edema or discomfort.  No evidence of extravasations.  Access discontinued per protocol.       Discharge Plan:   Discharge instructions reviewed with: Patient.  Patient and/or family verbalized understanding of discharge instructions and all questions answered.  Copy of AVS reviewed with patient and/or family.  Patient will return 9/13/19 for next appointment.  Patient discharged in stable condition accompanied by: sister.  Departure Mode: Ambulatory.    Austin Khan RN

## 2019-08-20 NOTE — PROGRESS NOTES
"Oncology/Hematology Visit Note  Aug 20, 2019    Reason for Visit: follow up of left sided breast cancer  Status post neoadjuvant chemotherapy completed in February 2019 03/19/2019 left breast lumpectomy and sentinel lymph node biopsy done with pathology that showed no evidence of residual carcinoma  Patient completed radiation 05/2019  -Patient is currently on Herceptin every 3 weeks to complete 1 year    She comes to clinic prior Herceptin dose    Interval History:    She came from her trip.  She is overall feels well.  She denies fever chills sweats denies cough no shortness of breath denies chest pain denies nausea vomiting diarrhea denies abdominal pain denies skin rash denies bleeding or edema    Review of Systems:  14 point ROS of systems including Constitutional, Eyes, Respiratory, Cardiovascular, Gastroenterology, Genitourinary, Integumentary, Muscularskeletal, Psychiatric were all negative except for pertinent positives noted in my HPI.      No current outpatient medications on file.       Physical Examination:  General: The patient is a pleasant female in no acute distress.  /62 (BP Location: Right arm, Patient Position: Chair, Cuff Size: Adult Regular)   Pulse 56   Temp 98.3  F (36.8  C) (Oral)   Ht 1.575 m (5' 2\")   Wt 83 kg (183 lb)   LMP 09/29/2012 (Approximate)   SpO2 97%   BMI 33.47 kg/m    HEENT: EOMI, PERRL. Sclerae are anicteric. Oral mucosa is pink and moist with no lesions or thrush.   Lymph: Neck is supple with no lymphadenopathy in the cervical or supraclavicular areas.   Heart: Regular rate and rhythm.   Lungs: Clear to auscultation bilaterally.   GI: Bowel sounds present, soft, nontender with no palpable hepatosplenomegaly or masses.   Extremities: No lower extremity edema noted bilaterally.   Skin: No rashes, petechiae, or bruising noted on exposed skin.    Laboratory Data:      Assessment and Plan:      Left side breast cancer  Status post neoadjuvant chemotherapy completed " in February 2019 03/19/2019 left breast lumpectomy and sentinel lymph node biopsy done with pathology that showed no evidence of residual carcinoma  Patient completed radiation 05/2019  -Patient is currently on Herceptin every 3 weeks to complete 1 year  -Patient has been tolerating Herceptin well  Okay to give Herceptin today and continue every 3 weeks  Continue with echocardiogram every 3 months  -Scheduled for echocardiogram first week of September  She has follow-up with Dr. Stock in September.        IRINA Scruggs CNP  Hem/Onc   [facility]     Chart documentation with Dragon Voice recognition Software. Although reviewed after completion, some words and grammatical errors may remain.

## 2019-08-29 ENCOUNTER — TELEPHONE (OUTPATIENT)
Dept: ONCOLOGY | Facility: CLINIC | Age: 49
End: 2019-08-29

## 2019-08-29 NOTE — PROGRESS NOTES
Received positive distress screen regarding patient's concern for current weight.  Called and left RD contact information on patient's voicemail.  Await return call from patient.    Lei Sutton, RD, LD  Clinical Dietitian  [facility]  [phone] (direct)

## 2019-09-06 ENCOUNTER — HOSPITAL ENCOUNTER (OUTPATIENT)
Dept: CARDIOLOGY | Facility: CLINIC | Age: 49
Discharge: HOME | End: 2019-09-06
Attending: NURSE PRACTITIONER | Admitting: NURSE PRACTITIONER
Payer: COMMERCIAL

## 2019-09-06 DIAGNOSIS — Z17.0 MALIGNANT NEOPLASM OF UPPER-OUTER QUADRANT OF LEFT BREAST IN FEMALE, ESTROGEN RECEPTOR POSITIVE (H): ICD-10-CM

## 2019-09-06 DIAGNOSIS — C50.412 MALIGNANT NEOPLASM OF UPPER-OUTER QUADRANT OF LEFT BREAST IN FEMALE, ESTROGEN RECEPTOR POSITIVE (H): ICD-10-CM

## 2019-09-06 PROCEDURE — 93308 TTE F-UP OR LMTD: CPT

## 2019-09-06 PROCEDURE — 93321 DOPPLER ECHO F-UP/LMTD STD: CPT | Mod: 26 | Performed by: INTERNAL MEDICINE

## 2019-09-06 PROCEDURE — 93325 DOPPLER ECHO COLOR FLOW MAPG: CPT | Mod: 26 | Performed by: INTERNAL MEDICINE

## 2019-09-06 PROCEDURE — 93308 TTE F-UP OR LMTD: CPT | Mod: 26 | Performed by: INTERNAL MEDICINE

## 2019-09-09 ENCOUNTER — TELEPHONE (OUTPATIENT)
Dept: ONCOLOGY | Facility: CLINIC | Age: 49
End: 2019-09-09

## 2019-09-09 NOTE — TELEPHONE ENCOUNTER
I received a call from the patient requesting her echo results.    I have reviewed normal echo results with patient.    Donna Duke RN

## 2019-09-13 ENCOUNTER — INFUSION THERAPY VISIT (OUTPATIENT)
Dept: INFUSION THERAPY | Facility: CLINIC | Age: 49
End: 2019-09-13
Attending: INTERNAL MEDICINE
Payer: COMMERCIAL

## 2019-09-13 ENCOUNTER — ONCOLOGY VISIT (OUTPATIENT)
Dept: ONCOLOGY | Facility: CLINIC | Age: 49
End: 2019-09-13
Attending: INTERNAL MEDICINE
Payer: COMMERCIAL

## 2019-09-13 VITALS
RESPIRATION RATE: 18 BRPM | HEART RATE: 55 BPM | BODY MASS INDEX: 32.57 KG/M2 | WEIGHT: 177 LBS | SYSTOLIC BLOOD PRESSURE: 132 MMHG | OXYGEN SATURATION: 97 % | TEMPERATURE: 98.1 F | HEIGHT: 62 IN | DIASTOLIC BLOOD PRESSURE: 74 MMHG

## 2019-09-13 VITALS — HEIGHT: 62 IN | WEIGHT: 177 LBS | BODY MASS INDEX: 32.57 KG/M2

## 2019-09-13 DIAGNOSIS — C50.412 MALIGNANT NEOPLASM OF UPPER-OUTER QUADRANT OF LEFT BREAST IN FEMALE, ESTROGEN RECEPTOR POSITIVE (H): ICD-10-CM

## 2019-09-13 DIAGNOSIS — Z12.39 BREAST CANCER SCREENING: Primary | ICD-10-CM

## 2019-09-13 DIAGNOSIS — Z17.0 MALIGNANT NEOPLASM OF UPPER-OUTER QUADRANT OF LEFT BREAST IN FEMALE, ESTROGEN RECEPTOR POSITIVE (H): Primary | ICD-10-CM

## 2019-09-13 DIAGNOSIS — C50.412 MALIGNANT NEOPLASM OF UPPER-OUTER QUADRANT OF LEFT BREAST IN FEMALE, ESTROGEN RECEPTOR POSITIVE (H): Primary | ICD-10-CM

## 2019-09-13 DIAGNOSIS — Z17.0 MALIGNANT NEOPLASM OF UPPER-OUTER QUADRANT OF LEFT BREAST IN FEMALE, ESTROGEN RECEPTOR POSITIVE (H): ICD-10-CM

## 2019-09-13 PROCEDURE — G0463 HOSPITAL OUTPT CLINIC VISIT: HCPCS | Mod: 25

## 2019-09-13 PROCEDURE — 96413 CHEMO IV INFUSION 1 HR: CPT

## 2019-09-13 PROCEDURE — 25800030 ZZH RX IP 258 OP 636: Performed by: NURSE PRACTITIONER

## 2019-09-13 PROCEDURE — 99214 OFFICE O/P EST MOD 30 MIN: CPT | Performed by: INTERNAL MEDICINE

## 2019-09-13 PROCEDURE — 25000128 H RX IP 250 OP 636: Performed by: NURSE PRACTITIONER

## 2019-09-13 RX ORDER — METHYLPREDNISOLONE SODIUM SUCCINATE 125 MG/2ML
125 INJECTION, POWDER, LYOPHILIZED, FOR SOLUTION INTRAMUSCULAR; INTRAVENOUS
Status: CANCELLED
Start: 2019-10-04

## 2019-09-13 RX ORDER — LORAZEPAM 2 MG/ML
0.5 INJECTION INTRAMUSCULAR EVERY 4 HOURS PRN
Status: CANCELLED
Start: 2019-10-04

## 2019-09-13 RX ORDER — DIPHENHYDRAMINE HYDROCHLORIDE 50 MG/ML
50 INJECTION INTRAMUSCULAR; INTRAVENOUS
Status: CANCELLED
Start: 2019-10-04

## 2019-09-13 RX ORDER — ALBUTEROL SULFATE 0.83 MG/ML
2.5 SOLUTION RESPIRATORY (INHALATION)
Status: CANCELLED | OUTPATIENT
Start: 2019-10-04

## 2019-09-13 RX ORDER — MEPERIDINE HYDROCHLORIDE 25 MG/ML
25 INJECTION INTRAMUSCULAR; INTRAVENOUS; SUBCUTANEOUS EVERY 30 MIN PRN
Status: CANCELLED | OUTPATIENT
Start: 2019-10-04

## 2019-09-13 RX ORDER — ALBUTEROL SULFATE 90 UG/1
1-2 AEROSOL, METERED RESPIRATORY (INHALATION)
Status: CANCELLED
Start: 2019-10-04

## 2019-09-13 RX ORDER — EPINEPHRINE 1 MG/ML
0.3 INJECTION, SOLUTION INTRAMUSCULAR; SUBCUTANEOUS EVERY 5 MIN PRN
Status: CANCELLED | OUTPATIENT
Start: 2019-10-04

## 2019-09-13 RX ORDER — SODIUM CHLORIDE 9 MG/ML
1000 INJECTION, SOLUTION INTRAVENOUS CONTINUOUS PRN
Status: CANCELLED
Start: 2019-10-04

## 2019-09-13 RX ORDER — ACETAMINOPHEN 325 MG/1
650 TABLET ORAL
Status: CANCELLED
Start: 2019-10-04

## 2019-09-13 RX ORDER — DIPHENHYDRAMINE HCL 25 MG
50 CAPSULE ORAL
Status: CANCELLED
Start: 2019-10-04

## 2019-09-13 RX ORDER — EPINEPHRINE 0.3 MG/.3ML
0.3 INJECTION SUBCUTANEOUS EVERY 5 MIN PRN
Status: CANCELLED | OUTPATIENT
Start: 2019-10-04

## 2019-09-13 RX ADMIN — SODIUM CHLORIDE 250 ML: 9 INJECTION, SOLUTION INTRAVENOUS at 09:33

## 2019-09-13 RX ADMIN — TRASTUZUMAB 450 MG: 150 INJECTION, POWDER, LYOPHILIZED, FOR SOLUTION INTRAVENOUS at 09:49

## 2019-09-13 ASSESSMENT — PAIN SCALES - GENERAL
PAINLEVEL: NO PAIN (0)
PAINLEVEL: MODERATE PAIN (5)

## 2019-09-13 ASSESSMENT — MIFFLIN-ST. JEOR
SCORE: 1376.12
SCORE: 1376.12

## 2019-09-13 NOTE — PROGRESS NOTES
[facility]    Hematology/Oncology Established Patient Note      Today's Date: 9/13/19    Reason for Follow-up: left-sided breast cancer      HISTORY OF PRESENT ILLNESS: Saray Huntley is a 49 year old female who presents with left-sided breast cancer.  It was found on a screening mammogram.  Left breast ultrasound found a hypoechoic mass at the 12:00 position, 8 cm from the nipple, measuring 1.5 x 1.6 x 1.5 cm.  Axilla survey showed only normal-appearing lymph nodes.   Biopsy showed invasive ductal carcinoma, grade 3, weakly positive for ER (1-3%), SD negative, HER-2 positive.      Case was reviewed at tumor conference, and recommendation was for neoadjuvant chemotherapy.    Port was placed on 11/28/18.  It was removed on 3/19/19.    She started neoadjuvant chemotherapy on 11/30/18 with weekly paclitaxel, Herceptin, Perjeta and completed on 2/15/19.    On 3/19/19, she underwent left breast lumpectomy and sentinel lymph node biopsy.  Port was also removed.  Pathology showed no evidence of residual carcinoma, consistent with a complete pathologic response.      She will continue on Herceptin to complete 1 year.    She completed radiation on 5/22/19.        INTERIM HISTORY: Patient is here for follow-up today.  She says that she feels well.  She has completed radiation and has healed fine.        REVIEW OF SYSTEMS:   14 point ROS was reviewed and is negative other than as noted above in HPI.       HOME MEDICATIONS:  No current outpatient medications on file.         ALLERGIES:  Allergies   Allergen Reactions     Sulfa Drugs Rash         PAST MEDICAL HISTORY:  Past Medical History:   Diagnosis Date     Breast cancer (H)      Hypothyroidism, unspecified type 9/29/2017     Neuropathy      PONV (postoperative nausea and vomiting)      Vertigo          PAST SURGICAL HISTORY:  Past Surgical History:   Procedure Laterality Date     BIOPSY NODE SENTINEL Left 3/19/2019    Procedure: WITH SENTINEL LYMPH NODE  BIOPSY;  Surgeon: Sae Montaño MD;  Location:  OR      SECTION  2003     INSERT PORT VASCULAR ACCESS N/A 2018    Procedure: PORT PLACEMENT ;  Surgeon: Sae Montaño MD;  Location:  OR     LUMPECTOMY BREAST WITH SEED LOCALIZATION Left 3/19/2019    Procedure: SEED LOCALIZED LEFT BREAST LUMPECTOMY;  Surgeon: Sae Montaño MD;  Location:  OR     REMOVE PORT VASCULAR ACCESS N/A 3/19/2019    Procedure: PORT REMOVAL;  Surgeon: Sae Montaño MD;  Location:  OR         SOCIAL HISTORY:  Social History     Socioeconomic History     Marital status:      Spouse name: Not on file     Number of children: Not on file     Years of education: Not on file     Highest education level: Not on file   Occupational History     Not on file   Social Needs     Financial resource strain: Not on file     Food insecurity:     Worry: Not on file     Inability: Not on file     Transportation needs:     Medical: Not on file     Non-medical: Not on file   Tobacco Use     Smoking status: Never Smoker     Smokeless tobacco: Never Used   Substance and Sexual Activity     Alcohol use: No     Drug use: No     Sexual activity: Not Currently   Lifestyle     Physical activity:     Days per week: Not on file     Minutes per session: Not on file     Stress: Not on file   Relationships     Social connections:     Talks on phone: Not on file     Gets together: Not on file     Attends Rastafari service: Not on file     Active member of club or organization: Not on file     Attends meetings of clubs or organizations: Not on file     Relationship status: Not on file     Intimate partner violence:     Fear of current or ex partner: Not on file     Emotionally abused: Not on file     Physically abused: Not on file     Forced sexual activity: Not on file   Other Topics Concern     Parent/sibling w/ CABG, MI or angioplasty before 65F 55M? Not Asked   Social History Narrative     Not on file   She has  "never smoked.  She does not drink or use illicit drugs.  She is going to school at [email or web address] studying IT.  She has 1 daughter who is 15 years old.  She says that she has menopause a few years old.  She tried oral contraceptive, but she did not tolerate it well, so did not take it long term.  She has never taken hormone replacement therapy          FAMILY HISTORY:  Family History   Problem Relation Age of Onset     Hyperlipidemia Mother      Kidney Disease Mother      Diabetes Father    Patient does not know much about her family history and is unaware if there is breast cancer or ovarian cancer in her family.        PHYSICAL EXAM:  Vital signs:  /74 (BP Location: Right arm, Patient Position: Sitting, Cuff Size: Adult Regular)   Pulse 55   Temp 98.1  F (36.7  C) (Oral)   Resp 18   Ht 1.575 m (5' 2\")   Wt 80.3 kg (177 lb)   LMP 2012 (Approximate)   SpO2 97%   BMI 32.37 kg/m     ECO  GENERAL/CONSTITUTIONAL: No acute distress. Accompanied bysister.  EYES: No scleral icterus.  BREAST: Right-no palpable mass, discharge, rash, or axillary lymphadenopathy.  Left-s/p lumpectomy, skin changes of radiation.   MUSCULOSKELETAL: Warm and well-perfused, no cyanosis, clubbing, or edema.  NEUROLOGIC: Alert, oriented, answers questions appropriately.  INTEGUMENTARY: No jaundice.  Port has been removed.      LABS:  None today.      IMAGING:  Echo 19:  1. Normal biventricular size and function. Left ventricular ejection fraction  of 55-60%. No segmental wall motion abnormalities noted.  2. Normal sized atria.  3. No hemodynamically significant valvular disease.  Compared to the previous echocardiogram on 2019, there are no significant  changes. Strain values are unchanged.  Technically adequate study.      ASSESSMENT/PLAN:  Saray Huntley is a 49 year old post-menopausal female with:    1) Left-sided breast cancer:  1.5 x 1.6 x 1.5 cm on ultrasound.  Axilla survey showed only normal-appearing lymph " nodes.   Biopsy showed invasive ductal carcinoma, grade 3, weakly positive for ER (1-3%), NC negative, HER-2 positive.  Clinical stage IA (cT1cN0).    She completed neoadjuvant chemotherapy with weekly paclitaxel, Herceptin, Perjeta on 2/15/19.  On 3/19/19, she underwent left breast lumpectomy and sentinel lymph node biopsy.  Pathology showed no evidence of residual carcinoma, consistent with a complete pathologic response.      She will continue with Herceptin every 3 weeks to complete 1 year.  She completed radiation on 5/22/19.    We previously discussed hormonal therapy after completion of radiation.  The ER was weakly positive, on 1-3%.  We discussed trying anastrozole, but patient does not want to take it for fear of side effects, and with the ER being so weakly positive, there may not be significant benefit anyway, so it would be reasonable not to pursue adjuvant hormonal therapy.      -echo every 3 months while on Herceptin; next one due in December 2019 - ordered  -mammogram in December 2019 - ordered  -proceed with Herceptin today and every 3 weeks  -RTC in 6 weeks for follow-up    2) Genetics: She was diagnosed at age 48 with breast cancer  -she met with genetic counseling.  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    3) Occasional aches: may be related to Herceptin.  Exercise helps.  She feels much better when she exercises.    4) Coping: Patient seems to be doing better now.      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  She has completed chemotherapy, and symptoms should gradually improve.  She says that it is getting better slowly.    -she is interested in acupuncture - will schedule.    7) Survivorship: Cancer treatment plan and summary was reviewed with patient and given to her.      I spent a total of 25 minutes with the patient, with over >50% of the time in counseling and/or coordination of care.       Cayla Stock,  MD  Hematology/Oncology  [facility]

## 2019-09-13 NOTE — LETTER
9/13/2019         RE: Saray Huntley  [address] [place] [address]        Dear Colleague,    Thank you for referring your patient, Saray Huntley, to the [facility]. Please see a copy of my visit note below.    [facility]    Hematology/Oncology Established Patient Note      Today's Date: 9/13/19    Reason for Follow-up: left-sided breast cancer      HISTORY OF PRESENT ILLNESS: Saray Huntley is a 49 year old female who presents with left-sided breast cancer.  It was found on a screening mammogram.  Left breast ultrasound found a hypoechoic mass at the 12:00 position, 8 cm from the nipple, measuring 1.5 x 1.6 x 1.5 cm.  Axilla survey showed only normal-appearing lymph nodes.   Biopsy showed invasive ductal carcinoma, grade 3, weakly positive for ER (1-3%), NV negative, HER-2 positive.      Case was reviewed at tumor conference, and recommendation was for neoadjuvant chemotherapy.    Port was placed on 11/28/18.  It was removed on 3/19/19.    She started neoadjuvant chemotherapy on 11/30/18 with weekly paclitaxel, Herceptin, Perjeta and completed on 2/15/19.    On 3/19/19, she underwent left breast lumpectomy and sentinel lymph node biopsy.  Port was also removed.  Pathology showed no evidence of residual carcinoma, consistent with a complete pathologic response.      She will continue on Herceptin to complete 1 year.    She completed radiation on 5/22/19.        INTERIM HISTORY: Patient is here for follow-up today.  She says that she feels well.  She has completed radiation and has healed fine.        REVIEW OF SYSTEMS:   14 point ROS was reviewed and is negative other than as noted above in HPI.       HOME MEDICATIONS:  No current outpatient medications on file.         ALLERGIES:  Allergies   Allergen Reactions     Sulfa Drugs Rash         PAST MEDICAL HISTORY:  Past Medical History:   Diagnosis Date     Breast cancer (H)      Hypothyroidism, unspecified type  2017     Neuropathy      PONV (postoperative nausea and vomiting)      Vertigo          PAST SURGICAL HISTORY:  Past Surgical History:   Procedure Laterality Date     BIOPSY NODE SENTINEL Left 3/19/2019    Procedure: WITH SENTINEL LYMPH NODE BIOPSY;  Surgeon: Sae Montaño MD;  Location:  OR      SECTION  2003     INSERT PORT VASCULAR ACCESS N/A 2018    Procedure: PORT PLACEMENT ;  Surgeon: Sae Montaño MD;  Location:  OR     LUMPECTOMY BREAST WITH SEED LOCALIZATION Left 3/19/2019    Procedure: SEED LOCALIZED LEFT BREAST LUMPECTOMY;  Surgeon: Sae Montaño MD;  Location:  OR     REMOVE PORT VASCULAR ACCESS N/A 3/19/2019    Procedure: PORT REMOVAL;  Surgeon: Sae Montaño MD;  Location:  OR         SOCIAL HISTORY:  Social History     Socioeconomic History     Marital status:      Spouse name: Not on file     Number of children: Not on file     Years of education: Not on file     Highest education level: Not on file   Occupational History     Not on file   Social Needs     Financial resource strain: Not on file     Food insecurity:     Worry: Not on file     Inability: Not on file     Transportation needs:     Medical: Not on file     Non-medical: Not on file   Tobacco Use     Smoking status: Never Smoker     Smokeless tobacco: Never Used   Substance and Sexual Activity     Alcohol use: No     Drug use: No     Sexual activity: Not Currently   Lifestyle     Physical activity:     Days per week: Not on file     Minutes per session: Not on file     Stress: Not on file   Relationships     Social connections:     Talks on phone: Not on file     Gets together: Not on file     Attends Catholic service: Not on file     Active member of club or organization: Not on file     Attends meetings of clubs or organizations: Not on file     Relationship status: Not on file     Intimate partner violence:     Fear of current or ex partner: Not on file     Emotionally  "abused: Not on file     Physically abused: Not on file     Forced sexual activity: Not on file   Other Topics Concern     Parent/sibling w/ CABG, MI or angioplasty before 65F 55M? Not Asked   Social History Narrative     Not on file   She has never smoked.  She does not drink or use illicit drugs.  She is going to school at [employer] studying IT.  She has 1 daughter who is 15 years old.  She says that she has menopause a few years old.  She tried oral contraceptive, but she did not tolerate it well, so did not take it long term.  She has never taken hormone replacement therapy          FAMILY HISTORY:  Family History   Problem Relation Age of Onset     Hyperlipidemia Mother      Kidney Disease Mother      Diabetes Father    Patient does not know much about her family history and is unaware if there is breast cancer or ovarian cancer in her family.        PHYSICAL EXAM:  Vital signs:  /74 (BP Location: Right arm, Patient Position: Sitting, Cuff Size: Adult Regular)   Pulse 55   Temp 98.1  F (36.7  C) (Oral)   Resp 18   Ht 1.575 m (5' 2\")   Wt 80.3 kg (177 lb)   LMP 2012 (Approximate)   SpO2 97%   BMI 32.37 kg/m      ECO  GENERAL/CONSTITUTIONAL: No acute distress. Accompanied bysister.  EYES: No scleral icterus.  BREAST: Right-no palpable mass, discharge, rash, or axillary lymphadenopathy.  Left-s/p lumpectomy, skin changes of radiation.   MUSCULOSKELETAL: Warm and well-perfused, no cyanosis, clubbing, or edema.  NEUROLOGIC: Alert, oriented, answers questions appropriately.  INTEGUMENTARY: No jaundice.  Port has been removed.      LABS:  None today.      IMAGING:  Echo 19:  1. Normal biventricular size and function. Left ventricular ejection fraction  of 55-60%. No segmental wall motion abnormalities noted.  2. Normal sized atria.  3. No hemodynamically significant valvular disease.  Compared to the previous echocardiogram on 2019, there are no significant  changes. Strain values " are unchanged.  Technically adequate study.      ASSESSMENT/PLAN:  Saray Huntley is a 49 year old post-menopausal female with:    1) Left-sided breast cancer:  1.5 x 1.6 x 1.5 cm on ultrasound.  Axilla survey showed only normal-appearing lymph nodes.   Biopsy showed invasive ductal carcinoma, grade 3, weakly positive for ER (1-3%), SC negative, HER-2 positive.  Clinical stage IA (cT1cN0).    She completed neoadjuvant chemotherapy with weekly paclitaxel, Herceptin, Perjeta on 2/15/19.  On 3/19/19, she underwent left breast lumpectomy and sentinel lymph node biopsy.  Pathology showed no evidence of residual carcinoma, consistent with a complete pathologic response.      She will continue with Herceptin every 3 weeks to complete 1 year.  She completed radiation on 5/22/19.    We previously discussed hormonal therapy after completion of radiation.  The ER was weakly positive, on 1-3%.  We discussed trying anastrozole, but patient does not want to take it for fear of side effects, and with the ER being so weakly positive, there may not be significant benefit anyway, so it would be reasonable not to pursue adjuvant hormonal therapy.      -echo every 3 months while on Herceptin; next one due in December 2019 - ordered  -mammogram in December 2019 - ordered  -proceed with Herceptin today and every 3 weeks  -RTC in 6 weeks for follow-up    2) Genetics: She was diagnosed at age 48 with breast cancer  -she met with genetic counseling.  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    3) Occasional aches: may be related to Herceptin.  Exercise helps.  She feels much better when she exercises.    4) Coping: Patient seems to be doing better now.      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  She has completed chemotherapy, and symptoms should gradually improve.  She says that it is getting better slowly.    -she is interested in acupuncture - will schedule.      I spent a  total of 25 minutes with the patient, with over >50% of the time in counseling and/or coordination of care.       Cayla Stock MD  Hematology/Oncology  [facility]    Again, thank you for allowing me to participate in the care of your patient.        Sincerely,        Cayla Stock MD

## 2019-09-13 NOTE — PROGRESS NOTES
Infusion Nursing Note:  Saray Huntley presents today for herceptin.    Patient seen by provider today: Yes: Dr. Stock   present during visit today: Not Applicable.    Note: N/A.    Intravenous Access:  Peripheral IV placed.    Treatment Conditions:  ECHO/MUGA completed 9/6/19  EF 55-60%.      Post Infusion Assessment:  Patient tolerated infusion without incident.  Blood return noted pre and post infusion.  Site patent and intact, free from redness, edema or discomfort.  No evidence of extravasations.  Access discontinued per protocol.       Discharge Plan:   Discharge instructions reviewed with: Patient.  Patient and/or family verbalized understanding of discharge instructions and all questions answered.  Copy of AVS reviewed with patient and/or family.  Patient will return 10/4/19 for next appointment.  Patient discharged in stable condition accompanied by: friend.  Departure Mode: Ambulatory.    Austin Khan, RN, RN

## 2019-10-04 ENCOUNTER — INFUSION THERAPY VISIT (OUTPATIENT)
Dept: INFUSION THERAPY | Facility: CLINIC | Age: 49
End: 2019-10-04
Attending: INTERNAL MEDICINE
Payer: COMMERCIAL

## 2019-10-04 VITALS
TEMPERATURE: 98.2 F | BODY MASS INDEX: 33.23 KG/M2 | RESPIRATION RATE: 18 BRPM | DIASTOLIC BLOOD PRESSURE: 56 MMHG | HEART RATE: 60 BPM | SYSTOLIC BLOOD PRESSURE: 136 MMHG | OXYGEN SATURATION: 96 % | WEIGHT: 180.6 LBS | HEIGHT: 62 IN

## 2019-10-04 DIAGNOSIS — Z17.0 MALIGNANT NEOPLASM OF UPPER-OUTER QUADRANT OF LEFT BREAST IN FEMALE, ESTROGEN RECEPTOR POSITIVE (H): Primary | ICD-10-CM

## 2019-10-04 DIAGNOSIS — C50.412 MALIGNANT NEOPLASM OF UPPER-OUTER QUADRANT OF LEFT BREAST IN FEMALE, ESTROGEN RECEPTOR POSITIVE (H): Primary | ICD-10-CM

## 2019-10-04 PROCEDURE — 25800030 ZZH RX IP 258 OP 636: Performed by: INTERNAL MEDICINE

## 2019-10-04 PROCEDURE — 25000128 H RX IP 250 OP 636: Performed by: INTERNAL MEDICINE

## 2019-10-04 PROCEDURE — 96413 CHEMO IV INFUSION 1 HR: CPT

## 2019-10-04 RX ADMIN — TRASTUZUMAB 450 MG: 150 INJECTION, POWDER, LYOPHILIZED, FOR SOLUTION INTRAVENOUS at 08:53

## 2019-10-04 RX ADMIN — SODIUM CHLORIDE 250 ML: 9 INJECTION, SOLUTION INTRAVENOUS at 08:53

## 2019-10-04 ASSESSMENT — MIFFLIN-ST. JEOR: SCORE: 1392.58

## 2019-10-04 ASSESSMENT — PAIN SCALES - GENERAL: PAINLEVEL: NO PAIN (0)

## 2019-10-09 ENCOUNTER — ONCOLOGY VISIT (OUTPATIENT)
Dept: ONCOLOGY | Facility: CLINIC | Age: 49
End: 2019-10-09
Attending: INTERNAL MEDICINE
Payer: COMMERCIAL

## 2019-10-09 DIAGNOSIS — G62.0 PERIPHERAL NEUROPATHY DUE TO CHEMOTHERAPY (H): Primary | ICD-10-CM

## 2019-10-09 DIAGNOSIS — T45.1X5A PERIPHERAL NEUROPATHY DUE TO CHEMOTHERAPY (H): Primary | ICD-10-CM

## 2019-10-09 PROCEDURE — 40001096 ZZH STATISTIC ACUPUNCTURE (RH & SH ONLY): Performed by: ACUPUNCTURIST

## 2019-10-09 NOTE — LETTER
"    10/9/2019         RE: Saray Huntley  [address] [place] [address]        Dear Colleague,    Thank you for referring your patient, Saray Huntley, to the [facility]. Please see a copy of my visit note below.    ACUPUNCTURIST TREATMENT NOTE    Name: Saray Huntley  :  1970  MRN:  [id number]      Acupuncture Treatment  Patient Type: Other( cancer clinic)  Intervention Reason: Pain, Pain 2, Neuropathy, Neuropathy 2  Pre-session Pain Rating: 3  Post-session Pain Rating: 3  Pre-session Pain 2 Ratin  Post-session Pain 2 Ratin  Pre-session Neuropathy rating: 3  Post-session Neuropathy rating: 3  Pre-session Neuropathy 2 ratin  Post-session Neuropathy 2 ratin  Patient complaint:: Saray is in clinic today for an initial acupuncture treatment.  Saray explains that she developed neuropathy in both her hands and her feet towards the end of her chemotherapy.  She explains that after chemo she had numbness in both her hands and both her feet.  She explains at her visit today that both her hands and feet feel less numb than they did at the end of chemo but now she experiences intermittent shocks and pins and needles sensation.  Saray also explains that she still struggles to accurately assess if she is gripping an object, she explains that she is still dropping objects that are handed to her, her phone, a water bottle, etc.  Saray also reports that she has some degree of fatigue, she explains that she has good days and bad days.  She states that she is not able to explain why she feels completely exhausted some days compared to others.  Acupuncture (Points):: Du 20, LI 4, St 36, Sp 6, Ki 3, Jennifer 3, Ba Panfilo  TCM Pulse: thin, weak, slightly hesitant  TCM Diagnosis: Qi and blood stagnation  Practitioner Observed: 30 minute treatment session; infrared lamp over both feet  Checklist: Progress Note Completed, Consent Reveiwed  Follow up comments: Tx 1     \"Risks and benefits of " "acupuncture were discussed with patient. Consent for treatment was given. We thank you for the referral.\"     Katie Cortez L.Ac.     Date:  10/9/2019  Time:  9:55 AM        Again, thank you for allowing me to participate in the care of your patient.        Sincerely,        Katie Cortez    "

## 2019-10-25 ENCOUNTER — ONCOLOGY VISIT (OUTPATIENT)
Dept: ONCOLOGY | Facility: CLINIC | Age: 49
End: 2019-10-25
Attending: INTERNAL MEDICINE
Payer: COMMERCIAL

## 2019-10-25 ENCOUNTER — INFUSION THERAPY VISIT (OUTPATIENT)
Dept: INFUSION THERAPY | Facility: CLINIC | Age: 49
End: 2019-10-25
Attending: INTERNAL MEDICINE
Payer: COMMERCIAL

## 2019-10-25 VITALS
RESPIRATION RATE: 16 BRPM | WEIGHT: 182.2 LBS | DIASTOLIC BLOOD PRESSURE: 64 MMHG | TEMPERATURE: 97.8 F | OXYGEN SATURATION: 98 % | HEIGHT: 62 IN | SYSTOLIC BLOOD PRESSURE: 110 MMHG | BODY MASS INDEX: 33.53 KG/M2 | HEART RATE: 58 BPM

## 2019-10-25 VITALS
TEMPERATURE: 97.8 F | WEIGHT: 182.1 LBS | HEART RATE: 58 BPM | RESPIRATION RATE: 16 BRPM | OXYGEN SATURATION: 98 % | SYSTOLIC BLOOD PRESSURE: 110 MMHG | DIASTOLIC BLOOD PRESSURE: 64 MMHG | BODY MASS INDEX: 33.51 KG/M2 | HEIGHT: 62 IN

## 2019-10-25 DIAGNOSIS — Z17.0 MALIGNANT NEOPLASM OF UPPER-OUTER QUADRANT OF LEFT BREAST IN FEMALE, ESTROGEN RECEPTOR POSITIVE (H): ICD-10-CM

## 2019-10-25 DIAGNOSIS — C50.412 MALIGNANT NEOPLASM OF UPPER-OUTER QUADRANT OF LEFT BREAST IN FEMALE, ESTROGEN RECEPTOR POSITIVE (H): ICD-10-CM

## 2019-10-25 DIAGNOSIS — Z23 NEED FOR PROPHYLACTIC VACCINATION AND INOCULATION AGAINST INFLUENZA: Primary | ICD-10-CM

## 2019-10-25 PROCEDURE — 25000128 H RX IP 250 OP 636: Performed by: INTERNAL MEDICINE

## 2019-10-25 PROCEDURE — 90682 RIV4 VACC RECOMBINANT DNA IM: CPT | Performed by: INTERNAL MEDICINE

## 2019-10-25 PROCEDURE — 99214 OFFICE O/P EST MOD 30 MIN: CPT | Performed by: INTERNAL MEDICINE

## 2019-10-25 PROCEDURE — 25800030 ZZH RX IP 258 OP 636: Performed by: INTERNAL MEDICINE

## 2019-10-25 PROCEDURE — G0463 HOSPITAL OUTPT CLINIC VISIT: HCPCS | Mod: 25

## 2019-10-25 PROCEDURE — 96413 CHEMO IV INFUSION 1 HR: CPT

## 2019-10-25 PROCEDURE — G0008 ADMIN INFLUENZA VIRUS VAC: HCPCS

## 2019-10-25 RX ORDER — EPINEPHRINE 0.3 MG/.3ML
0.3 INJECTION SUBCUTANEOUS EVERY 5 MIN PRN
Status: CANCELLED | OUTPATIENT
Start: 2019-10-25

## 2019-10-25 RX ORDER — SODIUM CHLORIDE 9 MG/ML
1000 INJECTION, SOLUTION INTRAVENOUS CONTINUOUS PRN
Status: CANCELLED
Start: 2019-11-15

## 2019-10-25 RX ORDER — ACETAMINOPHEN 325 MG/1
650 TABLET ORAL
Status: CANCELLED
Start: 2019-10-25

## 2019-10-25 RX ORDER — SODIUM CHLORIDE 9 MG/ML
1000 INJECTION, SOLUTION INTRAVENOUS CONTINUOUS PRN
Status: CANCELLED
Start: 2019-10-25

## 2019-10-25 RX ORDER — ALBUTEROL SULFATE 90 UG/1
1-2 AEROSOL, METERED RESPIRATORY (INHALATION)
Status: CANCELLED
Start: 2019-11-15

## 2019-10-25 RX ORDER — ALBUTEROL SULFATE 0.83 MG/ML
2.5 SOLUTION RESPIRATORY (INHALATION)
Status: CANCELLED | OUTPATIENT
Start: 2019-10-25

## 2019-10-25 RX ORDER — DIPHENHYDRAMINE HYDROCHLORIDE 50 MG/ML
50 INJECTION INTRAMUSCULAR; INTRAVENOUS
Status: CANCELLED
Start: 2019-11-15

## 2019-10-25 RX ORDER — ALBUTEROL SULFATE 90 UG/1
1-2 AEROSOL, METERED RESPIRATORY (INHALATION)
Status: CANCELLED
Start: 2019-10-25

## 2019-10-25 RX ORDER — MEPERIDINE HYDROCHLORIDE 25 MG/ML
25 INJECTION INTRAMUSCULAR; INTRAVENOUS; SUBCUTANEOUS EVERY 30 MIN PRN
Status: CANCELLED | OUTPATIENT
Start: 2019-11-15

## 2019-10-25 RX ORDER — LORAZEPAM 2 MG/ML
0.5 INJECTION INTRAMUSCULAR EVERY 4 HOURS PRN
Status: CANCELLED
Start: 2019-11-15

## 2019-10-25 RX ORDER — EPINEPHRINE 1 MG/ML
0.3 INJECTION, SOLUTION INTRAMUSCULAR; SUBCUTANEOUS EVERY 5 MIN PRN
Status: CANCELLED | OUTPATIENT
Start: 2019-10-25

## 2019-10-25 RX ORDER — LORAZEPAM 2 MG/ML
0.5 INJECTION INTRAMUSCULAR EVERY 4 HOURS PRN
Status: CANCELLED
Start: 2019-10-25

## 2019-10-25 RX ORDER — EPINEPHRINE 1 MG/ML
0.3 INJECTION, SOLUTION INTRAMUSCULAR; SUBCUTANEOUS EVERY 5 MIN PRN
Status: CANCELLED | OUTPATIENT
Start: 2019-11-15

## 2019-10-25 RX ORDER — DIPHENHYDRAMINE HYDROCHLORIDE 50 MG/ML
50 INJECTION INTRAMUSCULAR; INTRAVENOUS
Status: CANCELLED
Start: 2019-10-25

## 2019-10-25 RX ORDER — ALBUTEROL SULFATE 0.83 MG/ML
2.5 SOLUTION RESPIRATORY (INHALATION)
Status: CANCELLED | OUTPATIENT
Start: 2019-11-15

## 2019-10-25 RX ORDER — DIPHENHYDRAMINE HCL 25 MG
50 CAPSULE ORAL
Status: CANCELLED
Start: 2019-10-25

## 2019-10-25 RX ORDER — MEPERIDINE HYDROCHLORIDE 25 MG/ML
25 INJECTION INTRAMUSCULAR; INTRAVENOUS; SUBCUTANEOUS EVERY 30 MIN PRN
Status: CANCELLED | OUTPATIENT
Start: 2019-10-25

## 2019-10-25 RX ORDER — METHYLPREDNISOLONE SODIUM SUCCINATE 125 MG/2ML
125 INJECTION, POWDER, LYOPHILIZED, FOR SOLUTION INTRAMUSCULAR; INTRAVENOUS
Status: CANCELLED
Start: 2019-10-25

## 2019-10-25 RX ORDER — EPINEPHRINE 0.3 MG/.3ML
0.3 INJECTION SUBCUTANEOUS EVERY 5 MIN PRN
Status: CANCELLED | OUTPATIENT
Start: 2019-11-15

## 2019-10-25 RX ORDER — DIPHENHYDRAMINE HCL 25 MG
50 CAPSULE ORAL
Status: CANCELLED
Start: 2019-11-15

## 2019-10-25 RX ORDER — ACETAMINOPHEN 325 MG/1
650 TABLET ORAL
Status: CANCELLED
Start: 2019-11-15

## 2019-10-25 RX ORDER — METHYLPREDNISOLONE SODIUM SUCCINATE 125 MG/2ML
125 INJECTION, POWDER, LYOPHILIZED, FOR SOLUTION INTRAMUSCULAR; INTRAVENOUS
Status: CANCELLED
Start: 2019-11-15

## 2019-10-25 RX ADMIN — SODIUM CHLORIDE 250 ML: 9 INJECTION, SOLUTION INTRAVENOUS at 08:58

## 2019-10-25 RX ADMIN — INFLUENZA A VIRUS A/BRISBANE/02/2018 (H1N1) RECOMBINANT HEMAGGLUTININ ANTIGEN, INFLUENZA A VIRUS A/KANSAS/14/2017 (H3N2) RECOMBINANT HEMAGGLUTININ ANTIGEN, INFLUENZA B VIRUS B/PHUKET/3073/2013 RECOMBINANT HEMAGGLUTININ ANTIGEN, AND INFLUENZA B VIRUS B/MARYLAND/15/2016 RECOMBINANT HEMAGGLUTININ ANTIGEN 0.5 ML: 45; 45; 45; 45 INJECTION INTRAMUSCULAR at 08:58

## 2019-10-25 RX ADMIN — TRASTUZUMAB 450 MG: 150 INJECTION, POWDER, LYOPHILIZED, FOR SOLUTION INTRAVENOUS at 08:57

## 2019-10-25 ASSESSMENT — MIFFLIN-ST. JEOR
SCORE: 1399.83
SCORE: 1399.41

## 2019-10-25 ASSESSMENT — PAIN SCALES - GENERAL: PAINLEVEL: NO PAIN (0)

## 2019-10-25 NOTE — PROGRESS NOTES
Infusion Nursing Note:  Saray Huntley presents today for Herceptin.    Patient seen by provider today: Yes: Montrell   present during visit today: Not Applicable.    Note: N/A.    Intravenous Access:  Peripheral IV placed.    Treatment Conditions:  ECHO/MUGA completed 9/6/19  EF 55-60%.      Post Infusion Assessment:  Patient tolerated infusion without incident.  Site patent and intact, free from redness, edema or discomfort.  No evidence of extravasations.  Access discontinued per protocol.       Discharge Plan:   Patient and/or family verbalized understanding of discharge instructions and all questions answered.  AVS to patient via [employer].  Patient will return 11/13 for next appointment.   Patient discharged in stable condition accompanied by: friend.  Departure Mode: Ambulatory.    Jacques Goodwin RN

## 2019-10-25 NOTE — LETTER
10/25/2019         RE: Saray Huntley  [address] [place] [address]        Dear Colleague,    Thank you for referring your patient, Saray Huntley, to the [facility]. Please see a copy of my visit note below.    [facility]    Hematology/Oncology Established Patient Note      Today's Date: 10/25/19    Reason for Follow-up: left-sided breast cancer      HISTORY OF PRESENT ILLNESS: Saray Huntley is a 49 year old female who presents with left-sided breast cancer.  It was found on a screening mammogram.  Left breast ultrasound found a hypoechoic mass at the 12:00 position, 8 cm from the nipple, measuring 1.5 x 1.6 x 1.5 cm.  Axilla survey showed only normal-appearing lymph nodes.   Biopsy showed invasive ductal carcinoma, grade 3, weakly positive for ER (1-3%), NV negative, HER-2 positive.      Case was reviewed at tumor conference, and recommendation was for neoadjuvant chemotherapy.    Port was placed on 11/28/18.  It was removed on 3/19/19.    She started neoadjuvant chemotherapy on 11/30/18 with weekly paclitaxel, Herceptin, Perjeta and completed on 2/15/19.    On 3/19/19, she underwent left breast lumpectomy and sentinel lymph node biopsy.  Port was also removed.  Pathology showed no evidence of residual carcinoma, consistent with a complete pathologic response.      She will continue on Herceptin to complete 1 year.    She completed radiation on 5/22/19.        INTERIM HISTORY: Patient is here for follow-up today.  She is doing well overall.  She has fatigue.  She is having trouble losing weight.  She started doing acupuncture for neuropathy.  She feels some tightness at her left surgical site and will do stretching exercises.        REVIEW OF SYSTEMS:   14 point ROS was reviewed and is negative other than as noted above in HPI.       HOME MEDICATIONS:  No current outpatient medications on file.         ALLERGIES:  Allergies   Allergen Reactions     Sulfa Drugs Rash          PAST MEDICAL HISTORY:  Past Medical History:   Diagnosis Date     Breast cancer (H)      Hypothyroidism, unspecified type 2017     Neuropathy      PONV (postoperative nausea and vomiting)      Vertigo          PAST SURGICAL HISTORY:  Past Surgical History:   Procedure Laterality Date     BIOPSY NODE SENTINEL Left 3/19/2019    Procedure: WITH SENTINEL LYMPH NODE BIOPSY;  Surgeon: Sae Montaño MD;  Location:  OR      SECTION  2003     INSERT PORT VASCULAR ACCESS N/A 2018    Procedure: PORT PLACEMENT ;  Surgeon: Sae Montaño MD;  Location:  OR     LUMPECTOMY BREAST WITH SEED LOCALIZATION Left 3/19/2019    Procedure: SEED LOCALIZED LEFT BREAST LUMPECTOMY;  Surgeon: Sae Montaño MD;  Location:  OR     REMOVE PORT VASCULAR ACCESS N/A 3/19/2019    Procedure: PORT REMOVAL;  Surgeon: Sae Montaño MD;  Location:  OR         SOCIAL HISTORY:  Social History     Socioeconomic History     Marital status:      Spouse name: Not on file     Number of children: Not on file     Years of education: Not on file     Highest education level: Not on file   Occupational History     Not on file   Social Needs     Financial resource strain: Not on file     Food insecurity:     Worry: Not on file     Inability: Not on file     Transportation needs:     Medical: Not on file     Non-medical: Not on file   Tobacco Use     Smoking status: Never Smoker     Smokeless tobacco: Never Used   Substance and Sexual Activity     Alcohol use: No     Drug use: No     Sexual activity: Not Currently   Lifestyle     Physical activity:     Days per week: Not on file     Minutes per session: Not on file     Stress: Not on file   Relationships     Social connections:     Talks on phone: Not on file     Gets together: Not on file     Attends [employer] service: Not on file     Active member of club or organization: Not on file     Attends meetings of clubs or organizations: Not on file  "    Relationship status: Not on file     Intimate partner violence:     Fear of current or ex partner: Not on file     Emotionally abused: Not on file     Physically abused: Not on file     Forced sexual activity: Not on file   Other Topics Concern     Parent/sibling w/ CABG, MI or angioplasty before 65F 55M? Not Asked   Social History Narrative     Not on file   She has never smoked.  She does not drink or use illicit drugs.  She is going to school at [employer] studying IT.  She has 1 daughter who is 15 years old.  She says that she has menopause a few years old.  She tried oral contraceptive, but she did not tolerate it well, so did not take it long term.  She has never taken hormone replacement therapy          FAMILY HISTORY:  Family History   Problem Relation Age of Onset     Hyperlipidemia Mother      Kidney Disease Mother      Diabetes Father    Patient does not know much about her family history and is unaware if there is breast cancer or ovarian cancer in her family.        PHYSICAL EXAM:  Vital signs:  /64   Pulse 58   Temp 97.8  F (36.6  C) (Oral)   Resp 16   Ht 1.575 m (5' 2.01\")   Wt 82.6 kg (182 lb 1.6 oz)   LMP 2012 (Approximate)   SpO2 98%   BMI 33.30 kg/m      ECO  GENERAL/CONSTITUTIONAL: No acute distress. Accompanied bysister.  EYES: No scleral icterus.  RESPIRATORY: Clear to auscultation bilaterally.  CARDIOVASCULAR: Regular rate and rhythm.  GASTROINTESTINAL: Non-tender, bowel sounds present.  MUSCULOSKELETAL: Warm and well-perfused.  NEUROLOGIC: Alert, oriented, answers questions appropriately.  INTEGUMENTARY: No jaundice.  Port has been removed.      LABS:  None today.      IMAGING:  Echo 19:  1. Normal biventricular size and function. Left ventricular ejection fraction  of 55-60%. No segmental wall motion abnormalities noted.  2. Normal sized atria.  3. No hemodynamically significant valvular disease.  Compared to the previous echocardiogram on 2019, there " are no significant  changes. Strain values are unchanged.  Technically adequate study.      ASSESSMENT/PLAN:  Saray Huntley is a 49 year old post-menopausal female with:    1) Left-sided breast cancer:  1.5 x 1.6 x 1.5 cm on ultrasound.  Axilla survey showed only normal-appearing lymph nodes.   Biopsy showed invasive ductal carcinoma, grade 3, weakly positive for ER (1-3%), NM negative, HER-2 positive.  Clinical stage IA (cT1cN0).    She completed neoadjuvant chemotherapy with weekly paclitaxel, Herceptin, Perjeta on 2/15/19.  On 3/19/19, she underwent left breast lumpectomy and sentinel lymph node biopsy.  Pathology showed no evidence of residual carcinoma, consistent with a complete pathologic response.      She will continue with Herceptin every 3 weeks to complete 1 year.  She completed radiation on 5/22/19.    We previously discussed hormonal therapy after completion of radiation.  The ER was weakly positive, on 1-3%.  We discussed trying anastrozole, but patient does not want to take it for fear of side effects, and with the ER being so weakly positive, there may not be significant benefit anyway, so it would be reasonable not to pursue adjuvant hormonal therapy.      -echo every 3 months while on Herceptin; next one due in December 2019 - ordered  -mammogram in December 2019 - ordered  -proceed with Herceptin today and every 3 weeks.  11/15/19 will be the last dose.  -RTC in February 2020 for follow-up    2) Genetics: She was diagnosed at age 48 with breast cancer  -she met with genetic counseling.  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    3) Occasional aches: may be related to Herceptin.  Exercise helps.  She feels much better when she exercises.    4) Coping: Patient seems to be doing better now.      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  She has completed chemotherapy, and symptoms should gradually improve.  She says that it is getting  "better slowly.    -she is doing acupuncture    7) Survivorship: Cancer treatment plan and summary was reviewed with patient and given to her previously.    8) Patient will get flu shot today.      I spent a total of 25 minutes with the patient, with over >50% of the time in counseling and/or coordination of care.       Cayla Stock MD  Hematology/Oncology  [facility]    Oncology Rooming Note    October 25, 2019 8:08 AM   Saray Huntley is a 49 year old female who presents for:    Chief Complaint   Patient presents with     Oncology Clinic Visit     Initial Vitals: /64   Pulse 58   Temp 97.8  F (36.6  C) (Oral)   Resp 16   Ht 1.575 m (5' 2.01\")   Wt 82.6 kg (182 lb 1.6 oz)   LMP 09/29/2012 (Approximate)   SpO2 98%   BMI 33.30 kg/m    Estimated body mass index is 33.3 kg/m  as calculated from the following:    Height as of this encounter: 1.575 m (5' 2.01\").    Weight as of this encounter: 82.6 kg (182 lb 1.6 oz). Body surface area is 1.9 meters squared.  No Pain (0) Comment: Data Unavailable   Patient's last menstrual period was 09/29/2012 (approximate).  Allergies reviewed: Yes  Medications reviewed: Yes    Medications: Medication refills not needed today.  Pharmacy name entered into [facility]:    [id number] - [place], [place] - [address] AT [facility] [address] [place] [address] & [facility] [address] [address]    Clinical concerns: no      Jeanne Wood, [facility]              Again, thank you for allowing me to participate in the care of your patient.        Sincerely,        Cayla Stock MD    "

## 2019-10-25 NOTE — PROGRESS NOTES
[facility]    Hematology/Oncology Established Patient Note      Today's Date: 10/25/19    Reason for Follow-up: left-sided breast cancer      HISTORY OF PRESENT ILLNESS: Saray Huntley is a 49 year old female who presents with left-sided breast cancer.  It was found on a screening mammogram.  Left breast ultrasound found a hypoechoic mass at the 12:00 position, 8 cm from the nipple, measuring 1.5 x 1.6 x 1.5 cm.  Axilla survey showed only normal-appearing lymph nodes.   Biopsy showed invasive ductal carcinoma, grade 3, weakly positive for ER (1-3%), [place] negative, HER-2 positive.      Case was reviewed at tumor conference, and recommendation was for neoadjuvant chemotherapy.    Port was placed on 11/28/18.  It was removed on 3/19/19.    She started neoadjuvant chemotherapy on 11/30/18 with weekly paclitaxel, Herceptin, Perjeta and completed on 2/15/19.    On 3/19/19, she underwent left breast lumpectomy and sentinel lymph node biopsy.  Port was also removed.  Pathology showed no evidence of residual carcinoma, consistent with a complete pathologic response.      She will continue on Herceptin to complete 1 year.    She completed radiation on 5/22/19.        INTERIM HISTORY: Patient is here for follow-up today.  She is doing well overall.  She has fatigue.  She is having trouble losing weight.  She started doing acupuncture for neuropathy.  She feels some tightness at her left surgical site and will do stretching exercises.        REVIEW OF SYSTEMS:   14 point ROS was reviewed and is negative other than as noted above in HPI.       HOME MEDICATIONS:  No current outpatient medications on file.         ALLERGIES:  Allergies   Allergen Reactions     Sulfa Drugs Rash         PAST MEDICAL HISTORY:  Past Medical History:   Diagnosis Date     Breast cancer (H)      Hypothyroidism, unspecified type 9/29/2017     Neuropathy      PONV (postoperative nausea and vomiting)      Vertigo          PAST SURGICAL  HISTORY:  Past Surgical History:   Procedure Laterality Date     BIOPSY NODE SENTINEL Left 3/19/2019    Procedure: WITH SENTINEL LYMPH NODE BIOPSY;  Surgeon: Sae Montaño MD;  Location:  OR      SECTION  2003     INSERT PORT VASCULAR ACCESS N/A 2018    Procedure: PORT PLACEMENT ;  Surgeon: Sae Montaño MD;  Location:  OR     LUMPECTOMY BREAST WITH SEED LOCALIZATION Left 3/19/2019    Procedure: SEED LOCALIZED LEFT BREAST LUMPECTOMY;  Surgeon: Sae Montaño MD;  Location:  OR     REMOVE PORT VASCULAR ACCESS N/A 3/19/2019    Procedure: PORT REMOVAL;  Surgeon: Sae Montaño MD;  Location:  OR         SOCIAL HISTORY:  Social History     Socioeconomic History     Marital status:      Spouse name: Not on file     Number of children: Not on file     Years of education: Not on file     Highest education level: Not on file   Occupational History     Not on file   Social Needs     Financial resource strain: Not on file     Food insecurity:     Worry: Not on file     Inability: Not on file     Transportation needs:     Medical: Not on file     Non-medical: Not on file   Tobacco Use     Smoking status: Never Smoker     Smokeless tobacco: Never Used   Substance and Sexual Activity     Alcohol use: No     Drug use: No     Sexual activity: Not Currently   Lifestyle     Physical activity:     Days per week: Not on file     Minutes per session: Not on file     Stress: Not on file   Relationships     Social connections:     Talks on phone: Not on file     Gets together: Not on file     Attends [employer] service: Not on file     Active member of club or organization: Not on file     Attends meetings of clubs or organizations: Not on file     Relationship status: Not on file     Intimate partner violence:     Fear of current or ex partner: Not on file     Emotionally abused: Not on file     Physically abused: Not on file     Forced sexual activity: Not on file   Other  "Topics Concern     Parent/sibling w/ CABG, MI or angioplasty before 65F 55M? Not Asked   Social History Narrative     Not on file   She has never smoked.  She does not drink or use illicit drugs.  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  She has 1 daughter who is 15 years old.  She says that she has menopause a few years old.  She tried oral contraceptive, but she did not tolerate it well, so did not take it long term.  She has never taken hormone replacement therapy          FAMILY HISTORY:  Family History   Problem Relation Age of Onset     Hyperlipidemia Mother      Kidney Disease Mother      Diabetes Father    Patient does not know much about her family history and is unaware if there is breast cancer or ovarian cancer in her family.        PHYSICAL EXAM:  Vital signs:  /64   Pulse 58   Temp 97.8  F (36.6  C) (Oral)   Resp 16   Ht 1.575 m (5' 2.01\")   Wt 82.6 kg (182 lb 1.6 oz)   LMP 2012 (Approximate)   SpO2 98%   BMI 33.30 kg/m     ECO  GENERAL/CONSTITUTIONAL: No acute distress. Accompanied bysister.  EYES: No scleral icterus.  RESPIRATORY: Clear to auscultation bilaterally.  CARDIOVASCULAR: Regular rate and rhythm.  GASTROINTESTINAL: Non-tender, bowel sounds present.  MUSCULOSKELETAL: Warm and well-perfused.  NEUROLOGIC: Alert, oriented, answers questions appropriately.  INTEGUMENTARY: No jaundice.  Port has been removed.      LABS:  None today.      IMAGING:  Echo 19:  1. Normal biventricular size and function. Left ventricular ejection fraction  of 55-60%. No segmental wall motion abnormalities noted.  2. Normal sized atria.  3. No hemodynamically significant valvular disease.  Compared to the previous echocardiogram on 2019, there are no significant  changes. Strain values are unchanged.  Technically adequate study.      ASSESSMENT/PLAN:  Saray Huntley is a 49 year old post-menopausal female with:    1) Left-sided breast cancer:  1.5 x 1.6 x 1.5 cm on ultrasound. "  Axilla survey showed only normal-appearing lymph nodes.   Biopsy showed invasive ductal carcinoma, grade 3, weakly positive for ER (1-3%), [place] negative, HER-2 positive.  Clinical stage IA (cT1cN0).    She completed neoadjuvant chemotherapy with weekly paclitaxel, Herceptin, Perjeta on 2/15/19.  On 3/19/19, she underwent left breast lumpectomy and sentinel lymph node biopsy.  Pathology showed no evidence of residual carcinoma, consistent with a complete pathologic response.      She will continue with Herceptin every 3 weeks to complete 1 year.  She completed radiation on 5/22/19.    We previously discussed hormonal therapy after completion of radiation.  The ER was weakly positive, on 1-3%.  We discussed trying anastrozole, but patient does not want to take it for fear of side effects, and with the ER being so weakly positive, there may not be significant benefit anyway, so it would be reasonable not to pursue adjuvant hormonal therapy.      -echo every 3 months while on Herceptin; next one due in December 2019 - ordered  -mammogram in December 2019 - ordered  -proceed with Herceptin today and every 3 weeks.  11/15/19 will be the last dose.  -RTC in February 2020 for follow-up    2) Genetics: She was diagnosed at age 48 with breast cancer  -she met with genetic counseling.  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    3) Occasional aches: may be related to Herceptin.  Exercise helps.  She feels much better when she exercises.    4) Coping: Patient seems to be doing better now.      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  She has completed chemotherapy, and symptoms should gradually improve.  She says that it is getting better slowly.    -she is doing acupuncture    7) Survivorship: Cancer treatment plan and summary was reviewed with patient and given to her previously.    8) Patient will get flu shot today.      I spent a total of 25 minutes with the  patient, with over >50% of the time in counseling and/or coordination of care.       Cayla Stock MD  Hematology/Oncology  [facility]

## 2019-10-25 NOTE — PROGRESS NOTES
"Oncology Rooming Note    October 25, 2019 8:08 AM   Saray Huntley is a 49 year old female who presents for:    Chief Complaint   Patient presents with     Oncology Clinic Visit     Initial Vitals: /64   Pulse 58   Temp 97.8  F (36.6  C) (Oral)   Resp 16   Ht 1.575 m (5' 2.01\")   Wt 82.6 kg (182 lb 1.6 oz)   LMP 09/29/2012 (Approximate)   SpO2 98%   BMI 33.30 kg/m   Estimated body mass index is 33.3 kg/m  as calculated from the following:    Height as of this encounter: 1.575 m (5' 2.01\").    Weight as of this encounter: 82.6 kg (182 lb 1.6 oz). Body surface area is 1.9 meters squared.  No Pain (0) Comment: Data Unavailable   Patient's last menstrual period was 09/29/2012 (approximate).  Allergies reviewed: Yes  Medications reviewed: Yes    Medications: Medication refills not needed today.  Pharmacy name entered into [facility]:    [id number] - [place] [place] - [address] AT [facility] OF [place] [address] & [facility] [place], [place] - [address] [address]    Clinical concerns: no      Jeanne Wood CMA            "

## 2019-11-14 ENCOUNTER — INFUSION THERAPY VISIT (OUTPATIENT)
Dept: INFUSION THERAPY | Facility: CLINIC | Age: 49
End: 2019-11-14
Attending: INTERNAL MEDICINE
Payer: COMMERCIAL

## 2019-11-14 VITALS
WEIGHT: 182.2 LBS | DIASTOLIC BLOOD PRESSURE: 54 MMHG | RESPIRATION RATE: 16 BRPM | SYSTOLIC BLOOD PRESSURE: 111 MMHG | BODY MASS INDEX: 33.53 KG/M2 | TEMPERATURE: 98.2 F | HEIGHT: 62 IN | HEART RATE: 59 BPM | OXYGEN SATURATION: 97 %

## 2019-11-14 DIAGNOSIS — Z17.0 MALIGNANT NEOPLASM OF UPPER-OUTER QUADRANT OF LEFT BREAST IN FEMALE, ESTROGEN RECEPTOR POSITIVE (H): Primary | ICD-10-CM

## 2019-11-14 DIAGNOSIS — C50.412 MALIGNANT NEOPLASM OF UPPER-OUTER QUADRANT OF LEFT BREAST IN FEMALE, ESTROGEN RECEPTOR POSITIVE (H): Primary | ICD-10-CM

## 2019-11-14 PROCEDURE — 96413 CHEMO IV INFUSION 1 HR: CPT

## 2019-11-14 PROCEDURE — 25800030 ZZH RX IP 258 OP 636: Performed by: INTERNAL MEDICINE

## 2019-11-14 PROCEDURE — 25000128 H RX IP 250 OP 636: Performed by: INTERNAL MEDICINE

## 2019-11-14 RX ADMIN — TRASTUZUMAB 450 MG: 150 INJECTION, POWDER, LYOPHILIZED, FOR SOLUTION INTRAVENOUS at 08:25

## 2019-11-14 RX ADMIN — SODIUM CHLORIDE 250 ML: 9 INJECTION, SOLUTION INTRAVENOUS at 08:29

## 2019-11-14 ASSESSMENT — MIFFLIN-ST. JEOR: SCORE: 1399.83

## 2019-11-14 NOTE — PROGRESS NOTES
Infusion Nursing Note:  Saray Huntley presents today for herceptin.    Patient seen by provider today: No   present during visit today: Not Applicable.    Note: N/A.    Intravenous Access:  Peripheral IV placed.    Treatment Conditions:  ECHO/MUGA completed 9/6/19  EF 55-60%.      Post Infusion Assessment:  Patient tolerated infusion without incident.  Blood return noted pre and post infusion.  Site patent and intact, free from redness, edema or discomfort.  No evidence of extravasations.  Access discontinued per protocol.       Discharge Plan:   Discharge instructions reviewed with: Patient.  Patient and/or family verbalized understanding of discharge instructions and all questions answered.  Copy of AVS reviewed with patient and/or family.  Patient will return PRN for next appointment.  Patient discharged in stable condition accompanied by: self and sister.  Departure Mode: Ambulatory.    Rajwinder Paniagua RN

## 2019-12-03 ENCOUNTER — HOSPITAL ENCOUNTER (OUTPATIENT)
Dept: CARDIOLOGY | Facility: CLINIC | Age: 49
Discharge: HOME | End: 2019-12-03
Attending: INTERNAL MEDICINE | Admitting: INTERNAL MEDICINE
Payer: COMMERCIAL

## 2019-12-03 DIAGNOSIS — C50.412 MALIGNANT NEOPLASM OF UPPER-OUTER QUADRANT OF LEFT BREAST IN FEMALE, ESTROGEN RECEPTOR POSITIVE (H): ICD-10-CM

## 2019-12-03 DIAGNOSIS — Z17.0 MALIGNANT NEOPLASM OF UPPER-OUTER QUADRANT OF LEFT BREAST IN FEMALE, ESTROGEN RECEPTOR POSITIVE (H): ICD-10-CM

## 2019-12-03 PROCEDURE — 93321 DOPPLER ECHO F-UP/LMTD STD: CPT | Mod: 26 | Performed by: INTERNAL MEDICINE

## 2019-12-03 PROCEDURE — 93325 DOPPLER ECHO COLOR FLOW MAPG: CPT | Mod: 26 | Performed by: INTERNAL MEDICINE

## 2019-12-03 PROCEDURE — 93308 TTE F-UP OR LMTD: CPT

## 2019-12-03 PROCEDURE — 93308 TTE F-UP OR LMTD: CPT | Mod: 26 | Performed by: INTERNAL MEDICINE

## 2019-12-10 ENCOUNTER — HOSPITAL ENCOUNTER (OUTPATIENT)
Dept: MAMMOGRAPHY | Facility: CLINIC | Age: 49
Discharge: HOME | End: 2019-12-10
Attending: INTERNAL MEDICINE | Admitting: INTERNAL MEDICINE
Payer: COMMERCIAL

## 2019-12-10 DIAGNOSIS — Z12.39 BREAST CANCER SCREENING: ICD-10-CM

## 2019-12-10 PROCEDURE — 77063 BREAST TOMOSYNTHESIS BI: CPT

## 2020-01-29 PROBLEM — Z17.0 MALIGNANT NEOPLASM OF UPPER-OUTER QUADRANT OF LEFT BREAST IN FEMALE, ESTROGEN RECEPTOR POSITIVE (H): Status: ACTIVE | Noted: 2018-11-16

## 2020-01-29 PROBLEM — C50.412 MALIGNANT NEOPLASM OF UPPER-OUTER QUADRANT OF LEFT BREAST IN FEMALE, ESTROGEN RECEPTOR POSITIVE (H): Status: ACTIVE | Noted: 2018-11-16

## 2020-05-20 ENCOUNTER — VIRTUAL VISIT (OUTPATIENT)
Dept: ONCOLOGY | Facility: CLINIC | Age: 50
End: 2020-05-20
Attending: INTERNAL MEDICINE
Payer: COMMERCIAL

## 2020-05-20 DIAGNOSIS — Z17.0 MALIGNANT NEOPLASM OF UPPER-OUTER QUADRANT OF LEFT BREAST IN FEMALE, ESTROGEN RECEPTOR POSITIVE (H): Primary | ICD-10-CM

## 2020-05-20 DIAGNOSIS — C50.412 MALIGNANT NEOPLASM OF UPPER-OUTER QUADRANT OF LEFT BREAST IN FEMALE, ESTROGEN RECEPTOR POSITIVE (H): Primary | ICD-10-CM

## 2020-05-20 PROCEDURE — 99213 OFFICE O/P EST LOW 20 MIN: CPT | Mod: 95 | Performed by: INTERNAL MEDICINE

## 2020-05-20 NOTE — PROGRESS NOTES
"Saray Huntley is a 50 year old female who is being evaluated via a billable telephone visit.      The patient has been notified of following:     \"This telephone visit will be conducted via a call between you and your physician/provider. We have found that certain health care needs can be provided without the need for a physical exam.  This service lets us provide the care you need with a short phone conversation.  If a prescription is necessary we can send it directly to your pharmacy.  If lab work is needed we can place an order for that and you can then stop by our lab to have the test done at a later time.    Telephone visits are billed at different rates depending on your insurance coverage. During this emergency period, for some insurers they may be billed the same as an in-person visit.  Please reach out to your insurance provider with any questions.    If during the course of the call the physician/provider feels a telephone visit is not appropriate, you will not be charged for this service.\"    Patient has given verbal consent for Telephone visit?  Yes    What phone number would you like to be contacted at? [phone].    Reviewed meds and allergies.     How would you like to obtain your AVS? [facility] [place], [facility]    Hematology/Oncology Established Patient Note      Today's Date: 5/20/20    Reason for Follow-up: left-sided breast cancer      HISTORY OF PRESENT ILLNESS: Saray Huntley is a 50 year old female who presents with left-sided breast cancer.  It was found on a screening mammogram.  Left breast ultrasound found a hypoechoic mass at the 12:00 position, 8 cm from the nipple, measuring 1.5 x 1.6 x 1.5 cm.  Axilla survey showed only normal-appearing lymph nodes.   Biopsy showed invasive ductal carcinoma, grade 3, weakly positive for ER (1-3%), LA negative, HER-2 positive.      Case was reviewed at tumor conference, and recommendation was for neoadjuvant " chemotherapy.    Port was placed on 18.  It was removed on 3/19/19.    She started neoadjuvant chemotherapy on 18 with weekly paclitaxel, Herceptin, Perjeta and completed on 2/15/19.    On 3/19/19, she underwent left breast lumpectomy and sentinel lymph node biopsy.  Port was also removed.  Pathology showed no evidence of residual carcinoma, consistent with a complete pathologic response.      She completed radiation on 19.    She completed 1 year of Herceptin on 19.        INTERIM HISTORY: Patient says that she is feeling well.  Since she finished treatment, she has felt more like her old self.  Her neuropathy has gotten better but she still feels it once in a while.      REVIEW OF SYSTEMS:   14 point ROS was reviewed and is negative other than as noted above in HPI.       HOME MEDICATIONS:  No current outpatient medications on file.         ALLERGIES:  Allergies   Allergen Reactions     Sulfa Drugs Rash         PAST MEDICAL HISTORY:  Past Medical History:   Diagnosis Date     Breast cancer (H)      Hypothyroidism, unspecified type 2017     Neuropathy      PONV (postoperative nausea and vomiting)      Vertigo          PAST SURGICAL HISTORY:  Past Surgical History:   Procedure Laterality Date     BIOPSY NODE SENTINEL Left 3/19/2019    Procedure: WITH SENTINEL LYMPH NODE BIOPSY;  Surgeon: Sae Montaño MD;  Location:  OR      SECTION  2003     INSERT PORT VASCULAR ACCESS N/A 2018    Procedure: PORT PLACEMENT ;  Surgeon: Sae Montaño MD;  Location:  OR     LUMPECTOMY BREAST WITH SEED LOCALIZATION Left 3/19/2019    Procedure: SEED LOCALIZED LEFT BREAST LUMPECTOMY;  Surgeon: Sae Montaño MD;  Location:  OR     REMOVE PORT VASCULAR ACCESS N/A 3/19/2019    Procedure: PORT REMOVAL;  Surgeon: Sae Montaño MD;  Location:  OR         SOCIAL HISTORY:  Social History     Socioeconomic History     Marital status:      Spouse name: Not  on file     Number of children: Not on file     Years of education: Not on file     Highest education level: Not on file   Occupational History     Not on file   Social Needs     Financial resource strain: Not on file     Food insecurity     Worry: Not on file     Inability: Not on file     Transportation needs     Medical: Not on file     Non-medical: Not on file   Tobacco Use     Smoking status: Never Smoker     Smokeless tobacco: Never Used   Substance and Sexual Activity     Alcohol use: No     Drug use: No     Sexual activity: Not Currently   Lifestyle     Physical activity     Days per week: Not on file     Minutes per session: Not on file     Stress: Not on file   Relationships     Social connections     Talks on phone: Not on file     Gets together: Not on file     Attends Confucianist service: Not on file     Active member of club or organization: Not on file     Attends meetings of clubs or organizations: Not on file     Relationship status: Not on file     Intimate partner violence     Fear of current or ex partner: Not on file     Emotionally abused: Not on file     Physically abused: Not on file     Forced sexual activity: Not on file   Other Topics Concern     Parent/sibling w/ CABG, MI or angioplasty before 65F 55M? Not Asked   Social History Narrative     Not on file   She has never smoked.  She does not drink or use illicit drugs.  She is going to school at [employer] studying IT.  She has 1 daughter who is 15 years old.  She says that she has menopause a few years old.  She tried oral contraceptive, but she did not tolerate it well, so did not take it long term.  She has never taken hormone replacement therapy          FAMILY HISTORY:  Family History   Problem Relation Age of Onset     Hyperlipidemia Mother      Kidney Disease Mother      Diabetes Father    Patient does not know much about her family history and is unaware if there is breast cancer or ovarian cancer in her family.        PHYSICAL  EXAM:  Vital signs:  LMP 2012 (Approximate)    ECO  GENERAL/CONSTITUTIONAL: No acute distress. Healthy, alert.  EYES: No scleral icterus.  No redness or discharge.    RESPIRATORY: No audible wheeze, cough, or visible cyanosis.  No visible retractions or increased work of breathing.  Able to speak fully in complete sentences.  MUSCULOSKELETAL: Normal range of motion.  NEUROLOGIC: Alert, oriented, answers questions appropriately. No tremor. Mentation intact and speech normal  INTEGUMENTARY: No jaundice.  No obvious rash or skin lesions.  PSYCHIATRIC:  Mentation appears normal, affect normal/bright, judgement and insight intact, normal speech and appearance well-groomed.    The rest of a comprehensive physical exam is deferred due to public health emergency video visit restrictions.        LABS:  None today.      IMAGING:  Bilateral mammogram 12/10/19:  BI-RADS 2 - benign      ASSESSMENT/PLAN:  Sraay Huntley is a 50 year old post-menopausal female with:    1) Left-sided breast cancer:  1.5 x 1.6 x 1.5 cm on ultrasound.  Axilla survey showed only normal-appearing lymph nodes.   Biopsy showed invasive ductal carcinoma, grade 3, weakly positive for ER (1-3%), ME negative, HER-2 positive.  Clinical stage IA (cT1cN0).    She completed neoadjuvant chemotherapy with weekly paclitaxel, Herceptin, Perjeta on 2/15/19.  On 3/19/19, she underwent left breast lumpectomy and sentinel lymph node biopsy.  Pathology showed no evidence of residual carcinoma, consistent with a complete pathologic response.      She completed radiation on 19.  She completed 1 year of Herceptin on 19.    We previously discussed hormonal therapy after completion of radiation.  The ER was weakly positive, on 1-3%.  We discussed trying anastrozole, but patient does not want to take it for fear of side effects, and with the ER being so weakly positive, there may not be significant benefit anyway, so it would be reasonable not to pursue  adjuvant hormonal therapy.      -mammogram in December 2020 - will order at the next visit  -RTC in 3 months - will do breast exam at that time. If she is doing well then, can space out visits to every 6 months.    2) Genetics: She was diagnosed at age 48 with breast cancer  -she met with genetic counseling.  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    3) Coping: Patient seems to be doing better now.      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  She has completed chemotherapy, and symptoms should gradually improve.  She says that it has been getting better.     5) Survivorship: Cancer treatment plan and summary was reviewed with patient and given to her previously.      Cayla Stock MD  Hematology/Oncology  [facility]    Phone call duration: 6 minutes

## 2020-06-15 ENCOUNTER — TELEPHONE (OUTPATIENT)
Dept: ONCOLOGY | Facility: CLINIC | Age: 50
End: 2020-06-15

## 2020-06-15 NOTE — TELEPHONE ENCOUNTER
Saray called to report that she may have had an anxiety attack.    She states that two weeks ago she had the sensation of vertigo, racing heart, nausea, and feeling like she was going to pass out.  She states it resolved with rest.  Then, yesterday she reports that she had similar symptoms and she called EMS.  The paramedics came, her BP was 150/? and they told her she may be having an anxiety attack.     Because she feels like her heart is racing during these episodes, she called our clinic to make sure she wasn't having any heart effects of her treatment that she completed last year.  Explained to her that her post-treatment echocardiogram on 12/3/2019 was WNL.  She stated she would like Dr. Stock's input.  Will route to Dr. Stock to advise.    In the meantime, advised patient to call her PCP to have her anxiety evaluated and discuss possible treatment.  She verbalized understanding.    Yadiel Taylor, GABBIEN, RN, OCN  Oncology Care Coordinator  [facility]

## 2020-06-16 NOTE — TELEPHONE ENCOUNTER
Writer received a call from patient and reviewed the below response from Cassia SANDOVAL that patient is to be evaluated by PCP , that concerns do not seem to be chemo related.    Patient verbalized understanding and will call her PCP.    Donna Duke RN

## 2020-06-24 ENCOUNTER — HOSPITAL ENCOUNTER (EMERGENCY)
Facility: CLINIC | Age: 50
Discharge: HOME | End: 2020-06-25
Attending: EMERGENCY MEDICINE | Admitting: EMERGENCY MEDICINE
Payer: COMMERCIAL

## 2020-06-24 ENCOUNTER — APPOINTMENT (OUTPATIENT)
Dept: GENERAL RADIOLOGY | Facility: CLINIC | Age: 50
End: 2020-06-24
Attending: EMERGENCY MEDICINE
Payer: COMMERCIAL

## 2020-06-24 DIAGNOSIS — R42 DIZZINESS: ICD-10-CM

## 2020-06-24 DIAGNOSIS — R00.2 PALPITATIONS: ICD-10-CM

## 2020-06-24 DIAGNOSIS — R06.00 DYSPNEA, UNSPECIFIED TYPE: ICD-10-CM

## 2020-06-24 DIAGNOSIS — R53.83 FATIGUE, UNSPECIFIED TYPE: ICD-10-CM

## 2020-06-24 DIAGNOSIS — R79.89 ABNORMAL LFTS: ICD-10-CM

## 2020-06-24 LAB
ALBUMIN SERPL-MCNC: 3.8 G/DL (ref 3.4–5)
ALP SERPL-CCNC: 135 U/L (ref 40–150)
ALT SERPL W P-5'-P-CCNC: 191 U/L (ref 0–50)
ANION GAP SERPL CALCULATED.3IONS-SCNC: 5 MMOL/L (ref 3–14)
AST SERPL W P-5'-P-CCNC: 115 U/L (ref 0–45)
BASOPHILS # BLD AUTO: 0 10E9/L (ref 0–0.2)
BASOPHILS NFR BLD AUTO: 0.7 %
BILIRUB SERPL-MCNC: 0.4 MG/DL (ref 0.2–1.3)
BUN SERPL-MCNC: 14 MG/DL (ref 7–30)
CALCIUM SERPL-MCNC: 9.2 MG/DL (ref 8.5–10.1)
CHLORIDE SERPL-SCNC: 107 MMOL/L (ref 94–109)
CO2 SERPL-SCNC: 26 MMOL/L (ref 20–32)
CREAT SERPL-MCNC: 0.54 MG/DL (ref 0.52–1.04)
DIFFERENTIAL METHOD BLD: NORMAL
EOSINOPHIL # BLD AUTO: 0.2 10E9/L (ref 0–0.7)
EOSINOPHIL NFR BLD AUTO: 3.8 %
ERYTHROCYTE [DISTWIDTH] IN BLOOD BY AUTOMATED COUNT: 12.1 % (ref 10–15)
GFR SERPL CREATININE-BSD FRML MDRD: >90 ML/MIN/{1.73_M2}
GLUCOSE SERPL-MCNC: 111 MG/DL (ref 70–99)
HCT VFR BLD AUTO: 40.3 % (ref 35–47)
HGB BLD-MCNC: 14 G/DL (ref 11.7–15.7)
IMM GRANULOCYTES # BLD: 0 10E9/L (ref 0–0.4)
IMM GRANULOCYTES NFR BLD: 0.2 %
INTERPRETATION ECG - MUSE: NORMAL
LYMPHOCYTES # BLD AUTO: 2.4 10E9/L (ref 0.8–5.3)
LYMPHOCYTES NFR BLD AUTO: 38.8 %
MCH RBC QN AUTO: 31.5 PG (ref 26.5–33)
MCHC RBC AUTO-ENTMCNC: 34.7 G/DL (ref 31.5–36.5)
MCV RBC AUTO: 91 FL (ref 78–100)
MONOCYTES # BLD AUTO: 0.4 10E9/L (ref 0–1.3)
MONOCYTES NFR BLD AUTO: 6.8 %
NEUTROPHILS # BLD AUTO: 3 10E9/L (ref 1.6–8.3)
NEUTROPHILS NFR BLD AUTO: 49.7 %
NRBC # BLD AUTO: 0 10*3/UL
NRBC BLD AUTO-RTO: 0 /100
PLATELET # BLD AUTO: 268 10E9/L (ref 150–450)
POTASSIUM SERPL-SCNC: 3.6 MMOL/L (ref 3.4–5.3)
PROT SERPL-MCNC: 8 G/DL (ref 6.8–8.8)
RBC # BLD AUTO: 4.44 10E12/L (ref 3.8–5.2)
SODIUM SERPL-SCNC: 138 MMOL/L (ref 133–144)
T4 FREE SERPL-MCNC: 1.04 NG/DL (ref 0.76–1.46)
TROPONIN I SERPL-MCNC: <0.015 UG/L (ref 0–0.04)
TSH SERPL DL<=0.005 MIU/L-ACNC: 9.29 MU/L (ref 0.4–4)
WBC # BLD AUTO: 6.1 10E9/L (ref 4–11)

## 2020-06-24 PROCEDURE — 93005 ELECTROCARDIOGRAM TRACING: CPT

## 2020-06-24 PROCEDURE — 85025 COMPLETE CBC W/AUTO DIFF WBC: CPT | Performed by: EMERGENCY MEDICINE

## 2020-06-24 PROCEDURE — 71045 X-RAY EXAM CHEST 1 VIEW: CPT

## 2020-06-24 PROCEDURE — 96360 HYDRATION IV INFUSION INIT: CPT

## 2020-06-24 PROCEDURE — 84439 ASSAY OF FREE THYROXINE: CPT | Performed by: EMERGENCY MEDICINE

## 2020-06-24 PROCEDURE — 99285 EMERGENCY DEPT VISIT HI MDM: CPT | Mod: 25

## 2020-06-24 PROCEDURE — 80053 COMPREHEN METABOLIC PANEL: CPT | Performed by: EMERGENCY MEDICINE

## 2020-06-24 PROCEDURE — 84484 ASSAY OF TROPONIN QUANT: CPT | Performed by: EMERGENCY MEDICINE

## 2020-06-24 PROCEDURE — C9803 HOPD COVID-19 SPEC COLLECT: HCPCS

## 2020-06-24 PROCEDURE — 85379 FIBRIN DEGRADATION QUANT: CPT | Performed by: EMERGENCY MEDICINE

## 2020-06-24 PROCEDURE — 84443 ASSAY THYROID STIM HORMONE: CPT | Performed by: EMERGENCY MEDICINE

## 2020-06-24 ASSESSMENT — ENCOUNTER SYMPTOMS
FEVER: 0
APPETITE CHANGE: 0
CHILLS: 1
FREQUENCY: 0
NAUSEA: 1
COUGH: 0
HEMATURIA: 0
PALPITATIONS: 1
VOMITING: 0
WEAKNESS: 0
SHORTNESS OF BREATH: 1
DYSURIA: 0
DIAPHORESIS: 0
DIARRHEA: 0
DIFFICULTY URINATING: 0

## 2020-06-24 NOTE — LETTER
Saray Huntley was seen and treated in our emergency department on 6/24/2020.  She may return to work on 6/26/2020.  If you have any questions or concerns, please don't hesitate to call.

## 2020-06-24 NOTE — ED AVS SNAPSHOT
Emergency Department  [address] [place] [address]  Phone:  [phone]  Fax:  [phone]                                    Saray Huntley   MRN: [id number]    Department:   Emergency Department   Date of Visit:  6/24/2020           After Visit Summary Signature Page    I have received my discharge instructions, and my questions have been answered. I have discussed any challenges I see with this plan with the nurse or doctor.    ..........................................................................................................................................  Patient/Patient Representative Signature      ..........................................................................................................................................  Patient Representative Print Name and Relationship to Patient    ..................................................               ................................................  Date                                   Time    ..........................................................................................................................................  Reviewed by Signature/Title    ...................................................              ..............................................  Date                                               Time          22EPIC Rev 08/18

## 2020-06-24 NOTE — LETTER
June 26, 2020        Saray Huntley  [address] [place] [address]    This letter provides a written record that you were tested for COVID-19 on 6/25/20.       Your result was negative. This means that we didn t find the virus that causes COVID-19 in your sample. A test may show negative when you do actually have the virus. This can happen when the virus is in the early stages of infection, before you feel illness symptoms.    If you have symptoms   Stay home and away from others (self-isolate) until you meet ALL of the guidelines below:    You ve had no fever--and no medicine that reduces fever--for 3 full days (72 hours). And      Your other symptoms have gotten better. For example, your cough or breathing has improved. And     At least 10 days have passed since your symptoms started.    During this time:    Stay home. Don t go to work, school or anywhere else.     Stay in your own room, including for meals. Use your own bathroom if you can.    Stay away from others in your home. No hugging, kissing or shaking hands. No visitors.    Clean  high touch  surfaces often (doorknobs, counters, handles, etc.). Use a household cleaning spray or wipes. You can find a full list on the EPA website at www.epa.gov/pesticide-registration/list-n-disinfectants-use-against-sars-cov-2.    Cover your mouth and nose with a mask, tissue or washcloth to avoid spreading germs.    Wash your hands and face often with soap and water.    Going back to work  Check with your employer for any guidelines to follow for going back to work.    Employers: This document serves as formal notice that your employee tested negative for COVID-19, as of the testing date shown above.

## 2020-06-25 VITALS
WEIGHT: 190 LBS | OXYGEN SATURATION: 96 % | SYSTOLIC BLOOD PRESSURE: 110 MMHG | BODY MASS INDEX: 34.74 KG/M2 | TEMPERATURE: 98.6 F | RESPIRATION RATE: 16 BRPM | HEART RATE: 57 BPM

## 2020-06-25 LAB
D DIMER PPP FEU-MCNC: 0.4 UG/ML FEU (ref 0–0.5)
SARS-COV-2 RNA SPEC QL NAA+PROBE: NOT DETECTED
SPECIMEN SOURCE: NORMAL

## 2020-06-25 PROCEDURE — 25800030 ZZH RX IP 258 OP 636: Performed by: EMERGENCY MEDICINE

## 2020-06-25 PROCEDURE — 25000132 ZZH RX MED GY IP 250 OP 250 PS 637: Performed by: EMERGENCY MEDICINE

## 2020-06-25 PROCEDURE — 96360 HYDRATION IV INFUSION INIT: CPT

## 2020-06-25 PROCEDURE — U0003 INFECTIOUS AGENT DETECTION BY NUCLEIC ACID (DNA OR RNA); SEVERE ACUTE RESPIRATORY SYNDROME CORONAVIRUS 2 (SARS-COV-2) (CORONAVIRUS DISEASE [COVID-19]), AMPLIFIED PROBE TECHNIQUE, MAKING USE OF HIGH THROUGHPUT TECHNOLOGIES AS DESCRIBED BY CMS-2020-01-R: HCPCS | Performed by: EMERGENCY MEDICINE

## 2020-06-25 RX ORDER — MECLIZINE HYDROCHLORIDE 25 MG/1
25 TABLET ORAL 4 TIMES DAILY PRN
Qty: 30 TABLET | Refills: 0 | Status: SHIPPED | OUTPATIENT
Start: 2020-06-25 | End: 2023-03-08

## 2020-06-25 RX ORDER — MECLIZINE HYDROCHLORIDE 25 MG/1
25 TABLET ORAL ONCE
Status: COMPLETED | OUTPATIENT
Start: 2020-06-25 | End: 2020-06-25

## 2020-06-25 RX ADMIN — MECLIZINE HYDROCHLORIDE 25 MG: 25 TABLET ORAL at 01:29

## 2020-06-25 RX ADMIN — SODIUM CHLORIDE 1000 ML: 9 INJECTION, SOLUTION INTRAVENOUS at 00:09

## 2020-06-25 NOTE — DISCHARGE INSTRUCTIONS
"Discharge Instructions for COVID-19 Patients  You have--or may have--COVID-19. Please follow the instructions listed below.   If you have a weakened immune system, discuss with your doctor any other actions you need to take.  How can I protect others?  If you have symptoms (fever, cough, body aches or trouble breathing):  Stay home and away from others (self-isolate) until:  At least 10 days have passed since your symptoms started. And   You've had no fever--and no medicine that reduces fever--for 3 full days (72 hours). And   Your other symptoms have resolved (gotten better).  If you don't show symptoms, but testing showed that you have COVID-19:  Stay home and away from others (self-isolate) until at least 10 days have passed since the date of your first positive COVID-19 test.  During this time  Stay in your own room, even for meals. Use your own bathroom if you can.  Stay away from others in your home. No hugging, kissing or shaking hands. No visitors.  Don't go to work, school or anywhere else.  Clean \"high touch\" surfaces often (doorknobs, counters, handles). Use household cleaning spray or wipes. You'll find a full list of  on the EPA website: www.epa.gov/pesticide-registration/list-n-disinfectants-use-against-sars-cov-2.  Cover your mouth and nose with a mask, tissue or wash cloth to avoid spreading germs.  Wash your hands and face often. Use soap and water.  Caregivers in these groups are at risk for severe illness due to COVID-19:  People 65 years and older  People who live in a nursing home or long-term care facility  People with chronic disease (lung, heart, cancer, diabetes, kidney, liver, immunologic)  People who have a weakened immune system, including those who:  Are in cancer treatment  Take medicine that weakens the immune system, such as corticosteroids  Had a bone marrow or organ transplant  Have an immune deficiency  Have poorly controlled HIV or AIDS  Are obese (body mass index of 40 or " higher)  Smoke regularly  Caregivers should wear gloves while washing dishes, handling laundry and cleaning bedrooms and bathrooms.  Use caution when washing and drying laundry: Don't shake dirty laundry and use the warmest water setting that you can.  For more tips on managing your health at home, go to www.cdc.gov/coronavirus/2019-ncov/downloads/10Things.pdf.  How can I take care of myself at home?  Get lots of rest. Drink extra fluids (unless a doctor has told you not to).  Take Tylenol (acetaminophen) for fever or pain. If you have liver or kidney problems, ask your family doctor if it's okay to take Tylenol.     Adults can take either:  650 mg (two 325 mg pills) every 4 to 6 hours, or   1,000 mg (two 500 mg pills) every 8 hours as needed.  Note: Don't take more than 3,000 mg in one day. Acetaminophen is found in many medicines (both prescribed and over-the-counter medicines). Read all labels to be sure you don't take too much.   For children, check the Tylenol bottle for the right dose. The dose is based on the child's age or weight.  If you have other health problems (like cancer, heart failure, an organ transplant or severe kidney disease): Call your specialty clinic if you don't feel better in the next 2 days.  Know when to call 911. Emergency warning signs include:  Trouble breathing or shortness of breath  Pain or pressure in the chest that doesn't go away  Feeling confused like you haven't felt before, or not being able to wake up  Bluish-colored lips or face  Your doctor may have prescribed a blood thinner medicine. Follow their instructions.  Where can I get more information?  [facility] - About COVID-19:   [email or web address]  CDC - What to Do If You're Sick: www.cdc.gov/coronavirus/2019-ncov/about/steps-when-sick.html  CDC - Ending Home Isolation: www.cdc.gov/coronavirus/2019-ncov/hcp/disposition-in-home-patients.html  CDC - Caring for Someone:  www.cdc.gov/coronavirus/2019-ncov/if-you-are-sick/care-for-someone.html  [facility] - Interim Guidance for Hospital Discharge to Home: www.health.[facility].[place]/diseases/coronavirus/hcp/hospdischarge.pdf  [facility] clinical trials (COVID-19 research studies): clinicalaffairs.[facility]-clinical-trials  Below are the COVID-19 hotlines at the [place] Department of Health ([facility]). Interpreters are available.  For health questions: Call 397-950-1982 or 1-373.825.1893 (7 a.m. to 7 p.m.)  For questions about schools and childcare: Call 062-177-8695 or 1-367.408.9278 (7 a.m. to 7 p.m.)    For informational purposes only. Not to replace the advice of your health care provider. Clinically reviewed by the Infection Prevention Team.Copyright   2020 [facility]. All rights reserved. [employer] [id number] - 06/20.

## 2020-06-25 NOTE — ED TRIAGE NOTES
"Patient presents with:  1)felt tired the past few day and today is worse  2)Patient reports she has a \"cloudy\" feeling in her head  3)Patient called 911 10 days ago as she felt her heart was racing and she was going to pass out. EMS stated \"it was anxiety or a panic attack\"  4)Went to [facility] 1 week ago today for follow up and they did a chest xray which was negative.(per patient) Patient reports provider stated they could not find anything physically wrong and felt is is related to stress. Patient had a negative COVID at that time.  5)Patient reports intermit chills.  6) feels like intemit heart racing  "

## 2020-06-25 NOTE — ED PROVIDER NOTES
History     Chief Complaint:  Shortness of Breath (pt c/o shortness of breath, weakness for past 10 days. )    DOC Huntley is a 50 year old female who presents with shortness of breath. She has been feeling unwell for the last 10 days. 10 days ago she called 911 because she had a racing heart and shortness of breath. EMS thought it was a panic attack and her symptoms improved so they didn't transport. She then saw her primary care physician last Wednesday and was complaining of intermittent racing heart, shortness of breath, fatigue, and general malasise. She's had chills but no sweats or fever. She has not been coughing, there's been no chest pain. She said her appetite has been fine. No urinary symptoms. She had some nausea, but no vomiting or diarrhea. She is not having any focal weakness. She said when she saw her doctor last Wednesday they did lab testing, checked her for COVID-19, and did a chest x-ray which was all negative. There's no pleuritic pain, her shortness of breath is intermittent and she can't think of what triggers it. The reason she came in tonight was because se feels profoundly fatigued.    Allergies:  Sulfa Drugs     Medications:    Tenormin    Past Medical History:    Breast cancer  Hypothyroidism  Neuropathy  Vertigo  Dyspnea on exertion    Past Surgical History:    Breast lumpectomy   section  Insert port vascular access, 2018  Remove port vascular access, 2019    Family History:    Hyperlipidemia   Kidney disease  Diabetes     Social History:  Smoking status: No  Alcohol use: No  Drug use: No  Patient presents alone.  PCP: Jeannie Ross   Marital Status:         Review of Systems   Constitutional: Positive for chills. Negative for appetite change, diaphoresis and fever.   Respiratory: Positive for shortness of breath. Negative for cough.    Cardiovascular: Positive for palpitations. Negative for chest pain.   Gastrointestinal: Positive for nausea. Negative for  diarrhea and vomiting.   Genitourinary: Negative for decreased urine volume, difficulty urinating, dyspareunia, dysuria, enuresis, frequency, hematuria and urgency.   Neurological: Negative for weakness.   All other systems reviewed and are negative.    Physical Exam     Patient Vitals for the past 24 hrs:   BP Temp Temp src Pulse Heart Rate Resp SpO2 Weight   06/25/20 0116 105/63 -- -- 58 58 14 97 % --   06/25/20 0030 91/53 -- -- 57 56 16 95 % --   06/25/20 0000 103/78 -- -- 58 58 15 94 % --   06/24/20 2330 119/61 -- -- 59 59 9 97 % --   06/24/20 2300 131/73 -- -- 61 68 16 96 % --   06/24/20 2247 -- 98.6  F (37  C) Oral -- -- -- -- 86.2 kg (190 lb)   06/24/20 2238 (!) 149/83 -- -- 66 68 20 98 % --     Physical Exam  Constitutional:  Appears well-developed and well-nourished. Cooperative.   HENT:   Head:    Atraumatic.   Mouth/Throat:   Oropharynx is without erythema or exudate and mucous     membranes are moist.   Eyes:    Conjunctivae normal and EOM are normal.      Pupils are equal, round, and reactive to light.      Mild erythema, bilateral conjunctiva. No photophobia. Cornea look clear.  Neck:    Normal range of motion. Neck supple.  No nuclear rigidity.  Cardiovascular:  Normal rate, regular rhythm, normal heart sounds and radial and    dorsalis pedis pulses are 2+ and symmetric.    Pulmonary/Chest:  Effort normal and breath sounds normal.   Abdominal:   Soft. Bowel sounds are normal.      No splenomegaly or hepatomegaly. No tenderness. No rebound.   Musculoskeletal:  Normal range of motion. No edema and no tenderness.   Neurological:  Alert. Normal strength. No cranial nerve deficit.   Skin:    Skin is warm and dry.   Psychiatric:   Depressed mood. Flat affect     Emergency Department Course   ECG:  @ 2333  Indication: Shortness of breath   Vent. Rate 63 bpm. ME interval 186 ms. QRS duration 92 ms. QT/QTc 424/433 ms. P-R-T axis 22 -5 20.   Normal sinus rhythm. Incomplete right bundle branch block. Minimal  voltage criteria for LVH, may be normal variant. Borderline ECG.    Read @ 2342 by Dr. Brooks.    Imaging:  Chest XR Port 1 View:  IMPRESSION: No evidence of active cardiopulmonary disease.   Reading per radiology.    Radiographic findings were communicated with the patient who voiced understanding of the findings.    Laboratory:  Symptomatic COVID-19 Virus by PCR: In process    CBC: WBC 6.1, HGB 14,      CMP: Glucose 111,  (H),  (H), o/w WNL (Creatinine: 0.54)    2256 Troponin I: <0.015    TSH with free T4 reflex: 9.29 (H)    T4 free: 1.04    D-dimer quantitative: 0.4    Interventions:  0009 0.9% Sodium Chloride BOLUS 1000 mLs IV     Emergency Department Course:  2300 Nursing notes and vitals reviewed. I performed an exam of the patient as documented above.     EKG was done, interpretation as above.    IV inserted. Medicine administered as documented above. Blood drawn. This was sent to the lab for further testing, results above.    The patient was sent for a chest x-ray while in the emergency department, findings above.     Findings and plan explained to the Patient. Patient discharged home with instructions regarding supportive care, medications, and reasons to return. The importance of close follow-up was reviewed. The patient was prescribed Antivert.    I personally reviewed the laboratory and imaging results with the Patient and answered all related questions prior to discharge.     Impression & Plan    Medical Decision Making:  Patient presents complaining of feeling short of breath, with intermittent palpitations and severe fatigue and generalized weakness.  She said her symptoms started about 10 days ago, when she suddenly suffered what EMS thought was a panic attack.  Symptoms improved, but over the next several days she continued to have intermittent episodes.    Today she presents because she feels profoundly tired, like she can barely stay awake.  She says however when she tries to  sleep she does not really sleep.    Vital signs are unremarkable.  Her physical exam is benign, aside from appearing fatigued and having a flat affect.  She reports some stress at work, but does not think she is depressed.  She remained in sinus rhythm on the monitor.  EKG looked unremarkable.  Because of the dyspnea and palpitations I checked her troponin.  It is normal.  Metabolic panel CBC with differential look unremarkable.  D-dimer is checked and is normal.  I doubt PE has a source of her symptoms.  Patient reports she does have a history of hypothyroidism, but no longer takes Synthroid because it gave her a racing heart.  Tonight her TSH is high, but her free T4 is normal.  Of asked her to discuss this with her primary care provider.    Patient does have an elevated AST and ALT.  She said that she has had this before, and that this abnormality seems to run in her family.  She is not having any right upper quadrant pain and there is no tenderness.    Because the patient was seen during COVID-19 pandemic I sent a swab.  It may be that her profound fatigue is a symptom of COVID.  She is not showing any hypoxemia, chest x-ray looks normal and her oxygen saturations are normal.  I do not think she requires hospitalization.    We discussed test results and needed follow-up and the patient voices understanding.  She will call her doctor if not improving over the next few days.  She will return to the ED for any concerning symptoms.  She will self isolate until she knows her COVID-19 results.    The patient also reports a history of intermittent vertigo.  She has a few beats of nystagmus currently.  We will have her try meclizine to see if that eases any of her symptoms.    Diagnosis:    ICD-10-CM    1. Fatigue, unspecified type  R53.83    2. Dyspnea, unspecified type  R06.00    3. Palpitations  R00.2    4. Dizziness  R42    5. Abnormal LFTs  R94.5        Disposition:  discharged to home    Discharge  Medications:  New Prescriptions    MECLIZINE (ANTIVERT) 25 MG TABLET    Take 1 tablet (25 mg) by mouth 4 times daily as needed for dizziness       Nallely Avendaño  6/24/2020    EMERGENCY DEPARTMENT    Scribe Disclosure:  I, Nallely Avendaño, am serving as a scribe at 11:11 PM on 6/24/2020 to document services personally performed by Alvino Brooks MD based on my observations and the provider's statements to me.          Alvino Brooks MD  06/25/20 0604

## 2020-10-09 ENCOUNTER — ONCOLOGY VISIT (OUTPATIENT)
Dept: ONCOLOGY | Facility: CLINIC | Age: 50
End: 2020-10-09
Attending: INTERNAL MEDICINE
Payer: COMMERCIAL

## 2020-10-09 VITALS
TEMPERATURE: 98.7 F | OXYGEN SATURATION: 97 % | BODY MASS INDEX: 34.27 KG/M2 | RESPIRATION RATE: 16 BRPM | HEIGHT: 62 IN | WEIGHT: 186.2 LBS | HEART RATE: 69 BPM | SYSTOLIC BLOOD PRESSURE: 128 MMHG | DIASTOLIC BLOOD PRESSURE: 85 MMHG

## 2020-10-09 DIAGNOSIS — Z17.0 MALIGNANT NEOPLASM OF UPPER-OUTER QUADRANT OF LEFT BREAST IN FEMALE, ESTROGEN RECEPTOR POSITIVE (H): ICD-10-CM

## 2020-10-09 DIAGNOSIS — Z12.31 ENCOUNTER FOR SCREENING MAMMOGRAM FOR MALIGNANT NEOPLASM OF BREAST: Primary | ICD-10-CM

## 2020-10-09 DIAGNOSIS — C50.412 MALIGNANT NEOPLASM OF UPPER-OUTER QUADRANT OF LEFT BREAST IN FEMALE, ESTROGEN RECEPTOR POSITIVE (H): ICD-10-CM

## 2020-10-09 DIAGNOSIS — F41.9 ANXIETY: ICD-10-CM

## 2020-10-09 PROCEDURE — G0463 HOSPITAL OUTPT CLINIC VISIT: HCPCS

## 2020-10-09 PROCEDURE — 99215 OFFICE O/P EST HI 40 MIN: CPT | Performed by: INTERNAL MEDICINE

## 2020-10-09 ASSESSMENT — MIFFLIN-ST. JEOR: SCORE: 1412.85

## 2020-10-09 ASSESSMENT — PAIN SCALES - GENERAL: PAINLEVEL: NO PAIN (0)

## 2020-10-09 NOTE — PROGRESS NOTES
"Oncology Rooming Note    October 9, 2020 8:14 AM   Saray Huntley is a 50 year old female who presents for:    Chief Complaint   Patient presents with     Oncology Clinic Visit     Initial Vitals: LMP 09/29/2012 (Approximate)  Estimated body mass index is 34.74 kg/m  as calculated from the following:    Height as of 11/14/19: 1.575 m (5' 2.01\").    Weight as of 6/24/20: 86.2 kg (190 lb). There is no height or weight on file to calculate BSA.  Data Unavailable Comment: Data Unavailable   Patient's last menstrual period was 09/29/2012 (approximate).  Allergies reviewed: Yes  Medications reviewed: Yes    Medications: Medication refills not needed today.  Pharmacy name entered into [facility]:    [id number] [place] [place] - [address] AT [facility] [address] [facility] [place], [place] - [address] [id number]    Clinical concerns:  doctor was notified.      Marian Blank MA            "

## 2020-10-09 NOTE — LETTER
Subjective:      Raven Nix is a 69 y.o. female who was self-referred for dizziness.    Ms. Nix presents today with dizziness that she describes more as balance problem. She states it occurs while she is walking. She states the dizziness last for minutes.  She dizziness with change in position. She has associated decrease hearing in both ears and tinnitus but states it does not occur when her dizziness. She reports having several falls due to the balance problems. There is not a family history of hearing loss at a young age.  There is not a prior history of ear surgery.  There is not a prior history of ear infections .  She denies a history of significant noise exposure.  She does wear hearing aids currently.  She has not had a hearing test recently. She also reports post-nasal drip with associated nasal congestion, sinus pressure, rhinorrhea, and itchy nose and sneezing for the past several years. She has tried fluticasone with limited benefit.       Past Medical History  She has a past medical history of Asthma, Cataract, Deep vein thrombosis, Fecal incontinence, GERD (gastroesophageal reflux disease), IBS (irritable bowel syndrome), Osteoarthritis, Osteopenia, and RLS (restless legs syndrome).    Past Surgical History  She has a past surgical history that includes Finger surgery; Skin tag removal; Eye surgery; Cataract extraction w/  intraocular lens implant (Left, 05/05/2016); Cataract extraction w/  intraocular lens implant (Right, 05/19/2016); and Inguinal hernia repair (Left).    Family History  Her family history includes Breast cancer in her maternal aunt and mother; Dementia in her mother; Glaucoma in her mother; Hypertension in her father and mother; No Known Problems in her brother, maternal grandfather, maternal grandmother, maternal uncle, paternal aunt, paternal grandfather, paternal grandmother, paternal uncle, and sister; Parkinsonism in her father.    Social History  She reports that she  "    10/9/2020         RE: Saray Huntley  [address] [place] [address]        Dear Colleague,    Thank you for referring your patient, Saray Huntley, to the [facility]. Please see a copy of my visit note below.    Oncology Rooming Note    October 9, 2020 8:14 AM   Saray Huntley is a 50 year old female who presents for:    Chief Complaint   Patient presents with     Oncology Clinic Visit     Initial Vitals: LMP 09/29/2012 (Approximate)  Estimated body mass index is 34.74 kg/m  as calculated from the following:    Height as of 11/14/19: 1.575 m (5' 2.01\").    Weight as of 6/24/20: 86.2 kg (190 lb). There is no height or weight on file to calculate BSA.  Data Unavailable Comment: Data Unavailable   Patient's last menstrual period was 09/29/2012 (approximate).  Allergies reviewed: Yes  Medications reviewed: Yes    Medications: Medication refills not needed today.  Pharmacy name entered into aaTag:    [id number] - [place], [place] - [address] AT [address] [place] [address] [address] [facility] [place], [place] - [address] [address]    Clinical concerns:  doctor was notified.      Marian Blank MA              [facility]    Hematology/Oncology Established Patient Note      Today's Date: 10/09/20    Reason for Follow-up: left-sided breast cancer      HISTORY OF PRESENT ILLNESS: Saray Huntley is a 50 year old female who presents with left-sided breast cancer.  It was found on a screening mammogram.  Left breast ultrasound found a hypoechoic mass at the 12:00 position, 8 cm from the nipple, measuring 1.5 x 1.6 x 1.5 cm.  Axilla survey showed only normal-appearing lymph nodes.   Biopsy showed invasive ductal carcinoma, grade 3, weakly positive for ER (1-3%), OK negative, HER-2 positive.      Case was reviewed at tumor conference, and recommendation was for neoadjuvant chemotherapy.    Port was placed on 11/28/18.  It was " has never smoked. She has never used smokeless tobacco. She reports that she does not drink alcohol or use drugs.    Allergies  She is allergic to pcn [penicillins] and seldane.    Medications  She has a current medication list which includes the following prescription(s): acetaminophen, albuterol, aspirin, diphenhydramine-zinc acetate 2-0.1%, fluticasone propionate, fluzone high-dose 2018-19 (pf), loperamide, multivitamin, ondansetron, ranitidine, and azelastine.    Review of Systems   Constitutional: Negative for chills, fever and unexpected weight change.   HENT: Positive for congestion, hearing loss, postnasal drip, rhinorrhea and tinnitus. Negative for ear discharge, ear pain, facial swelling, sinus pressure, sore throat and trouble swallowing.    Eyes: Positive for itching. Negative for pain and visual disturbance.   Respiratory: Negative for apnea and shortness of breath.    Cardiovascular: Negative for chest pain and palpitations.   Gastrointestinal: Negative for abdominal pain and nausea.   Endocrine: Negative for cold intolerance and heat intolerance.   Musculoskeletal: Positive for arthralgias and gait problem. Negative for joint swelling and neck stiffness.   Skin: Negative for color change and rash.   Allergic/Immunologic: Positive for environmental allergies.   Neurological: Positive for dizziness and tremors. Negative for facial asymmetry and headaches.   Hematological: Negative for adenopathy. Does not bruise/bleed easily.   Psychiatric/Behavioral: Negative for agitation. The patient is not nervous/anxious.           Objective:     [facility] 01/09/1996      Constitutional:   She is oriented to person, place, and time. Vital signs are normal. She appears well-developed and well-nourished. She appears alert.   Walks with cane    Hand tremors Normal speech.      Head:  Normocephalic and atraumatic.     Ears:    Right Ear: No lacerations. No drainage, swelling or tenderness. No foreign bodies. No mastoid  removed on 3/19/19.    She started neoadjuvant chemotherapy on 18 with weekly paclitaxel, Herceptin, Perjeta and completed on 2/15/19.    On 3/19/19, she underwent left breast lumpectomy and sentinel lymph node biopsy.  Port was also removed.  Pathology showed no evidence of residual carcinoma, consistent with a complete pathologic response.      She completed radiation on 19.    She completed 1 year of Herceptin on 19.        INTERIM HISTORY: Saray says that she has been having anxiety/panic attacks at times, and she does not know why.  She wonders if it is normal after completing treatment.  She says that she actually felt okay while on treatment, but now that she is finished, she has been having more anxiety.  She says that her family is good.  She is focused on being a mom, and work has been fine.        REVIEW OF SYSTEMS:   14 point ROS was reviewed and is negative other than as noted above in HPI.       HOME MEDICATIONS:  Current Outpatient Medications   Medication Sig Dispense Refill     meclizine (ANTIVERT) 25 MG tablet Take 1 tablet (25 mg) by mouth 4 times daily as needed for dizziness 30 tablet 0         ALLERGIES:  Allergies   Allergen Reactions     Sulfa Drugs Rash         PAST MEDICAL HISTORY:  Past Medical History:   Diagnosis Date     Breast cancer (H)      Hypothyroidism, unspecified type 2017     Neuropathy      PONV (postoperative nausea and vomiting)      Vertigo          PAST SURGICAL HISTORY:  Past Surgical History:   Procedure Laterality Date     BIOPSY NODE SENTINEL Left 3/19/2019    Procedure: WITH SENTINEL LYMPH NODE BIOPSY;  Surgeon: Sae Montaño MD;  Location:  OR      SECTION  2003     INSERT PORT VASCULAR ACCESS N/A 2018    Procedure: PORT PLACEMENT ;  Surgeon: Sae Montaño MD;  Location:  OR     LUMPECTOMY BREAST WITH SEED LOCALIZATION Left 3/19/2019    Procedure: SEED LOCALIZED LEFT BREAST LUMPECTOMY;  Surgeon: Sabra  Sae Lopez MD;  Location:  OR     REMOVE PORT VASCULAR ACCESS N/A 3/19/2019    Procedure: PORT REMOVAL;  Surgeon: Sae Montaño MD;  Location:  OR         SOCIAL HISTORY:  Social History     Socioeconomic History     Marital status: Single     Spouse name: Not on file     Number of children: Not on file     Years of education: Not on file     Highest education level: Not on file   Occupational History     Not on file   Social Needs     Financial resource strain: Not on file     Food insecurity     Worry: Not on file     Inability: Not on file     Transportation needs     Medical: Not on file     Non-medical: Not on file   Tobacco Use     Smoking status: Never Smoker     Smokeless tobacco: Never Used   Substance and Sexual Activity     Alcohol use: No     Drug use: No     Sexual activity: Not Currently   Lifestyle     Physical activity     Days per week: Not on file     Minutes per session: Not on file     Stress: Not on file   Relationships     Social connections     Talks on phone: Not on file     Gets together: Not on file     Attends Protestant service: Not on file     Active member of club or organization: Not on file     Attends meetings of clubs or organizations: Not on file     Relationship status: Not on file     Intimate partner violence     Fear of current or ex partner: Not on file     Emotionally abused: Not on file     Physically abused: Not on file     Forced sexual activity: Not on file   Other Topics Concern     Parent/sibling w/ CABG, MI or angioplasty before 65F 55M? Not Asked   Social History Narrative     Not on file   She has never smoked.  She does not drink or use illicit drugs.  She is going to school at [employer] studying IT.  She has 1 daughter who is 15 years old.  She says that she has menopause a few years old.  She tried oral contraceptive, but she did not tolerate it well, so did not take it long term.  She has never taken hormone replacement therapy          FAMILY  tenderness. Tympanic membrane is not injected, not scarred, not perforated, not erythematous, not retracted and not bulging. Tympanic membrane mobility is normal. No middle ear effusion. No hemotympanum. Decreased hearing is noted.   Left Ear: No lacerations. No drainage, swelling or tenderness. No foreign bodies. No mastoid tenderness. Tympanic membrane is not injected, not scarred, not perforated, not erythematous, not retracted and not bulging. Tympanic membrane mobility is normal.  No middle ear effusion. No hemotympanum. Decreased hearing is noted.     Nose:  Mucosal edema and rhinorrhea present. No nose lacerations, sinus tenderness, septal deviation, nasal septal hematoma or polyps. No epistaxis.  No foreign bodies. Turbinate hypertrophy.  Right sinus exhibits no maxillary sinus tenderness and no frontal sinus tenderness. Left sinus exhibits no maxillary sinus tenderness and no frontal sinus tenderness.     Mouth/Throat  Oropharynx clear and moist without lesions or asymmetry and normal uvula midline. Abnormal dentition. No uvula swelling, oral lesions, trismus, mucous membrane lesions or xerostomia. No oropharyngeal exudate, posterior oropharyngeal edema or posterior oropharyngeal erythema.     Neck:  Neck normal without thyromegaly masses, asymmetry, normal tracheal structure, crepitus, and tenderness and no adenopathy.     Psychiatric:   She has a normal mood and affect. Her speech is normal and behavior is normal.     Neurological:   She is alert and oriented to person, place, and time. No cranial nerve deficit.     Skin:   No abrasions, lacerations, lesions, or rashes.       Procedure    None      Data Reviewed    WBC (Thousand/uL)   Date Value   09/27/2017 4.0     Platelets (Thousand/uL)   Date Value   09/27/2017 266      Creatinine (mg/dL)   Date Value   09/27/2017 0.61     TSH (mIU/L)   Date Value   09/27/2017 0.91     Glucose (mg/dL)   Date Value   09/27/2017 93     Hemoglobin A1C (%)   Date Value    09/12/2012 6.0       I independently reviewed the tracings of the complete audiometric evaluation performed today.  I reviewed the audiogram with the patient as well.  Pertinent findings include bilateral mid to high frequency SNHL, type A tympanogram in both ears, Right SRT 25 with 64% discrimination at 65 db. Left SRT 30 with 88% discrimination at 70 db..         Assessment:     1. Vertigo    2. Sensorineural hearing loss (SNHL) of both ears    3. Chronic rhinitis         Plan:     I had a long discussion with the patient regarding her condition and the further workup and management options.    I recommend hearing aid evaluation. Monica Cook's card provided to patient.  Follow up in one year with audiogram.  I ordered azelastine for chronic rhinitis.  Continue fluticasone for chronic rhinitis.  I ordered VNG for further evaluation of her dizziness/balance problems.    Follow up in about 2 weeks (around 11/19/2019).   "HISTORY:  Family History   Problem Relation Age of Onset     Hyperlipidemia Mother      Kidney Disease Mother      Diabetes Father    Patient does not know much about her family history and is unaware if there is breast cancer or ovarian cancer in her family.        PHYSICAL EXAM:  Vital signs:  /85   Pulse 69   Temp 98.7  F (37.1  C)   Resp 16   Ht 1.575 m (5' 2\")   Wt 84.5 kg (186 lb 3.2 oz)   LMP 2012 (Approximate)   SpO2 97%   BMI 34.06 kg/m     ECO  GENERAL/CONSTITUTIONAL: No acute distress.   EYES: No scleral icterus.  RESPIRATORY: Clear to auscultation bilaterally.  CARDIOVASCULAR: Regular rate and rhythm.    GASTROINTESTINAL: Non-tender, soft.  BREAST: Right-no palpable mass, discharge, rash, or axillary lymphadenopathy.  Left-s/p lumpectomy, skin changes of radiation.   MUSCULOSKELETAL: Warm and well-perfused, no cyanosis, clubbing, or edema.  NEUROLOGIC: Alert, oriented, answers questions appropriately.  INTEGUMENTARY: No jaundice.  Port has been removed.       LABS:  None today.      IMAGING:  Bilateral mammogram 12/10/19:  BI-RADS 2 - benign      ASSESSMENT/PLAN:  Saray Huntley is a 50 year old post-menopausal female with:    1) Left-sided breast cancer:  1.5 x 1.6 x 1.5 cm on ultrasound.  Axilla survey showed only normal-appearing lymph nodes.   Biopsy showed invasive ductal carcinoma, grade 3, weakly positive for ER (1-3%), MA negative, HER-2 positive.  Clinical stage IA (cT1cN0).    She completed neoadjuvant chemotherapy with weekly paclitaxel, Herceptin, Perjeta on 2/15/19.  On 3/19/19, she underwent left breast lumpectomy and sentinel lymph node biopsy.  Pathology showed no evidence of residual carcinoma, consistent with a complete pathologic response.      She completed radiation on 19.  She completed 1 year of Herceptin on 19.    We previously discussed hormonal therapy after completion of radiation.  The ER was weakly positive, on 1-3%.  We discussed trying " anastrozole, but patient does not want to take it for fear of side effects, and with the ER being so weakly positive, there may not be significant benefit anyway, so it would be reasonable not to pursue adjuvant hormonal therapy.      -mammogram in December 2020 - ordered  -RTC in 6 months    2) Genetics: She was diagnosed at age 48 with breast cancer  -she met with genetic counseling.  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    3) Coping: Patient was doing better during treatment, but says that she is having anxiety/panic attacks intermittently, and she does not know why.   We spent some time discussing her feelings and symptoms today.  She was teary and wonders if this is normal after completing treatment. She notes that she does feel sad when she sees people on treatment who have lost their hair and feels sad for the patients getting chemo now during the pandemic.  She did discuss this with her PCP, who referred her to a therapist, but she says that she didn't find it that helpful.  She is open to talking to our oncology psychologist and .  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  She has completed chemotherapy, and symptoms should gradually improve.  She says that it has been getting better.     5) Survivorship: Cancer treatment plan and summary was reviewed with patient and given to her previously.      I spent a total of 40 minutes with the patient, with over >50% of the time in counseling and/or coordination of care.     Cayla Stock MD  Hematology/Oncology  [facility]        Again, thank you for allowing me to participate in the care of your patient.        Sincerely,        Cayla Stock MD

## 2020-10-09 NOTE — PROGRESS NOTES
[facility]    Hematology/Oncology Established Patient Note      Today's Date: 10/09/20    Reason for Follow-up: left-sided breast cancer      HISTORY OF PRESENT ILLNESS: Saray Huntley is a 50 year old female who presents with left-sided breast cancer.  It was found on a screening mammogram.  Left breast ultrasound found a hypoechoic mass at the 12:00 position, 8 cm from the nipple, measuring 1.5 x 1.6 x 1.5 cm.  Axilla survey showed only normal-appearing lymph nodes.   Biopsy showed invasive ductal carcinoma, grade 3, weakly positive for ER (1-3%), VT negative, HER-2 positive.      Case was reviewed at tumor conference, and recommendation was for neoadjuvant chemotherapy.    Port was placed on 11/28/18.  It was removed on 3/19/19.    She started neoadjuvant chemotherapy on 11/30/18 with weekly paclitaxel, Herceptin, Perjeta and completed on 2/15/19.    On 3/19/19, she underwent left breast lumpectomy and sentinel lymph node biopsy.  Port was also removed.  Pathology showed no evidence of residual carcinoma, consistent with a complete pathologic response.      She completed radiation on 5/22/19.    She completed 1 year of Herceptin on 11/14/19.        INTERIM HISTORY: Saray says that she has been having anxiety/panic attacks at times, and she does not know why.  She wonders if it is normal after completing treatment.  She says that she actually felt okay while on treatment, but now that she is finished, she has been having more anxiety.  She says that her family is good.  She is focused on being a mom, and work has been fine.        REVIEW OF SYSTEMS:   14 point ROS was reviewed and is negative other than as noted above in HPI.       HOME MEDICATIONS:  Current Outpatient Medications   Medication Sig Dispense Refill     meclizine (ANTIVERT) 25 MG tablet Take 1 tablet (25 mg) by mouth 4 times daily as needed for dizziness 30 tablet 0         ALLERGIES:  Allergies   Allergen Reactions     Sulfa  Drugs Rash         PAST MEDICAL HISTORY:  Past Medical History:   Diagnosis Date     Breast cancer (H)      Hypothyroidism, unspecified type 2017     Neuropathy      PONV (postoperative nausea and vomiting)      Vertigo          PAST SURGICAL HISTORY:  Past Surgical History:   Procedure Laterality Date     BIOPSY NODE SENTINEL Left 3/19/2019    Procedure: WITH SENTINEL LYMPH NODE BIOPSY;  Surgeon: Sae Montaño MD;  Location:  OR      SECTION  2003     INSERT PORT VASCULAR ACCESS N/A 2018    Procedure: PORT PLACEMENT ;  Surgeon: Sae Montaño MD;  Location:  OR     LUMPECTOMY BREAST WITH SEED LOCALIZATION Left 3/19/2019    Procedure: SEED LOCALIZED LEFT BREAST LUMPECTOMY;  Surgeon: Sae Montaño MD;  Location:  OR     REMOVE PORT VASCULAR ACCESS N/A 3/19/2019    Procedure: PORT REMOVAL;  Surgeon: Sae Montaño MD;  Location:  OR         SOCIAL HISTORY:  Social History     Socioeconomic History     Marital status: Single     Spouse name: Not on file     Number of children: Not on file     Years of education: Not on file     Highest education level: Not on file   Occupational History     Not on file   Social Needs     Financial resource strain: Not on file     Food insecurity     Worry: Not on file     Inability: Not on file     Transportation needs     Medical: Not on file     Non-medical: Not on file   Tobacco Use     Smoking status: Never Smoker     Smokeless tobacco: Never Used   Substance and Sexual Activity     Alcohol use: No     Drug use: No     Sexual activity: Not Currently   Lifestyle     Physical activity     Days per week: Not on file     Minutes per session: Not on file     Stress: Not on file   Relationships     Social connections     Talks on phone: Not on file     Gets together: Not on file     Attends Sabianism service: Not on file     Active member of club or organization: Not on file     Attends meetings of clubs or organizations: Not on  "file     Relationship status: Not on file     Intimate partner violence     Fear of current or ex partner: Not on file     Emotionally abused: Not on file     Physically abused: Not on file     Forced sexual activity: Not on file   Other Topics Concern     Parent/sibling w/ CABG, MI or angioplasty before 65F 55M? Not Asked   Social History Narrative     Not on file   She has never smoked.  She does not drink or use illicit drugs.  She is going to school at [employer] studying IT.  She has 1 daughter who is 15 years old.  She says that she has menopause a few years old.  She tried oral contraceptive, but she did not tolerate it well, so did not take it long term.  She has never taken hormone replacement therapy          FAMILY HISTORY:  Family History   Problem Relation Age of Onset     Hyperlipidemia Mother      Kidney Disease Mother      Diabetes Father    Patient does not know much about her family history and is unaware if there is breast cancer or ovarian cancer in her family.        PHYSICAL EXAM:  Vital signs:  /85   Pulse 69   Temp 98.7  F (37.1  C)   Resp 16   Ht 1.575 m (5' 2\")   Wt 84.5 kg (186 lb 3.2 oz)   LMP 2012 (Approximate)   SpO2 97%   BMI 34.06 kg/m     ECO  GENERAL/CONSTITUTIONAL: No acute distress.   EYES: No scleral icterus.  RESPIRATORY: Clear to auscultation bilaterally.  CARDIOVASCULAR: Regular rate and rhythm.    GASTROINTESTINAL: Non-tender, soft.  BREAST: Right-no palpable mass, discharge, rash, or axillary lymphadenopathy.  Left-s/p lumpectomy, skin changes of radiation.   MUSCULOSKELETAL: Warm and well-perfused, no cyanosis, clubbing, or edema.  NEUROLOGIC: Alert, oriented, answers questions appropriately.  INTEGUMENTARY: No jaundice.  Port has been removed.       LABS:  None today.      IMAGING:  Bilateral mammogram 12/10/19:  BI-RADS 2 - benign      ASSESSMENT/PLAN:  Saray Huntley is a 50 year old post-menopausal female with:    1) Left-sided breast cancer:  " 1.5 x 1.6 x 1.5 cm on ultrasound.  Axilla survey showed only normal-appearing lymph nodes.   Biopsy showed invasive ductal carcinoma, grade 3, weakly positive for ER (1-3%), NJ negative, HER-2 positive.  Clinical stage IA (cT1cN0).    She completed neoadjuvant chemotherapy with weekly paclitaxel, Herceptin, Perjeta on 2/15/19.  On 3/19/19, she underwent left breast lumpectomy and sentinel lymph node biopsy.  Pathology showed no evidence of residual carcinoma, consistent with a complete pathologic response.      She completed radiation on 5/22/19.  She completed 1 year of Herceptin on 11/14/19.    We previously discussed hormonal therapy after completion of radiation.  The ER was weakly positive, on 1-3%.  We discussed trying anastrozole, but patient does not want to take it for fear of side effects, and with the ER being so weakly positive, there may not be significant benefit anyway, so it would be reasonable not to pursue adjuvant hormonal therapy.      -mammogram in December 2020 - ordered  -RTC in 6 months    2) Genetics: She was diagnosed at age 48 with breast cancer  -she met with genetic counseling.  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    3) Coping: Patient was doing better during treatment, but says that she is having anxiety/panic attacks intermittently, and she does not know why.   We spent some time discussing her feelings and symptoms today.  She was teary and wonders if this is normal after completing treatment. She notes that she does feel sad when she sees people on treatment who have lost their hair and feels sad for the patients getting chemo now during the pandemic.  She did discuss this with her PCP, who referred her to a therapist, but she says that she didn't find it that helpful.  She is open to talking to our oncology psychologist and .  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   She has completed chemotherapy, and symptoms should gradually improve.  She says that it has been getting better.     5) Survivorship: Cancer treatment plan and summary was reviewed with patient and given to her previously.      I spent a total of 40 minutes with the patient, with over >50% of the time in counseling and/or coordination of care.     Cayla Stock MD  Hematology/Oncology  [facility]

## 2020-10-12 NOTE — TELEPHONE ENCOUNTER
Oncology/Surgical Oncology Referral Request:     Specialty Requested: Medical Oncology     Referring Provider: Cayla Stock MD    Referring Clinic/Organization: [facility]    Records location: [facility]     Requested Provider (if specified): Not Specified

## 2020-10-13 ENCOUNTER — TELEPHONE (OUTPATIENT)
Dept: ONCOLOGY | Facility: CLINIC | Age: 50
End: 2020-10-13

## 2020-10-20 ENCOUNTER — TELEPHONE (OUTPATIENT)
Dept: ONCOLOGY | Facility: CLINIC | Age: 50
End: 2020-10-20

## 2020-10-21 ENCOUNTER — VIRTUAL VISIT (OUTPATIENT)
Dept: ONCOLOGY | Facility: CLINIC | Age: 50
End: 2020-10-21
Attending: INTERNAL MEDICINE
Payer: COMMERCIAL

## 2020-10-21 ENCOUNTER — PRE VISIT (OUTPATIENT)
Dept: ONCOLOGY | Facility: CLINIC | Age: 50
End: 2020-10-21

## 2020-10-21 DIAGNOSIS — C50.412 MALIGNANT NEOPLASM OF UPPER-OUTER QUADRANT OF LEFT BREAST IN FEMALE, ESTROGEN RECEPTOR POSITIVE (H): ICD-10-CM

## 2020-10-21 DIAGNOSIS — Z17.0 MALIGNANT NEOPLASM OF UPPER-OUTER QUADRANT OF LEFT BREAST IN FEMALE, ESTROGEN RECEPTOR POSITIVE (H): ICD-10-CM

## 2020-10-21 DIAGNOSIS — F41.9 ANXIETY: ICD-10-CM

## 2020-10-21 PROCEDURE — 90832 PSYTX W PT 30 MINUTES: CPT | Mod: TEL | Performed by: PSYCHOLOGIST

## 2020-10-21 NOTE — PROGRESS NOTES
"Saray Huntley is a 50 year old female who is being evaluated via a billable telephone visit.  Phone call duration: 30 minutes    The patient has been notified of following:     \"This telephone visit will be conducted via a call between you and your physician/provider. We have found that certain health care needs can be provided without the need for a physical exam.  This service lets us provide the care you need with a short phone conversation.  If a prescription is necessary we can send it directly to your pharmacy.  If lab work is needed we can place an order for that and you can then stop by our lab to have the test done at a later time.    Telephone visits are billed at different rates depending on your insurance coverage. During this emergency period, for some insurers they may be billed the same as an in-person visit.  Please reach out to your insurance provider with any questions.    If during the course of the call the physician/provider feels a telephone visit is not appropriate, you will not be charged for this service.\"    Patient has given verbal consent for Telephone visit? Yes    Confidential Summary of Oncology Psychology Initial Visit    Saray Huntley is a 50 year old female who was referred by Dr. Stock for fear of recurrence, anxiousness, and panic. The patient was seen for an initial 30 minute evaluation on 10/21/2020.    Presenting Concerns: Worry, anxiousness, fear, uncertain, headaches, trouble sleeping, shortness of breath, fear of Covid-19, concern about her daughter, and crying.     Mental Status/Interview:   Orientation: Saray Huntley was alert, attentive, and oriented to time, place, and person  Affect: Affect was appropriate to the situation and showed normal range and stability.  Speech: Speech was clear with normal fluency, rate, tone, and volume.  Memory: Although not formally assessed, immediate, recent, and remote memory appeared to be intact.   Insight and Judgement: Saray Huntley" "demonstrates adequate awareness of the issues discussed and was able to come to reasonable conclusions.  Thought: Thought patterns were coherent and logical. Hallucinations were denied and delusions were not evident.   Lethality: Saray Huntley denied suicidal or homicidal ideation or intention.        Impression: She was entertaining thoughts about being \"broken\" and her brain \"is not working.\" She reported the usual criteria for panic disorder. Panic was described as the body's reaction to her thoughts. She is ruminating on fear in her thinking and thus triggering the panic. All of this was discussed and a behavioral plan was developed.     Diagnosis:   Encounter Diagnoses   Name Primary?     Malignant neoplasm of upper-outer quadrant of left breast in female, estrogen receptor positive (H)      Anxiety      Recommendations/Plan:  1. Take four deep breaths every hour  2. Remind herself she is not \"broken\" and panic cannot cause physical harm  3. Set personal goals and orient her thinking on those goals.  4. Return for follow-up in 2-3 weeks.     Thank you for this opportunity to participate in your care of this patient.    Ryan Mohamud Psy.D., L.P.  Director, Oncology Supportive Care   "

## 2020-10-27 ENCOUNTER — TELEPHONE (OUTPATIENT)
Dept: ONCOLOGY | Facility: CLINIC | Age: 50
End: 2020-10-27

## 2020-10-27 NOTE — TELEPHONE ENCOUNTER
This clinician made outreach attempt to Barbara according to psychosocial plan of care. Left voicemail with social work contact information and availability.     Plan:   1) Saray scheduled for next appointment with Tamir Mohamud 11/11/20. Appreciate Tamir Mohamud's ongoing involvement.   2) SW will continue to be available as needed for ongoing psychosocial support. Saray knows to reach out in the case of distress or need.   3) Ongoing collaboration with multidisciplinary care team.     ALEA Tapia, [facility]  Phone: [phone]    [facility]: M, Thu  *every other Tue, 8am-4:30pm  [facility]: W, F, *every other Tue, 8am-4:30pm

## 2020-11-11 ENCOUNTER — VIRTUAL VISIT (OUTPATIENT)
Dept: ONCOLOGY | Facility: CLINIC | Age: 50
End: 2020-11-11
Attending: PSYCHOLOGIST
Payer: COMMERCIAL

## 2020-11-11 DIAGNOSIS — F41.9 ANXIETY: Primary | ICD-10-CM

## 2020-11-11 PROCEDURE — 98968 PH1 ASSMT&MGMT NQHP 21-30: CPT | Mod: TEL | Performed by: PSYCHOLOGIST

## 2020-11-11 PROCEDURE — 999N001193 HC VIDEO/TELEPHONE VISIT; NO CHARGE

## 2020-11-11 NOTE — LETTER
"    11/11/2020         RE: Saray Huntley  [address] [place] [address]        Dear Colleague,    Thank you for referring your patient, Saray Huntley, to the [facility]. Please see a copy of my visit note below.    Saray Huntley is a 50 year old female who is being evaluated via a billable telephone visit.Phone call duration: 30 minutes       The patient has been notified of following:     \"This telephone visit will be conducted via a call between you and your physician/provider. We have found that certain health care needs can be provided without the need for a physical exam.  This service lets us provide the care you need with a short phone conversation.  If a prescription is necessary we can send it directly to your pharmacy.  If lab work is needed we can place an order for that and you can then stop by our lab to have the test done at a later time.    Telephone visits are billed at different rates depending on your insurance coverage. During this emergency period, for some insurers they may be billed the same as an in-person visit.  Please reach out to your insurance provider with any questions.    If during the course of the call the physician/provider feels a telephone visit is not appropriate, you will not be charged for this service.\"    Patient has given verbal consent for Telephone visit?  yes    Confidential Summary of Oncology Psychology Followup Visit    Saray Huntley is a 50 year old female who presents for anxiousness secondary to her cancer diagnosis and treatments. The patient was seen for a 30 minute appointment on 11/11/2020.    Subjective: Still has periods of anxiousness but feels they are more under her control. She gets upset and frustrated at how long it is taking her to get into her rhythm. We discussed the usual process post-treatment and how she needs to be persistent with moving her body, eating well, and getting rest.     Objective: Affect was " appropriate to the content of the therapeutic conversation.     Diagnosis:   Encounter Diagnosis   Name Primary?     Anxiety Yes     Recommendations/Plan:  1. Keep her activity levels up   2. Return for follow-up    Thank you for this opportunity to participate in your care of this patient.    Joshua Hernandez.CHRISTIAN., L.P.  Director, Oncology Supportive Care       Again, thank you for allowing me to participate in the care of your patient.        Sincerely,        Ryan Mohamud PsyD

## 2020-11-11 NOTE — PROGRESS NOTES
"Saray Huntley is a 50 year old female who is being evaluated via a billable telephone visit.Phone call duration: 30 minutes       The patient has been notified of following:     \"This telephone visit will be conducted via a call between you and your physician/provider. We have found that certain health care needs can be provided without the need for a physical exam.  This service lets us provide the care you need with a short phone conversation.  If a prescription is necessary we can send it directly to your pharmacy.  If lab work is needed we can place an order for that and you can then stop by our lab to have the test done at a later time.    Telephone visits are billed at different rates depending on your insurance coverage. During this emergency period, for some insurers they may be billed the same as an in-person visit.  Please reach out to your insurance provider with any questions.    If during the course of the call the physician/provider feels a telephone visit is not appropriate, you will not be charged for this service.\"    Patient has given verbal consent for Telephone visit?  yes    Confidential Summary of Oncology Psychology Followup Visit    Saray Huntley is a 50 year old female who presents for anxiousness secondary to her cancer diagnosis and treatments. The patient was seen for a 30 minute appointment on 11/11/2020.    Subjective: Still has periods of anxiousness but feels they are more under her control. She gets upset and frustrated at how long it is taking her to get into her rhythm. We discussed the usual process post-treatment and how she needs to be persistent with moving her body, eating well, and getting rest.     Objective: Affect was appropriate to the content of the therapeutic conversation.     Diagnosis:   Encounter Diagnosis   Name Primary?     Anxiety Yes     Recommendations/Plan:  1. Keep her activity levels up   2. Return for follow-up    Thank you for this opportunity to participate " in your care of this patient.    Ryan Mohamud Psy.D., L.P.  Director, Oncology Supportive Care

## 2020-11-11 NOTE — LETTER
"    11/11/2020         RE: Saray Huntley  [address] [place] [address]        Dear Colleague,    Thank you for referring your patient, Saray Huntley, to the [facility]. Please see a copy of my visit note below.    Saray Huntley is a 50 year old female who is being evaluated via a billable telephone visit.Phone call duration: 30 minutes       The patient has been notified of following:     \"This telephone visit will be conducted via a call between you and your physician/provider. We have found that certain health care needs can be provided without the need for a physical exam.  This service lets us provide the care you need with a short phone conversation.  If a prescription is necessary we can send it directly to your pharmacy.  If lab work is needed we can place an order for that and you can then stop by our lab to have the test done at a later time.    Telephone visits are billed at different rates depending on your insurance coverage. During this emergency period, for some insurers they may be billed the same as an in-person visit.  Please reach out to your insurance provider with any questions.    If during the course of the call the physician/provider feels a telephone visit is not appropriate, you will not be charged for this service.\"    Patient has given verbal consent for Telephone visit?  yes    Confidential Summary of Oncology Psychology Followup Visit    Saray Huntley is a 50 year old female who presents for anxiousness secondary to her cancer diagnosis and treatments. The patient was seen for a 30 minute appointment on 11/11/2020.    Subjective: Still has periods of anxiousness but feels they are more under her control. She gets upset and frustrated at how long it is taking her to get into her rhythm. We discussed the usual process post-treatment and how she needs to be persistent with moving her body, eating well, and getting rest.     Objective: Affect was " appropriate to the content of the therapeutic conversation.     Diagnosis:   Encounter Diagnosis   Name Primary?     Anxiety Yes     Recommendations/Plan:  1. Keep her activity levels up   2. Return for follow-up    Thank you for this opportunity to participate in your care of this patient.    Joshua Hernandez.CHRISTIAN., L.P.  Director, Oncology Supportive Care       Again, thank you for allowing me to participate in the care of your patient.        Sincerely,        Ryan Mohamud PsyD

## 2020-11-19 ENCOUNTER — TELEPHONE (OUTPATIENT)
Dept: ONCOLOGY | Facility: CLINIC | Age: 50
End: 2020-11-19

## 2020-12-02 ENCOUNTER — VIRTUAL VISIT (OUTPATIENT)
Dept: ONCOLOGY | Facility: CLINIC | Age: 50
End: 2020-12-02
Attending: PSYCHOLOGIST
Payer: COMMERCIAL

## 2020-12-02 DIAGNOSIS — F41.9 ANXIETY: Primary | ICD-10-CM

## 2020-12-02 PROCEDURE — 90832 PSYTX W PT 30 MINUTES: CPT | Mod: TEL | Performed by: PSYCHOLOGIST

## 2020-12-02 NOTE — PROGRESS NOTES
"Saray Huntley is a 50 year old female who is being evaluated via a billable telephone visit.  Phone call duration: 30 minutes    The patient has been notified of following:     \"This telephone visit will be conducted via a call between you and your physician/provider. We have found that certain health care needs can be provided without the need for a physical exam.  This service lets us provide the care you need with a short phone conversation.  If a prescription is necessary we can send it directly to your pharmacy.  If lab work is needed we can place an order for that and you can then stop by our lab to have the test done at a later time.    Telephone visits are billed at different rates depending on your insurance coverage. During this emergency period, for some insurers they may be billed the same as an in-person visit.  Please reach out to your insurance provider with any questions.    If during the course of the call the physician/provider feels a telephone visit is not appropriate, you will not be charged for this service.\"    Patient has given verbal consent for Telephone visit? Yes    Confidential Summary of Oncology Psychology Followup Visit    Saray Huntley is a 50 year old female who presents for anxiousness. The patient was seen for a 30 minute appointment on 12/2/2020.    Subjective: She reported having some difficulty with anxiousness during Thanksgiving while managing her family. We discussed these issues and she was concerned about how tired she was after feeling anxious.     Objective: Affect was appropriate to the content of the therapeutic conversation.     Diagnosis:   Encounter Diagnosis   Name Primary?     Anxiety Yes     Recommendations/Plan:  1. Maintain her perspective when managing household and family stressors  2. Return for additional appointments    Thank you for this opportunity to participate in your care of this patient.    Ryan Mohamud Psy.D., L.P.  Director, Oncology Supportive " Care

## 2020-12-16 ENCOUNTER — VIRTUAL VISIT (OUTPATIENT)
Dept: ONCOLOGY | Facility: CLINIC | Age: 50
End: 2020-12-16
Attending: PSYCHOLOGIST
Payer: COMMERCIAL

## 2020-12-16 DIAGNOSIS — Z53.9 ERRONEOUS ENCOUNTER--DISREGARD: Primary | ICD-10-CM

## 2020-12-16 NOTE — LETTER
12/16/2020         RE: Saray Huntley  [address] [place] [address]        Dear Colleague,    Thank you for referring your patient, Saray Huntley, to the [facility]. Please see a copy of my visit note below.      This encounter was opened in error. Please disregard.      Again, thank you for allowing me to participate in the care of your patient.        Sincerely,        Ryan Mohamud PsyD

## 2020-12-16 NOTE — LETTER
12/16/2020         RE: Saray Huntley  [address] [place] [address]        Dear Colleague,    Thank you for referring your patient, Saray Huntley, to the [facility]. Please see a copy of my visit note below.      This encounter was opened in error. Please disregard.      Again, thank you for allowing me to participate in the care of your patient.        Sincerely,        Ryan Mohamud PsyD

## 2020-12-23 ENCOUNTER — PATIENT OUTREACH (OUTPATIENT)
Dept: ONCOLOGY | Facility: CLINIC | Age: 50
End: 2020-12-23

## 2020-12-23 NOTE — PROGRESS NOTES
Writer received a call from patient she has changed her insurance and Dr. Stock is no longer covered by her insurance.     Writer advised to call insurance and see what her options are( compete a form for continuity of care) or transfer to a provider that is covered by her insurance.     Patient verbalized understanding and will call her insurance.    Donna Duke RN

## 2021-01-06 ENCOUNTER — TELEPHONE (OUTPATIENT)
Dept: ONCOLOGY | Facility: CLINIC | Age: 51
End: 2021-01-06

## 2021-01-06 NOTE — TELEPHONE ENCOUNTER
Social Work Progress Note      Data/Intervention:  Patient Name:  Saray Huntley  /Age:  1970 (50 year old)    Reason for Follow-Up:  Saray is a 50-year-old woman with a diagnosis of breast cancer who was originally diagnosed 2018. Saray is s/p neoadjuvant chemotherapy, lumpectomy, radiation, and 1 year Herceptin. This clinician received referral from [facility] for support in identifying therapist that is in-network.     Intervention:   Saray reports that Dr. Stock and Tamir Mohamud are not in-network with insurance plan and that she learned that [facility] is considered in-network. SW reviewed with Saray steps that she can take to transfer oncologic care to [facility], and also discussed avenues for finding therapist that is covered by her insurance plan. Saray expressed appreciation, and acknowledged that she would reach out as needed for support. Reviewed Saray's upcoming appointment 2021, and she will work to establish with new provider prior to that date.     Resources Provided:  [facility]  Psychology Today  [facility] ([facility])    Plan:  1) This clinician will [facility] Dr[facility] Stock [facility].   2) SW will continue to be available for ongoing psychosocial support as needed. Saray knows to call in the case of concern or need.     Please call or page if needs or concerns arise.     ALEA Tapia, [facility]  Direct Phone: [phone]  Pager: [phone]

## 2021-01-15 ENCOUNTER — HEALTH MAINTENANCE LETTER (OUTPATIENT)
Age: 51
End: 2021-01-15

## 2021-03-20 ENCOUNTER — IMMUNIZATION (OUTPATIENT)
Dept: NURSING | Facility: CLINIC | Age: 51
End: 2021-03-20
Payer: COMMERCIAL

## 2021-03-20 PROCEDURE — 0001A PR COVID VAC PFIZER DIL RECON 30 MCG/0.3 ML IM: CPT

## 2021-03-20 PROCEDURE — 91300 PR COVID VAC PFIZER DIL RECON 30 MCG/0.3 ML IM: CPT

## 2021-03-21 ENCOUNTER — HEALTH MAINTENANCE LETTER (OUTPATIENT)
Age: 51
End: 2021-03-21

## 2021-04-09 ENCOUNTER — TRANSFERRED RECORDS (OUTPATIENT)
Dept: HEALTH INFORMATION MANAGEMENT | Facility: CLINIC | Age: 51
End: 2021-04-09

## 2021-04-10 ENCOUNTER — IMMUNIZATION (OUTPATIENT)
Dept: NURSING | Facility: CLINIC | Age: 51
End: 2021-04-10
Attending: INTERNAL MEDICINE
Payer: COMMERCIAL

## 2021-04-10 PROCEDURE — 0002A PR COVID VAC PFIZER DIL RECON 30 MCG/0.3 ML IM: CPT

## 2021-04-10 PROCEDURE — 91300 PR COVID VAC PFIZER DIL RECON 30 MCG/0.3 ML IM: CPT

## 2021-09-04 ENCOUNTER — HEALTH MAINTENANCE LETTER (OUTPATIENT)
Age: 51
End: 2021-09-04

## 2022-02-19 ENCOUNTER — HEALTH MAINTENANCE LETTER (OUTPATIENT)
Age: 52
End: 2022-02-19

## 2022-02-23 NOTE — PROGRESS NOTES
Infusion Nursing Note:  Saray Huntley presents today for C1D1 Taxol, herceptin, Perjeta.    Patient seen by provider today: Yes: Dr. Stock   present during visit today: Not Applicable.    Note: N/A.    Intravenous Access:  Implanted Port.    Treatment Conditions:  Lab Results   Component Value Date    HGB 13.7 11/30/2018     Lab Results   Component Value Date    WBC 6.6 11/30/2018      Lab Results   Component Value Date    ANEU 3.8 11/30/2018     Lab Results   Component Value Date     11/30/2018      Lab Results   Component Value Date     11/30/2018                   Lab Results   Component Value Date    POTASSIUM 3.9 11/30/2018           No results found for: MAG         Lab Results   Component Value Date    CR 0.60 11/30/2018                   Lab Results   Component Value Date    ELIJAH 8.9 11/30/2018                Lab Results   Component Value Date    BILITOTAL 0.7 11/30/2018           Lab Results   Component Value Date    ALBUMIN 3.8 11/30/2018                    Lab Results   Component Value Date    ALT 66 11/30/2018           Lab Results   Component Value Date    AST 30 11/30/2018       Results reviewed, labs MET treatment parameters, ok to proceed with treatment.  ECHO 11/20/18  EF 64.7%    Post Infusion Assessment:  Patient tolerated infusion without incident.  Blood return noted pre and post infusion.  Site patent and intact, free from redness, edema or discomfort.  No evidence of extravasations.  Access discontinued per protocol.    Discharge Plan:   Discharge instructions reviewed with: Patient and Family.  Patient and/or family verbalized understanding of discharge instructions and all questions answered.  AVS to patient via [facility].  Patient will return 12/7/18 for next appointment.   Patient discharged in stable condition accompanied by: family members.  Departure Mode: Ambulatory to CT Scan.    Austin Khan RN                         Quality 110: Preventive Care And Screening: Influenza Immunization: Influenza Immunization Administered during Influenza season Detail Level: Detailed

## 2022-06-11 ENCOUNTER — HEALTH MAINTENANCE LETTER (OUTPATIENT)
Age: 52
End: 2022-06-11

## 2022-10-22 ENCOUNTER — HEALTH MAINTENANCE LETTER (OUTPATIENT)
Age: 52
End: 2022-10-22

## 2023-03-03 ENCOUNTER — HOSPITAL ENCOUNTER (OUTPATIENT)
Dept: MAMMOGRAPHY | Facility: CLINIC | Age: 53
Discharge: HOME | End: 2023-03-03
Attending: INTERNAL MEDICINE | Admitting: INTERNAL MEDICINE
Payer: COMMERCIAL

## 2023-03-03 DIAGNOSIS — Z12.31 BREAST CANCER SCREENING BY MAMMOGRAM: ICD-10-CM

## 2023-03-03 PROCEDURE — 77067 SCR MAMMO BI INCL CAD: CPT

## 2023-03-07 ASSESSMENT — ENCOUNTER SYMPTOMS
BREAST MASS: 0
MYALGIAS: 1

## 2023-03-08 ENCOUNTER — OFFICE VISIT (OUTPATIENT)
Dept: FAMILY MEDICINE | Facility: CLINIC | Age: 53
End: 2023-03-08
Payer: COMMERCIAL

## 2023-03-08 VITALS
DIASTOLIC BLOOD PRESSURE: 74 MMHG | TEMPERATURE: 99.8 F | HEART RATE: 64 BPM | RESPIRATION RATE: 18 BRPM | BODY MASS INDEX: 35.96 KG/M2 | WEIGHT: 195.4 LBS | OXYGEN SATURATION: 98 % | SYSTOLIC BLOOD PRESSURE: 112 MMHG | HEIGHT: 62 IN

## 2023-03-08 DIAGNOSIS — Z11.4 SCREENING FOR HIV (HUMAN IMMUNODEFICIENCY VIRUS): ICD-10-CM

## 2023-03-08 DIAGNOSIS — C50.919 MALIGNANT NEOPLASM OF FEMALE BREAST, UNSPECIFIED ESTROGEN RECEPTOR STATUS, UNSPECIFIED LATERALITY, UNSPECIFIED SITE OF BREAST (H): ICD-10-CM

## 2023-03-08 DIAGNOSIS — Z12.4 CERVICAL CANCER SCREENING: ICD-10-CM

## 2023-03-08 DIAGNOSIS — Z12.11 SCREEN FOR COLON CANCER: ICD-10-CM

## 2023-03-08 DIAGNOSIS — H92.02 OTALGIA, LEFT: ICD-10-CM

## 2023-03-08 DIAGNOSIS — Z00.00 ENCOUNTER FOR ANNUAL PHYSICAL EXAM: ICD-10-CM

## 2023-03-08 DIAGNOSIS — L25.9 CONTACT DERMATITIS, UNSPECIFIED CONTACT DERMATITIS TYPE, UNSPECIFIED TRIGGER: ICD-10-CM

## 2023-03-08 DIAGNOSIS — M79.662 PAIN OF LEFT LOWER LEG: ICD-10-CM

## 2023-03-08 DIAGNOSIS — Z11.59 NEED FOR HEPATITIS C SCREENING TEST: ICD-10-CM

## 2023-03-08 DIAGNOSIS — E78.00 PURE HYPERCHOLESTEROLEMIA WITH TARGET LOW DENSITY LIPOPROTEIN (LDL) CHOLESTEROL LESS THAN 160 MG/DL: ICD-10-CM

## 2023-03-08 DIAGNOSIS — E03.9 HYPOTHYROIDISM, UNSPECIFIED TYPE: Primary | ICD-10-CM

## 2023-03-08 DIAGNOSIS — Z13.220 SCREENING FOR HYPERLIPIDEMIA: ICD-10-CM

## 2023-03-08 LAB
ALBUMIN SERPL BCG-MCNC: 4.5 G/DL (ref 3.5–5.2)
ALP SERPL-CCNC: 125 U/L (ref 35–104)
ALT SERPL W P-5'-P-CCNC: 59 U/L (ref 10–35)
ANION GAP SERPL CALCULATED.3IONS-SCNC: 14 MMOL/L (ref 7–15)
AST SERPL W P-5'-P-CCNC: 48 U/L (ref 10–35)
BILIRUB SERPL-MCNC: 0.5 MG/DL
BUN SERPL-MCNC: 16.4 MG/DL (ref 6–20)
CALCIUM SERPL-MCNC: 9.5 MG/DL (ref 8.6–10)
CHLORIDE SERPL-SCNC: 103 MMOL/L (ref 98–107)
CHOLEST SERPL-MCNC: 282 MG/DL
CREAT SERPL-MCNC: 0.65 MG/DL (ref 0.51–0.95)
DEPRECATED HCO3 PLAS-SCNC: 22 MMOL/L (ref 22–29)
GFR SERPL CREATININE-BSD FRML MDRD: >90 ML/MIN/1.73M2
GLUCOSE SERPL-MCNC: 95 MG/DL (ref 70–99)
HDLC SERPL-MCNC: 47 MG/DL
HGB BLD-MCNC: 14 G/DL (ref 11.7–15.7)
LDLC SERPL CALC-MCNC: 200 MG/DL
NONHDLC SERPL-MCNC: 235 MG/DL
POTASSIUM SERPL-SCNC: 4.1 MMOL/L (ref 3.4–5.3)
PROT SERPL-MCNC: 7.9 G/DL (ref 6.4–8.3)
SODIUM SERPL-SCNC: 139 MMOL/L (ref 136–145)
TRIGL SERPL-MCNC: 175 MG/DL
TSH SERPL DL<=0.005 MIU/L-ACNC: 4.19 UIU/ML (ref 0.3–4.2)

## 2023-03-08 PROCEDURE — 87389 HIV-1 AG W/HIV-1&-2 AB AG IA: CPT | Performed by: FAMILY MEDICINE

## 2023-03-08 PROCEDURE — 90750 HZV VACC RECOMBINANT IM: CPT | Performed by: FAMILY MEDICINE

## 2023-03-08 PROCEDURE — G0145 SCR C/V CYTO,THINLAYER,RESCR: HCPCS | Performed by: FAMILY MEDICINE

## 2023-03-08 PROCEDURE — 99386 PREV VISIT NEW AGE 40-64: CPT | Mod: 25 | Performed by: FAMILY MEDICINE

## 2023-03-08 PROCEDURE — 86803 HEPATITIS C AB TEST: CPT | Performed by: FAMILY MEDICINE

## 2023-03-08 PROCEDURE — 80053 COMPREHEN METABOLIC PANEL: CPT | Performed by: FAMILY MEDICINE

## 2023-03-08 PROCEDURE — 99213 OFFICE O/P EST LOW 20 MIN: CPT | Mod: 25 | Performed by: FAMILY MEDICINE

## 2023-03-08 PROCEDURE — 84443 ASSAY THYROID STIM HORMONE: CPT | Performed by: FAMILY MEDICINE

## 2023-03-08 PROCEDURE — 36415 COLL VENOUS BLD VENIPUNCTURE: CPT | Performed by: FAMILY MEDICINE

## 2023-03-08 PROCEDURE — 80061 LIPID PANEL: CPT | Performed by: FAMILY MEDICINE

## 2023-03-08 PROCEDURE — 90471 IMMUNIZATION ADMIN: CPT | Performed by: FAMILY MEDICINE

## 2023-03-08 PROCEDURE — 87624 HPV HI-RISK TYP POOLED RSLT: CPT | Performed by: FAMILY MEDICINE

## 2023-03-08 PROCEDURE — 85018 HEMOGLOBIN: CPT | Performed by: FAMILY MEDICINE

## 2023-03-08 RX ORDER — TRIAMCINOLONE ACETONIDE 1 MG/G
CREAM TOPICAL 2 TIMES DAILY
Qty: 15 G | Refills: 1 | Status: SHIPPED | OUTPATIENT
Start: 2023-03-08 | End: 2023-08-25

## 2023-03-08 ASSESSMENT — PAIN SCALES - GENERAL: PAINLEVEL: MODERATE PAIN (5)

## 2023-03-08 ASSESSMENT — ENCOUNTER SYMPTOMS
MYALGIAS: 1
BREAST MASS: 0

## 2023-03-08 NOTE — PROGRESS NOTES
SUBJECTIVE:   CC: Saray is an 53 year old who presents for preventive health visit.   Patient has been advised of split billing requirements and indicates understanding: Yes  Healthy Habits:     Getting at least 3 servings of Calcium per day:  Yes    Bi-annual eye exam:  Yes    Dental care twice a year:  Yes    Sleep apnea or symptoms of sleep apnea:  None    Diet:  Regular (no restrictions)    Frequency of exercise:  2-3 days/week    Duration of exercise:  15-30 minutes    Taking medications regularly:  Yes    PHQ-2 Total Score: 0    Additional concerns today:  Yes    Patient history of breast cancer s/p treatment.  Currently she is cancer free and on regular mammograms.  Due for Pap smear which she would like to get it today.  Ongoing leg pain knee pain she has seen provider at [facility] however physical therapy has not helped.  She would like to reestablish somebody in [place] and would like to see a sports medicine referral.  Skin irritation on the neck after wearing some close over-the-counter medication not helping she would like to try some prescription steroid medication.  Ear fullness on the left side.  No recent trauma denies any upper respiratory symptoms.    Today's PHQ-2 Score:   PHQ-2 ( 1999 [employer]) 3/7/2023   Q1: Little interest or pleasure in doing things 0   Q2: Feeling down, depressed or hopeless 0   PHQ-2 Score 0   PHQ-2 Total Score (12-17 Years)- Positive if 3 or more points; Administer PHQ-A if positive -   Q1: Little interest or pleasure in doing things Not at all   Q2: Feeling down, depressed or hopeless Not at all   PHQ-2 Score 0       Have you ever done Advance Care Planning? (For example, a Health Directive, POLST, or a discussion with a medical provider or your loved ones about your wishes):     Social History     Tobacco Use     Smoking status: Never     Smokeless tobacco: Never   Substance Use Topics     Alcohol use: No         Alcohol Use 3/7/2023   Prescreen: >3 drinks/day or >7  "drinks/week? Not Applicable       Reviewed orders with patient.  Reviewed health maintenance and updated orders accordingly - Yes  Lab work is in process    Breast Cancer Screening:  Yearly.    History of abnormal Pap smear: NO - age 30-65 PAP every 5 years with negative HPV co-testing recommended  PAP / HPV Latest Ref Rng & Units 9/29/2017   PAP (Historical) - NIL   HPV16 NEG:Negative Negative   HPV18 NEG:Negative Negative   HRHPV NEG:Negative Negative     Reviewed and updated as needed this visit by clinical staff   Tobacco  Allergies  Meds              Reviewed and updated as needed this visit by Provider                     Review of Systems   HENT: Positive for ear pain.    Breasts:  Negative for tenderness, breast mass and discharge.   Genitourinary: Negative for pelvic pain, vaginal bleeding and vaginal discharge.   Musculoskeletal: Positive for myalgias.   Skin: Positive for rash.     Rest of the review of systems is negative.     OBJECTIVE:   /74   Pulse 64   Temp 99.8  F (37.7  C) (Tympanic)   Resp 18   Ht 1.575 m (5' 2\")   Wt 88.6 kg (195 lb 6.4 oz)   LMP 09/29/2012 (Approximate)   SpO2 98%   BMI 35.74 kg/m    Physical Exam  GENERAL: healthy, alert and no distress  EYES: Eyes grossly normal to inspection, PERRL and conjunctivae and sclerae normal  HENT: ear canals and TM's normal, nose and mouth without ulcers or lesions  NECK: no adenopathy, no asymmetry, masses, or scars and thyroid normal to palpation  RESP: lungs clear to auscultation - no rales, rhonchi or wheezes  CV: regular rate and rhythm, normal S1 S2, no S3 or S4, no murmur, click or rub, no peripheral edema and peripheral pulses strong  ABDOMEN: soft, nontender, no hepatosplenomegaly, no masses and bowel sounds normal  MS: no gross musculoskeletal defects noted, no edema  NEURO: Normal strength and tone, mentation intact and speech normal  PSYCH: mentation appears normal, affect normal/bright  Neck range of motion is normal " there is no obvious signs of any deformity.  No calf tenderness.  Skin has some irritation on the neck with erythema.  No obvious blisters.      ASSESSMENT/PLAN:   Saray was seen today for physical, leg pain, neck pain, rash and ear problem.    Diagnoses and all orders for this visit:    Hypothyroidism, unspecified type         TSH with free T4 reflex; Future  -     TSH with free T4 reflex  History of thyroid issue we will get the lab work done and follow-up on that  Encounter for annual physical exam  -     Comprehensive metabolic panel; Future  -     TSH with free T4 reflex; Future  -     Lipid Profile; Future  -     Hemoglobin; Future  -     Comprehensive metabolic panel  -     TSH with free T4 reflex  -     Lipid Profile  -     Hemoglobin    Screen for colon cancer  -     Colonoscopy Screening  Referral; Future    Screening for HIV (human immunodeficiency virus)  -     HIV Antigen Antibody Combo; Future  -     HIV Antigen Antibody Combo    Need for hepatitis C screening test  -     Hepatitis C Screen Reflex to HCV RNA Quant and Genotype; Future  -     Hepatitis C Screen Reflex to HCV RNA Quant and Genotype    Cervical cancer screening  -     Pap Screen with HPV - recommended age 30 - 65 years    Screening for hyperlipidemia  -       Malignant neoplasm of female breast, unspecified estrogen receptor status, unspecified laterality, unspecified site of breast (H)    Otalgia, left  I  do not see any signs of any infection or any other etiology if this continues you to see a ENT.  Contact dermatitis, unspecified contact dermatitis type, unspecified trigger  -     triamcinolone (KENALOG) 0.1 % external cream; Apply topically 2 times daily  Advised use Kenalog as suggested.  If this continue or worsening she needs to follow-up.  Avoid triggers  Pain of left lower leg  -     Orthopedic  Referral; Future  Etiology not clear she has some work-up done in [place].  Would like to reevaluate  that.  Other orders  -     REVIEW OF HEALTH MAINTENANCE PROTOCOL ORDERS  -     ZOSTER VACCINE RECOMBINANT ADJUVANTED (SHINGRIX)        Patient has been advised of split billing requirements and indicates understanding: Yes      COUNSELING:  Reviewed preventive health counseling, as reflected in patient instructions       Healthy diet/nutrition       Vision screening        She reports that she has never smoked. She has never used smokeless tobacco.      Devin Crowley MD  [facility]

## 2023-03-09 LAB
HCV AB SERPL QL IA: NONREACTIVE
HIV 1+2 AB+HIV1 P24 AG SERPL QL IA: NONREACTIVE

## 2023-03-10 LAB
BKR LAB AP GYN ADEQUACY: NORMAL
BKR LAB AP GYN INTERPRETATION: NORMAL
BKR LAB AP HPV REFLEX: NORMAL
BKR LAB AP PREVIOUS ABNORMAL: NORMAL
PATH REPORT.COMMENTS IMP SPEC: NORMAL
PATH REPORT.COMMENTS IMP SPEC: NORMAL
PATH REPORT.RELEVANT HX SPEC: NORMAL

## 2023-03-14 LAB
HUMAN PAPILLOMA VIRUS 16 DNA: NEGATIVE
HUMAN PAPILLOMA VIRUS 18 DNA: NEGATIVE
HUMAN PAPILLOMA VIRUS FINAL DIAGNOSIS: NORMAL
HUMAN PAPILLOMA VIRUS OTHER HR: NEGATIVE

## 2023-05-02 ENCOUNTER — TELEPHONE (OUTPATIENT)
Dept: GASTROENTEROLOGY | Facility: CLINIC | Age: 53
End: 2023-05-02
Payer: COMMERCIAL

## 2023-05-02 ENCOUNTER — HOSPITAL ENCOUNTER (OUTPATIENT)
Facility: CLINIC | Age: 53
End: 2023-05-02
Attending: COLON & RECTAL SURGERY | Admitting: COLON & RECTAL SURGERY
Payer: COMMERCIAL

## 2023-05-02 NOTE — TELEPHONE ENCOUNTER
Screening Questions  BLUE  KIND OF PREP RED  LOCATION [review exclusion criteria] GREEN  SEDATION TYPE        Y Are you active on mychart?       EMORY HERNANDEZ Ordering/Referring Provider?        HP What type of coverage do you have?      N Have you had a positive covid test in the last 14 days?     35.7 1. BMI  [BMI 40+ - review exclusion criteria& smart-phrase document]    Y  2. Are you able to give consent for your medical care? [IF NO,RN REVIEW]          N  3. Are you taking any prescription pain medications on a routine schedule   (ex narcotics: oxycodone, roxicodone, oxycontin,  and percocet)? [RN Review]          3a. EXTENDED PREP What kind of prescription?     N 4. Do you have any chemical dependencies such as alcohol, street drugs, or methadone?        **If yes 3- 5 , please schedule with MAC sedation.**          IF YES TO ANY 6 - 10 - HOSPITAL SETTING ONLY.     N 6.   Do you need assistance transferring?     N 7.   Have you had a heart or lung transplant?    N 8.   Are you currently on dialysis?   N 9.   Do you use daily home oxygen?   N 10. Do you take nitroglycerin?   10a.  If yes, how often?     11. [FEMALES]   Are you currently pregnant?    11a.  If yes, how many weeks? [ Greater than 12 weeks, OR NEEDED]    N 12. Do you have Pulmonary Hypertension? *NEED PAC APPT AT [facility] w/ MAC*     N 13. [review exclusion criteria]  Do you have any implantable devices in your body (pacemaker, defib, LVAD)?    N 14. In the past 6 months, have you had any heart related issues including cardiomyopathy or heart attack?     14a.  If yes, did it require cardiac stenting if so when?     N 15. Have you had a stroke or Transient ischemic attack (TIA - aka  mini stroke ) within 6 months?      N 16. Do you have mod to severe Obstructive Sleep Apnea?  [Hospital only]    N 17. Do you have SEVERE AND UNCONTROLLED asthma? *NEED PAC APPT AT [facility] w/MAC*     18. Are you currently taking any blood thinners?     18a. No. Continue to  "19.   18b.    N 19. Do you take the medication Phentermine?    19a. If yes, \"Hold for 7 days before procedure.  Please consult your prescribing provider if you have questions about holding this medication.\"     N  20. Do you have chronic kidney disease?      N  21. Do you have a diagnosis of diabetes?     N  22. On a regular basis do you go 3-5 days between bowel movements?      23. Preferred LOCAL Pharmacy for Pre Prescription    [ LIST ONLY ONE PHARMACY]       [id number] - [place], [place] - [address]        - CLOSING REMINDERS -    Informed patient they will need an adult    Cannot take any type of public or medical transportation alone    Conscious Sedation- Needs  for 6 hours after the procedure       MAC/General-Needs  for 24 hours after procedure    Pre-Procedure Covid test to be completed [[facility] PCR Testing Required]    Confirmed Nurse will call to complete assessment       - SCHEDULING DETAILS -  N Hospital Setting Required? If yes, what is the exclusion?:    YAEL  Surgeon    7/31  Date of Procedure  Lower Endoscopy [Colonoscopy]  [facility]ALAX GATORADE WITHOUT MAGNEISUM CITRATE Which Colonoscopy Prep was Sent?     CS Sedation Type     N PAC / Pre-op Required                 "

## 2023-06-02 NOTE — TELEPHONE ENCOUNTER
NEUROSURGERY- NEW PREVISIT PLANNING       Record Status/Location     Referring Provider  self   Diagnosis  Neck   MRI (HEAD, NECK, SPINE)  no   CT  no   X-ray  no   INJECTION  no   PHYSICAL THERAPY In chart Only of lumbar, nothing of cervical   SURGERY  no

## 2023-06-05 ENCOUNTER — OFFICE VISIT (OUTPATIENT)
Dept: NEUROSURGERY | Facility: CLINIC | Age: 53
End: 2023-06-05
Payer: COMMERCIAL

## 2023-06-05 ENCOUNTER — PRE VISIT (OUTPATIENT)
Dept: NEUROSURGERY | Facility: CLINIC | Age: 53
End: 2023-06-05

## 2023-06-05 DIAGNOSIS — M54.2 NECK PAIN: Primary | ICD-10-CM

## 2023-06-05 PROCEDURE — 99203 OFFICE O/P NEW LOW 30 MIN: CPT | Performed by: PHYSICIAN ASSISTANT

## 2023-06-05 NOTE — NURSING NOTE
"Saray Sifuentes is a 53 year old female who presents for:  Chief Complaint   Patient presents with     Consult     Right sided neck pain radiating into shoulder, aching throbbing        Initial Vitals:  LMP 09/29/2012 (Approximate)  Estimated body mass index is 35.74 kg/m  as calculated from the following:    Height as of 3/8/23: 5' 2\" (1.575 m).    Weight as of 3/8/23: 195 lb 6.4 oz (88.6 kg).. There is no height or weight on file to calculate BSA. BP completed using cuff size: NA (Not Taken)  Data Unavailable    Nursing Comments:       Beata Orellana    "

## 2023-06-05 NOTE — LETTER
6/5/2023         RE: Saray Sifuentes  [address] [place] [address]        Dear Colleague,    Thank you for referring your patient, Saray Sifuentes, to the [facility]. Please see a copy of my visit note below.    Neurosurgery Consult    HPI    Ms. Huntley is a 53 old female who states about 4 to 5 months ago in the winter she pushed really hard while she was snowblowing and felt a tweak/crack in her neck on the right side, she has been having some intermittent muscular neck pain since that time, she denies any radicular pain or numbness or weakness in her upper extremities, denies any loss of dexterity in her fingers loss of coordination of her lower extremities.  She has not tried any conservative therapies.    Medical history  Obesity  History of breast cancer    Social history  Works in IT, sitting      B/P: Data Unavailable, T: Data Unavailable, P: Data Unavailable, R: Data Unavailable       Exam    Alert and oriented no acute distress  Bilateral upper extremities with 5/5 strength  Aimee sign negative  Reflexes 2+ triceps, biceps, brachioradialis    Bilateral lower extremities with 5/5 strength  Reflexes 2+ patella/ankle  Negative straight leg raise bilaterally  Negative ankle clonus negative Babinski bilaterally  Gait is normal    Imaging    No imaging to review    Assessment    Neck pain without radiculopathy    Plan:      I recommend the patient begin with a trial of physical therapy and follow-up as needed if she is not improving.          Again, thank you for allowing me to participate in the care of your patient.        Sincerely,        Shweta Breaux PA-C

## 2023-06-05 NOTE — PROGRESS NOTES
Neurosurgery Consult    HPI    Ms. Huntley is a 53 old female who states about 4 to 5 months ago in the winter she pushed really hard while she was snowblowing and felt a tweak/crack in her neck on the right side, she has been having some intermittent muscular neck pain since that time, she denies any radicular pain or numbness or weakness in her upper extremities, denies any loss of dexterity in her fingers loss of coordination of her lower extremities.  She has not tried any conservative therapies.    Medical history  Obesity  History of breast cancer    Social history  Works in [employer]      B/P: Data Unavailable, T: Data Unavailable, P: Data Unavailable, R: Data Unavailable       Exam    Alert and oriented no acute distress  Bilateral upper extremities with 5/5 strength  Aimee sign negative  Reflexes 2+ triceps, biceps, brachioradialis    Bilateral lower extremities with 5/5 strength  Reflexes 2+ patella/ankle  Negative straight leg raise bilaterally  Negative ankle clonus negative Babinski bilaterally  Gait is normal    Imaging    No imaging to review    Assessment    Neck pain without radiculopathy    Plan:      I recommend the patient begin with a trial of physical therapy and follow-up as needed if she is not improving.

## 2023-06-06 NOTE — PROGRESS NOTES
CHIEF COMPLAINT:  Pain of the Left Lower Leg       HISTORY OF PRESENT ILLNESS  Ms. Audi Sifuentes is a pleasant 53 year old female who presents to clinic today with left lower leg pain.  Saray explains that she's had lateral sided lower leg pain for over 2 years with no known injury or trauma. She was previously treated at [facility]/ [facility], but due to lack of improvement with treatment plan she has transitioned care to [facility]. She has had an extensive workup to date with US doppler, MRI Tibia/fibula, MRI lumbar, Tibia XR.  Treatment has included lidocaine patches, PT with use of graston technique, HEP with stretching and left knee corticosteroid injection. NO improvement with any treatment thus far.    Working diagnosis was intraarticular knee pathology, possible referred from lateral meniscus.  Injection did seem to provide modest benefit only lasting 3 days.     Onset: sudden  Location: left lower leg pain  Quality:  Aching pain   Duration: 1+ years   Severity: 7/10 at worst  Timing:constant  Modifying factors:  resting and non-use makes it better, standing and walking with pain at lateral aspect of calf/proximal gastrocnemius  Associated signs & symptoms: pain is constant and worsens with exercise  Previous similar pain: No  Treatments to date: PT, HEP, OTCs, left knee CSI completed at [facility] on 11/16/22 provided relief for 3 days, XR 5/20/22, MRI 6/2/22.  Acupuncture temporary relief only.    No improvement at any point with treatments to date.    Additional history: Left lower extremity ultrasound completed on 11/23/21 was negative for DVT. Lumbar MRI on 02/06/22 negative. MRI lower leg negative      Review of Systems:    Have you recently had a a fever, chills, weight loss? No    Do you have any vision problems? No    Do you have any chest pain or edema? No    Do you have any shortness of breath or wheezing?  No    Do you have stomach problems? No    Do you have any numbness or focal weakness?  "No    Do you have diabetes? No    Do you have problems with bleeding or clotting? No    Do you have an rashes or other skin lesions? No      MEDICAL HISTORY  Patient Active Problem List   Diagnosis     Hypothyroidism, unspecified type     Malignant neoplasm of upper-outer quadrant of left breast in female, estrogen receptor positive (H)     Port-A-Cath in place       Current Outpatient Medications   Medication Sig Dispense Refill     triamcinolone (KENALOG) 0.1 % external cream Apply topically 2 times daily 15 g 1       Allergies   Allergen Reactions     Sulfa Antibiotics Rash       Family History   Problem Relation Age of Onset     Hyperlipidemia Mother      Kidney Disease Mother      Diabetes Father        Additional medical/Social/Surgical histories reviewed in [facility] and updated as appropriate.       PHYSICAL EXAM  /80   Ht 1.549 m (5' 1\")   Wt 91.3 kg (201 lb 4.8 oz)   LMP 09/29/2012 (Approximate)   BMI 38.04 kg/m      General  - normal appearance, in no obvious distress  Musculoskeletal - Left knee  - stance: normal gait without limp  - inspection: no swelling or effusion, normal bone and joint alignment, no obvious deformity  - palpation: no joint line tenderness, patella and patellar tendon non-tender. Tender to palpation at proximal lateral gastrocnemius and fibular head region.  - ROM: 135 degrees flexion, -5 degrees extension, not painful, normal actively and passively compared to contralateral  - strength: 5/5 in flexion, 5/5 in extension  - special tests:  (-) Lachman  (-) Sandro  (-) varus at 0 and 30 degrees flexion  (-) valgus at 0 and 30 degrees flexion  Neuro  - no sensory or motor deficit, grossly normal coordination, normal muscle tone  Skin  - no ecchymosis, erythema, warmth, or induration, no obvious rash    IMAGING :   MRI TIBIA FIBULA LOWER LEG 6/2/23    IMPRESSION:       1. No osseous contusion, stress reaction, or fracture involving the left tibia or fibula     XR TIBIA FIBULA " 5/20/23    COMPARISON:  None.     FINDINGS:  Left tibia and fibula 2 views. No fracture of the left tibia or fibula is identified. Joint spaces appear to be within normal limits.  There is a small posterior calcaneal enthesophyte. No significant abnormality is identified.     MRI LUMBAR SPINE 2/6/22    FINDINGS:   There are 5 lumbar-type vertebrae. Correlation with this numbering scheme is recommended prior to any planned surgical intervention. Lumbar vertebral heights are preserved. No significant spondylolisthesis. Distal spinal cord is unremarkable.     T12-L1: Unremarkable.     L1-2: Unremarkable.     L2-3: Unremarkable.       L3-4: Unremarkable.     L4-5: Facet arthropathy. No significant canal or foraminal narrowing.     L5-S1: Mild facet arthropathy. No significant canal or foraminal narrowing.       IMPRESSION:     1. No acute fracture or spondylolisthesis.   2. No significant spinal stenosis or neural foraminal narrowing.     US LEFT LOWER EXTREMITY DOPPLER    CLINICAL HISTORY:  left calf pain     FINDINGS: The venous system of the left lower extremity was visualized using color-flow Doppler technique.  The common femoral, proximal deep femoral, femoral, popliteal, and visualized portions of the posterior tibial and peroneal veins show normal compressibility, color flow, and response to augmentation. The greater saphenous vein has normal compression.     IMPRESSION:    1. No evidence of deep venous thrombosis. Small calf vein thrombosis cannot be completely excluded by this technique.        ASSESSMENT & PLAN  Ms. Audi Sifuentes is a 53 year old year old female who presents to clinic today with chronic left lateral lower leg pain present consistently over the past 2 years without improvement after conventional treatment to date.      Etiology remains unclear. Concern for possible peroneal nerve entrapment although symptoms are not classic. I maintain this concern as vascular, bony or muscular or lumbar  etiologies have been effectively ruled out through the above workup and imaging including DVT study on 11/23/21, MRI lumbar on 2/6/22, MRI tibia and fibula on 6/1/22, XR tibia on 5/20/22.    Diagnosis: Pain of left lower leg    -EMG ordered left lower extremity  -Consideration for gabapentin, will discuss after EMG  -No formal MRI knee ordered, this could be a separate avenue for referred pain to proximal calf and will consider this image if EMG normal.  -Continue ibuprofen/tylenol  -Follow up after EMG completed    Extensive review of XR, MRI x2, US, and notes from [facility] provider, Dr. Palacios, PCP and PT Akira Freedman.  50 minutes on date of the encounter doing chart review, history and examination, independent imaging review, documentation, and additional activities noted above.      It was a pleasure seeing Saray today.    Jemal Alexandre DO, CAM  Primary Care Sports Medicine

## 2023-06-07 ENCOUNTER — OFFICE VISIT (OUTPATIENT)
Dept: ORTHOPEDICS | Facility: CLINIC | Age: 53
End: 2023-06-07
Attending: FAMILY MEDICINE
Payer: COMMERCIAL

## 2023-06-07 VITALS
HEIGHT: 61 IN | SYSTOLIC BLOOD PRESSURE: 124 MMHG | WEIGHT: 201.3 LBS | BODY MASS INDEX: 38.01 KG/M2 | DIASTOLIC BLOOD PRESSURE: 80 MMHG

## 2023-06-07 DIAGNOSIS — M79.662 PAIN OF LEFT LOWER LEG: Primary | ICD-10-CM

## 2023-06-07 PROCEDURE — 99204 OFFICE O/P NEW MOD 45 MIN: CPT | Performed by: FAMILY MEDICINE

## 2023-06-07 NOTE — PATIENT INSTRUCTIONS
Thank you for choosing [facility]    DR [facility]RIGAL[facility]S CLIN[facility] LOCATIONS  [place] [address] [address] [facility], [address]th [place], [address] [place] [address]   [phone]       APPOINTMENTS: [phone]    CARE QUESTIONS: [phone],    BILLING QUESTIONS: [phone]    FAX NUMBER: [phone]        Follow up: after completion of your EMG- please call [phone] to schedule this appointment.      1. Pain of left lower leg      An EMG referral was placed today at [facility]. You can call [phone] to schedule at your convenience.

## 2023-06-07 NOTE — LETTER
6/7/2023         RE: Saray Sifuentes  [address] [place] [address]        Dear Colleague,    Thank you for referring your patient, Saray Sifuentes, to the [facility] [place]. Please see a copy of my visit note below.    CHIEF COMPLAINT:  Pain of the Left Lower Leg       HISTORY OF PRESENT ILLNESS  Ms. Audi Sifuentes is a pleasant 53 year old female who presents to clinic today with left lower leg pain.  Saray explains that she's had lateral sided lower leg pain for over 2 years with no known injury or trauma. She was previously treated at [facility]/ [facility], but due to lack of improvement with treatment plan she has transitioned care to the [facility]. She has had an extensive workup to date with US doppler, MRI Tibia/fibula, MRI lumbar, Tibia XR.  Treatment has included lidocaine patches, PT with use of graston technique, HEP with stretching and left knee corticosteroid injection. NO improvement with any treatment thus far.    Working diagnosis was intraarticular knee pathology, possible referred from lateral meniscus.  Injection did seem to provide modest benefit only lasting 3 days.     Onset: sudden  Location: left lower leg pain  Quality:  Aching pain   Duration: 1+ years   Severity: 7/10 at worst  Timing:constant  Modifying factors:  resting and non-use makes it better, standing and walking with pain at lateral aspect of calf/proximal gastrocnemius  Associated signs & symptoms: pain is constant and worsens with exercise  Previous similar pain: No  Treatments to date: PT, HEP, OTCs, left knee CSI completed at [facility] on 11/16/22 provided relief for 3 days, XR 5/20/22, MRI 6/2/22.  Acupuncture temporary relief only.    No improvement at any point with treatments to date.    Additional history: Left lower extremity ultrasound completed on 11/23/21 was negative for DVT. Lumbar MRI on 02/06/22 negative. MRI lower leg negative      Review of  "Systems:    Have you recently had a a fever, chills, weight loss? No    Do you have any vision problems? No    Do you have any chest pain or edema? No    Do you have any shortness of breath or wheezing?  No    Do you have stomach problems? No    Do you have any numbness or focal weakness? No    Do you have diabetes? No    Do you have problems with bleeding or clotting? No    Do you have an rashes or other skin lesions? No      MEDICAL HISTORY  Patient Active Problem List   Diagnosis     Hypothyroidism, unspecified type     Malignant neoplasm of upper-outer quadrant of left breast in female, estrogen receptor positive (H)     Port-A-Cath in place       Current Outpatient Medications   Medication Sig Dispense Refill     triamcinolone (KENALOG) 0.1 % external cream Apply topically 2 times daily 15 g 1       Allergies   Allergen Reactions     Sulfa Antibiotics Rash       Family History   Problem Relation Age of Onset     Hyperlipidemia Mother      Kidney Disease Mother      Diabetes Father        Additional medical/Social/Surgical histories reviewed in [facility] and updated as appropriate.       PHYSICAL EXAM  /80   Ht 1.549 m (5' 1\")   Wt 91.3 kg (201 lb 4.8 oz)   LMP 09/29/2012 (Approximate)   BMI 38.04 kg/m      General  - normal appearance, in no obvious distress  Musculoskeletal - Left knee  - stance: normal gait without limp  - inspection: no swelling or effusion, normal bone and joint alignment, no obvious deformity  - palpation: no joint line tenderness, patella and patellar tendon non-tender. Tender to palpation at proximal lateral gastrocnemius and fibular head region.  - ROM: 135 degrees flexion, -5 degrees extension, not painful, normal actively and passively compared to contralateral  - strength: 5/5 in flexion, 5/5 in extension  - special tests:  (-) Lachman  (-) Sandro  (-) varus at 0 and 30 degrees flexion  (-) valgus at 0 and 30 degrees flexion  Neuro  - no sensory or motor deficit, grossly " normal coordination, normal muscle tone  Skin  - no ecchymosis, erythema, warmth, or induration, no obvious rash    IMAGING :   MRI TIBIA FIBULA LOWER LEG 6/2/23    IMPRESSION:       1. No osseous contusion, stress reaction, or fracture involving the left tibia or fibula     XR TIBIA FIBULA 5/20/23    COMPARISON:  None.     FINDINGS:  Left tibia and fibula 2 views. No fracture of the left tibia or fibula is identified. Joint spaces appear to be within normal limits.  There is a small posterior calcaneal enthesophyte. No significant abnormality is identified.     MRI LUMBAR SPINE 2/6/22    FINDINGS:   There are 5 lumbar-type vertebrae. Correlation with this numbering scheme is recommended prior to any planned surgical intervention. Lumbar vertebral heights are preserved. No significant spondylolisthesis. Distal spinal cord is unremarkable.     T12-L1: Unremarkable.     L1-2: Unremarkable.     L2-3: Unremarkable.       L3-4: Unremarkable.     L4-5: Facet arthropathy. No significant canal or foraminal narrowing.     L5-S1: Mild facet arthropathy. No significant canal or foraminal narrowing.       IMPRESSION:     1. No acute fracture or spondylolisthesis.   2. No significant spinal stenosis or neural foraminal narrowing.     US LEFT LOWER EXTREMITY DOPPLER    CLINICAL HISTORY:  left calf pain     FINDINGS: The venous system of the left lower extremity was visualized using color-flow Doppler technique.  The common femoral, proximal deep femoral, femoral, popliteal, and visualized portions of the posterior tibial and peroneal veins show normal compressibility, color flow, and response to augmentation. The greater saphenous vein has normal compression.     IMPRESSION:    1. No evidence of deep venous thrombosis. Small calf vein thrombosis cannot be completely excluded by this technique.        ASSESSMENT & PLAN  Ms. Audi Sifuentes is a 53 year old year old female who presents to clinic today with chronic left lateral lower  leg pain present consistently over the past 2 years without improvement after conventional treatment to date.      Etiology remains unclear. Concern for possible peroneal nerve entrapment although symptoms are not classic. I maintain this concern as vascular, bony or muscular or lumbar etiologies have been effectively ruled out through the above workup and imaging including DVT study on 11/23/21, MRI lumbar on 2/6/22, MRI tibia and fibula on 6/1/22, XR tibia on 5/20/22.    Diagnosis: Pain of left lower leg    -EMG ordered left lower extremity  -Consideration for gabapentin, will discuss after EMG  -No formal MRI knee ordered, this could be a separate avenue for referred pain to proximal calf and will consider this image if EMG normal.  -Continue ibuprofen/tylenol  -Follow up after EMG completed    Extensive review of XR, MRI x2, US, and notes from [facility] provider, Dr. Palacios, PCP and PT Akira Freedman.  50 minutes on date of the encounter doing chart review, history and examination, independent imaging review, documentation, and additional activities noted above.      It was a pleasure seeing Saray today.    Jemal Alexandre DO, [facility]  Primary Care Sports Medicine      Again, thank you for allowing me to participate in the care of your patient.        Sincerely,        Jemal Alexandre DO

## 2023-07-17 ENCOUNTER — OFFICE VISIT (OUTPATIENT)
Dept: FAMILY MEDICINE | Facility: CLINIC | Age: 53
End: 2023-07-17
Payer: COMMERCIAL

## 2023-07-17 VITALS
OXYGEN SATURATION: 95 % | HEART RATE: 73 BPM | DIASTOLIC BLOOD PRESSURE: 75 MMHG | TEMPERATURE: 97.8 F | RESPIRATION RATE: 18 BRPM | SYSTOLIC BLOOD PRESSURE: 114 MMHG

## 2023-07-17 DIAGNOSIS — S13.9XXA NECK SPRAIN, INITIAL ENCOUNTER: ICD-10-CM

## 2023-07-17 DIAGNOSIS — Z12.11 SCREEN FOR COLON CANCER: ICD-10-CM

## 2023-07-17 DIAGNOSIS — H69.91 DYSFUNCTION OF RIGHT EUSTACHIAN TUBE: ICD-10-CM

## 2023-07-17 DIAGNOSIS — G62.9 PERIPHERAL POLYNEUROPATHY: Primary | ICD-10-CM

## 2023-07-17 PROCEDURE — 82607 VITAMIN B-12: CPT | Performed by: FAMILY MEDICINE

## 2023-07-17 PROCEDURE — 36415 COLL VENOUS BLD VENIPUNCTURE: CPT | Performed by: FAMILY MEDICINE

## 2023-07-17 PROCEDURE — 99214 OFFICE O/P EST MOD 30 MIN: CPT | Performed by: FAMILY MEDICINE

## 2023-07-17 PROCEDURE — 82306 VITAMIN D 25 HYDROXY: CPT | Performed by: FAMILY MEDICINE

## 2023-07-17 PROCEDURE — 82728 ASSAY OF FERRITIN: CPT | Performed by: FAMILY MEDICINE

## 2023-07-17 PROCEDURE — 83540 ASSAY OF IRON: CPT | Performed by: FAMILY MEDICINE

## 2023-07-17 PROCEDURE — 83550 IRON BINDING TEST: CPT | Performed by: FAMILY MEDICINE

## 2023-07-17 RX ORDER — GABAPENTIN 100 MG/1
100 CAPSULE ORAL 2 TIMES DAILY
Qty: 60 CAPSULE | Refills: 0 | Status: SHIPPED | OUTPATIENT
Start: 2023-07-17 | End: 2023-08-25

## 2023-07-17 ASSESSMENT — ENCOUNTER SYMPTOMS
COUGH: 1
FATIGUE: 1

## 2023-07-17 ASSESSMENT — PAIN SCALES - GENERAL: PAINLEVEL: MODERATE PAIN (5)

## 2023-07-17 NOTE — PROGRESS NOTES
"  Assessment & Plan     Screen for colon cancer    - Fecal colorectal cancer screen (FIT); Future    Peripheral polyneuropathy  Has been worsening with frequent flares, she had chemotherapy for breast cancer in the past, sx has started with it  Will have her to try gabapentin for next 1-2months and recheck   - gabapentin (NEURONTIN) 100 MG capsule; Take 1 capsule (100 mg) by mouth 2 times daily  - Vitamin B12; Future  - Vitamin D Deficiency; Future  - Ferritin; Future  - Iron and iron binding capacity; Future    Dysfunction of right eustachian tube  Has been worsened with sinus infection  Encouraged her to work on conservative measure     Neck sprain, initial encounter  Will have her to start PT,   - Physical Therapy Referral; Future           BMI:   Estimated body mass index is 38.04 kg/m  as calculated from the following:    Height as of 6/7/23: 1.549 m (5' 1\").    Weight as of 6/7/23: 91.3 kg (201 lb 4.8 oz).   Weight management plan: Discussed healthy diet and exercise guidelines    FUTURE APPOINTMENTS:       - Follow-up visit in 2 months if not improving,    Stewart Burns MD  [facility] ADONAY So is a 53 year old, presenting for the following health issues:  Cough and Fatigue         No data to display              Cough    Fatigue  Associated symptoms include coughing and fatigue.   History of Present Illness       Reason for visit:  Several illnesses  Symptom onset:  More than a month    She eats 0-1 servings of fruits and vegetables daily.She consumes 1 sweetened beverage(s) daily.She exercises with enough effort to increase her heart rate 10 to 19 minutes per day.  She exercises with enough effort to increase her heart rate 3 or less days per week.   She is taking medications regularly.     Patient is wanting to do a FIT test rather than a colonoscopy.   Current symptoms:  - cough/ cold (at this time does not want to talk about those)  1. Bilateral calf pain  2. Left " sided leg pain 06/07 saw sports med; EMG ordered  3. Right thumb pain  4. Back of neck pain 06/05 saw neurology - thought they were going to order PT, heard nothing about it at this time  5. Back pain    Symptoms: pain ranging from 6-7/10 generally. Weakness, vibrations on right top of ear, near the vein?  Moderate to severe regarding pain level,   duration: a long time  Medications: ibu or tylenol as needed      Review of Systems   Constitutional: Positive for fatigue.   Respiratory: Positive for cough.             Objective    /75   Pulse 73   Temp 97.8  F (36.6  C) (Temporal)   Resp 18   LMP 09/29/2012 (Approximate)   SpO2 95%   There is no height or weight on file to calculate BMI.  Physical Exam   GENERAL: healthy, alert and no distress  NECK: no adenopathy, no asymmetry, masses, or scars and thyroid normal to palpation  RESP: lungs clear to auscultation - no rales, rhonchi or wheezes  CV: regular rate and rhythm, normal S1 S2, no S3 or S4, no murmur, click or rub, no peripheral edema and peripheral pulses strong  ABDOMEN: soft, nontender, no hepatosplenomegaly, no masses and bowel sounds normal  MS: no gross musculoskeletal defects noted, no edema

## 2023-07-18 LAB
DEPRECATED CALCIDIOL+CALCIFEROL SERPL-MC: 23 UG/L (ref 20–75)
FERRITIN SERPL-MCNC: 205 NG/ML (ref 11–328)
IRON BINDING CAPACITY (ROCHE): 368 UG/DL (ref 240–430)
IRON SATN MFR SERPL: 23 % (ref 15–46)
IRON SERPL-MCNC: 86 UG/DL (ref 37–145)
VIT B12 SERPL-MCNC: 850 PG/ML (ref 232–1245)

## 2023-07-31 ENCOUNTER — THERAPY VISIT (OUTPATIENT)
Dept: PHYSICAL THERAPY | Facility: CLINIC | Age: 53
End: 2023-07-31
Attending: FAMILY MEDICINE
Payer: COMMERCIAL

## 2023-07-31 DIAGNOSIS — M54.2 NECK PAIN: Primary | ICD-10-CM

## 2023-07-31 DIAGNOSIS — S13.9XXA NECK SPRAIN, INITIAL ENCOUNTER: ICD-10-CM

## 2023-07-31 PROCEDURE — 97162 PT EVAL MOD COMPLEX 30 MIN: CPT | Mod: GP | Performed by: PHYSICAL THERAPIST

## 2023-07-31 PROCEDURE — 97110 THERAPEUTIC EXERCISES: CPT | Mod: GP | Performed by: PHYSICAL THERAPIST

## 2023-07-31 NOTE — PROGRESS NOTES
PHYSICAL THERAPY EVALUATION  Type of Visit: Evaluation    See electronic medical record for Abuse and Falls Screening details.    Subjective       Presenting condition or subjective complaint: neck pain  Date of onset: 03/15/23    Relevant medical history: Cancer; Dizziness; Neck injury   Dates & types of surgery:      Prior diagnostic imaging/testing results:       Prior therapy history for the same diagnosis, illness or injury: No      Pt was snowblowing and she got neck pain afterwards- that was how this started initially.    Prior Level of Function  Transfers: Independent  Ambulation: Independent  ADL: Independent  IADL:     Living Environment  Social support: With family members   Type of home: House   Stairs to enter the home: No       Ramp: No   Stairs inside the home: Yes 2 Is there a railing: Yes   Help at home: None  Equipment owned:       Employment:   IT  Hobbies/Interests: walking    Patient goals for therapy: Ease the discomfort     Objective   CERVICAL SPINE EVALUATION  PHYSICAL THERAPY EVALUATION  Type of Visit: Evaluation    See electronic medical record for Abuse and Falls Screening details.    Subjective       Presenting condition or subjective complaint: neck pain  Date of onset: 03/15/23    Relevant medical history: Cancer; Dizziness; Neck injury   Dates & types of surgery:      Prior diagnostic imaging/testing results:       Prior therapy history for the same diagnosis, illness or injury: No      Prior Level of Function  Transfers:   Ambulation:   ADL:   IADL:     Living Environment  Social support: With family members   Type of home: House   Stairs to enter the home: No       Ramp: No   Stairs inside the home: Yes 2 Is there a railing: Yes   Help at home: None  Equipment owned:       Employment:   IT  Hobbies/Interests: walking    Patient goals for therapy: Ease the discomfort    Pain assessment:      Objective       Assessment & Plan   CLINICAL IMPRESSIONS  Medical Diagnosis: Neck sprain,  initial encounter    Treatment Diagnosis: neck pain   Impression/Assessment: Patient is a 53 year old female with neck complaints.  The following significant findings have been identified: Pain, Decreased ROM/flexibility, Decreased activity tolerance, and Impaired posture. These impairments interfere with their ability to perform self care tasks, recreational activities, and household chores as compared to previous level of function.     Clinical Decision Making (Complexity):  Clinical Presentation: Evolving/Changing  Clinical Presentation Rationale: based on medical and personal factors listed in PT evaluation  Clinical Decision Making (Complexity): Low complexity    PLAN OF CARE  Treatment Interventions:  Interventions: Manual Therapy, Neuromuscular Re-education, Therapeutic Activity, Therapeutic Exercise    Long Term Goals     PT Goal 1  Goal Identifier: Reaching/lifting  Goal Description: Pt will be able to lift a plate/cup to place overhead into a mid-level shelf with 0/10 concordant neck pain.  Rationale: to maximize safety and independence with performance of ADLs and functional tasks;to maximize safety and independence with self cares;to maximize safety and independence within the home;to maximize safety and independence within the community  Target Date: 09/11/23      Frequency of Treatment: 1x/week  Duration of Treatment: 6 weeks    Recommended Referrals to Other Professionals:   Education Assessment:   Learner/Method: No Barriers to Learning    Risks and benefits of evaluation/treatment have been explained.   Patient/Family/caregiver agrees with Plan of Care.     Evaluation Time:     PT Eval, Moderate Complexity Minutes (13099): 20       Signing Clinician: Xena Padron PT        Work mechanical stresses: sitting in front computer   Leisure mechanical stresses: walking - sometimes bothers neck  Functional disability score (NDI):    VAS score (0-10): 5/10 at worst with lifting/reaching  activities.    ADDITIONAL HISTORY:  Present symptoms:  upper thoracic/lower cervical pain, primarily on the R side.  Pain quality: Aching and Dull  Paresthesia (yes/no):  no - except she has neuropathy from chemo.    Symptoms (improving/unchanging/worsening):  improved since beginning, but is now unchanging.  Symptoms commenced as a result of: snowblowing   Condition occurred in the following environment:  home    Symptoms at onset (neck/arm/forearm/headache): upper thoracic/lower cervical spine on R.  Constant symptoms (neck/arm/forearm/headache): upper thoracic/lower cervical spine on R.  Intermittent symptoms (neck/arm/forearm/headache): none    Symptoms are made worse with the following: reaching, lifting overhead   Symptoms are made better with the following: nothing    Disturbed sleep (yes/no): no  Number of pillows: n/a  Sleeping postures (prone/sup/side R/L): n/a    Previous episodes (0/1-5/6-10/11+): none Year of first episode: n/a    Previous history: n/a  Previous treatments: n/a    Specific Questions: (as reported by the patient)  Dizziness/Tinnitus/Nausea/Swallowing (pos/neg): vertigo - positional  Gait/Upper Limbs (normal/abnormal): normal  Medications (nil/NSAIDS/anlag/steroids/anticoag/other):  gabapentin - not helpful and feels worse sometimes with use - so will need to change to something else.  Medical allergies:  yes - see epic  General health (excellent/good/fair/poor):  good  Pertinent medical history:    Imaging (None/Xray/MRI/Other):  no for neck  Recent or major surgery (yes/no): no  Night pain (yes/no): no  Accidents (yes/no): no  Unexplained weight loss (yes/no): no  Barriers at home: no  Other red flags: no    Postural Observation:   Sitting: Neutral  Protruded head: Yes Lateral deviation:  Nil.  Change of posture: No effect Wry Neck: Nil  Other observations/functional baselines:      Neurological:  Motor Deficit:  normal   Reflexes:    Sensory Deficit:     Neurodynamic tests:       Movement Loss:   Srikanth Mod Min Nil Symptoms   Protrusion        Flexion    X    Retraction   X     Extension   X     Lateral flexion R    X  (+)   Lateral flexion L    X    Rotation R    X    Rotation L   X       Shoulder AROM  R shoulder AROM flex : WNL (concordant neck pain)  R shoulder AROM abd: WNL (pulling at neck),  R shoulder AROM ext/IR: WNL  R shoulder AROM flex/ER: WNL    Shoulder MMT: normal - no pain in flexion, abduction, ER, IR and bicep.    Test Movements:   During: produces, abolishes, increases, decreases, no effect, centralizing, peripheralizing  After: better, worse, no better, no worse, no effect, centralized, peripheralized    Symptomatic response Mechanical response    During testing After testing Effect - increased ROM, decreased ROM, or key functional test No Effect   Pretest symptoms sittin/10 at R upper thoracic/lower cervical spine     Rep PRO       Rep RET No Effect    No Worse        Rep RET EXT Decreases    Better    R shoulder AROM flex easier and less painful, neck ROM improved in all directions             Pretest symptoms lyin-2/10 R neck/upper thoracic pain   During testing After testing Effect - increased ROM, decreased ROM, or key functional test No Effect   Rep RET No Effect    No Effect    X    Rep RET EXT         If required, pretest symptoms sitting:      During testing After testing Effect - increased ROM, decreased ROM, or key functional test No Effect   Rep LF-R       Rep LF-L       Rep ROT-R       Rep ROT-L       Rep FLEX         Static Tests:     Other Tests:     Provisional Classification:  Derangement - Asymmetrical, unilateral, symptoms above elbow    Potential Drivers of Pain and/or Disability: none    Principle of Management:  Education:  Postural education, frequency of HEP needed to test for effect. Equipment provided:  none  Mechanical therapy (Y/N):  Y   Extension principle:  repeated sitting cervical retraction/ext x 10 repetitions, every 2-3 hrs.       MYOTOMES:   DTR S:   CORD SIGNS:   DERMATOMES:   NEURAL TENSION:   FLEXIBILITY:    SPECIAL TESTS:   PALPATION:   SPINAL SEGMENTAL CONCLUSIONS:       Assessment & Plan   CLINICAL IMPRESSIONS  Medical Diagnosis: Neck sprain, initial encounter    Treatment Diagnosis: neck pain   Impression/Assessment: Patient is a 53 year old female with neck complaints.  The following significant findings have been identified: Pain, Decreased ROM/flexibility, Impaired muscle performance, Decreased activity tolerance, and Impaired posture. These impairments interfere with their ability to perform self care tasks, recreational activities, household chores, and community mobility as compared to previous level of function.     Clinical Decision Making (Complexity):  Clinical Presentation: Evolving/Changing  Clinical Presentation Rationale: based on medical and personal factors listed in PT evaluation  Clinical Decision Making (Complexity): Low complexity    PLAN OF CARE  Treatment Interventions:  Interventions: Manual Therapy, Neuromuscular Re-education, Therapeutic Activity, Therapeutic Exercise    Long Term Goals     PT Goal 1  Goal Identifier: Reaching/lifting  Goal Description: Pt will be able to lift a plate/cup to place overhead into a mid-level shelf with 0/10 concordant neck pain.  Rationale: to maximize safety and independence with performance of ADLs and functional tasks;to maximize safety and independence with self cares;to maximize safety and independence within the home;to maximize safety and independence within the community  Target Date: 09/11/23      Frequency of Treatment: 1x/week  Duration of Treatment: 6 weeks    Recommended Referrals to Other Professionals:   Education Assessment:   Learner/Method: No Barriers to Learning    Risks and benefits of evaluation/treatment have been explained.   Patient/Family/caregiver agrees with Plan of Care.     Evaluation Time:     PT Eval, Moderate Complexity Minutes (81109): 20        Signing Clinician: Xena Padron PT

## 2023-08-14 ENCOUNTER — THERAPY VISIT (OUTPATIENT)
Dept: PHYSICAL THERAPY | Facility: CLINIC | Age: 53
End: 2023-08-14
Attending: FAMILY MEDICINE
Payer: COMMERCIAL

## 2023-08-14 DIAGNOSIS — S13.9XXA NECK SPRAIN, INITIAL ENCOUNTER: Primary | ICD-10-CM

## 2023-08-14 DIAGNOSIS — M54.2 NECK PAIN: ICD-10-CM

## 2023-08-14 PROCEDURE — 97140 MANUAL THERAPY 1/> REGIONS: CPT | Mod: GP | Performed by: PHYSICAL THERAPIST

## 2023-08-14 PROCEDURE — 97110 THERAPEUTIC EXERCISES: CPT | Mod: GP | Performed by: PHYSICAL THERAPIST

## 2023-08-14 PROCEDURE — 97112 NEUROMUSCULAR REEDUCATION: CPT | Mod: GP | Performed by: PHYSICAL THERAPIST

## 2023-08-16 ENCOUNTER — OFFICE VISIT (OUTPATIENT)
Dept: FAMILY MEDICINE | Facility: CLINIC | Age: 53
End: 2023-08-16
Payer: COMMERCIAL

## 2023-08-16 DIAGNOSIS — L82.0 INFLAMED SEBORRHEIC KERATOSIS: Primary | ICD-10-CM

## 2023-08-16 PROCEDURE — 17111 DESTRUCTION B9 LESIONS 15/>: CPT | Performed by: NURSE PRACTITIONER

## 2023-08-16 ASSESSMENT — PAIN SCALES - GENERAL: PAINLEVEL: MODERATE PAIN (4)

## 2023-08-16 NOTE — PROGRESS NOTES
[facility] Dermatology Note  Encounter Date: Aug 16, 2023  Office Visit     Reviewed patients past medical history and pertinent chart review prior to patients visit today.     Dermatology Problem List:  ISK, neckline and face. Cryo 8/16/2023     ____________________________________________    Assessment & Plan:     # Irritated and inflamed Seborrheic Keratosis. Discussed treatment options with patient including no treatment, cryotherapy, and shave removal. Patient prefers cryotherapy today due to irritation and itching. After verbal consent and discussion of risks and benefits including but no limited to dyspigmentation/scar, blister, and pain, 25+ was(were) treated with 1-2mm freeze border for 2 cycles with liquid nitrogen. Post cryotherapy instructions were provided.         Olga Lidia Dick CNP  Dermatology    _______________________________________    CC: Derm Problem (Skin tags around neck and face/Dark spots on cheeks)    HPI:  Ms. Saray Sifuentes is a(n) 53 year old female who presents today as a new patient for presumed skin tags on the neckline and on the face.  She would like these treated as they are rubbing and painful and bothersome to her.    Patient is otherwise feeling well, without additional skin concerns.      Physical Exam:  SKIN: Focused examination of face neck and upper chest was performed.  -Several brown waxy papules about 1 to 2 mm in size scattered on the neckline and upper chest and eyelids    - No other lesions of concern on areas examined.     Medications:  Current Outpatient Medications   Medication    gabapentin (NEURONTIN) 300 MG capsule    gabapentin (NEURONTIN) 100 MG capsule    triamcinolone (KENALOG) 0.1 % external cream     No current facility-administered medications for this visit.     Facility-Administered Medications Ordered in Other Visits   Medication    lidocaine-prilocaine (EMLA) cream      Past Medical History:   Patient Active Problem List    Diagnosis    Hypothyroidism, unspecified type    Malignant neoplasm of upper-outer quadrant of left breast in female, estrogen receptor positive (H)    Port-A-Cath in place    Neck sprain, initial encounter    Neck pain     Past Medical History:   Diagnosis Date    Breast cancer (H)     Hypothyroidism, unspecified type 9/29/2017    Neuropathy     PONV (postoperative nausea and vomiting)     Vertigo        CC No referring provider defined for this encounter. on close of this encounter.

## 2023-08-16 NOTE — LETTER
8/16/2023         RE: Saray Sifuentes  [address] [place] [address]        Dear Colleague,    Thank you for referring your patient, Saray Sifuentes, to the [facility]. Please see a copy of my visit note below.    [facility] Dermatology Note  Encounter Date: Aug 16, 2023  Office Visit     Reviewed patients past medical history and pertinent chart review prior to patients visit today.     Dermatology Problem List:  ISK, neckline and face. Cryo 8/16/2023     ____________________________________________    Assessment & Plan:     # Irritated and inflamed Seborrheic Keratosis. Discussed treatment options with patient including no treatment, cryotherapy, and shave removal. Patient prefers cryotherapy today due to irritation and itching. After verbal consent and discussion of risks and benefits including but no limited to dyspigmentation/scar, blister, and pain, 25+ was(were) treated with 1-2mm freeze border for 2 cycles with liquid nitrogen. Post cryotherapy instructions were provided.         Olga Lidia Dick, [facility]  Dermatology    _______________________________________    CC: Derm Problem (Skin tags around neck and face/Dark spots on cheeks)    HPI:  Ms. Saray Sifuentes is a(n) 53 year old female who presents today as a new patient for presumed skin tags on the neckline and on the face.  She would like these treated as they are rubbing and painful and bothersome to her.    Patient is otherwise feeling well, without additional skin concerns.      Physical Exam:  SKIN: Focused examination of face neck and upper chest was performed.  -Several brown waxy papules about 1 to 2 mm in size scattered on the neckline and upper chest and eyelids    - No other lesions of concern on areas examined.     Medications:  Current Outpatient Medications   Medication     gabapentin (NEURONTIN) 300 MG capsule     gabapentin (NEURONTIN) 100 MG capsule     triamcinolone  (KENALOG) 0.1 % external cream     No current facility-administered medications for this visit.     Facility-Administered Medications Ordered in Other Visits   Medication     lidocaine-prilocaine (EMLA) cream      Past Medical History:   Patient Active Problem List   Diagnosis     Hypothyroidism, unspecified type     Malignant neoplasm of upper-outer quadrant of left breast in female, estrogen receptor positive (H)     Port-A-Cath in place     Neck sprain, initial encounter     Neck pain     Past Medical History:   Diagnosis Date     Breast cancer (H)      Hypothyroidism, unspecified type 9/29/2017     Neuropathy      PONV (postoperative nausea and vomiting)      Vertigo        CC No referring provider defined for this encounter. on close of this encounter.       Again, thank you for allowing me to participate in the care of your patient.        Sincerely,        IRINA Thomas CNP

## 2023-08-21 ENCOUNTER — MYC MEDICAL ADVICE (OUTPATIENT)
Dept: FAMILY MEDICINE | Facility: CLINIC | Age: 53
End: 2023-08-21
Payer: COMMERCIAL

## 2023-08-25 ENCOUNTER — VIRTUAL VISIT (OUTPATIENT)
Dept: FAMILY MEDICINE | Facility: CLINIC | Age: 53
End: 2023-08-25
Payer: COMMERCIAL

## 2023-08-25 DIAGNOSIS — G62.9 PERIPHERAL POLYNEUROPATHY: Primary | ICD-10-CM

## 2023-08-25 DIAGNOSIS — C50.412 MALIGNANT NEOPLASM OF UPPER-OUTER QUADRANT OF LEFT BREAST IN FEMALE, ESTROGEN RECEPTOR POSITIVE (H): ICD-10-CM

## 2023-08-25 DIAGNOSIS — E03.9 HYPOTHYROIDISM, UNSPECIFIED TYPE: ICD-10-CM

## 2023-08-25 DIAGNOSIS — Z17.0 MALIGNANT NEOPLASM OF UPPER-OUTER QUADRANT OF LEFT BREAST IN FEMALE, ESTROGEN RECEPTOR POSITIVE (H): ICD-10-CM

## 2023-08-25 PROCEDURE — 99214 OFFICE O/P EST MOD 30 MIN: CPT | Mod: VID | Performed by: FAMILY MEDICINE

## 2023-08-25 RX ORDER — AMITRIPTYLINE HYDROCHLORIDE 10 MG/1
10 TABLET ORAL AT BEDTIME
Qty: 30 TABLET | Refills: 1 | Status: SHIPPED | OUTPATIENT
Start: 2023-08-25 | End: 2023-10-27

## 2023-08-25 NOTE — PROGRESS NOTES
Saray is a 53 year old who is being evaluated via a billable video visit.      How would you like to obtain your AVS? MyChart  If the video visit is dropped, the invitation should be resent by: Text to cell phone: [phone]  Will anyone else be joining your video visit? No          Assessment & Plan     Peripheral polyneuropathy  Gabapentin made her having side effect and drowsiness, has been stopped last 3 days and started having withdrawal sx, will have her to try TCA for next 1 months and recheck   - amitriptyline (ELAVIL) 10 MG tablet; Take 1 tablet (10 mg) by mouth At Bedtime    Malignant neoplasm of upper-outer quadrant of left breast in female, estrogen receptor positive (H)  Her neuropathy is associated with chemotherapy for breast cancer, will have her to work on medical management mentioned above     Hypothyroidism, unspecified type  Stable, seems not associated with neuropathy            FUTURE APPOINTMENTS:       - Follow-up visit in 1 month     Stewart Burns MD  [facility]    Subjective   Saray is a 53 year old, presenting for the following health issues:  Recheck Medication (Medication is not working for her)      8/25/2023    10:31 AM   Additional Questions   Roomed by Nieves ROACH Medication review.      How many servings of fruits and vegetables do you eat daily?  0-1  On average, how many sweetened beverages do you drink each day (Examples: soda, juice, sweet tea, etc.  Do NOT count diet or artificially sweetened beverages)?   0  How many days per week do you exercise enough to make your heart beat faster? 3 or less  How many minutes a day do you exercise enough to make your heart beat faster? 30 - 60  How many days per week do you miss taking your medication? 0  Pain History:  When did you first notice your pain? Several months   Have you seen this provider for your pain in the past? Yes   Where in your body do you have pain? Legs  Are you seeing anyone else for  your pain? No                Chronic Pain Follow Up:    Location of pain: legs  Analgesia/pain control:    - Recent changes:  Increase in Gabapentin dose    - Overall control: Inadequate pain control    - Current treatments: Gabapentin, 300 mg   Adherence:     - Do you ever take more pain medicine than prescribed? No    - When did you take your last dose of pain medicine?  Day after started.   Adverse effects: Yes: Twitching, irritability, more pain. Angry   PDMP Review       None          Last CSA Agreement:   CSA -- Patient Level:    CSA: None found at the patient level.       Review of Systems   Constitutional, HEENT, cardiovascular, pulmonary, gi and gu systems are negative, except as otherwise noted.      Objective           Vitals:  No vitals were obtained today due to virtual visit.    Physical Exam   GENERAL: Healthy, alert and no distress  EYES: Eyes grossly normal to inspection.  No discharge or erythema, or obvious scleral/conjunctival abnormalities.  RESP: No audible wheeze, cough, or visible cyanosis.  No visible retractions or increased work of breathing.    SKIN: Visible skin clear. No significant rash, abnormal pigmentation or lesions.  NEURO: Cranial nerves grossly intact.  Mentation and speech appropriate for age.  PSYCH: Mentation appears normal, affect normal/bright, judgement and insight intact, normal speech and appearance well-groomed.                Video-Visit Details    Type of service:  Video Visit     Originating Location (pt. Location): Home    Distant Location (provider location):  On-site  Platform used for Video Visit: [employer]    Video start:12:00  Video finish:12:38

## 2023-09-08 ENCOUNTER — OFFICE VISIT (OUTPATIENT)
Dept: NEUROLOGY | Facility: CLINIC | Age: 53
End: 2023-09-08
Attending: FAMILY MEDICINE
Payer: COMMERCIAL

## 2023-09-08 DIAGNOSIS — M79.662 PAIN OF LEFT LOWER LEG: ICD-10-CM

## 2023-09-08 PROCEDURE — 95909 NRV CNDJ TST 5-6 STUDIES: CPT | Performed by: PHYSICAL MEDICINE & REHABILITATION

## 2023-09-08 PROCEDURE — 95886 MUSC TEST DONE W/N TEST COMP: CPT | Mod: LT | Performed by: PHYSICAL MEDICINE & REHABILITATION

## 2023-09-08 NOTE — LETTER
2023       RE: Saray Sifuentes  [address] [place] [address]     Dear Colleague,    Thank you for referring your patient, Saray Sifuentes, to the [facility] [place] at [facility]. Please see a copy of my visit note below.                            [facility]  Electrodiagnostic Laboratory                 Department of Neurology                                                                                                         Test Date:  2023    Patient: Saray Sifuentes : 1970 Physician: Ale Hutchinson MD   Sex: Female AGE: 53 year Ref Phys: Jemal Alexandre, DO   ID#: [id number]   Technician: Reinier Sánchez     History and Examination:  Saray Sifuentes is a 54 yo female who presents with 2 year h/o left leg pain. Query peripheral mononeuropathy vs lumbosacral radiculopathy.     Techniques:  Motor conduction studies were done with surface recording electrodes. Sensory conduction studies were performed with surface electrodes, unless indicated otherwise by (n), designating the use of subdermal recording electrodes. Temperature was monitored and recorded throughout the study. Upper extremities were maintained at a temperature of 32 degrees Centigrade or higher.  EMG was done with a concentric needle electrode.     Results:  All the nerve conduction studies were normal.     All examined muscles (as indicated in the following table) showed no evidence of electrical instability.        Interpretation:  Normal study    --There is no electrodiagnostic evidence of a left lower extremity radiculopathy.    --There is no electrodiagnostic evidence of a tibial,  fibular or sural mononeuropathy in the left lower extremity.      ___________________________  Ale Hutchinson MD        Nerve Conduction Studies  Motor Sites      Latency Amplitude Neg. Amp Diff Segment Distance Velocity Neg. Dur Neg Area  Diff Temperature Comment   Site (ms) Norm (mV) Norm %  cm m/s Norm ms %  C    Left Fibular (EDB) Motor   Ankle 4.3  < 6.0 2.6  > 2.5  Ankle-EDB 8   6.6  31.1    Bel Fib Head 9.5 - 2.5 - -3.8 Bel Fib Head-Ankle 28.5 55  > 38 7.0 -4.0 32.1    Pop Fossa 10.8 - 2.4 - -4.0 Pop Fossa-Bel Fib Head 6 46  > 38 7.0 -5.2 32.1    Left Tibial (AHB) Motor   Ankle 3.8  < 6.5 11.1  > 5.0  Ankle-AHB 8   5.2  31.6    Knee 10.9 - 9.7 - -12.6 Knee-Ankle 31 44  > 38 5.1 -13.9 31.7      Sensory Sites      Onset Lat Peak Lat Amp (O-P) Amp (P-P) Segment Distance Velocity Temperature Comment   Site ms ms  V Norm  V  cm m/s Norm  C    Left Superficial Fibular Sensory   14 cm-Ankle 2.2 3.0 15  > 3 42 14 cm-Ankle 12.5 57  > 38 31.5    Right Superficial Fibular Sensory   14 cm-Ankle 2.4 3.1 21  > 3 22 14 cm-Ankle 12.5 52  > 38 31    Left Sural Sensory   Calf-Lat Mall 2.9 3.6 24  > 5 21 Calf-Lat Mall 14 48  > 38 31.3    Right Sural Sensory   Calf-Lat Mall 2.6 3.3 16  > 5 18 Calf-Lat Mall 14 54  > 38 31        Electromyography     Side Muscle Ins Act Fibs/PSW Fasc HF Amp Dur Poly Recrt Int Pat   Left Tib Anterior Nml None Nml 0 Nml Nml 0 Nml Nml   Left Gastroc Nml None Nml 0 Nml Nml 0 Nml Nml   Left Vastus Lat Nml None Nml 0 Nml Nml 0 Nml Nml   Left Biceps Fem SH Nml None Nml 0 Nml Nml 0 Nml Nml   Left Gluteus Med Nml None Nml 0 Nml Nml 0 Nml Nml         NCS Waveforms:    Motor         Sensory                  Again, thank you for allowing me to participate in the care of your patient.      Sincerely,    Ale Hutchinson MD

## 2023-09-08 NOTE — PROGRESS NOTES
[facility]  Electrodiagnostic Laboratory                 Department of Neurology                                                                                                         Test Date:  2023    Patient: Saray Sifuentes : 1970 Physician: Ale Hutchinson MD   Sex: Female AGE: 53 year Ref Phys: Jemal AlexandreDO   ID#: [id number]   Technician: Reinier Sánchez     History and Examination:  Saray Sifuentes is a 54 yo female who presents with 2 year h/o left leg pain. Query peripheral mononeuropathy vs lumbosacral radiculopathy.     Techniques:  Motor conduction studies were done with surface recording electrodes. Sensory conduction studies were performed with surface electrodes, unless indicated otherwise by (n), designating the use of subdermal recording electrodes. Temperature was monitored and recorded throughout the study. Upper extremities were maintained at a temperature of 32 degrees Centigrade or higher.  EMG was done with a concentric needle electrode.     Results:  All the nerve conduction studies were normal.     All examined muscles (as indicated in the following table) showed no evidence of electrical instability.        Interpretation:  Normal study    --There is no electrodiagnostic evidence of a left lower extremity radiculopathy.    --There is no electrodiagnostic evidence of a tibial,  fibular or sural mononeuropathy in the left lower extremity.      ___________________________  Ale Hutchinson MD        Nerve Conduction Studies  Motor Sites      Latency Amplitude Neg. Amp Diff Segment Distance Velocity Neg. Dur Neg Area Diff Temperature Comment   Site (ms) Norm (mV) Norm %  cm m/s Norm ms %  C    Left Fibular (EDB) Motor   Ankle 4.3  < 6.0 2.6  > 2.5  Ankle-EDB 8   6.6  31.1    Bel Fib Head 9.5 - 2.5 - -3.8 Bel Fib Head-Ankle 28.5 55  > 38 7.0 -4.0 32.1    Pop Fossa 10.8 - 2.4 - -4.0 Pop Fossa-Bel Fib Head 6 46  > 38 7.0 -5.2 32.1     Left Tibial (AHB) Motor   Ankle 3.8  < 6.5 11.1  > 5.0  Ankle-AHB 8   5.2  31.6    Knee 10.9 - 9.7 - -12.6 Knee-Ankle 31 44  > 38 5.1 -13.9 31.7      Sensory Sites      Onset Lat Peak Lat Amp (O-P) Amp (P-P) Segment Distance Velocity Temperature Comment   Site ms ms  V Norm  V  cm m/s Norm  C    Left Superficial Fibular Sensory   14 cm-Ankle 2.2 3.0 15  > 3 42 14 cm-Ankle 12.5 57  > 38 31.5    Right Superficial Fibular Sensory   14 cm-Ankle 2.4 3.1 21  > 3 22 14 cm-Ankle 12.5 52  > 38 31    Left Sural Sensory   Calf-Lat Mall 2.9 3.6 24  > 5 21 Calf-Lat Mall 14 48  > 38 31.3    Right Sural Sensory   Calf-Lat Mall 2.6 3.3 16  > 5 18 Calf-Lat Mall 14 54  > 38 31        Electromyography     Side Muscle Ins Act Fibs/PSW Fasc HF Amp Dur Poly Recrt Int Pat   Left Tib Anterior Nml None Nml 0 Nml Nml 0 Nml Nml   Left Gastroc Nml None Nml 0 Nml Nml 0 Nml Nml   Left Vastus Lat Nml None Nml 0 Nml Nml 0 Nml Nml   Left Biceps Fem SH Nml None Nml 0 Nml Nml 0 Nml Nml   Left Gluteus Med Nml None Nml 0 Nml Nml 0 Nml Nml         NCS Waveforms:    Motor         Sensory

## 2023-10-27 DIAGNOSIS — G62.9 PERIPHERAL POLYNEUROPATHY: ICD-10-CM

## 2023-10-27 RX ORDER — AMITRIPTYLINE HYDROCHLORIDE 10 MG/1
10 TABLET ORAL AT BEDTIME
Qty: 30 TABLET | Refills: 1 | Status: SHIPPED | OUTPATIENT
Start: 2023-10-27 | End: 2023-11-03

## 2023-11-03 ENCOUNTER — VIRTUAL VISIT (OUTPATIENT)
Dept: FAMILY MEDICINE | Facility: CLINIC | Age: 53
End: 2023-11-03
Payer: COMMERCIAL

## 2023-11-03 DIAGNOSIS — Z17.0 MALIGNANT NEOPLASM OF UPPER-OUTER QUADRANT OF LEFT BREAST IN FEMALE, ESTROGEN RECEPTOR POSITIVE (H): ICD-10-CM

## 2023-11-03 DIAGNOSIS — E03.9 HYPOTHYROIDISM, UNSPECIFIED TYPE: ICD-10-CM

## 2023-11-03 DIAGNOSIS — G62.9 PERIPHERAL POLYNEUROPATHY: Primary | ICD-10-CM

## 2023-11-03 DIAGNOSIS — C50.412 MALIGNANT NEOPLASM OF UPPER-OUTER QUADRANT OF LEFT BREAST IN FEMALE, ESTROGEN RECEPTOR POSITIVE (H): ICD-10-CM

## 2023-11-03 PROCEDURE — 99214 OFFICE O/P EST MOD 30 MIN: CPT | Mod: VID | Performed by: FAMILY MEDICINE

## 2023-11-03 RX ORDER — AMITRIPTYLINE HYDROCHLORIDE 10 MG/1
20 TABLET ORAL AT BEDTIME
Qty: 60 TABLET | Refills: 3 | Status: SHIPPED | OUTPATIENT
Start: 2023-11-03 | End: 2024-09-20

## 2023-11-03 NOTE — PROGRESS NOTES
Saray is a 53 year old who is being evaluated via a billable video visit.      How would you like to obtain your AVS? MyChart  If the video visit is dropped, the invitation should be resent by: Text to cell phone: [phone]  Will anyone else be joining your video visit? No          Assessment & Plan     Peripheral polyneuropathy  Has gradual improvement with TCA lose dose, will have her to increase the dose to 15 - 20mg and recheck in 3 months   - amitriptyline (ELAVIL) 10 MG tablet; Take 2 tablets (20 mg) by mouth at bedtime    Hypothyroidism, unspecified type  stable    Malignant neoplasm of upper-outer quadrant of left breast in female, estrogen receptor positive (H)  Slightly increased sensitivity on left chest wall, will have her to continue monitoring and will consider early Mammogram if it is not improving within next 2 months              FUTURE APPOINTMENTS:       - Follow-up visit in 3 months     Stewart Burns MD  [facility]    Claudy So is a 53 year old, presenting for the following health issues:  No chief complaint on file.      11/3/2023     7:09 AM   Additional Questions   Roomed by Renzo   Accompanied by N/A       History of Present Illness       Reason for visit:  Medication recheck                Review of Systems   Constitutional, HEENT, cardiovascular, pulmonary, gi and gu systems are negative, except as otherwise noted.      Objective           Vitals:  No vitals were obtained today due to virtual visit.    Physical Exam   GENERAL: Healthy, alert and no distress  EYES: Eyes grossly normal to inspection.  No discharge or erythema, or obvious scleral/conjunctival abnormalities.  RESP: No audible wheeze, cough, or visible cyanosis.  No visible retractions or increased work of breathing.    SKIN: Visible skin clear. No significant rash, abnormal pigmentation or lesions.  NEURO: Cranial nerves grossly intact.  Mentation and speech appropriate for age.  PSYCH:  Mentation appears normal, affect normal/bright, judgement and insight intact, normal speech and appearance well-groomed.                Video-Visit Details    Type of service:  Video Visit     Originating Location (pt. Location): Home    Distant Location (provider location):  On-site  Platform used for Video Visit: Rupesh    Video start:7:40  Video finish: 8:10

## 2023-11-24 PROBLEM — S13.9XXA NECK SPRAIN, INITIAL ENCOUNTER: Status: RESOLVED | Noted: 2023-07-31 | Resolved: 2023-11-24

## 2023-11-24 PROBLEM — M54.2 NECK PAIN: Status: RESOLVED | Noted: 2023-07-31 | Resolved: 2023-11-24

## 2023-11-24 NOTE — PROGRESS NOTES
"    DISCHARGE  Reason for Discharge: Patient has failed to schedule further appointments.    Equipment Issued: none    Discharge Plan: Patient to continue home program.    Referring Provider:  Stewart Burns     08/14/23 0500   Appointment Info   Signing clinician's name / credentials Charity Hanks PT   Total/Authorized Visits 6 (E&T)   Visits Used 2   Medical Diagnosis Neck sprain, initial encounter   PT Tx Diagnosis neck pain   Precautions/Limitations recent h/o breast cancer   Progress Note/Certification   Onset of illness/injury or Date of Surgery 03/15/23   Therapy Frequency 1x/week   Predicted Duration 6 weeks   Progress Note Completed Date 07/31/23   PT Goal 1   Goal Identifier Reaching/lifting   Goal Description Pt will be able to lift a plate/cup to place overhead into a mid-level shelf with 0/10 concordant neck pain.   Rationale to maximize safety and independence with performance of ADLs and functional tasks;to maximize safety and independence with self cares;to maximize safety and independence within the home;to maximize safety and independence within the community   Goal Progress Not met due to no change in pain.  Continue.   Target Date 09/11/23   Subjective Report   Subjective Report \"Nothing has improved, I still get sore.\"  She still reports pain with lifting her R arm and has noticed this on the L as well recently.  She reports pain in the lower neck today 3/10 that is always there.  Neck and arms are also sore with using her arms to lift.   Objective Measures   Objective Measures Objective Measure 1   Objective Measure 1   Objective Measure Cervical AROM   Details B rotation nil loss, NE; extension min loss, no pain but apprehensive.  Extension ROM improved after thoracic and cervical extension mobs and also after ret/ext/thoracic extension in sitting--to do at home 10 reps, every 2 hours   Treatment Interventions (PT)   Interventions Therapeutic Procedure/Exercise;Neuromuscular Re-education;Manual " "Therapy   Therapeutic Procedure/Exercise   Therapeutic Procedures: strength, endurance, ROM, flexibillity minutes (49424) 18   Therapeutic Procedures Ther Proc 2;Ther Proc 3;Ther Proc 4   Ther Proc 1 seated retraction   Ther Proc 1 - Details x10 reps--NE/NE/NE--\"c\"   Ther Proc 2 seated retraction, hands on UTs/shoulders   Ther Proc 2 - Details x10 reps--increase central neck, NW, improved extension   Ther Proc 3 seated ret/ext, pillowcase at neck   Ther Proc 3 - Details x10 reps--increase central neck, better, improved extension--\"c\" due to better improvement with t-ext added   Ther Proc 4 seated ret/ext/t-ext   Ther Proc 4 - Details pillowcase at neck x10 reps, x5 reps--VC, MC, VSC for form, end range--to do 10 reps, every 2 hours--decreases central neck, better, improved extension   Neuromuscular Re-education   Neuromuscular re-ed of mvmt, balance, coord, kinesthetic sense, posture, proprioception minutes (02100) 8   Neuro Re-ed 1 Posture   Neuro Re-ed 1 - Details use of lumbar roll in sitting, importance/impact of posture and avoidance of fwd head, proper placement of lumbar roll, use in car and at home   Skilled Intervention education   Patient Response/Progress demonstrated good understanding   Manual Therapy   Manual Therapy: Mobilization, MFR, MLD, friction massage minutes (19125) 8   Manual Therapy 1 prone, C7-T7 ext mobs   Manual Therapy 1 - Details x8', gr I-III   Skilled Intervention hands-on technique   Patient Response/Progress decreased central neck pain and improved cerv extension with decr pain   Education   Learner/Method No Barriers to Learning   Plan   Plan for next session continue ret/ext/t-ext as helpful, posture   Total Session Time   Timed Code Treatment Minutes 34   Total Treatment Time (sum of timed and untimed services) 34       "

## 2024-01-23 ENCOUNTER — ANCILLARY PROCEDURE (OUTPATIENT)
Dept: GENERAL RADIOLOGY | Facility: CLINIC | Age: 54
End: 2024-01-23
Attending: INTERNAL MEDICINE
Payer: COMMERCIAL

## 2024-01-23 ENCOUNTER — OFFICE VISIT (OUTPATIENT)
Dept: FAMILY MEDICINE | Facility: CLINIC | Age: 54
End: 2024-01-23
Payer: COMMERCIAL

## 2024-01-23 VITALS
RESPIRATION RATE: 14 BRPM | DIASTOLIC BLOOD PRESSURE: 70 MMHG | OXYGEN SATURATION: 95 % | HEART RATE: 83 BPM | TEMPERATURE: 98.3 F | SYSTOLIC BLOOD PRESSURE: 122 MMHG

## 2024-01-23 DIAGNOSIS — J20.9 ACUTE BRONCHITIS WITH SYMPTOMS > 10 DAYS: ICD-10-CM

## 2024-01-23 DIAGNOSIS — J10.1 INFLUENZA A: Primary | ICD-10-CM

## 2024-01-23 DIAGNOSIS — C50.412 MALIGNANT NEOPLASM OF UPPER-OUTER QUADRANT OF LEFT BREAST IN FEMALE, ESTROGEN RECEPTOR POSITIVE (H): ICD-10-CM

## 2024-01-23 DIAGNOSIS — Z87.01 HISTORY OF PNEUMONIA: ICD-10-CM

## 2024-01-23 DIAGNOSIS — Z17.0 MALIGNANT NEOPLASM OF UPPER-OUTER QUADRANT OF LEFT BREAST IN FEMALE, ESTROGEN RECEPTOR POSITIVE (H): ICD-10-CM

## 2024-01-23 DIAGNOSIS — J06.9 VIRAL URI WITH COUGH: ICD-10-CM

## 2024-01-23 PROBLEM — M79.605 LEFT LEG PAIN: Status: ACTIVE | Noted: 2022-06-16

## 2024-01-23 PROBLEM — R06.09 DOE (DYSPNEA ON EXERTION): Status: ACTIVE | Noted: 2020-06-17

## 2024-01-23 PROBLEM — K76.0 FATTY LIVER: Status: ACTIVE | Noted: 2022-07-01

## 2024-01-23 PROBLEM — R53.83 OTHER FATIGUE: Status: ACTIVE | Noted: 2020-06-17

## 2024-01-23 PROBLEM — Z85.3 HISTORY OF BREAST CANCER: Status: ACTIVE | Noted: 2020-06-17

## 2024-01-23 PROBLEM — Z87.898 HISTORY OF VERTIGO: Status: ACTIVE | Noted: 2020-06-17

## 2024-01-23 LAB
FLUAV RNA SPEC QL NAA+PROBE: POSITIVE
FLUBV RNA RESP QL NAA+PROBE: NEGATIVE
RSV RNA SPEC NAA+PROBE: NEGATIVE
SARS-COV-2 RNA RESP QL NAA+PROBE: NEGATIVE

## 2024-01-23 PROCEDURE — 71046 X-RAY EXAM CHEST 2 VIEWS: CPT | Mod: TC | Performed by: RADIOLOGY

## 2024-01-23 PROCEDURE — 99214 OFFICE O/P EST MOD 30 MIN: CPT | Performed by: INTERNAL MEDICINE

## 2024-01-23 PROCEDURE — 87637 SARSCOV2&INF A&B&RSV AMP PRB: CPT | Performed by: INTERNAL MEDICINE

## 2024-01-23 RX ORDER — OSELTAMIVIR PHOSPHATE 75 MG/1
75 CAPSULE ORAL 2 TIMES DAILY
Qty: 10 CAPSULE | Refills: 0 | Status: SHIPPED | OUTPATIENT
Start: 2024-01-23 | End: 2024-01-28

## 2024-01-23 RX ORDER — ALBUTEROL SULFATE 90 UG/1
2 AEROSOL, METERED RESPIRATORY (INHALATION) EVERY 6 HOURS PRN
Qty: 18 G | Refills: 0 | Status: SHIPPED | OUTPATIENT
Start: 2024-01-23 | End: 2024-09-20

## 2024-01-23 ASSESSMENT — PAIN SCALES - GENERAL: PAINLEVEL: MODERATE PAIN (5)

## 2024-01-23 NOTE — PROGRESS NOTES
Assessment and Plan    1. Influenza A  New diagnosis on the symptoms as below. Will start on Tamiflu for improvement.   - oseltamivir (TAMIFLU) 75 MG capsule; Take 1 capsule (75 mg) by mouth 2 times daily for 5 days  Dispense: 10 capsule; Refill: 0    1. Viral URI with cough  Acute flareup of ongoing cough since yesterday, will check for viral etiology before further recommendations.  Patient understood and agreed with the plan  - Symptomatic Influenza A/B, RSV, & SARS-CoV2 PCR (COVID-19) Nasopharyngeal; Future    2. Acute bronchitis with symptoms > 10 days  3. History of pneumonia  Acute flareup in past 2 days with ongoing cough for more than a week as endorsed by the pt. Physical exam positive for mildly decreased breath sounds with ongoing posttussive Rales with faint wheeze will consider empiric antibiotic with prednisone once I receive the lab results for above nasal swab to confirm that she does not have any of the infection stable.  -Patient understood and agreed with the plan with check of x-ray at this time to exclude any pneumonia.  -In the meantime she will be given albuterol inhaler for improvement of the symptoms, ER precautions given.  UPDATE - X ray normal as reviewed, no concerns of pneumonia at this time.    - Still await further viral etiologies as mentioned above for definitive treatment plan which patient understands.  - albuterol (PROAIR HFA/PROVENTIL HFA/VENTOLIN HFA) 108 (90 Base) MCG/ACT inhaler; Inhale 2 puffs into the lungs every 6 hours as needed for wheezing, cough or shortness of breath  Dispense: 18 g; Refill: 0  - XR Chest 2 Views; Future    4. Malignant neoplasm of upper-outer quadrant of left breast in female, estrogen receptor positive (H)  Chronic stable condition, risk factors as above.  Continue to follow oncology and PCP on surveillance at this.      Please note that this note consists of symbols derived from keyboarding, dictation and/or voice recognition software. As a result,  there may be errors in the script that have gone undetected. Please consider this when interpreting information found in this chart.    Patient Instructions   As discussed, will await for lab result before sending the medication ( either antibiotic with prednisone versus if infection positive )     Sent in symptomatic Albuterol inhaler as needed and will check X ray given your pneumonia history.   Return in about 10 days (around 2/2/2024), or if symptoms worsen or fail to improve, for Follow up of last visit, If symptoms persist.    Antonia Burrows MD  [facility] 54 [facility], presenting for the following health issues:  URI (Pt was sick a couple weeks ago. Symptoms lingering cough, chest pain, headache and  runny nose )        1/23/2024     2:53 PM   Additional Questions   Roomed by Edita BOLANOS     History of Present Illness       Reason for visit:  Coughing a lot  Symptom onset:  1-2 weeks ago  Symptom intensity:  Severe  Symptom progression:  Worsening  Had these symptoms before:  No    She eats 2-3 servings of fruits and vegetables daily.She consumes 1 sweetened beverage(s) daily.She exercises with enough effort to increase her heart rate 10 to 19 minutes per day.  She exercises with enough effort to increase her heart rate 3 or less days per week.   She is taking medications regularly.     Patient is new to me, here for acute concerns of URI and ongoing cough and wheezing.    Review of Systems  Constitutional, HEENT, cardiovascular, pulmonary, GI, , musculoskeletal, neuro, skin, endocrine and psych systems are negative, except as otherwise noted.      Objective    /70 (BP Location: Left arm, Patient Position: Sitting, Cuff Size: Adult Regular)   Pulse 83   Temp 98.3  F (36.8  C) (Temporal)   Resp 14   LMP 09/29/2012 (Approximate)   SpO2 95%   There is no height or weight on file to calculate BMI.  Physical Exam   GENERAL: alert and no  distress  NECK: no adenopathy, no asymmetry, masses, or scars  RESP: Positive for mildly decreased breath sounds with ongoing posttussive Rales with faint wheeze  CV: regular rate and rhythm, normal S1 S2, no S3 or S4, no murmur, click or rub, no peripheral edema  ABDOMEN: soft, nontender, no hepatosplenomegaly, no masses and bowel sounds normal  MS: no gross musculoskeletal defects noted, no edema        Signed Electronically by: Antonia Burrows MD

## 2024-01-23 NOTE — PATIENT INSTRUCTIONS
As discussed, will await for lab result before sending the medication ( either antibiotic with prednisone versus if infection positive )     Sent in symptomatic Albuterol inhaler as needed and will check X ray given your pneumonia history.

## 2024-01-24 NOTE — RESULT ENCOUNTER NOTE
Your X ray is normal, no concerns per radiology review.    Please let me know if you have any questions.  Antonia Burrows MD on 1/24/2024

## 2024-01-29 ENCOUNTER — TELEPHONE (OUTPATIENT)
Dept: FAMILY MEDICINE | Facility: CLINIC | Age: 54
End: 2024-01-29
Payer: COMMERCIAL

## 2024-01-29 NOTE — TELEPHONE ENCOUNTER
"Patient calls back inquiring if provider had composed letter for patient's work absence. Patient does state that that she is still symptomatic (cough).     Patient adds that she would like letter to include \"\"Saray can work from home if still experiencing symptoms.\"   "

## 2024-01-29 NOTE — TELEPHONE ENCOUNTER
Spoke with pt and she was able to view the letter while on the phone with writer. Pt had no further questions.    Nat Cancino RN

## 2024-01-29 NOTE — LETTER
January 29, 2024      Saray Sifuentes  [address] [place] [address]        To Whom It May Concern:    Saray Sifuentes was seen in our clinic. Please excuse her from 1/24/24 - 1/29/24 for her medical condition.       Sincerely,  Antonia Burrows MD on 1/29/2024 at 2:42 PM

## 2024-01-29 NOTE — TELEPHONE ENCOUNTER
CC: Patient calling requesting a doctor's note for work.    Saw Dr. Burrows on the 23rd for Influenza A. Patient states she is overall feeling better but is still coughing. Will finish tamiflu rx today.     Requesting a letter to excuse her from work Wednesday the 24th - today 29th. Would like this letter to be available via [employer].    Routing to Dr. Burrows to please advise if able to provide letter or it patient needs VV etc? Please review and advise - thank you!     Patient would like a callback with Dr. Burrows's response - callback [phone] - ok to leave detailed VM     Irving Landa RN  [facility]

## 2024-01-29 NOTE — TELEPHONE ENCOUNTER
Well, she has sent 2 messages back to back which is only today for letter . I am just seeing the message. :)    Sure, I have placed the letter in her chart which she can download through [employer]. If further letter needed , she will need VV.     Thank you  Antonia Burrows MD on 1/29/2024 at 2:40 PM

## 2024-03-21 ENCOUNTER — HOSPITAL ENCOUNTER (OUTPATIENT)
Dept: MAMMOGRAPHY | Facility: CLINIC | Age: 54
Discharge: HOME | End: 2024-03-21
Payer: COMMERCIAL

## 2024-03-21 DIAGNOSIS — Z12.31 VISIT FOR SCREENING MAMMOGRAM: ICD-10-CM

## 2024-03-21 PROCEDURE — 77063 BREAST TOMOSYNTHESIS BI: CPT

## 2024-06-02 ENCOUNTER — HEALTH MAINTENANCE LETTER (OUTPATIENT)
Age: 54
End: 2024-06-02

## 2024-08-16 NOTE — PROGRESS NOTES
SUBJECTIVE:   CC: Saray Huntley is an 47 year old woman who presents for preventive health visit.     Healthy Habits:    Do you get at least three servings of calcium containing foods daily (dairy, green leafy vegetables, etc.)? yes    Amount of exercise or daily activities, outside of work: 3 day(s) per week    Problems taking medications regularly No    Medication side effects: No pt has to have brand name of synthroid     Have you had an eye exam in the past two years? yes    Do you see a dentist twice per year? yes    Do you have sleep apnea, excessive snoring or daytime drowsiness?no    Please abstract the following data from this visit with this patient into the appropriate field in Epic:    Mammogram done on this date: 11/1/16 (approximately), by/// this group: doctor in [place], results were/ negative.             Today's PHQ-2 Score:   PHQ-2 ( 1999 Pfizer) 9/29/2017   Q1: Little interest or pleasure in doing things 0   Q2: Feeling down, depressed or hopeless 0   PHQ-2 Score 0       Abuse: Current or Past(Physical, Sexual or Emotional)- No  Do you feel safe in your environment - Yes    Social History   Substance Use Topics     Smoking status: Never Smoker     Smokeless tobacco: Never Used     Alcohol use No     The patient does not drink >3 drinks per day nor >7 drinks per week.    Reviewed orders with patient.  Reviewed health maintenance and updated orders accordingly - Yes  Patient Active Problem List   Diagnosis     Hypothyroidism, unspecified type     History reviewed. No pertinent surgical history.    Social History   Substance Use Topics     Smoking status: Never Smoker     Smokeless tobacco: Never Used     Alcohol use No     Family History   Problem Relation Age of Onset     Family History Negative Other          Current Outpatient Prescriptions   Medication Sig Dispense Refill     SYNTHROID 25 MCG tablet Take 1 tablet (25 mcg) by mouth daily BRAND NAME ONLY 90 tablet 3     No Known  "Allergies      Patient under age 50, mutual decision reflected in health maintenance.        Pertinent mammograms are reviewed under the imaging tab.  History of abnormal Pap smear: NO - age 30- 65 PAP every 3 years recommended    Reviewed and updated as needed this visit by clinical staffTobacco  Allergies  Problems  Med Hx  Surg Hx  Fam Hx  Soc Hx        Reviewed and updated as needed this visit by Provider  Allergies  Problems  Med Hx  Surg Hx  Fam Hx              ROS:  C: NEGATIVE for fever, chills, change in weight  I: NEGATIVE for worrisome rashes, moles or lesions  E: NEGATIVE for vision changes or irritation  ENT: NEGATIVE for ear, mouth and throat problems  R: NEGATIVE for significant cough or SOB  B: NEGATIVE for masses, tenderness or discharge  CV: NEGATIVE for chest pain, palpitations or peripheral edema  GI: NEGATIVE for nausea, abdominal pain, heartburn, or change in bowel habits  : NEGATIVE for unusual urinary or vaginal symptoms. Periods are regular.  M: NEGATIVE for significant arthralgias or myalgia  N: NEGATIVE for weakness, dizziness or paresthesias  P: NEGATIVE for changes in mood or affect    OBJECTIVE:   /78 (BP Location: Right arm, Patient Position: Sitting, Cuff Size: Adult Large)  Pulse 52  Temp 98.1  F (36.7  C) (Tympanic)  Ht 5' 0.75\" (1.543 m)  Wt 167 lb 3.2 oz (75.8 kg)  LMP 09/29/2012 (Approximate)  SpO2 98%  BMI 31.85 kg/m2  EXAM:  GENERAL: healthy, alert and no distress  EYES: Eyes grossly normal to inspection, PERRL and conjunctivae and sclerae normal  HENT: ear canals and TM's normal, nose and mouth without ulcers or lesions  NECK: no adenopathy, no asymmetry, masses, or scars and thyroid normal to palpation  RESP: lungs clear to auscultation - no rales, rhonchi or wheezes  BREAST: normal without masses, tenderness or nipple discharge and no palpable axillary masses or adenopathy  CV: regular rate and rhythm, normal S1 S2, no S3 or S4, no murmur, click or " "rub, no peripheral edema and peripheral pulses strong  ABDOMEN: soft, nontender, no hepatosplenomegaly, no masses and bowel sounds normal   (female): normal female external genitalia, normal urethral meatus, vaginal mucosa pink, moist, well rugated, and normal cervix/adnexa/uterus without masses or discharge  MS: no gross musculoskeletal defects noted, no edema  SKIN: no suspicious lesions or rashes  NEURO: Normal strength and tone, mentation intact and speech normal  PSYCH: mentation appears normal, affect normal/bright  Results for orders placed or performed in visit on 09/29/17   TSH   Result Value Ref Range    TSH 2.66 0.40 - 4.00 mU/L       ASSESSMENT/PLAN:   1. Routine history and physical examination of adult  Screening Pap smear obtained  - HPV High Risk Types DNA Cervical  - Pap imaged thin layer screen with HPV - recommended age 30 - 65 years (select HPV order below)    2. Hypothyroidism, unspecified type  Well controlled. Resume current dose of Synthroid  - SYNTHROID 25 MCG tablet; Take 1 tablet (25 mcg) by mouth daily BRAND NAME ONLY  Dispense: 90 tablet; Refill: 3  - TSH    3. Encounter for screening mammogram for breast cancer  Screening mammogram ordered  - *MA Screening Digital Bilateral; Future    4. Need for prophylactic vaccination and inoculation against influenza    - FLU VAC, SPLIT VIRUS IM > 3 YO (QUADRIVALENT) [29026]  - Vaccine Administration, Initial [33555]    COUNSELING:   Reviewed preventive health counseling, as reflected in patient instructions       Regular exercise       Healthy diet/nutrition         reports that she has never smoked. She has never used smokeless tobacco.    Estimated body mass index is 31.85 kg/(m^2) as calculated from the following:    Height as of this encounter: 5' 0.75\" (1.543 m).    Weight as of this encounter: 167 lb 3.2 oz (75.8 kg).   Weight management plan: Discussed healthy diet and exercise guidelines and patient will follow up in 12 months in clinic " to re-evaluate.    Counseling Resources:  ATP IV Guidelines  Pooled Cohorts Equation Calculator  Breast Cancer Risk Calculator  FRAX Risk Assessment  ICSI Preventive Guidelines  Dietary Guidelines for Americans, 2010  USDA's MyPlate  ASA Prophylaxis  Lung CA Screening    Jeannie Ross MD  [facility]  Injectable Influenza Immunization Documentation    1.  Is the person to be vaccinated sick today?   No    2. Does the person to be vaccinated have an allergy to a component   of the vaccine?   No    3. Has the person to be vaccinated ever had a serious reaction   to influenza vaccine in the past?   No    4. Has the person to be vaccinated ever had Guillain-Barré syndrome?   No    Form completed by Mackenzie Kwong [facility]            no

## 2024-09-20 ENCOUNTER — VIRTUAL VISIT (OUTPATIENT)
Dept: FAMILY MEDICINE | Facility: CLINIC | Age: 54
End: 2024-09-20
Payer: COMMERCIAL

## 2024-09-20 DIAGNOSIS — R73.09 ELEVATED GLUCOSE: ICD-10-CM

## 2024-09-20 DIAGNOSIS — R53.83 TIRED: ICD-10-CM

## 2024-09-20 DIAGNOSIS — E03.9 HYPOTHYROIDISM, UNSPECIFIED TYPE: Primary | ICD-10-CM

## 2024-09-20 DIAGNOSIS — E55.9 VITAMIN D DEFICIENCY: ICD-10-CM

## 2024-09-20 PROCEDURE — 99214 OFFICE O/P EST MOD 30 MIN: CPT | Mod: 95 | Performed by: FAMILY MEDICINE

## 2024-09-20 ASSESSMENT — ENCOUNTER SYMPTOMS: LEG PAIN: 1

## 2024-09-20 NOTE — PROGRESS NOTES
Saray is a 54 year old who is being evaluated via a billable video visit.    How would you like to obtain your AVS? MyChart  If the video visit is dropped, the invitation should be resent by: Text to cell phone: [phone]  Will anyone else be joining your video visit? No      Assessment & Plan     Hypothyroidism, unspecified type  Stable   - Lipid panel reflex to direct LDL Non-fasting; Future  - TSH WITH FREE T4 REFLEX; Future  - Vitamin D Deficiency; Future  - ESR: Erythrocyte sedimentation rate; Future  - Rheumatoid factor; Future  - Comprehensive metabolic panel (BMP + Alb, Alk Phos, ALT, AST, Total. Bili, TP); Future  - TSH with free T4 reflex; Future  - Vitamin B12; Future    Tired  Worsening over past 3-4 months, will have her to check on lab to review  Will consider sleep study for further evaluation if indicated   - Lipid panel reflex to direct LDL Non-fasting; Future  - Vitamin D Deficiency; Future  - ESR: Erythrocyte sedimentation rate; Future  - Rheumatoid factor; Future  - Comprehensive metabolic panel (BMP + Alb, Alk Phos, ALT, AST, Total. Bili, TP); Future  - TSH with free T4 reflex; Future  - Vitamin B12; Future    Vitamin D deficiency  Mentioned above   - Lipid panel reflex to direct LDL Non-fasting; Future  - Vitamin D Deficiency; Future  - ESR: Erythrocyte sedimentation rate; Future  - Rheumatoid factor; Future  - Comprehensive metabolic panel (BMP + Alb, Alk Phos, ALT, AST, Total. Bili, TP); Future  - TSH with free T4 reflex; Future  - Vitamin B12; Future    Elevated glucose  Has been fluctuating, will have her to check on lab for further evaluation   - Lipid panel reflex to direct LDL Non-fasting; Future  - Comprehensive metabolic panel (BMP + Alb, Alk Phos, ALT, AST, Total. Bili, TP); Future  - Hemoglobin A1c; Future  - Vitamin B12; Future        FUTURE APPOINTMENTS:       - Follow-up visit after lab tests done     Subjective   Saray is a 54 year old, presenting for the following health  issues:  Leg Pain and Fatigue        9/20/2024     8:03 AM   Additional Questions   Roomed by Edita BOLANOS     History of Present Illness       Reason for visit:  Pain in both legs and extreme tiredness at times    She eats 2-3 servings of fruits and vegetables daily.She consumes 0 sweetened beverage(s) daily.She exercises with enough effort to increase her heart rate 20 to 29 minutes per day.  She exercises with enough effort to increase her heart rate 4 days per week.   She is taking medications regularly.       Pain History:  When did you first notice your pain? Several years   Have you seen this provider for your pain in the past? Yes   Where in your body do you have pain? legs  Are you seeing anyone else for your pain? Yes - Other providers, Dr. Bettie Davis UDS:       Review of Systems  Constitutional, HEENT, cardiovascular, pulmonary, GI, , musculoskeletal, neuro, skin, endocrine and psych systems are negative, except as otherwise noted.      Objective    Vitals - Patient Reported  Pain Score: Severe Pain (6)      Vitals:  No vitals were obtained today due to virtual visit.    Physical Exam   GENERAL: alert and no distress  EYES: Eyes grossly normal to inspection.  No discharge or erythema, or obvious scleral/conjunctival abnormalities.  RESP: No audible wheeze, cough, or visible cyanosis.    SKIN: Visible skin clear. No significant rash, abnormal pigmentation or lesions.  NEURO: Cranial nerves grossly intact.  Mentation and speech appropriate for age.  PSYCH: Appropriate affect, tone, and pace of words          Video-Visit Details    Type of service:  Video Visit   Originating Location (pt. Location): Home    Distant Location (provider location):  On-site  Platform used for Video Visit: Rupesh  Signed Electronically by: Stewart Burns MD    Video start:7:50  Video complete 8:22

## 2024-10-04 ENCOUNTER — LAB (OUTPATIENT)
Dept: LAB | Facility: CLINIC | Age: 54
End: 2024-10-04
Payer: COMMERCIAL

## 2024-10-04 DIAGNOSIS — E55.9 VITAMIN D DEFICIENCY: ICD-10-CM

## 2024-10-04 DIAGNOSIS — E03.9 HYPOTHYROIDISM, UNSPECIFIED TYPE: ICD-10-CM

## 2024-10-04 DIAGNOSIS — R73.09 ELEVATED GLUCOSE: ICD-10-CM

## 2024-10-04 DIAGNOSIS — R53.83 TIRED: ICD-10-CM

## 2024-10-04 LAB
ERYTHROCYTE [SEDIMENTATION RATE] IN BLOOD BY WESTERGREN METHOD: 10 MM/HR (ref 0–30)
EST. AVERAGE GLUCOSE BLD GHB EST-MCNC: 134 MG/DL
HBA1C MFR BLD: 6.3 % (ref 0–5.6)

## 2024-10-04 PROCEDURE — 36415 COLL VENOUS BLD VENIPUNCTURE: CPT

## 2024-10-04 PROCEDURE — 83036 HEMOGLOBIN GLYCOSYLATED A1C: CPT

## 2024-10-04 PROCEDURE — 80061 LIPID PANEL: CPT

## 2024-10-04 PROCEDURE — 85652 RBC SED RATE AUTOMATED: CPT

## 2024-10-04 PROCEDURE — 82607 VITAMIN B-12: CPT

## 2024-10-04 PROCEDURE — 84443 ASSAY THYROID STIM HORMONE: CPT

## 2024-10-04 PROCEDURE — 80053 COMPREHEN METABOLIC PANEL: CPT

## 2024-10-04 PROCEDURE — 86431 RHEUMATOID FACTOR QUANT: CPT

## 2024-10-04 PROCEDURE — 82306 VITAMIN D 25 HYDROXY: CPT

## 2024-10-05 LAB
ALBUMIN SERPL BCG-MCNC: 4.6 G/DL (ref 3.5–5.2)
ALP SERPL-CCNC: 127 U/L (ref 40–150)
ALT SERPL W P-5'-P-CCNC: 111 U/L (ref 0–50)
ANION GAP SERPL CALCULATED.3IONS-SCNC: 9 MMOL/L (ref 7–15)
AST SERPL W P-5'-P-CCNC: 65 U/L (ref 0–45)
BILIRUB SERPL-MCNC: 0.5 MG/DL
BUN SERPL-MCNC: 16.4 MG/DL (ref 6–20)
CALCIUM SERPL-MCNC: 9.5 MG/DL (ref 8.8–10.4)
CHLORIDE SERPL-SCNC: 104 MMOL/L (ref 98–107)
CHOLEST SERPL-MCNC: 245 MG/DL
CREAT SERPL-MCNC: 0.62 MG/DL (ref 0.51–0.95)
EGFRCR SERPLBLD CKD-EPI 2021: >90 ML/MIN/1.73M2
FASTING STATUS PATIENT QL REPORTED: NO
FASTING STATUS PATIENT QL REPORTED: NO
GLUCOSE SERPL-MCNC: 102 MG/DL (ref 70–99)
HCO3 SERPL-SCNC: 23 MMOL/L (ref 22–29)
HDLC SERPL-MCNC: 45 MG/DL
LDLC SERPL CALC-MCNC: 167 MG/DL
NONHDLC SERPL-MCNC: 200 MG/DL
POTASSIUM SERPL-SCNC: 4 MMOL/L (ref 3.4–5.3)
PROT SERPL-MCNC: 7.9 G/DL (ref 6.4–8.3)
RHEUMATOID FACT SERPL-ACNC: <10 IU/ML
SODIUM SERPL-SCNC: 136 MMOL/L (ref 135–145)
TRIGL SERPL-MCNC: 163 MG/DL
TSH SERPL DL<=0.005 MIU/L-ACNC: 3.34 UIU/ML (ref 0.3–4.2)
VIT B12 SERPL-MCNC: 674 PG/ML (ref 232–1245)
VIT D+METAB SERPL-MCNC: 22 NG/ML (ref 20–50)

## 2024-10-08 ENCOUNTER — OFFICE VISIT (OUTPATIENT)
Dept: FAMILY MEDICINE | Facility: CLINIC | Age: 54
End: 2024-10-08
Payer: COMMERCIAL

## 2024-10-08 VITALS
TEMPERATURE: 92.4 F | BODY MASS INDEX: 38.14 KG/M2 | HEIGHT: 61 IN | DIASTOLIC BLOOD PRESSURE: 80 MMHG | SYSTOLIC BLOOD PRESSURE: 130 MMHG | OXYGEN SATURATION: 94 % | HEART RATE: 70 BPM | RESPIRATION RATE: 14 BRPM | WEIGHT: 202 LBS

## 2024-10-08 DIAGNOSIS — Z00.00 HEALTH MAINTENANCE EXAMINATION: Primary | ICD-10-CM

## 2024-10-08 DIAGNOSIS — E55.9 VITAMIN D DEFICIENCY: ICD-10-CM

## 2024-10-08 DIAGNOSIS — E78.2 MIXED HYPERLIPIDEMIA: ICD-10-CM

## 2024-10-08 DIAGNOSIS — Z12.11 SCREEN FOR COLON CANCER: ICD-10-CM

## 2024-10-08 PROCEDURE — 99396 PREV VISIT EST AGE 40-64: CPT | Performed by: FAMILY MEDICINE

## 2024-10-08 SDOH — HEALTH STABILITY: PHYSICAL HEALTH: ON AVERAGE, HOW MANY DAYS PER WEEK DO YOU ENGAGE IN MODERATE TO STRENUOUS EXERCISE (LIKE A BRISK WALK)?: 3 DAYS

## 2024-10-08 ASSESSMENT — SOCIAL DETERMINANTS OF HEALTH (SDOH): HOW OFTEN DO YOU GET TOGETHER WITH FRIENDS OR RELATIVES?: ONCE A WEEK

## 2024-10-08 ASSESSMENT — PAIN SCALES - GENERAL: PAINLEVEL: NO PAIN (0)

## 2024-10-08 NOTE — PROGRESS NOTES
"Preventive Care Visit  [facility] [place] Burns MD, Family Medicine  Oct 8, 2024      Assessment & Plan     Screen for colon cancer    - Fecal colorectal cancer screen FIT - Future (S+30); Future  - Fecal colorectal cancer screen FIT - Future (S+30)    Health maintenance examination    - Fecal colorectal cancer screen FIT - Future (S+30); Future  - Fecal colorectal cancer screen FIT - Future (S+30)    Mixed hyperlipidemia  Has elevated lipid with slight improvement, she declined trial of statin, encouraged her to continue working on life style modification and plan to recheck at next follow up    Vitamin D deficiency  Has insufficiency of vitamin D, her sx might be associated with the condition    Patient has been advised of split billing requirements and indicates understanding: Yes        BMI  Estimated body mass index is 38.38 kg/m  as calculated from the following:    Height as of this encounter: 1.545 m (5' 0.83\").    Weight as of this encounter: 91.6 kg (202 lb).   Weight management plan: Discussed healthy diet and exercise guidelines    Counseling  Appropriate preventive services were addressed with this patient via screening, questionnaire, or discussion as appropriate for fall prevention, nutrition, physical activity, Tobacco-use cessation, social engagement, weight loss and cognition.  Checklist reviewing preventive services available has been given to the patient.  Reviewed patient's diet, addressing concerns and/or questions.   She is at risk for lack of exercise and has been provided with information to increase physical activity for the benefit of her well-being.       FUTURE APPOINTMENTS:       - Follow-up visit in 6 months for lipid and vitamin D    Claudy So is a 54 year old, presenting for the following:  Physical (Fasting ) and Pain (Bilateral leg pain )        10/8/2024    11:10 AM   Additional Questions   Roomed by Migdalia LUNDY   Accompanied by Daughter    "     Health Care Directive  Patient does not have a Health Care Directive or Living Will: Discussed advance care planning with patient; however, patient declined at this time.    Pain          10/8/2024   General Health   How would you rate your overall physical health? (!) FAIR   Feel stress (tense, anxious, or unable to sleep) To some extent      (!) STRESS CONCERN      10/8/2024   Nutrition   Three or more servings of calcium each day? Yes   Diet: Regular (no restrictions)   How many servings of fruit and vegetables per day? (!) 2-3   How many sweetened beverages each day? 0-1            10/8/2024   Exercise   Days per week of moderate/strenous exercise 3 days            10/8/2024   Social Factors   Frequency of gathering with friends or relatives Once a week   Worry food won't last until get money to buy more No   Food not last or not have enough money for food? No   Do you have housing? (Housing is defined as stable permanent housing and does not include staying ouside in a car, in a tent, in an abandoned building, in an overnight shelter, or couch-surfing.) Yes   Are you worried about losing your housing? No   Lack of transportation? No   Unable to get utilities (heat,electricity)? No            10/8/2024   Fall Risk   Fallen 2 or more times in the past year? No   Trouble with walking or balance? Yes   Gait Speed Test (Document in seconds) 4.16   Gait Speed Test Interpretation Less than or equal to 5.00 seconds - PASS             10/8/2024   Dental   Dentist two times every year? Yes            10/8/2024   TB Screening   Were you born outside of the [place]? Yes                  10/8/2024   Substance Use   Alcohol more than 3/day or more than 7/wk No   Do you use any other substances recreationally? No        Social History     Tobacco Use    Smoking status: Never     Passive exposure: Never    Smokeless tobacco: Never   Vaping Use    Vaping status: Never Used   Substance Use Topics    Alcohol use: No    Drug use:  No           3/21/2024   LAST FHS-7 RESULTS   1st degree relative breast or ovarian cancer No   Any relative bilateral breast cancer No   Any male have breast cancer No   Any ONE woman have BOTH breast AND ovarian cancer No   Any woman with breast cancer before 50yrs No   2 or more relatives with breast AND/OR ovarian cancer No   2 or more relatives with breast AND/OR bowel cancer No           Mammogram Screening - Mammogram every 1-2 years updated in Health Maintenance based on mutual decision making        10/8/2024   STI Screening   New sexual partner(s) since last STI/HIV test? No        History of abnormal Pap smear: No - age 30- 64 PAP with HPV every 5 years recommended        Latest Ref Rng & Units 3/8/2023     3:06 PM 2017     3:28 PM 2017     2:28 PM   PAP / HPV   PAP  Negative for Intraepithelial Lesion or Malignancy (NILM)      PAP (Historical)    NIL    HPV 16 DNA Negative Negative  Negative     HPV 18 DNA Negative Negative  Negative     Other HR HPV Negative Negative  Negative       ASCVD Risk   The 10-year ASCVD risk score (Carina DK, et al., 2019) is: 3.1%    Values used to calculate the score:      Age: 54 years      Sex: Female      Is Non- : No      Diabetic: No      Tobacco smoker: No      Systolic Blood Pressure: 130 mmHg      Is BP treated: No      HDL Cholesterol: 45 mg/dL      Total Cholesterol: 245 mg/dL           Reviewed and updated as needed this visit by Provider                    Past Medical History:   Diagnosis Date    Breast cancer (H)     Hypothyroidism, unspecified type 2017    Neuropathy     PONV (postoperative nausea and vomiting)     Vertigo      Past Surgical History:   Procedure Laterality Date    BIOPSY NODE SENTINEL Left 3/19/2019    Procedure: WITH SENTINEL LYMPH NODE BIOPSY;  Surgeon: Sae Montaño MD;  Location: [facility] OR     SECTION  2003    INSERT PORT VASCULAR ACCESS N/A 2018    Procedure: PORT  PLACEMENT ;  Surgeon: Sae Montaño MD;  Location:  OR    LUMPECTOMY BREAST WITH SEED LOCALIZATION Left 3/19/2019    Procedure: SEED LOCALIZED LEFT BREAST LUMPECTOMY;  Surgeon: Sae Montaño MD;  Location:  OR    REMOVE PORT VASCULAR ACCESS N/A 3/19/2019    Procedure: PORT REMOVAL;  Surgeon: Sae Montaño MD;  Location:  OR     OB History   No obstetric history on file.     Labs reviewed in EPIC  BP Readings from Last 3 Encounters:   10/08/24 130/80   24 122/70   23 114/75    Wt Readings from Last 3 Encounters:   10/08/24 91.6 kg (202 lb)   23 91.3 kg (201 lb 4.8 oz)   23 88.6 kg (195 lb 6.4 oz)                  Patient Active Problem List   Diagnosis    Hypothyroidism, unspecified type    Malignant neoplasm of upper-outer quadrant of left breast in female, estrogen receptor positive (H)    Port-A-Cath in place    ALLEN (dyspnea on exertion)    Fatty liver    History of breast cancer    History of pneumonia    History of vertigo    Left leg pain    Other fatigue     Past Surgical History:   Procedure Laterality Date    BIOPSY NODE SENTINEL Left 3/19/2019    Procedure: WITH SENTINEL LYMPH NODE BIOPSY;  Surgeon: Sae Montaño MD;  Location:  OR     SECTION  2003    INSERT PORT VASCULAR ACCESS N/A 2018    Procedure: PORT PLACEMENT ;  Surgeon: Sae Montaño MD;  Location:  OR    LUMPECTOMY BREAST WITH SEED LOCALIZATION Left 3/19/2019    Procedure: SEED LOCALIZED LEFT BREAST LUMPECTOMY;  Surgeon: Sae Montaño MD;  Location:  OR    REMOVE PORT VASCULAR ACCESS N/A 3/19/2019    Procedure: PORT REMOVAL;  Surgeon: Sae Montaño MD;  Location:  OR       Social History     Tobacco Use    Smoking status: Never     Passive exposure: Never    Smokeless tobacco: Never   Substance Use Topics    Alcohol use: No     Family History   Problem Relation Age of Onset    Hyperlipidemia Mother     Kidney Disease Mother     Diabetes  "Father          No current outpatient medications on file.     Allergies   Allergen Reactions    Sulfa Antibiotics Rash and Hives     Recent Labs   Lab Test 10/04/24  1303 03/08/23  1530 06/24/20  2256 03/23/19  2235 11/16/18  1644 10/04/17  0717   A1C 6.3*  --   --   --   --   --    * 200*  --   --   --  131*   HDL 45* 47*  --   --   --  57   TRIG 163* 175*  --   --   --  94   * 59* 191* 122*   < > 74*   CR 0.62 0.65 0.54 0.85   < > 0.69   GFRESTIMATED >90 >90 >90 80   < > 90   GFRESTBLACK  --   --  >90 >90   < > >90   POTASSIUM 4.0 4.1 3.6 3.7   < > 3.7   TSH 3.34 4.19 9.29*  --    < >  --     < > = values in this interval not displayed.          Review of Systems  Constitutional, HEENT, cardiovascular, pulmonary, GI, , musculoskeletal, neuro, skin, endocrine and psych systems are negative, except as otherwise noted.     Objective    Exam  /80   Pulse 70   Temp (!) 92.4  F (33.6  C) (Tympanic)   Resp 14   Ht 1.545 m (5' 0.83\")   Wt 91.6 kg (202 lb)   LMP 09/29/2012 (Approximate)   SpO2 94%   BMI 38.38 kg/m     Estimated body mass index is 38.38 kg/m  as calculated from the following:    Height as of this encounter: 1.545 m (5' 0.83\").    Weight as of this encounter: 91.6 kg (202 lb).    Physical Exam  GENERAL: alert and no distress  EYES: Eyes grossly normal to inspection, PERRL and conjunctivae and sclerae normal  HENT: ear canals and TM's normal, nose and mouth without ulcers or lesions  NECK: no adenopathy, no asymmetry, masses, or scars  RESP: lungs clear to auscultation - no rales, rhonchi or wheezes  CV: regular rate and rhythm, normal S1 S2, no S3 or S4, no murmur, click or rub, no peripheral edema  ABDOMEN: soft, nontender, no hepatosplenomegaly, no masses and bowel sounds normal  MS: no gross musculoskeletal defects noted, no edema  SKIN: no suspicious lesions or rashes  NEURO: Normal strength and tone, mentation intact and speech normal  BACK: no CVA tenderness, no " paralumbar tenderness  PSYCH: mentation appears normal, affect normal/bright        Signed Electronically by: Stewart Burns MD

## 2024-10-13 ENCOUNTER — APPOINTMENT (OUTPATIENT)
Dept: CT IMAGING | Facility: CLINIC | Age: 54
End: 2024-10-13
Attending: EMERGENCY MEDICINE
Payer: COMMERCIAL

## 2024-10-13 ENCOUNTER — HOSPITAL ENCOUNTER (EMERGENCY)
Facility: CLINIC | Age: 54
Discharge: HOME | End: 2024-10-13
Attending: EMERGENCY MEDICINE | Admitting: EMERGENCY MEDICINE
Payer: COMMERCIAL

## 2024-10-13 VITALS
SYSTOLIC BLOOD PRESSURE: 144 MMHG | HEART RATE: 76 BPM | DIASTOLIC BLOOD PRESSURE: 78 MMHG | TEMPERATURE: 98 F | RESPIRATION RATE: 20 BRPM | OXYGEN SATURATION: 96 %

## 2024-10-13 DIAGNOSIS — R05.1 ACUTE COUGH: ICD-10-CM

## 2024-10-13 DIAGNOSIS — J18.9 PNEUMONIA DUE TO INFECTIOUS ORGANISM, UNSPECIFIED LATERALITY, UNSPECIFIED PART OF LUNG: ICD-10-CM

## 2024-10-13 DIAGNOSIS — R04.2 HEMOPTYSIS: ICD-10-CM

## 2024-10-13 LAB
ANION GAP SERPL CALCULATED.3IONS-SCNC: 10 MMOL/L (ref 7–15)
BASOPHILS # BLD AUTO: 0.1 10E3/UL (ref 0–0.2)
BASOPHILS NFR BLD AUTO: 1 %
BUN SERPL-MCNC: 17.7 MG/DL (ref 6–20)
CALCIUM SERPL-MCNC: 9.9 MG/DL (ref 8.8–10.4)
CHLORIDE SERPL-SCNC: 102 MMOL/L (ref 98–107)
CREAT SERPL-MCNC: 0.54 MG/DL (ref 0.51–0.95)
D DIMER PPP FEU-MCNC: 0.97 UG/ML FEU (ref 0–0.5)
EGFRCR SERPLBLD CKD-EPI 2021: >90 ML/MIN/1.73M2
EOSINOPHIL # BLD AUTO: 0.4 10E3/UL (ref 0–0.7)
EOSINOPHIL NFR BLD AUTO: 5 %
ERYTHROCYTE [DISTWIDTH] IN BLOOD BY AUTOMATED COUNT: 12 % (ref 10–15)
FLUAV RNA SPEC QL NAA+PROBE: NEGATIVE
FLUBV RNA RESP QL NAA+PROBE: NEGATIVE
GLUCOSE SERPL-MCNC: 169 MG/DL (ref 70–99)
HCO3 SERPL-SCNC: 25 MMOL/L (ref 22–29)
HCT VFR BLD AUTO: 41.7 % (ref 35–47)
HGB BLD-MCNC: 14.2 G/DL (ref 11.7–15.7)
IMM GRANULOCYTES # BLD: 0 10E3/UL
IMM GRANULOCYTES NFR BLD: 0 %
LYMPHOCYTES # BLD AUTO: 2.4 10E3/UL (ref 0.8–5.3)
LYMPHOCYTES NFR BLD AUTO: 33 %
MCH RBC QN AUTO: 30.8 PG (ref 26.5–33)
MCHC RBC AUTO-ENTMCNC: 34.1 G/DL (ref 31.5–36.5)
MCV RBC AUTO: 91 FL (ref 78–100)
MONOCYTES # BLD AUTO: 0.4 10E3/UL (ref 0–1.3)
MONOCYTES NFR BLD AUTO: 6 %
NEUTROPHILS # BLD AUTO: 3.9 10E3/UL (ref 1.6–8.3)
NEUTROPHILS NFR BLD AUTO: 55 %
NRBC # BLD AUTO: 0 10E3/UL
NRBC BLD AUTO-RTO: 0 /100
PLATELET # BLD AUTO: 282 10E3/UL (ref 150–450)
POTASSIUM SERPL-SCNC: 3.9 MMOL/L (ref 3.4–5.3)
RBC # BLD AUTO: 4.61 10E6/UL (ref 3.8–5.2)
RSV RNA SPEC NAA+PROBE: NEGATIVE
SARS-COV-2 RNA RESP QL NAA+PROBE: NEGATIVE
SODIUM SERPL-SCNC: 137 MMOL/L (ref 135–145)
WBC # BLD AUTO: 7.2 10E3/UL (ref 4–11)

## 2024-10-13 PROCEDURE — 85379 FIBRIN DEGRADATION QUANT: CPT | Performed by: EMERGENCY MEDICINE

## 2024-10-13 PROCEDURE — 80048 BASIC METABOLIC PNL TOTAL CA: CPT | Performed by: EMERGENCY MEDICINE

## 2024-10-13 PROCEDURE — 99285 EMERGENCY DEPT VISIT HI MDM: CPT | Mod: 25

## 2024-10-13 PROCEDURE — 36415 COLL VENOUS BLD VENIPUNCTURE: CPT | Performed by: EMERGENCY MEDICINE

## 2024-10-13 PROCEDURE — 250N000011 HC RX IP 250 OP 636: Performed by: EMERGENCY MEDICINE

## 2024-10-13 PROCEDURE — 71275 CT ANGIOGRAPHY CHEST: CPT

## 2024-10-13 PROCEDURE — 85004 AUTOMATED DIFF WBC COUNT: CPT | Performed by: EMERGENCY MEDICINE

## 2024-10-13 PROCEDURE — 250N000009 HC RX 250: Performed by: EMERGENCY MEDICINE

## 2024-10-13 PROCEDURE — 250N000013 HC RX MED GY IP 250 OP 250 PS 637: Performed by: EMERGENCY MEDICINE

## 2024-10-13 PROCEDURE — 87637 SARSCOV2&INF A&B&RSV AMP PRB: CPT | Performed by: EMERGENCY MEDICINE

## 2024-10-13 RX ORDER — IOPAMIDOL 755 MG/ML
73 INJECTION, SOLUTION INTRAVASCULAR ONCE
Status: COMPLETED | OUTPATIENT
Start: 2024-10-13 | End: 2024-10-13

## 2024-10-13 RX ORDER — DOXYCYCLINE 100 MG/1
100 CAPSULE ORAL 2 TIMES DAILY
Qty: 14 CAPSULE | Refills: 0 | Status: SHIPPED | OUTPATIENT
Start: 2024-10-13 | End: 2024-10-20

## 2024-10-13 RX ORDER — DOXYCYCLINE 100 MG/1
100 CAPSULE ORAL ONCE
Status: COMPLETED | OUTPATIENT
Start: 2024-10-13 | End: 2024-10-13

## 2024-10-13 RX ADMIN — IOPAMIDOL 73 ML: 755 INJECTION, SOLUTION INTRAVENOUS at 13:55

## 2024-10-13 RX ADMIN — DOXYCYCLINE HYCLATE 100 MG: 100 CAPSULE ORAL at 14:47

## 2024-10-13 RX ADMIN — SODIUM CHLORIDE 96 ML: 9 INJECTION, SOLUTION INTRAVENOUS at 13:56

## 2024-10-13 ASSESSMENT — COLUMBIA-SUICIDE SEVERITY RATING SCALE - C-SSRS
2. HAVE YOU ACTUALLY HAD ANY THOUGHTS OF KILLING YOURSELF IN THE PAST MONTH?: NO
1. IN THE PAST MONTH, HAVE YOU WISHED YOU WERE DEAD OR WISHED YOU COULD GO TO SLEEP AND NOT WAKE UP?: NO
6. HAVE YOU EVER DONE ANYTHING, STARTED TO DO ANYTHING, OR PREPARED TO DO ANYTHING TO END YOUR LIFE?: NO

## 2024-10-13 ASSESSMENT — ACTIVITIES OF DAILY LIVING (ADL)
ADLS_ACUITY_SCORE: 35
ADLS_ACUITY_SCORE: 33
ADLS_ACUITY_SCORE: 35

## 2024-10-13 NOTE — LETTER
October 13, 2024      To Whom It May Concern:      Saray Sifuentes was seen in our Emergency Department today, 10/13/24.  I expect her condition to improve over the next 1 days.  She may return to work/school when improved.    Sincerely,        DAR Marmolejo

## 2024-10-13 NOTE — ED PROVIDER NOTES
Emergency Department Note      History of Present Illness     Chief Complaint   Cough    HPI   Saray Sifuentes is a 54 year old female with history of breast cancer who presents with her daughter for a cough. Patient reports that she was on vacation in [place] and was sick while there. She was feeling better when she returned home three weeks ago, but one week ago the cough and sore throat came back. She reports that she two episodes of epistaxis yesterday but this has since subsided and she has not had one since. When she coughed earlier this morning, there was some blood and this is why she came to the ED. No abdominal pain or vomiting. Patient denies history of PE, DVT, and no recent surgeries.     Independent Historian   None    Review of External Notes   None     Past Medical History     Medical History and Problem List   Breast cancer  Hypothyroidism   Neuropathy   Vertigo   Fatty liver     Medications   The patient is not currently taking any prescribed medications    Surgical History   Lymph node biopsy   section  Insert port vascular access  Remove port vascular access   Localized left breast lumpectomy     Physical Exam     Patient Vitals for the past 24 hrs:   BP Temp Temp src Pulse Resp SpO2   10/13/24 1139 (!) 144/78 98  F (36.7  C) Oral 76 20 96 %     Physical Exam  General:  Sitting on bed.  HENT:  No obvious trauma to head. Posterior oropharynx without erythema, uvula is midline and no soft palate petechiae. No swelling beneath tongue.   Right Ear:  External ear normal.   Left Ear:  External ear normal.   Nose:  Nose normal.   Eyes:  Conjunctivae and EOM are normal. Pupils are equal, round, and reactive.   Neck: Normal range of motion. Neck supple. No tracheal deviation present.   CV:  Normal heart sounds. No murmur heard.  Pulm/Chest: Effort normal and breath sounds normal.   Abd: Soft. No distension. There is no tenderness. There is no rigidity, no rebound and no guarding.   M/S: Normal  range of motion. No calf pain or swelling.  Neuro: Awake and alert.   Skin: Skin is warm and dry. No rash noted. Not diaphoretic.   Psych: Normal mood and affect. Behavior is normal.      Diagnostics     Lab Results   Labs Ordered and Resulted from Time of ED Arrival to Time of ED Departure   D DIMER QUANTITATIVE - Abnormal       Result Value    D-Dimer Quantitative 0.97 (*)    BASIC METABOLIC PANEL - Abnormal    Sodium 137      Potassium 3.9      Chloride 102      Carbon Dioxide (CO2) 25      Anion Gap 10      Urea Nitrogen 17.7      Creatinine 0.54      GFR Estimate >90      Calcium 9.9      Glucose 169 (*)    INFLUENZA A/B, RSV, & SARS-COV2 PCR - Normal    Influenza A PCR Negative      Influenza B PCR Negative      RSV PCR Negative      SARS CoV2 PCR Negative     CBC WITH PLATELETS AND DIFFERENTIAL    WBC Count 7.2      RBC Count 4.61      Hemoglobin 14.2      Hematocrit 41.7      MCV 91      MCH 30.8      MCHC 34.1      RDW 12.0      Platelet Count 282      % Neutrophils 55      % Lymphocytes 33      % Monocytes 6      % Eosinophils 5      % Basophils 1      % Immature Granulocytes 0      NRBCs per 100 WBC 0      Absolute Neutrophils 3.9      Absolute Lymphocytes 2.4      Absolute Monocytes 0.4      Absolute Eosinophils 0.4      Absolute Basophils 0.1      Absolute Immature Granulocytes 0.0      Absolute NRBCs 0.0       Imaging   CT Chest Pulmonary Embolism w Contrast   Final Result   IMPRESSION:   1.  No evidence for pulmonary embolism.   2.  Bilateral patchy groundglass opacities may relate to an atypical infectious or inflammatory etiology versus minimal edema.   3.  Moderate bibasilar atelectasis.   4.  Fatty liver.              EKG   None     Independent Interpretation   None    ED Course      Medications Administered   Medications   doxycycline hyclate (VIBRAMYCIN) capsule 100 mg (has no administration in time range)   iopamidol (ISOVUE-370) solution 73 mL (73 mLs Intravenous $Given 10/13/24 4107)   Saline  (96 mLs As instructed $Given 10/13/24 1356)     Procedures   None      Discussion of Management   None    ED Course   ED Course as of 10/13/24 1443   Sun Oct 13, 2024   1205 I evaluated the patient, obtained history, and performed a physical exam as detailed above.    1434 I rechecked on the patient and explained the plan for discharge. They are comfortable with this plan.      Additional Documentation  None    Medical Decision Making / Diagnosis     CMS Diagnoses: None    MIPS  CT for PE was ordered because the patient had an abnormal d-dimer.    MARCOS Sifuentes is a very pleasant 54 year old female who presents for evaluation of cough.  Patient has for 3 weeks.  She noted some hemoptysis.  She had had recent epistaxis that is resolved.  Hemoptysis may be related to that, but given longevity of cough and recent travel and history of cancer, I considered pulmonary embolism.  D-dimer was elevated and follow-up CT PE study is necessary and performed.  Fortunately, there is no evidence of pulmonary embolism.  There are findings to suggest pneumonia.  Molecular COVID and influenza are negative.  History, physical exam and imaging studies are consistent with pneumonia.  First dose of antibiotics given in ED within 2 hours of diagnosis.  There are no signs of complications of pneumonia at this point such as septic shock, bacteremia, empyema, hypoxia, respiratory failure or compromise.  Supportive outpatient management is therefore indicated.  I will not therefore hospitalize for further cares or IV antibiotics.  This seems to be community acquired pneumonia and there are no risk factors at this point for coverage of antibiotic-resistant strains. CT negative for PE. I doubt dissection, acute coronary syndrome, or other worrisome etiology for patients symptoms. Patient will follow-up with primary care physician regardless of disease course within 2 days maximum.  Signs for return visit to ED were discussed with  patient and pneumonia precautions given for home.       The treatment plan was discussed with the patient and they expressed understanding of this plan and consented to the plan.  In addition, the patient will return to the emergency department if their symptoms persist, worsen, if new symptoms arise or if there is any concern as other pathology may be present that is not evident at this time. They also understand the importance of close follow up in the clinic and if unable to do so will return to the emergency department for a reevaluation. All questions were answered.    Disposition   The patient was discharged.     Diagnosis     ICD-10-CM    1. Pneumonia due to infectious organism, unspecified laterality, unspecified part of lung  J18.9       2. Acute cough  R05.1       3. Hemoptysis  R04.2          Discharge Medications   New Prescriptions    DOXYCYCLINE HYCLATE (VIBRAMYCIN) 100 MG CAPSULE    Take 1 capsule (100 mg) by mouth 2 times daily for 7 days.     Scribe Disclosure:  Leana LAURA, am serving as a scribe at 12:05 PM on 10/13/2024 to document services personally performed by Derrick Mayfield DO based on my observations and the provider's statements to me.        Derrick Mayfield DO  10/13/24 0583

## 2024-10-13 NOTE — ED TRIAGE NOTES
Pt has had cough and congestion for 3 weeks, pt had epistaxis x2 yesterday and when she coughed noted blood in sputum     Triage Assessment (Adult)       Row Name 10/13/24 1140          Triage Assessment    Airway WDL WDL        Respiratory WDL    Respiratory WDL cough     Cough Frequency infrequent        Skin Circulation/Temperature WDL    Skin Circulation/Temperature WDL WDL        Cardiac WDL    Cardiac WDL WDL        Peripheral/Neurovascular WDL    Peripheral Neurovascular WDL WDL        Cognitive/Neuro/Behavioral WDL    Cognitive/Neuro/Behavioral WDL WDL

## 2024-12-10 LAB — HEMOCCULT STL QL IA: NEGATIVE

## 2024-12-23 ENCOUNTER — TELEPHONE (OUTPATIENT)
Dept: DERMATOLOGY | Facility: CLINIC | Age: 54
End: 2024-12-23
Payer: COMMERCIAL

## 2024-12-23 NOTE — TELEPHONE ENCOUNTER
[facility] Call Center    Phone Message    May a detailed message be left on voicemail: yes     Reason for Call: Other: Pt is calling to get medications sent over to [id number] - [place], [place] - [address], they are not listed in the notes but she said it is for dark spots and wrinkles, please call back for any questions thanks!     Action Taken: Other: EC DERM     Travel Screening: Not Applicable     Date of Service:

## 2024-12-24 NOTE — TELEPHONE ENCOUNTER
Routing to provider to advise    Thank you,    Linda PENNINGTON,RN BSN  [facility]- [phone]

## 2024-12-27 NOTE — TELEPHONE ENCOUNTER
Patient is wondering if she will be getting her medications sent over to the pharmacy. Please call patient back to discuss. Thanks.

## 2024-12-27 NOTE — TELEPHONE ENCOUNTER
"Spoke with patient to let her know we are still waiting for a response from the provider, she is upset she still needs to wait for these prescriptions as they were to be sent \"way before [place]\". I apologized to patient and let her know I would send another message to [place].     Routing to provider, please see messages below, thank you     Janeen SETHI RN BSN  [facility]  [phone]    "

## 2024-12-28 DIAGNOSIS — L57.8 PHOTOAGING OF SKIN: ICD-10-CM

## 2024-12-28 DIAGNOSIS — L81.4 LENTIGINES: Primary | ICD-10-CM

## 2024-12-28 DIAGNOSIS — L73.8 SEBACEOUS HYPERPLASIA: ICD-10-CM

## 2024-12-28 RX ORDER — TRETINOIN 0.25 MG/G
CREAM TOPICAL
Qty: 45 G | Refills: 1 | Status: SHIPPED | OUTPATIENT
Start: 2024-12-28

## 2024-12-28 RX ORDER — HYDROQUINONE 40 MG/G
CREAM TOPICAL
Qty: 30 G | Refills: 0 | Status: SHIPPED | OUTPATIENT
Start: 2024-12-28

## 2024-12-30 ENCOUNTER — TELEPHONE (OUTPATIENT)
Dept: DERMATOLOGY | Facility: CLINIC | Age: 54
End: 2024-12-30
Payer: COMMERCIAL

## 2024-12-30 NOTE — TELEPHONE ENCOUNTER
Medication sent - based on the note, it must not have been apparent to me at the time she wanted medications, more so that she wanted to talk through options. I will update and addend my note.     I sent tretinoin and hydroquinone to her pharmacy, both require PAs - I dont do PAs for these medications, so if she is not getting them covered by insurance then she can either pay out of pocket or use [facility]

## 2024-12-30 NOTE — TELEPHONE ENCOUNTER
PRIOR AUTHORIZATION DENIED    Medication: TRETINOIN 0.025 % EX CREA  Insurance Company: [email or web address] - Phone [phone] Fax [phone]  Denial Date: 12/30/2024  Denial Reason(s): DIAGNOSIS IS NOT COVERED      Appeal Information:   Patient Notified: NO

## 2024-12-31 NOTE — TELEPHONE ENCOUNTER
Pt notified via my chart message that rx has been denied and will need to pay out of pocket or go to [employer] to see if there is a coupon to help with cost.    Lyn TAVAREZ RN  [facility] [place]  [phone]

## 2024-12-31 NOTE — TELEPHONE ENCOUNTER
See TE from 12/30/24 that pt was notified via my chart about PA denial.    Lyn TAVAREZ RN  [facility] [place]  [phone]

## 2025-01-02 ENCOUNTER — TELEPHONE (OUTPATIENT)
Dept: FAMILY MEDICINE | Facility: CLINIC | Age: 55
End: 2025-01-02
Payer: COMMERCIAL

## 2025-01-02 ENCOUNTER — ANCILLARY PROCEDURE (OUTPATIENT)
Dept: GENERAL RADIOLOGY | Facility: CLINIC | Age: 55
End: 2025-01-02
Payer: COMMERCIAL

## 2025-01-02 ENCOUNTER — OFFICE VISIT (OUTPATIENT)
Dept: URGENT CARE | Facility: URGENT CARE | Age: 55
End: 2025-01-02
Payer: COMMERCIAL

## 2025-01-02 VITALS
TEMPERATURE: 98.3 F | HEART RATE: 67 BPM | OXYGEN SATURATION: 95 % | DIASTOLIC BLOOD PRESSURE: 76 MMHG | RESPIRATION RATE: 18 BRPM | SYSTOLIC BLOOD PRESSURE: 145 MMHG | BODY MASS INDEX: 40.1 KG/M2 | WEIGHT: 211 LBS

## 2025-01-02 DIAGNOSIS — R06.02 SHORTNESS OF BREATH: Primary | ICD-10-CM

## 2025-01-02 DIAGNOSIS — R06.02 SHORTNESS OF BREATH: ICD-10-CM

## 2025-01-02 RX ORDER — AZITHROMYCIN 250 MG/1
TABLET, FILM COATED ORAL
Qty: 6 TABLET | Refills: 0 | Status: SHIPPED | OUTPATIENT
Start: 2025-01-02 | End: 2025-01-07

## 2025-01-02 RX ORDER — ALBUTEROL SULFATE 90 UG/1
2 INHALANT RESPIRATORY (INHALATION) EVERY 6 HOURS PRN
Qty: 18 G | Refills: 0 | Status: SHIPPED | OUTPATIENT
Start: 2025-01-02

## 2025-01-02 NOTE — PROGRESS NOTES
Patient presents with:  Shortness of Breath: Had pneumonia and was told by a nurse she may need another antibiotic. She was seen in the ER      Clinical Decision Making:      ICD-10-CM    1. Shortness of breath  R06.02 XR Chest 2 Views     azithromycin (ZITHROMAX) 250 MG tablet     albuterol (PROAIR HFA/PROVENTIL HFA/VENTOLIN HFA) 108 (90 Base) MCG/ACT inhaler     PRIMARY CARE FOLLOW-UP SCHEDULING        Chest x-ray negative for pneumonia.  Highly considered PE given patient's ongoing shortness of breath, though low concern given that she says shortness of breath has not changed at all October when CT scan was done and was negative for PE. She denies any chest pain or pain with deep breaths, less concern for cardiac etiology.  Does not appear to be in acute respiratory distress today in clinic.  She states that whenever she gets pneumonia she always gets shortness of breath like this, we discussed that chest x-ray was negative for pneumonia but given that she did recently have a diagnosis of pneumonia a couple months ago in addition to having symptoms consistent to what she has when she does get pneumonia I did prescribe azithromycin.  Also prescribed albuterol inhaler to take as needed for shortness of breath.  I told her to have low threshold to go to ER if she has any worsening symptoms of shortness of breath or chest pain.  Also placed referral for her to follow-up with her PCP in 1 to 2 weeks.  Patient in agreement with plan. Patient instructions as below. Discussed red flag symptoms for when to return.       Patient Instructions   Your chest xray was negative. We will still try another round of antibiotics given that you recently had pneumonia and have had it in the past with similar symptoms. I have also prescribed an albuterol inhaler for you to take as needed for shortness of breath. I placed a referral for you to follow-up with your primary care provider in 1-2 weeks to follow-up on symptoms. If you  develop worsening shortness of breath or chest pain before then, go to ER for further evaluation.     HPI:  Saray Sifuentes is a 54 year old female who presents today with concerns of cough and shortness of breath. Was diagnosed with pneumonia on 10/13, symptoms of cough have improved but still feels like she is having some symptoms of shortness of breath. Shortness of breath has not worsened, rather it has persisted. Was also having this shortness of breath at the time pneumonia of diagnosed, was ruled out for PE at that time. No chest pain. No fevers. No sore throat. Denies history PE, DVT, or recent surgeries.     History obtained from the patient.    Problem List:  2023-07: Neck sprain, initial encounter  2023-07: Neck pain  2022-07: Fatty liver  2022-06: Left leg pain  2020-06: ALLEN (dyspnea on exertion)  2020-06: History of breast cancer  2020-06: History of pneumonia  2020-06: History of vertigo  2020-06: Other fatigue  2018-11: Port-A-Cath in place  2018-11: Malignant neoplasm of upper-outer quadrant of left breast in   female, estrogen receptor positive (H)  2017-09: Hypothyroidism, unspecified type      Past Medical History:   Diagnosis Date    Breast cancer (H)     Hypothyroidism, unspecified type 9/29/2017    Neuropathy     PONV (postoperative nausea and vomiting)     Vertigo        Social History     Tobacco Use    Smoking status: Never     Passive exposure: Never    Smokeless tobacco: Never   Substance Use Topics    Alcohol use: No       ROS is negative other than what is noted in HPI.       Vitals:    01/02/25 1736   BP: (!) 145/76   Pulse: 67   Resp: 18   Temp: 98.3  F (36.8  C)   TempSrc: Tympanic   SpO2: 95%   Weight: 95.7 kg (211 lb)       Physical Exam  Constitutional:       General: She is not in acute distress.     Appearance: She is not diaphoretic.   HENT:      Head: Normocephalic and atraumatic.      Right Ear: Tympanic membrane and external ear normal.      Left Ear: Tympanic membrane  and external ear normal.      Mouth/Throat:      Pharynx: No oropharyngeal exudate or posterior oropharyngeal erythema.   Eyes:      Conjunctiva/sclera: Conjunctivae normal.   Cardiovascular:      Rate and Rhythm: Normal rate and regular rhythm.      Heart sounds: Normal heart sounds. No murmur heard.  Pulmonary:      Effort: Pulmonary effort is normal. No respiratory distress.      Breath sounds: Normal breath sounds.   Abdominal:      Palpations: Abdomen is soft.      Tenderness: There is no abdominal tenderness.   Musculoskeletal:         General: Normal range of motion.   Skin:     General: Skin is warm.      Capillary Refill: Capillary refill takes less than 2 seconds.   Neurological:      General: No focal deficit present.      Mental Status: She is alert.   Psychiatric:         Mood and Affect: Mood normal.         Thought Content: Thought content normal.         Judgment: Judgment normal.         Results:  Results for orders placed or performed in visit on 01/02/25   XR Chest 2 Views     Status: None    Narrative    EXAM: XR CHEST 2 VIEWS  LOCATION: [facility]  DATE: 1/2/2025    INDICATION:  Shortness of breath  COMPARISON: CT 10/13/2024      Impression    IMPRESSION: Negative chest.       I have personally ordered and preliminarily reviewed the following xray, I have noted no evidence of pneumonia.         At the end of the encounter, I discussed results, diagnosis, medications. Discussed red flags for immediate return to clinic/ER, as well as indications for follow up if no improvement. Patient understood and agreed to plan. Patient was stable for discharge.    IRINA Daniel [facility]

## 2025-01-02 NOTE — TELEPHONE ENCOUNTER
Pt calling wanted to have a repeat chest xray form having pneumonia back in October. Pt feels she has not gotten over it and still has some SOB when going up stairs. Pt advised to go to  as no in clinic appts today or tomorrow. Pt in agreement with plan.    Nat Cancino RN

## 2025-01-03 NOTE — PATIENT INSTRUCTIONS
Your chest xray was negative. We will still try another round of antibiotics given that you recently had pneumonia and have had it in the past with similar symptoms. I have also prescribed an albuterol inhaler for you to take as needed for shortness of breath. I placed a referral for you to follow-up with your primary care provider in 1-2 weeks to follow-up on symptoms. If you develop worsening shortness of breath or chest pain before then, go to ER for further evaluation.

## 2025-01-20 ENCOUNTER — OFFICE VISIT (OUTPATIENT)
Dept: FAMILY MEDICINE | Facility: CLINIC | Age: 55
End: 2025-01-20
Payer: COMMERCIAL

## 2025-01-20 VITALS
TEMPERATURE: 98.3 F | WEIGHT: 210 LBS | BODY MASS INDEX: 39.91 KG/M2 | RESPIRATION RATE: 18 BRPM | SYSTOLIC BLOOD PRESSURE: 132 MMHG | HEART RATE: 71 BPM | OXYGEN SATURATION: 96 % | DIASTOLIC BLOOD PRESSURE: 86 MMHG

## 2025-01-20 DIAGNOSIS — R06.02 SHORTNESS OF BREATH: ICD-10-CM

## 2025-01-20 DIAGNOSIS — R53.83 TIRED: Primary | ICD-10-CM

## 2025-01-20 DIAGNOSIS — R73.09 ELEVATED GLUCOSE: ICD-10-CM

## 2025-01-20 PROBLEM — C50.412 MALIGNANT NEOPLASM OF UPPER-OUTER QUADRANT OF LEFT BREAST IN FEMALE, ESTROGEN RECEPTOR POSITIVE (H): Status: RESOLVED | Noted: 2018-11-16 | Resolved: 2025-01-20

## 2025-01-20 PROBLEM — Z17.0 MALIGNANT NEOPLASM OF UPPER-OUTER QUADRANT OF LEFT BREAST IN FEMALE, ESTROGEN RECEPTOR POSITIVE (H): Status: RESOLVED | Noted: 2018-11-16 | Resolved: 2025-01-20

## 2025-01-20 PROCEDURE — 99214 OFFICE O/P EST MOD 30 MIN: CPT | Performed by: FAMILY MEDICINE

## 2025-01-20 ASSESSMENT — ENCOUNTER SYMPTOMS: SHORTNESS OF BREATH: 1

## 2025-01-20 ASSESSMENT — PAIN SCALES - GENERAL: PAINLEVEL_OUTOF10: NO PAIN (0)

## 2025-01-20 NOTE — PROGRESS NOTES
Assessment & Plan     Shortness of breath  Has been feeling tired and SOB after had pneumonia 2 months ago, has no change in vital sign, will have her to check on echocardiogram based on her previous H/O breast cancer treatment  - PRIMARY CARE FOLLOW-UP SCHEDULING  - Echocardiogram Complete; Future    Tired  Mentioned above, her glucose has been fluctuating, she has family H/O DM, will review there A1C and update   - Hemoglobin A1c; Future  - Iron and iron binding capacity; Future  - Ferritin; Future  - Echocardiogram Complete; Future  - Vitamin D Deficiency; Future    Elevated glucose  Mentioned above   - Hemoglobin A1c; Future  - Echocardiogram Complete; Future  - Vitamin D Deficiency; Future            FUTURE APPOINTMENTS:       - Follow-up visit after lab and echo done, will also discuss about adding Bupropion for her anxiety and sx     Subjective   Saray is a 55 year old, presenting for the following health issues:  Shortness of Breath        1/20/2025    10:15 AM   Additional Questions   Roomed by Judy     History of Present Illness       Reason for visit:  Fatigue, SOB, lack of energy  Symptom onset:  More than a month  Symptom intensity:  Moderate  Symptom progression:  Staying the same  Had these symptoms before:  No   She is taking medications regularly.         Acute Illness  Acute illness concerns: Sob, cough and fatigue  Onset/Duration: 3-4 weeks  Symptoms:  Fever: No  Chills/Sweats: No  Headache (location?): No  Sinus Pressure: No  Conjunctivitis:  No  Ear Pain: no  Rhinorrhea: No  Congestion: No  Sore Throat: No  Cough: YES-productive of clear sputum  Wheeze: No  Decreased Appetite: No  Nausea: No  Vomiting: No  Diarrhea: No  Dysuria/Freq.: No  Dysuria or Hematuria: No  Fatigue/Achiness: YES  Sick/Strep Exposure: No  Therapies tried and outcome: None        Review of Systems  Constitutional, HEENT, cardiovascular, pulmonary, GI, , musculoskeletal, neuro, skin, endocrine and psych systems are  negative, except as otherwise noted.      Objective    /86   Pulse 71   Temp 98.3  F (36.8  C) (Temporal)   Resp 18   Wt 95.3 kg (210 lb)   LMP 09/29/2012 (Approximate)   SpO2 96%   Breastfeeding No   BMI 39.91 kg/m    Body mass index is 39.91 kg/m .  Physical Exam   GENERAL: alert and no distress  EYES: Eyes grossly normal to inspection, PERRL and conjunctivae and sclerae normal  HENT: ear canals and TM's normal, nose and mouth without ulcers or lesions  NECK: no adenopathy, no asymmetry, masses, or scars  RESP: lungs clear to auscultation - no rales, rhonchi or wheezes  CV: regular rate and rhythm, normal S1 S2, no S3 or S4, no murmur, click or rub, no peripheral edema  ABDOMEN: soft, nontender, no hepatosplenomegaly, no masses and bowel sounds normal  MS: no gross musculoskeletal defects noted, no edema  SKIN: no suspicious lesions or rashes  NEURO: Normal strength and tone, mentation intact and speech normal            Signed Electronically by: Stewart Burns MD

## 2025-03-20 ENCOUNTER — HOSPITAL ENCOUNTER (OUTPATIENT)
Dept: CARDIOLOGY | Facility: CLINIC | Age: 55
Discharge: HOME | End: 2025-03-20
Attending: FAMILY MEDICINE
Payer: COMMERCIAL

## 2025-03-20 DIAGNOSIS — R06.02 SHORTNESS OF BREATH: ICD-10-CM

## 2025-03-20 DIAGNOSIS — R73.09 ELEVATED GLUCOSE: ICD-10-CM

## 2025-03-20 DIAGNOSIS — R53.83 TIRED: ICD-10-CM

## 2025-03-20 LAB — LVEF ECHO: NORMAL

## 2025-03-20 PROCEDURE — 93306 TTE W/DOPPLER COMPLETE: CPT

## 2025-04-30 ENCOUNTER — ANCILLARY PROCEDURE (OUTPATIENT)
Dept: MAMMOGRAPHY | Facility: CLINIC | Age: 55
End: 2025-04-30
Payer: COMMERCIAL

## 2025-04-30 DIAGNOSIS — Z12.31 VISIT FOR SCREENING MAMMOGRAM: ICD-10-CM

## 2025-04-30 PROCEDURE — 77063 BREAST TOMOSYNTHESIS BI: CPT | Mod: TC | Performed by: STUDENT IN AN ORGANIZED HEALTH CARE EDUCATION/TRAINING PROGRAM

## 2025-04-30 PROCEDURE — 77067 SCR MAMMO BI INCL CAD: CPT | Mod: TC | Performed by: STUDENT IN AN ORGANIZED HEALTH CARE EDUCATION/TRAINING PROGRAM

## 2025-05-02 NOTE — PROGRESS NOTES
Health Maintenance       DTaP/Tdap/Td Vaccine (3 - Td or Tdap)  Overdue since 1/7/2025    TDaP Pregnancy Vaccine (Once)  Overdue since 3/20/2025    Cervical Cancer Screening (HPV/Cotest - Every 3 Years)  Due since 3/11/2025    COVID-19 Vaccine (1 - 2024-25 season)  Never done           Following review of the above:  Patient is not proceeding with: Cervical Cancer Screening,  , COVID-19, and Dtap/Tdap/Td    Note: Refer to final orders and clinician documentation.         Infusion Nursing Note:  Saray Huntley presents today for herceptin.    Patient seen by provider today: No   present during visit today: Not Applicable.    Note: N/A.    Intravenous Access:  Peripheral IV placed.    Treatment Conditions:  ECHO/MUGA completed 9/6  EF 60-65%.      Post Infusion Assessment:  Patient tolerated infusion without incident.  Blood return noted pre and post infusion.  Site patent and intact, free from redness, edema or discomfort.  No evidence of extravasations.  Access discontinued per protocol.       Discharge Plan:   Discharge instructions reviewed with: Patient.  Patient and/or family verbalized understanding of discharge instructions and all questions answered.  Patient discharged in stable condition accompanied by: self.  Departure Mode: Ambulatory.    Rajwinder Paniagua, RN, RN

## 2025-06-25 ENCOUNTER — OFFICE VISIT (OUTPATIENT)
Dept: FAMILY MEDICINE | Facility: CLINIC | Age: 55
End: 2025-06-25
Payer: COMMERCIAL

## 2025-06-25 VITALS
RESPIRATION RATE: 18 BRPM | WEIGHT: 208 LBS | TEMPERATURE: 97.4 F | SYSTOLIC BLOOD PRESSURE: 126 MMHG | HEIGHT: 61 IN | DIASTOLIC BLOOD PRESSURE: 76 MMHG | HEART RATE: 75 BPM | OXYGEN SATURATION: 96 % | BODY MASS INDEX: 39.27 KG/M2

## 2025-06-25 DIAGNOSIS — F43.0 ACUTE REACTION TO STRESS: ICD-10-CM

## 2025-06-25 DIAGNOSIS — M71.9: Primary | ICD-10-CM

## 2025-06-25 DIAGNOSIS — K21.00 GASTROESOPHAGEAL REFLUX DISEASE WITH ESOPHAGITIS, UNSPECIFIED WHETHER HEMORRHAGE: ICD-10-CM

## 2025-06-25 DIAGNOSIS — M75.21 BICEPS TENDINITIS OF RIGHT UPPER EXTREMITY: ICD-10-CM

## 2025-06-25 DIAGNOSIS — C50.919 MALIGNANT NEOPLASM OF FEMALE BREAST, UNSPECIFIED ESTROGEN RECEPTOR STATUS, UNSPECIFIED LATERALITY, UNSPECIFIED SITE OF BREAST (H): ICD-10-CM

## 2025-06-25 PROCEDURE — 3074F SYST BP LT 130 MM HG: CPT | Performed by: FAMILY MEDICINE

## 2025-06-25 PROCEDURE — 99215 OFFICE O/P EST HI 40 MIN: CPT | Performed by: FAMILY MEDICINE

## 2025-06-25 PROCEDURE — 3078F DIAST BP <80 MM HG: CPT | Performed by: FAMILY MEDICINE

## 2025-06-25 PROCEDURE — 1125F AMNT PAIN NOTED PAIN PRSNT: CPT | Performed by: FAMILY MEDICINE

## 2025-06-25 RX ORDER — MELOXICAM 7.5 MG/1
7.5 TABLET ORAL DAILY
Qty: 30 TABLET | Refills: 0 | Status: SHIPPED | OUTPATIENT
Start: 2025-06-25

## 2025-06-25 RX ORDER — ESCITALOPRAM OXALATE 5 MG/1
5 TABLET ORAL DAILY
Qty: 90 TABLET | Refills: 1 | Status: SHIPPED | OUTPATIENT
Start: 2025-06-25

## 2025-06-25 RX ORDER — PANTOPRAZOLE SODIUM 20 MG/1
20 TABLET, DELAYED RELEASE ORAL EVERY EVENING
Qty: 30 TABLET | Refills: 0 | Status: SHIPPED | OUTPATIENT
Start: 2025-06-25

## 2025-06-25 ASSESSMENT — ANXIETY QUESTIONNAIRES
1. FEELING NERVOUS, ANXIOUS, OR ON EDGE: SEVERAL DAYS
8. IF YOU CHECKED OFF ANY PROBLEMS, HOW DIFFICULT HAVE THESE MADE IT FOR YOU TO DO YOUR WORK, TAKE CARE OF THINGS AT HOME, OR GET ALONG WITH OTHER PEOPLE?: SOMEWHAT DIFFICULT
IF YOU CHECKED OFF ANY PROBLEMS ON THIS QUESTIONNAIRE, HOW DIFFICULT HAVE THESE PROBLEMS MADE IT FOR YOU TO DO YOUR WORK, TAKE CARE OF THINGS AT HOME, OR GET ALONG WITH OTHER PEOPLE: SOMEWHAT DIFFICULT
2. NOT BEING ABLE TO STOP OR CONTROL WORRYING: SEVERAL DAYS
3. WORRYING TOO MUCH ABOUT DIFFERENT THINGS: SEVERAL DAYS
7. FEELING AFRAID AS IF SOMETHING AWFUL MIGHT HAPPEN: NOT AT ALL
5. BEING SO RESTLESS THAT IT IS HARD TO SIT STILL: NOT AT ALL
4. TROUBLE RELAXING: NOT AT ALL
6. BECOMING EASILY ANNOYED OR IRRITABLE: SEVERAL DAYS
GAD7 TOTAL SCORE: 4
7. FEELING AFRAID AS IF SOMETHING AWFUL MIGHT HAPPEN: NOT AT ALL

## 2025-06-25 ASSESSMENT — PAIN SCALES - GENERAL: PAINLEVEL_OUTOF10: MODERATE PAIN (5)

## 2025-06-25 NOTE — PROGRESS NOTES
"  Assessment & Plan     Malignant neoplasm of female breast, unspecified estrogen receptor status, unspecified laterality, unspecified site of breast (H)  Seeing oncology, has no clinical worsening of sx, will keep monitoring     Bursitis of upper extremity  Worsening with arm movement at exercise, has no radiculopathy nor neck pain,will have her to try MATT-2I, pt declined PT  - meloxicam (MOBIC) 7.5 MG tablet; Take 1 tablet (7.5 mg) by mouth daily.    Biceps tendinitis of right upper extremity  Worsening as mentioned above, will have her to start med as mentioned above   - meloxicam (MOBIC) 7.5 MG tablet; Take 1 tablet (7.5 mg) by mouth daily.    Gastroesophageal reflux disease with esophagitis, unspecified whether hemorrhage  Worening with sore throat and mucous built up, will have her to stop taking Ibuprofen and switch to MATT-2I, will add PPI  - pantoprazole (PROTONIX) 20 MG EC tablet; Take 1 tablet (20 mg) by mouth every evening.    Acute reaction to stress  Worsening with stress at work and life, will have her to try shot acting  selective serotonin reuptake inhibitor   - escitalopram (LEXAPRO) 5 MG tablet; Take 1 tablet (5 mg) by mouth daily.    A total time of 43 minutes was spent with the patient today. Of this time More than 50% was spent counseling and coordinating of care of the above concerns.      BMI  Estimated body mass index is 39.78 kg/m  as calculated from the following:    Height as of this encounter: 1.54 m (5' 0.63\").    Weight as of this encounter: 94.3 kg (208 lb).   Weight management plan: Discussed healthy diet and exercise guidelines          Claudy So is a 55 year old, presenting for the following health issues:  Headache, Dizziness (Chronic vertigo with SOB, worse when she stands up too fast), and Muscle Pain (In both arms and lower legs, pain doesn't radiate anywhere)        1/20/2025    10:24 AM   Additional Questions   Roomed by Delicia     History of Present Illness " "      Mental Health Follow-up:  Patient presents to follow-up on Anxiety.    Patient's anxiety since last visit has been:  Medium  The patient is having other symptoms associated with anxiety.  Any significant life events: No  Patient is feeling anxious or having panic attacks.  Patient has no concerns about alcohol or drug use.    Headaches:   Since the patient's last clinic visit, headaches are: worsened  The patient is getting headaches:  I don t know if it is a migraine. I get it in the right side of head.  She is able to do normal daily activities when she has a migraine.  The patient is taking the following rescue/relief medications:  Ibuprofen (Advil, Motrin)   Patient states \"I get some relief\" from the rescue/relief medications.   The patient is taking the following medications to prevent migraines:  No medications to prevent migraines  In the past 4 weeks, the patient has gone to an Urgent Care or Emergency Room 0 times times due to headaches.    Reason for visit:  Vértigo, leg pain, agitated, headaches,arm pain  Symptom onset:  1-2 weeks ago  Symptoms include:  Pain  Symptom intensity:  Moderate  Symptom progression:  Staying the same  Had these symptoms before:  Yes  Has tried/received treatment for these symptoms:  No  What makes it better:  Advil   She is taking medications regularly.            Where in your body do you have pain? Musculoskeletal problem/pain  Onset/Duration: 3 weeks  Description  Location: leg and arm  - bilateral  Joint Swelling: No  Redness: No  Pain: YES  Warmth: No  Intensity:  moderate  Progression of Symptoms:  waxing and waning  Accompanying signs and symptoms:   Fevers: No  Numbness/tingling/weakness: No  History  Trauma to the area: No  Recent illness:  No  Previous similar problem: YES  Previous evaluation:  No  Precipitating or alleviating factors:  Aggravating factors include: none  Therapies tried and outcome: nothing        Review of Systems  Constitutional, HEENT, " "cardiovascular, pulmonary, GI, , musculoskeletal, neuro, skin, endocrine and psych systems are negative, except as otherwise noted.      Objective    /76   Pulse 75   Temp 97.4  F (36.3  C) (Temporal)   Resp 18   Ht 1.54 m (5' 0.63\")   Wt 94.3 kg (208 lb)   LMP 09/29/2012 (Approximate)   SpO2 96%   BMI 39.78 kg/m    Body mass index is 39.78 kg/m .  Physical Exam   GENERAL: alert and no distress  EYES: Eyes grossly normal to inspection, PERRL and conjunctivae and sclerae normal  HENT: ear canals and TM's normal, nose and mouth without ulcers or lesions  NECK: no adenopathy, no asymmetry, masses, or scars  RESP: lungs clear to auscultation - no rales, rhonchi or wheezes  CV: regular rate and rhythm, normal S1 S2, no S3 or S4, no murmur, click or rub, no peripheral edema  ABDOMEN: soft, nontender, no hepatosplenomegaly, no masses and bowel sounds normal  MS: no gross musculoskeletal defects noted, no edema  NEURO: Normal strength and tone, mentation intact and speech normal  BACK: no CVA tenderness, no paralumbar tenderness  PSYCH: mentation appears normal, affect normal/bright  LYMPH: no cervical, supraclavicular, axillary, or inguinal adenopathy            Signed Electronically by: Stewart Burns MD    "

## 2025-07-17 DIAGNOSIS — K21.00 GASTROESOPHAGEAL REFLUX DISEASE WITH ESOPHAGITIS, UNSPECIFIED WHETHER HEMORRHAGE: ICD-10-CM

## 2025-07-17 RX ORDER — PANTOPRAZOLE SODIUM 20 MG/1
20 TABLET, DELAYED RELEASE ORAL EVERY EVENING
Qty: 90 TABLET | Refills: 2 | Status: SHIPPED | OUTPATIENT
Start: 2025-07-17

## 2025-08-11 ENCOUNTER — MYC MEDICAL ADVICE (OUTPATIENT)
Dept: FAMILY MEDICINE | Facility: CLINIC | Age: 55
End: 2025-08-11
Payer: COMMERCIAL

## 2025-08-11 DIAGNOSIS — M75.21 BICEPS TENDINITIS OF RIGHT UPPER EXTREMITY: ICD-10-CM

## 2025-08-11 DIAGNOSIS — M71.9: ICD-10-CM

## 2025-08-11 RX ORDER — MELOXICAM 7.5 MG/1
7.5 TABLET ORAL DAILY
Qty: 30 TABLET | Refills: 0 | Status: SHIPPED | OUTPATIENT
Start: 2025-08-11

## (undated) DEVICE — ESU ELEC BLADE 2.75" COATED/INSULATED E1455

## (undated) DEVICE — APPLICATOR COTTON TIP 6"X2 STERILE LF 6012

## (undated) DEVICE — SU MONOCRYL 4-0 PS-2 18" UND Y496G

## (undated) DEVICE — PACK MINOR SBA15MIFSE

## (undated) DEVICE — SYR 10ML LL W/O NDL

## (undated) DEVICE — SYR 03ML LL W/O NDL 309657

## (undated) DEVICE — LINEN TOWEL PACK X5 5464

## (undated) DEVICE — GLOVE PROTEXIS W/NEU-THERA 7.5  2D73TE75

## (undated) DEVICE — SYR 10ML LL W/O NDL 302995

## (undated) DEVICE — NDL 25GA 1.5" 305127

## (undated) DEVICE — GLOVE GAMMEX DERMAPRENE ULTRA SZ 8 LF 8516

## (undated) DEVICE — SOL WATER IRRIG 1000ML BOTTLE 2F7114

## (undated) DEVICE — SOL NACL 0.9% IRRIG 1000ML BOTTLE 07138-09

## (undated) DEVICE — SYR 05ML LL W/O NDL

## (undated) DEVICE — SU VICRYL 3-0 SH 27" J316H

## (undated) DEVICE — DRAPE COVER C-ARM SEAMLESS SNAP-KAP 03-KP26 LATEX FREE

## (undated) DEVICE — SYR 10ML SLIP TIP W/O NDL

## (undated) DEVICE — SU VICRYL 3-0 TIE 12X18" J904T

## (undated) DEVICE — NDL 19GA 1.5"

## (undated) DEVICE — SU DERMABOND ADVANCED .7ML DNX12

## (undated) DEVICE — ESU GROUND PAD UNIVERSAL W/O CORD

## (undated) DEVICE — SU SILK 2-0 FSL 18" 677G

## (undated) DEVICE — CLIP ETHICON LIGACLIP SM BLUE LT100

## (undated) DEVICE — BLADE KNIFE SURG 11 371111

## (undated) DEVICE — ESU PENCIL W/SMOKE EVAC NEPTUNE STRYKER 0703-046-000

## (undated) DEVICE — DRAPE LAP TRANSVERSE 29421

## (undated) DEVICE — SUCTION CANISTER MEDIVAC LINER 3000ML W/LID 65651-530

## (undated) DEVICE — SLEEVE PROTECTIVE BREAST BIOPSY  GMSLV001-10

## (undated) DEVICE — DECANTER BAG 2002S

## (undated) DEVICE — DRAPE BREAST/CHEST 29420

## (undated) DEVICE — DRAPE LEGGINGS 8421

## (undated) DEVICE — PREP CHLORAPREP W/ORANGE TINT 10.5ML 260715

## (undated) DEVICE — DECANTER VIAL 2006S

## (undated) DEVICE — SU PROLENE 2-0 CT-2 30" 8411H

## (undated) DEVICE — PREP CHLORAPREP 26ML TINTED ORANGE  260815

## (undated) DEVICE — SYR 30ML SLIP TIP W/O NDL 302833

## (undated) DEVICE — COVER ULTRASOUND PROBE 6X48" PC1290

## (undated) DEVICE — PAD CHUX UNDERPAD 23X24" 7136

## (undated) DEVICE — SOL NACL 0.9% INJ 250ML BAG 2B1322Q

## (undated) DEVICE — SYR 10ML SLIP TIP W/O NDL 303134

## (undated) DEVICE — GOWN IMPERVIOUS SPECIALTY XLG/XLONG 32474

## (undated) RX ORDER — FENTANYL CITRATE 50 UG/ML
INJECTION, SOLUTION INTRAMUSCULAR; INTRAVENOUS
Status: DISPENSED
Start: 2019-03-19

## (undated) RX ORDER — BUPIVACAINE HYDROCHLORIDE 2.5 MG/ML
INJECTION, SOLUTION EPIDURAL; INFILTRATION; INTRACAUDAL
Status: DISPENSED
Start: 2018-11-28

## (undated) RX ORDER — CEFAZOLIN SODIUM 2 G/100ML
INJECTION, SOLUTION INTRAVENOUS
Status: DISPENSED
Start: 2018-11-28

## (undated) RX ORDER — PROPOFOL 10 MG/ML
INJECTION, EMULSION INTRAVENOUS
Status: DISPENSED
Start: 2019-03-19

## (undated) RX ORDER — LIDOCAINE HYDROCHLORIDE 10 MG/ML
INJECTION, SOLUTION EPIDURAL; INFILTRATION; INTRACAUDAL; PERINEURAL
Status: DISPENSED
Start: 2019-03-19

## (undated) RX ORDER — FENTANYL CITRATE 50 UG/ML
INJECTION, SOLUTION INTRAMUSCULAR; INTRAVENOUS
Status: DISPENSED
Start: 2018-11-28

## (undated) RX ORDER — LIDOCAINE HYDROCHLORIDE 20 MG/ML
INJECTION, SOLUTION EPIDURAL; INFILTRATION; INTRACAUDAL; PERINEURAL
Status: DISPENSED
Start: 2019-03-19

## (undated) RX ORDER — SCOLOPAMINE TRANSDERMAL SYSTEM 1 MG/1
PATCH, EXTENDED RELEASE TRANSDERMAL
Status: DISPENSED
Start: 2019-03-19

## (undated) RX ORDER — ONDANSETRON 2 MG/ML
INJECTION INTRAMUSCULAR; INTRAVENOUS
Status: DISPENSED
Start: 2019-03-19

## (undated) RX ORDER — CEFAZOLIN SODIUM 2 G/100ML
INJECTION, SOLUTION INTRAVENOUS
Status: DISPENSED
Start: 2019-03-19

## (undated) RX ORDER — ACETAMINOPHEN 325 MG/1
TABLET ORAL
Status: DISPENSED
Start: 2019-03-19

## (undated) RX ORDER — HYDROCODONE BITARTRATE AND ACETAMINOPHEN 5; 325 MG/1; MG/1
TABLET ORAL
Status: DISPENSED
Start: 2018-11-28

## (undated) RX ORDER — BUPIVACAINE HYDROCHLORIDE AND EPINEPHRINE 2.5; 5 MG/ML; UG/ML
INJECTION, SOLUTION EPIDURAL; INFILTRATION; INTRACAUDAL; PERINEURAL
Status: DISPENSED
Start: 2019-03-19

## (undated) RX ORDER — HEPARIN SODIUM (PORCINE) LOCK FLUSH IV SOLN 100 UNIT/ML 100 UNIT/ML
SOLUTION INTRAVENOUS
Status: DISPENSED
Start: 2018-11-30

## (undated) RX ORDER — ACYCLOVIR 200 MG/1
CAPSULE ORAL
Status: DISPENSED
Start: 2019-03-19

## (undated) RX ORDER — HEPARIN SODIUM 1000 [USP'U]/ML
INJECTION, SOLUTION INTRAVENOUS; SUBCUTANEOUS
Status: DISPENSED
Start: 2018-11-28

## (undated) RX ORDER — LIDOCAINE HYDROCHLORIDE 10 MG/ML
INJECTION, SOLUTION EPIDURAL; INFILTRATION; INTRACAUDAL; PERINEURAL
Status: DISPENSED
Start: 2018-11-28

## (undated) RX ORDER — DEXAMETHASONE SODIUM PHOSPHATE 4 MG/ML
INJECTION, SOLUTION INTRA-ARTICULAR; INTRALESIONAL; INTRAMUSCULAR; INTRAVENOUS; SOFT TISSUE
Status: DISPENSED
Start: 2019-03-19

## (undated) RX ORDER — EPINEPHRINE 1 MG/ML
INJECTION, SOLUTION INTRAMUSCULAR; SUBCUTANEOUS
Status: DISPENSED
Start: 2018-11-28

## (undated) RX ORDER — HEPARIN SODIUM (PORCINE) LOCK FLUSH IV SOLN 100 UNIT/ML 100 UNIT/ML
SOLUTION INTRAVENOUS
Status: DISPENSED
Start: 2018-11-28